# Patient Record
Sex: MALE | Race: WHITE | NOT HISPANIC OR LATINO | Employment: OTHER | URBAN - METROPOLITAN AREA
[De-identification: names, ages, dates, MRNs, and addresses within clinical notes are randomized per-mention and may not be internally consistent; named-entity substitution may affect disease eponyms.]

---

## 2021-07-27 ENCOUNTER — OFFICE VISIT (OUTPATIENT)
Dept: CARDIOLOGY | Facility: CLINIC | Age: 86
End: 2021-07-27

## 2021-07-27 VITALS
HEIGHT: 69 IN | DIASTOLIC BLOOD PRESSURE: 68 MMHG | SYSTOLIC BLOOD PRESSURE: 124 MMHG | RESPIRATION RATE: 18 BRPM | HEART RATE: 64 BPM | WEIGHT: 185.5 LBS | BODY MASS INDEX: 27.47 KG/M2

## 2021-07-27 DIAGNOSIS — R94.31 ABNORMAL EKG: Primary | ICD-10-CM

## 2021-07-27 PROCEDURE — 99204 OFFICE O/P NEW MOD 45 MIN: CPT | Performed by: INTERNAL MEDICINE

## 2021-07-27 RX ORDER — LOSARTAN POTASSIUM AND HYDROCHLOROTHIAZIDE 25; 100 MG/1; MG/1
1 TABLET ORAL DAILY
COMMUNITY
End: 2021-08-31 | Stop reason: DRUGHIGH

## 2021-07-28 NOTE — PROGRESS NOTES
Cardiology clinic note  Aashish Thapa MD, PhD  Subjective:     Encounter Date:07/27/2021      Patient ID: Art Mullins is a 86 y.o. male.    Chief Complaint:  Chief Complaint   Patient presents with   • Abnormal ECG       HPI:  History of Present Illness  At the pleasure to see this very pleasant 86-year-old gentleman is a new patient today for abnormal EKG demonstrating sinus rhythm with frequent PACs, he is a treated for essential hypertension but has no cardiovascular comorbidities such as hyperlipidemia, history of stroke, history of heart attack, he has no stents no prior bypass surgery no history of any heart failure and he is asymptomatic NYHA class I CCS class I and plays golf avidly twice a week.  His heart rate 64 his blood pressure is 124/68 on antihypertensive therapy including metoprolol 25 twice daily, Crestor 40 daily with lipids at target by last check, losartan HCTZ combination managed by primary care.  We are being asked for interpretation of abnormal EKG and cardiovascular impact.    Review of systems otherwise negative x14 point review of systems except as mentioned above    Historical data copied forward from previous encounters in EMR is unchanged next  The following portions of the patient's history were reviewed and updated as appropriate: allergies, current medications, past family history, past medical history, past social history, past surgical history and problem list.    Problem List:  There is no problem list on file for this patient.      Past Medical History:  Past Medical History:   Diagnosis Date   • Hyperlipidemia    • Hypertension        Past Surgical History:  No past surgical history on file.    Social History:  Social History     Socioeconomic History   • Marital status:      Spouse name: Not on file   • Number of children: Not on file   • Years of education: Not on file   • Highest education level: Not on file   Tobacco Use   • Smoking status: Never Smoker  "  Substance and Sexual Activity   • Alcohol use: No   • Drug use: No   • Sexual activity: Defer       Allergies:  Allergies   Allergen Reactions   • Sulfa Antibiotics        Immunizations:  Immunization History   Administered Date(s) Administered   • Tdap 09/01/2016       ROS:  ROS       Objective:         /68 (BP Location: Left arm, Patient Position: Sitting)   Pulse 64   Resp 18   Ht 175.3 cm (69\")   Wt 84.1 kg (185 lb 8 oz)   BMI 27.39 kg/m²     Physical Exam  Regular rate and rhythm no rubs murmurs gallops  No heave no lift  Normal CV exam  No clubbing cyanosis or edema  Clear to auscultation    In-Office Procedure(s):  Procedures    ASCVD RIsk Score::  The ASCVD Risk score (Delavanleesa MCNULTY Jr., et al., 2013) failed to calculate for the following reasons:    The 2013 ASCVD risk score is only valid for ages 40 to 79    Recent Radiology:  Imaging Results (Most Recent)     None          Lab Review:   No visits with results within 6 Month(s) from this visit.   Latest known visit with results is:   No results found for any previous visit.                Assessment:          Diagnosis Plan   1. Abnormal EKG  Holter Monitor - 72 Hour Up To 15 Days    Adult Transthoracic Echo Complete W/ Cont if Necessary Per Protocol          Plan:      Abnormal EKG  2D echo for structural and functional evaluation  Zio patch for ambulatory ECG monitoring    Essential hypertension controlled continue present medications  Hyperlipidemia controlled  Asymptomatic    Telehealth in 30 days to review results, sooner if needed    Aashish Thapa MD, PhD      Level of Care:                 Aashish Thapa MD  07/28/21  .  "

## 2021-08-24 ENCOUNTER — HOSPITAL ENCOUNTER (OUTPATIENT)
Dept: CARDIOLOGY | Facility: HOSPITAL | Age: 86
Discharge: HOME | End: 2021-08-24
Admitting: INTERNAL MEDICINE

## 2021-08-24 ENCOUNTER — TELEPHONE (OUTPATIENT)
Dept: CARDIOLOGY | Facility: CLINIC | Age: 86
End: 2021-08-24

## 2021-08-24 VITALS
HEIGHT: 69 IN | WEIGHT: 185 LBS | DIASTOLIC BLOOD PRESSURE: 52 MMHG | SYSTOLIC BLOOD PRESSURE: 121 MMHG | BODY MASS INDEX: 27.4 KG/M2

## 2021-08-24 DIAGNOSIS — R94.31 ABNORMAL EKG: ICD-10-CM

## 2021-08-24 LAB
AORTIC ARCH: 3.2 CM
AORTIC DIMENSIONLESS INDEX: 0.5 (DI)
ASCENDING AORTA: 3.4 CM
BH CV ECHO MEAS - ACS: 2 CM
BH CV ECHO MEAS - AI DEC SLOPE: 218 CM/SEC^2
BH CV ECHO MEAS - AI MAX PG: 80.3 MMHG
BH CV ECHO MEAS - AI MAX VEL: 448 CM/SEC
BH CV ECHO MEAS - AI P1/2T: 601.9 MSEC
BH CV ECHO MEAS - AO MAX PG: 8 MMHG
BH CV ECHO MEAS - AO MEAN PG (FULL): 3 MMHG
BH CV ECHO MEAS - AO MEAN PG: 4 MMHG
BH CV ECHO MEAS - AO ROOT AREA (BSA CORRECTED): 1.5
BH CV ECHO MEAS - AO ROOT AREA: 6.6 CM^2
BH CV ECHO MEAS - AO ROOT DIAM: 2.9 CM
BH CV ECHO MEAS - AO V2 MAX: 143 CM/SEC
BH CV ECHO MEAS - AO V2 MEAN: 94.8 CM/SEC
BH CV ECHO MEAS - AO V2 VTI: 33.1 CM
BH CV ECHO MEAS - ASC AORTA: 3.4 CM
BH CV ECHO MEAS - AVA(I,A): 2.2 CM^2
BH CV ECHO MEAS - AVA(I,D): 2.2 CM^2
BH CV ECHO MEAS - BSA(HAYCOCK): 2 M^2
BH CV ECHO MEAS - BSA: 2 M^2
BH CV ECHO MEAS - BZI_BMI: 27.3 KILOGRAMS/M^2
BH CV ECHO MEAS - BZI_METRIC_HEIGHT: 175.3 CM
BH CV ECHO MEAS - BZI_METRIC_WEIGHT: 83.9 KG
BH CV ECHO MEAS - EDV(CUBED): 140.6 ML
BH CV ECHO MEAS - EDV(MOD-SP2): 127 ML
BH CV ECHO MEAS - EDV(MOD-SP4): 156 ML
BH CV ECHO MEAS - EDV(TEICH): 129.5 ML
BH CV ECHO MEAS - EF(CUBED): 64 %
BH CV ECHO MEAS - EF(MOD-BP): 56 %
BH CV ECHO MEAS - EF(MOD-SP2): 56.7 %
BH CV ECHO MEAS - EF(MOD-SP4): 54.5 %
BH CV ECHO MEAS - EF(TEICH): 55.1 %
BH CV ECHO MEAS - ESV(CUBED): 50.7 ML
BH CV ECHO MEAS - ESV(MOD-SP2): 55 ML
BH CV ECHO MEAS - ESV(MOD-SP4): 71 ML
BH CV ECHO MEAS - ESV(TEICH): 58.1 ML
BH CV ECHO MEAS - FS: 28.8 %
BH CV ECHO MEAS - IVS/LVPW: 1
BH CV ECHO MEAS - IVSD: 1 CM
BH CV ECHO MEAS - LAT PEAK E' VEL: 6 CM/SEC
BH CV ECHO MEAS - LV DIASTOLIC VOL/BSA (35-75): 78.1 ML/M^2
BH CV ECHO MEAS - LV MASS(C)D: 194.2 GRAMS
BH CV ECHO MEAS - LV MASS(C)DI: 97.2 GRAMS/M^2
BH CV ECHO MEAS - LV MAX PG: 2 MMHG
BH CV ECHO MEAS - LV MEAN PG: 1 MMHG
BH CV ECHO MEAS - LV SYSTOLIC VOL/BSA (12-30): 35.5 ML/M^2
BH CV ECHO MEAS - LV V1 MAX: 74 CM/SEC
BH CV ECHO MEAS - LV V1 MEAN: 49 CM/SEC
BH CV ECHO MEAS - LV V1 VTI: 17.7 CM
BH CV ECHO MEAS - LVIDD: 5.2 CM
BH CV ECHO MEAS - LVIDS: 3.7 CM
BH CV ECHO MEAS - LVLD AP2: 8 CM
BH CV ECHO MEAS - LVLD AP4: 8.4 CM
BH CV ECHO MEAS - LVLS AP2: 6.9 CM
BH CV ECHO MEAS - LVLS AP4: 6.9 CM
BH CV ECHO MEAS - LVOT AREA (M): 4.2 CM^2
BH CV ECHO MEAS - LVOT AREA: 4.2 CM^2
BH CV ECHO MEAS - LVOT DIAM: 2.3 CM
BH CV ECHO MEAS - LVPWD: 1 CM
BH CV ECHO MEAS - MED PEAK E' VEL: 7 CM/SEC
BH CV ECHO MEAS - MV A DUR: 0.15 SEC
BH CV ECHO MEAS - MV A MAX VEL: 55.3 CM/SEC
BH CV ECHO MEAS - MV DEC SLOPE: 486 CM/SEC^2
BH CV ECHO MEAS - MV DEC TIME: 248 SEC
BH CV ECHO MEAS - MV E MAX VEL: 53.3 CM/SEC
BH CV ECHO MEAS - MV E/A: 0.96
BH CV ECHO MEAS - MV MEAN PG: 1 MMHG
BH CV ECHO MEAS - MV P1/2T MAX VEL: 94.7 CM/SEC
BH CV ECHO MEAS - MV P1/2T: 57.1 MSEC
BH CV ECHO MEAS - MV V2 MEAN: 36.9 CM/SEC
BH CV ECHO MEAS - MV V2 VTI: 24.4 CM
BH CV ECHO MEAS - MVA P1/2T LCG: 2.3 CM^2
BH CV ECHO MEAS - MVA(P1/2T): 3.9 CM^2
BH CV ECHO MEAS - MVA(VTI): 3 CM^2
BH CV ECHO MEAS - PA ACC SLOPE: 1456 CM/SEC^2
BH CV ECHO MEAS - PA ACC TIME: 0.07 SEC
BH CV ECHO MEAS - PA MAX PG (FULL): 3.4 MMHG
BH CV ECHO MEAS - PA MAX PG: 4.4 MMHG
BH CV ECHO MEAS - PA PR(ACCEL): 45.7 MMHG
BH CV ECHO MEAS - PA V2 MAX: 105 CM/SEC
BH CV ECHO MEAS - PI END-D VEL: 90.2 CM/SEC
BH CV ECHO MEAS - PULM A REVS DUR: 0.11 SEC
BH CV ECHO MEAS - PULM A REVS VEL: 27.3 CM/SEC
BH CV ECHO MEAS - PULM DIAS VEL: 46.8 CM/SEC
BH CV ECHO MEAS - PULM S/D: 1.3
BH CV ECHO MEAS - PULM SYS VEL: 61.5 CM/SEC
BH CV ECHO MEAS - PVA(V,A): 1.6 CM^2
BH CV ECHO MEAS - PVA(V,D): 1.6 CM^2
BH CV ECHO MEAS - QP/QS: 0.37
BH CV ECHO MEAS - RAP SYSTOLE: 3 MMHG
BH CV ECHO MEAS - RV MAX PG: 0.98 MMHG
BH CV ECHO MEAS - RV MEAN PG: 0 MMHG
BH CV ECHO MEAS - RV V1 MAX: 49.6 CM/SEC
BH CV ECHO MEAS - RV V1 MEAN: 28.2 CM/SEC
BH CV ECHO MEAS - RV V1 VTI: 7.9 CM
BH CV ECHO MEAS - RVOT AREA: 3.5 CM^2
BH CV ECHO MEAS - RVOT DIAM: 2.1 CM
BH CV ECHO MEAS - RVSP: 28.2 MMHG
BH CV ECHO MEAS - SI(AO): 109.4 ML/M^2
BH CV ECHO MEAS - SI(CUBED): 45 ML/M^2
BH CV ECHO MEAS - SI(LVOT): 36.8 ML/M^2
BH CV ECHO MEAS - SI(MOD-SP2): 36 ML/M^2
BH CV ECHO MEAS - SI(MOD-SP4): 42.5 ML/M^2
BH CV ECHO MEAS - SI(TEICH): 35.7 ML/M^2
BH CV ECHO MEAS - SV(AO): 218.6 ML
BH CV ECHO MEAS - SV(CUBED): 90 ML
BH CV ECHO MEAS - SV(LVOT): 73.5 ML
BH CV ECHO MEAS - SV(MOD-SP2): 72 ML
BH CV ECHO MEAS - SV(MOD-SP4): 85 ML
BH CV ECHO MEAS - SV(RVOT): 27.2 ML
BH CV ECHO MEAS - SV(TEICH): 71.4 ML
BH CV ECHO MEAS - TAPSE (>1.6): 2.2 CM
BH CV ECHO MEAS - TR MAX VEL: 251 CM/SEC
BH CV ECHO MEASUREMENTS AVERAGE E/E' RATIO: 8.2
BH CV VAS BP RIGHT ARM: NORMAL MMHG
BH CV XLRA - RV BASE: 3.8 CM
BH CV XLRA - RV LENGTH: 7.5 CM
BH CV XLRA - RV MID: 2.2 CM
BH CV XLRA - TDI S': 13 CM/SEC
LEFT ATRIUM VOLUME INDEX: 59 ML/M2
MAXIMAL PREDICTED HEART RATE: 135 BPM
SINUS: 3.5 CM
STJ: 3.1 CM
STRESS TARGET HR: 115 BPM

## 2021-08-24 PROCEDURE — 93306 TTE W/DOPPLER COMPLETE: CPT | Performed by: INTERNAL MEDICINE

## 2021-08-24 PROCEDURE — 93306 TTE W/DOPPLER COMPLETE: CPT

## 2021-08-24 RX ORDER — METOPROLOL SUCCINATE 50 MG/1
50 TABLET, EXTENDED RELEASE ORAL 2 TIMES DAILY
Qty: 60 TABLET | Refills: 5 | Status: SHIPPED | OUTPATIENT
Start: 2021-08-24

## 2021-08-31 ENCOUNTER — OFFICE VISIT (OUTPATIENT)
Dept: CARDIOLOGY | Facility: CLINIC | Age: 86
End: 2021-08-31

## 2021-08-31 VITALS
HEART RATE: 60 BPM | SYSTOLIC BLOOD PRESSURE: 103 MMHG | WEIGHT: 183 LBS | HEIGHT: 69 IN | DIASTOLIC BLOOD PRESSURE: 57 MMHG | BODY MASS INDEX: 27.11 KG/M2

## 2021-08-31 DIAGNOSIS — R00.2 PALPITATIONS: ICD-10-CM

## 2021-08-31 DIAGNOSIS — I10 ESSENTIAL HYPERTENSION: ICD-10-CM

## 2021-08-31 DIAGNOSIS — I49.3 PVC'S (PREMATURE VENTRICULAR CONTRACTIONS): ICD-10-CM

## 2021-08-31 DIAGNOSIS — R94.31 ABNORMAL EKG: Primary | ICD-10-CM

## 2021-08-31 PROCEDURE — 99214 OFFICE O/P EST MOD 30 MIN: CPT | Performed by: INTERNAL MEDICINE

## 2021-08-31 RX ORDER — LOSARTAN POTASSIUM AND HYDROCHLOROTHIAZIDE 12.5; 5 MG/1; MG/1
1 TABLET ORAL DAILY
Qty: 30 TABLET | Refills: 5 | Status: SHIPPED | OUTPATIENT
Start: 2021-08-31 | End: 2022-11-22 | Stop reason: DRUGHIGH

## 2021-08-31 NOTE — PROGRESS NOTES
Cardiology clinic note  Aashish Thapa MD, PhD  Subjective:     Encounter Date:08/31/2021      Patient ID: Art Mullins is a 86 y.o. male.    Chief Complaint:  Chief Complaint   Patient presents with   • Follow-up   Follow-up paroxysmal atrial fibrillation  Follow-up essential hypertension hyperlipidemia    Verbal consent obtained for telehealth visit today  Unable to complete visit using a video connection to the patient. A phone visit was used to complete this visits. Total time of discussion was 16 minutes with additional 14 minutes and charting in coordination of care.    HPI:  History of Present Illness  Previously I had the pleasure to see this very pleasant 86-year-old gentleman is a new patient today for abnormal EKG demonstrating sinus rhythm with frequent PACs, he is a treated for essential hypertension,  hyperlipidemia, without history of stroke, history of heart attack, he has no stents no prior bypass surgery no history of any heart failure and he is asymptomatic NYHA class I CCS class I and plays golf avidly twice a week.  His heart rate 64 his blood pressure is 124/68 on antihypertensive therapy including metoprolol 25 twice daily, Crestor 40 daily with lipids at target by last check, losartan HCTZ combination managed by primary care.  We are being asked for interpretation of abnormal EKG and cardiovascular impact.    Today I had the privilege to visit with him via telehealth.  Says his blood pressure still remaining slightly low but he otherwise feels okay and is playing golf again twice a week.  His heart rate is down at 60 but his blood pressure is 103/57 likely pretty low in an 86-year-old with otherwise no significant comorbidities that require blood pressure to be that we will tightly controlled.  He denies any orthostasis, his Zio patch demonstrated frequent runs of nonsustained paroxysmal atrial tachycardia as well as very frequent PVCs at 8.7% total burden of QRS complexes with one run of  nonsustained VT at 5 beats only.  He has known preserved LV systolic function with left atrial enlargement, mild to moderate valvular regurgitation that does not need intervention, no valvular stenoses, normal pulmonary pressures by my review and interpretation of his most recent 2D echo.     Review of systems otherwise negative x14 point review of systems except as mentioned above     Historical data copied forward from previous encounters in EMR is unchanged nextThe following    No exam today  Blood pressure 103/57 heart rate 60 his weight is 183 pounds    Assessment plan per my encounter today  Essential hypertension, blood pressures are low on added metoprolol  Decrease losartan HCTZ to 50/12.5 from 100/25  Metoprolol 50 twice daily will continue with paroxysmal tachycardias and significant burden of PVCs    Palpitations  PVCs, bigeminy, 8.7% total burden of complexes  Proximal atrial tachycardia  Nonsustained VT  Metoprolol 50 twice daily heart rates are 60 presently  Decreased antihypertensives with losartan HCTZ as above to avoid hypotension    hyperlipidemia continue on Crestor    No evidence of syncope or heart failure signs or symptoms or anginal chest pain    Continue prevention goals    Back to clinic in 3-6 months for follow-up    Aashish Thapa MD, PhD    portions of the patient's history were reviewed and updated as appropriate: allergies, current medications, past family history, past medical history, past social history, past surgical history and problem list.    Zio patch is reviewed interpreted by me results above    2D echo reviewed interpreted by me results as above    Cardiac medicines reviewed with risk and benefits and necessity of each discussed    Problem List:  There is no problem list on file for this patient.      Past Medical History:  Past Medical History:   Diagnosis Date   • Hyperlipidemia    • Hypertension        Past Surgical History:  History reviewed. No pertinent surgical  "history.    Social History:  Social History     Socioeconomic History   • Marital status:      Spouse name: Not on file   • Number of children: Not on file   • Years of education: Not on file   • Highest education level: Not on file   Tobacco Use   • Smoking status: Never Smoker   Substance and Sexual Activity   • Alcohol use: No   • Drug use: No   • Sexual activity: Defer       Allergies:  Allergies   Allergen Reactions   • Sulfa Antibiotics        Immunizations:  Immunization History   Administered Date(s) Administered   • Tdap 09/01/2016       ROS:  ROS       Objective:         /57   Pulse 60   Ht 175.3 cm (69\")   Wt 83 kg (183 lb)   BMI 27.02 kg/m²     Physical Exam    In-Office Procedure(s):  Procedures    ASCVD RIsk Score::  The ASCVD Risk score (Lizbet MCNULTY Jr., et al., 2013) failed to calculate for the following reasons:    The 2013 ASCVD risk score is only valid for ages 40 to 79    Recent Radiology:  Imaging Results (Most Recent)     None          Lab Review:   Hospital Outpatient Visit on 08/24/2021   Component Date Value   • BSA 08/24/2021 2.0    • IVSd 08/24/2021 1.0    • LVIDd 08/24/2021 5.2    • LVIDs 08/24/2021 3.7    • LVPWd 08/24/2021 1.0    • IVS/LVPW 08/24/2021 1.0    • FS 08/24/2021 28.8    • EDV(Teich) 08/24/2021 129.5    • ESV(Teich) 08/24/2021 58.1    • EF(Teich) 08/24/2021 55.1    • EDV(cubed) 08/24/2021 140.6    • ESV(cubed) 08/24/2021 50.7    • EF(cubed) 08/24/2021 64.0    • LV mass(C)d 08/24/2021 194.2    • LV mass(C)dI 08/24/2021 97.2    • SV(Teich) 08/24/2021 71.4    • SI(Teich) 08/24/2021 35.7    • SV(cubed) 08/24/2021 90.0    • SI(cubed) 08/24/2021 45.0    • Ao root diam 08/24/2021 2.9    • Ao root area 08/24/2021 6.6    • ACS 08/24/2021 2.0    • asc Aorta Diam 08/24/2021 3.4    • LVOT diam 08/24/2021 2.3    • LVOT area 08/24/2021 4.2    • LVOT area(traced) 08/24/2021 4.2    • RVOT diam 08/24/2021 2.1    • RVOT area 08/24/2021 3.5    • LVLd ap4 08/24/2021 8.4    • " EDV(MOD-sp4) 08/24/2021 156.0    • LVLs ap4 08/24/2021 6.9    • ESV(MOD-sp4) 08/24/2021 71.0    • EF(MOD-sp4) 08/24/2021 54.5    • LVLd ap2 08/24/2021 8.0    • EDV(MOD-sp2) 08/24/2021 127.0    • LVLs ap2 08/24/2021 6.9    • ESV(MOD-sp2) 08/24/2021 55.0    • EF(MOD-sp2) 08/24/2021 56.7    • SV(MOD-sp4) 08/24/2021 85.0    • SI(MOD-sp4) 08/24/2021 42.5    • SV(MOD-sp2) 08/24/2021 72.0    • SI(MOD-sp2) 08/24/2021 36.0    • Ao root area (BSA correc* 08/24/2021 1.5    • LV Majano Vol (BSA correct* 08/24/2021 78.1    • LV Sys Vol (BSA correcte* 08/24/2021 35.5    • EF(MOD-bp) 08/24/2021 56.0    • MV A dur 08/24/2021 0.15    • MV E max iveth 08/24/2021 53.3    • MV A max iveth 08/24/2021 55.3    • MV E/A 08/24/2021 0.96    • MV V2 mean 08/24/2021 36.9    • MV mean PG 08/24/2021 1.0    • MV V2 VTI 08/24/2021 24.4    • MVA(VTI) 08/24/2021 3.0    • MV P1/2t max iveth 08/24/2021 94.7    • MV P1/2t 08/24/2021 57.1    • MVA(P1/2t) 08/24/2021 3.9    • MV dec slope 08/24/2021 486.0    • MV dec time 08/24/2021 248    • Ao V2 mean 08/24/2021 94.8    • Ao mean PG 08/24/2021 4.0    • Ao mean PG (full) 08/24/2021 3.0    • Ao V2 VTI 08/24/2021 33.1    • CHET(I,A) 08/24/2021 2.2    • CHET(I,D) 08/24/2021 2.2    • AI max iveth 08/24/2021 448.0    • AI max PG 08/24/2021 80.3    • AI dec slope 08/24/2021 218.0    • AI P1/2t 08/24/2021 601.9    • LV V1 mean PG 08/24/2021 1.0    • LV V1 mean 08/24/2021 49.0    • LV V1 VTI 08/24/2021 17.7    • SV(Ao) 08/24/2021 218.6    • SI(Ao) 08/24/2021 109.4    • SV(LVOT) 08/24/2021 73.5    • SV(RVOT) 08/24/2021 27.2    • SI(LVOT) 08/24/2021 36.8    • PA V2 max 08/24/2021 105.0    • PA max PG 08/24/2021 4.4    • PA max PG (full) 08/24/2021 3.4    • BH CV ECHO GERTRUDE - PVA(V,* 08/24/2021 1.6    • BH CV ECHO GERTRUDE - PVA(V,* 08/24/2021 1.6    • PA acc slope 08/24/2021 1,456    • PA acc time 08/24/2021 0.07    • PI end-d iveth 08/24/2021 90.2    • RV V1 max PG 08/24/2021 0.98    • RV V1 mean PG 08/24/2021 0    • RV V1 max  08/24/2021 49.6    • RV V1 mean 08/24/2021 28.2    • RV V1 VTI 08/24/2021 7.9    • TR max moris 08/24/2021 251.0    • RVSP(TR) 08/24/2021 28.2    • RAP systole 08/24/2021 3.0    • PA pr(Accel) 08/24/2021 45.7    • Pulm Sys Moris 08/24/2021 61.5    • Pulm Majano Moris 08/24/2021 46.8    • Pulm S/D 08/24/2021 1.3    • Qp/Qs 08/24/2021 0.37    • Pulm A Revs Dur 08/24/2021 0.11    • Pulm A Revs Moris 08/24/2021 27.3    • MVA P1/2T LCG 08/24/2021 2.3    • BH CV ECHO GERTRUDE - BZI_BMI 08/24/2021 27.3    • BH CV ECHO GERTRUDE - BSA(HA* 08/24/2021 2.0    • BH CV ECHO GERTRUDE - BZI_ME* 08/24/2021 83.9    • BH CV ECHO GERTRUDE - BZI_ME* 08/24/2021 175.3    • Target HR (85%) 08/24/2021 115    • Max. Pred. HR (100%) 08/24/2021 135    • BH CV VAS BP RIGHT ARM 08/24/2021 121/52    • RV S' 08/24/2021 13.00    • RV Base 08/24/2021 3.80    • RV Length 08/24/2021 7.50    • RV Mid 08/24/2021 2.20    • Sinus 08/24/2021 3.5    • STJ 08/24/2021 3.1    • Ascending aorta 08/24/2021 3.4    • Aortic arch 08/24/2021 3.2    • Dimensionless Index 08/24/2021 0.50    • LA Volume Index 08/24/2021 59.0    • Avg E/e' ratio 08/24/2021 8.20    • Ao pk moris 08/24/2021 143.0    • Lat Peak E' Moris 08/24/2021 6.0    • LV V1 max PG 08/24/2021 2.0    • LV V1 max 08/24/2021 74.0    • Med Peak E' Moris 08/24/2021 7.00    • Ao max PG 08/24/2021 8    • TAPSE (>1.6) 08/24/2021 2.20                 Assessment:         No diagnosis found.       Plan:            Level of Care:                 Aashish Thapa MD  08/31/21  .

## 2021-11-18 ENCOUNTER — OFFICE VISIT (OUTPATIENT)
Dept: CARDIOLOGY | Facility: CLINIC | Age: 86
End: 2021-11-18

## 2021-11-18 VITALS
DIASTOLIC BLOOD PRESSURE: 68 MMHG | WEIGHT: 187 LBS | BODY MASS INDEX: 27.7 KG/M2 | SYSTOLIC BLOOD PRESSURE: 130 MMHG | HEART RATE: 67 BPM | HEIGHT: 69 IN | RESPIRATION RATE: 18 BRPM

## 2021-11-18 DIAGNOSIS — R00.2 PALPITATIONS: ICD-10-CM

## 2021-11-18 DIAGNOSIS — I49.3 PVC'S (PREMATURE VENTRICULAR CONTRACTIONS): ICD-10-CM

## 2021-11-18 DIAGNOSIS — I10 ESSENTIAL HYPERTENSION: ICD-10-CM

## 2021-11-18 DIAGNOSIS — R94.31 ABNORMAL EKG: Primary | ICD-10-CM

## 2021-11-18 PROCEDURE — 99213 OFFICE O/P EST LOW 20 MIN: CPT | Performed by: INTERNAL MEDICINE

## 2021-11-18 RX ORDER — ASPIRIN 81 MG/1
81 TABLET ORAL DAILY
COMMUNITY

## 2021-11-23 NOTE — PROGRESS NOTES
Cardiology clinic note  Aashish Thapa MD, PhD  Subjective:     Encounter Date:11/18/2021      Patient ID: Art Mullins is a 86 y.o. male.    Chief Complaint:  Chief Complaint   Patient presents with   • Follow-up       HPI:  History of Present Illness  Previously I had the pleasure to see this very pleasant 86-year-old gentleman is a new patient today for abnormal EKG demonstrating sinus rhythm with frequent PACs, he is a treated for essential hypertension,  hyperlipidemia, without history of stroke, history of heart attack, he has no stents no prior bypass surgery no history of any heart failure and he is asymptomatic NYHA class I CCS class I and plays golf avidly twice a week.  His heart rate 64 his blood pressure is 124/68 on antihypertensive therapy including metoprolol 25 twice daily, Crestor 40 daily with lipids at target by last check, losartan HCTZ combination managed by primary care.  We are being asked for interpretation of abnormal EKG and cardiovascular impact.     Today I had the privilege to visit with him via telehealth.  Says his blood pressure still remaining slightly low but he otherwise feels okay and is playing golf again twice a week.  His heart rate is down at 60 but his blood pressure is 103/57 likely pretty low in an 86-year-old with otherwise no significant comorbidities that require blood pressure to be that we will tightly controlled.  He denies any orthostasis, his Zio patch demonstrated frequent runs of nonsustained paroxysmal atrial tachycardia as well as very frequent PVCs at 8.7% total burden of QRS complexes with one run of nonsustained VT at 5 beats only.  He has known preserved LV systolic function with left atrial enlargement, mild to moderate valvular regurgitation that does not need intervention, no valvular stenoses, normal pulmonary pressures by my review and interpretation of his most recent 2D echo.    Today in clinic he continues to be well every 5 years old.  No chest  pain shortness of breath or heart failure signs or symptoms.  He continues on losartan HCTZ, metoprolol rosuvastatin and aspirin for his cardiovascular medicines which were reviewed and risk benefits discussed with relative to his medicine with necessity of each.  We are again reviewed his previous Zio patch results which had significant PVCs but he remained with normal EF.  Clinically completely asymptomatic from this respect no medication changes, no need for ablation or other antiarrhythmics     Review of systems otherwise negative x14 point review of systems except as mentioned above     Historical data copied forward from previous encounters in EMR is unchanged nextThe following     No exam today  Blood pressure 103/57 heart rate 60 his weight is 183 pounds     Assessment plan per my encounter today  Essential hypertension, blood pressures are low on added metoprolol  Decrease losartan HCTZ to 50/12.5 from 100/25  Metoprolol 50 twice daily will continue with paroxysmal tachycardias and significant burden of PVCs     Palpitations, benign, normal EF, asymptomatic  PVCs, bigeminy, 8.7% total burden of complexes  Proximal atrial tachycardia  Nonsustained VT  Metoprolol 50 twice daily heart rates are 60 presently  Decreased antihypertensives with losartan HCTZ as above to avoid hypotension     hyperlipidemia continue on Crestor     No evidence of syncope or heart failure signs or symptoms or anginal chest pain     Continue prevention goals     Back to clinic in 3-6 months for follow-up     Aashish Thapa MD, PhD     portions of the patient's history were reviewed and updated as appropriate: allergies, current medications, past family history, past medical history, past social history, past surgical history and problem list.     Zio patch is reviewed interpreted by me results above     2D echo reviewed interpreted by me results as above     Cardiac medicines reviewed with risk and benefits and necessity of each  "discussed  The following portions of the patient's history were reviewed and updated as appropriate: allergies, current medications, past family history, past medical history, past social history, past surgical history and problem list.    Problem List:  Patient Active Problem List   Diagnosis   • Palpitations   • Essential hypertension   • Abnormal EKG       Past Medical History:  Past Medical History:   Diagnosis Date   • Hyperlipidemia    • Hypertension        Past Surgical History:  No past surgical history on file.    Social History:  Social History     Socioeconomic History   • Marital status:    Tobacco Use   • Smoking status: Never Smoker   Substance and Sexual Activity   • Alcohol use: No   • Drug use: No   • Sexual activity: Defer       Allergies:  Allergies   Allergen Reactions   • Sulfa Antibiotics        Immunizations:  Immunization History   Administered Date(s) Administered   • COVID-19 (PFIZER) 02/02/2021, 02/23/2021, 10/12/2021   • Tdap 09/01/2016       ROS:  ROS       Objective:         /68 (BP Location: Left arm, Patient Position: Sitting)   Pulse 67   Resp 18   Ht 175.3 cm (69.02\")   Wt 84.8 kg (187 lb)   BMI 27.60 kg/m²     Physical Exam    In-Office Procedure(s):  Procedures    ASCVD RIsk Score::  The ASCVD Risk score (Hart PRICILA Jr., et al., 2013) failed to calculate for the following reasons:    The 2013 ASCVD risk score is only valid for ages 40 to 79    Recent Radiology:  Imaging Results (Most Recent)     None          Lab Review:   Hospital Outpatient Visit on 08/24/2021   Component Date Value   • BSA 08/24/2021 2.0    • IVSd 08/24/2021 1.0    • LVIDd 08/24/2021 5.2    • LVIDs 08/24/2021 3.7    • LVPWd 08/24/2021 1.0    • IVS/LVPW 08/24/2021 1.0    • FS 08/24/2021 28.8    • EDV(Teich) 08/24/2021 129.5    • ESV(Teich) 08/24/2021 58.1    • EF(Teich) 08/24/2021 55.1    • EDV(cubed) 08/24/2021 140.6    • ESV(cubed) 08/24/2021 50.7    • EF(cubed) 08/24/2021 64.0    • LV mass(C)d " 08/24/2021 194.2    • LV mass(C)dI 08/24/2021 97.2    • SV(Teich) 08/24/2021 71.4    • SI(Teich) 08/24/2021 35.7    • SV(cubed) 08/24/2021 90.0    • SI(cubed) 08/24/2021 45.0    • Ao root diam 08/24/2021 2.9    • Ao root area 08/24/2021 6.6    • ACS 08/24/2021 2.0    • asc Aorta Diam 08/24/2021 3.4    • LVOT diam 08/24/2021 2.3    • LVOT area 08/24/2021 4.2    • LVOT area(traced) 08/24/2021 4.2    • RVOT diam 08/24/2021 2.1    • RVOT area 08/24/2021 3.5    • LVLd ap4 08/24/2021 8.4    • EDV(MOD-sp4) 08/24/2021 156.0    • LVLs ap4 08/24/2021 6.9    • ESV(MOD-sp4) 08/24/2021 71.0    • EF(MOD-sp4) 08/24/2021 54.5    • LVLd ap2 08/24/2021 8.0    • EDV(MOD-sp2) 08/24/2021 127.0    • LVLs ap2 08/24/2021 6.9    • ESV(MOD-sp2) 08/24/2021 55.0    • EF(MOD-sp2) 08/24/2021 56.7    • SV(MOD-sp4) 08/24/2021 85.0    • SI(MOD-sp4) 08/24/2021 42.5    • SV(MOD-sp2) 08/24/2021 72.0    • SI(MOD-sp2) 08/24/2021 36.0    • Ao root area (BSA correc* 08/24/2021 1.5    • LV Majano Vol (BSA correct* 08/24/2021 78.1    • LV Sys Vol (BSA correcte* 08/24/2021 35.5    • EF(MOD-bp) 08/24/2021 56.0    • MV A dur 08/24/2021 0.15    • MV E max iveth 08/24/2021 53.3    • MV A max iveth 08/24/2021 55.3    • MV E/A 08/24/2021 0.96    • MV V2 mean 08/24/2021 36.9    • MV mean PG 08/24/2021 1.0    • MV V2 VTI 08/24/2021 24.4    • MVA(VTI) 08/24/2021 3.0    • MV P1/2t max iveth 08/24/2021 94.7    • MV P1/2t 08/24/2021 57.1    • MVA(P1/2t) 08/24/2021 3.9    • MV dec slope 08/24/2021 486.0    • MV dec time 08/24/2021 248    • Ao V2 mean 08/24/2021 94.8    • Ao mean PG 08/24/2021 4.0    • Ao mean PG (full) 08/24/2021 3.0    • Ao V2 VTI 08/24/2021 33.1    • CHET(I,A) 08/24/2021 2.2    • CHET(I,D) 08/24/2021 2.2    • AI max iveth 08/24/2021 448.0    • AI max PG 08/24/2021 80.3    • AI dec slope 08/24/2021 218.0    • AI P1/2t 08/24/2021 601.9    • LV V1 mean PG 08/24/2021 1.0    • LV V1 mean 08/24/2021 49.0    • LV V1 VTI 08/24/2021 17.7    • SV(Ao) 08/24/2021 218.6    •  SI(Ao) 08/24/2021 109.4    • SV(LVOT) 08/24/2021 73.5    • SV(RVOT) 08/24/2021 27.2    • SI(LVOT) 08/24/2021 36.8    • PA V2 max 08/24/2021 105.0    • PA max PG 08/24/2021 4.4    • PA max PG (full) 08/24/2021 3.4    •  CV ECHO GERTRUDE - PVA(V,* 08/24/2021 1.6    •  CV ECHO GERTRUDE - PVA(V,* 08/24/2021 1.6    • PA acc slope 08/24/2021 1,456    • PA acc time 08/24/2021 0.07    • PI end-d moris 08/24/2021 90.2    • RV V1 max PG 08/24/2021 0.98    • RV V1 mean PG 08/24/2021 0    • RV V1 max 08/24/2021 49.6    • RV V1 mean 08/24/2021 28.2    • RV V1 VTI 08/24/2021 7.9    • TR max moris 08/24/2021 251.0    • RVSP(TR) 08/24/2021 28.2    • RAP systole 08/24/2021 3.0    • PA pr(Accel) 08/24/2021 45.7    • Pulm Sys Moris 08/24/2021 61.5    • Pulm Majano Moris 08/24/2021 46.8    • Pulm S/D 08/24/2021 1.3    • Qp/Qs 08/24/2021 0.37    • Pulm A Revs Dur 08/24/2021 0.11    • Pulm A Revs Moris 08/24/2021 27.3    • MVA P1/2T LCG 08/24/2021 2.3    •  CV ECHO GERTRUDE - BZI_BMI 08/24/2021 27.3    •  CV ECHO GERTRUDE - BSA(HA* 08/24/2021 2.0    •  CV ECHO GERTRUDE - BZI_ME* 08/24/2021 83.9    •  CV ECHO GERTRUDE - BZI_ME* 08/24/2021 175.3    • Target HR (85%) 08/24/2021 115    • Max. Pred. HR (100%) 08/24/2021 135    • BH CV VAS BP RIGHT ARM 08/24/2021 121/52    • RV S' 08/24/2021 13.00    • RV Base 08/24/2021 3.80    • RV Length 08/24/2021 7.50    • RV Mid 08/24/2021 2.20    • Sinus 08/24/2021 3.5    • STJ 08/24/2021 3.1    • Ascending aorta 08/24/2021 3.4    • Aortic arch 08/24/2021 3.2    • Dimensionless Index 08/24/2021 0.50    • LA Volume Index 08/24/2021 59.0    • Avg E/e' ratio 08/24/2021 8.20    • Ao pk moris 08/24/2021 143.0    • Lat Peak E' Moris 08/24/2021 6.0    • LV V1 max PG 08/24/2021 2.0    • LV V1 max 08/24/2021 74.0    • Med Peak E' Moris 08/24/2021 7.00    • Ao max PG 08/24/2021 8    • TAPSE (>1.6) 08/24/2021 2.20                    Aashish Thapa MD  11/23/21  .

## 2022-11-22 ENCOUNTER — OFFICE VISIT (OUTPATIENT)
Dept: CARDIOLOGY | Facility: CLINIC | Age: 87
End: 2022-11-22

## 2022-11-22 VITALS
HEIGHT: 69 IN | HEART RATE: 70 BPM | WEIGHT: 180 LBS | BODY MASS INDEX: 26.66 KG/M2 | SYSTOLIC BLOOD PRESSURE: 103 MMHG | RESPIRATION RATE: 18 BRPM | DIASTOLIC BLOOD PRESSURE: 61 MMHG

## 2022-11-22 DIAGNOSIS — I49.3 PVC'S (PREMATURE VENTRICULAR CONTRACTIONS): Primary | ICD-10-CM

## 2022-11-22 PROCEDURE — 99214 OFFICE O/P EST MOD 30 MIN: CPT | Performed by: INTERNAL MEDICINE

## 2022-11-22 PROCEDURE — 93000 ELECTROCARDIOGRAM COMPLETE: CPT | Performed by: INTERNAL MEDICINE

## 2022-11-22 RX ORDER — LOSARTAN POTASSIUM AND HYDROCHLOROTHIAZIDE 25; 100 MG/1; MG/1
1 TABLET ORAL DAILY
COMMUNITY

## 2022-11-22 NOTE — PROGRESS NOTES
Cardiology Clinic Note  Aashish Thapa MD, PhD    Subjective:     Encounter Date:11/22/2022      Patient ID: Art Mullins is a 87 y.o. male.    Chief Complaint:  Chief Complaint   Patient presents with   • Follow-up       HPI:    History of Present Illness  Previously I had the pleasure to see this very pleasant 87-year-old gentleman with cardiac history of essential hypertension,  hyperlipidemia, with no history of CVA or MI, he has no stents no prior bypass surgery no history of any heart failure and he is asymptomatic NYHA class I CCS class I and plays golf avidly twice a week.  Previous work-up for palpitations with his Zio patch demonstrated frequent runs of nonsustained paroxysmal atrial tachycardia as well as frequent PVCs at 8.7% total burden of QRS complexes with one run of nonsustained VT at 5 beats only.  He has known preserved LV systolic function with left atrial enlargement, mild to moderate valvular regurgitation that does not need intervention, no valvular stenoses, normal pulmonary pressures by my review and interpretation of his most recent 2D echo.  He remains NYHA class I CCS class I at his age otherwise high functioning and doing well      Review of systems otherwise negative x14 point review of systems except as mentioned above     Historical data copied forward from previous encounters in EMR is unchanged nextThe following          Assessment plan per my encounter today  Essential hypertension, blood pressures controlled presently on present medications, logs demonstrate 10 5-1 27 systolic over the last 1 week  Continue losartan HCTZ and metoprolol will be continued    PVCs, palpitations, continue beta-blocker, overall unchanged   Palpitations, benign, normal EF, asymptomatic  PVCs, bigeminy, 8.7% total burden of complexes  Proximal atrial tachycardia  Nonsustained VT  Metoprolol 50 twice daily, decrease to 50 daily if becomes bradycardic or has dizziness or presyncope    Patient follows  with primary care for titration of hypertensives.  Goal blood pressures of less than 135 systolic, would avoid blood pressures routinely less than 110 and 87-year-old gentleman also on HCTZ and alpha-blocker as well.  Avoid volume depletion       hyperlipidemia continue on Crestor     No evidence of syncope or heart failure signs or symptoms or anginal chest pain     Continue primary prevention goals     Back to clinic in 6 months to 1 year     Aashish Thapa MD, PhD    Historical data copied forward from previous encounters in EMR including the history, exam, and assessment/plan has been reviewed and is unchanged unless noted otherwise.    Cardiac medicines reviewed with risk, benefits, and necessity of each discussed.    Risk and benefit of cardiac testing reviewed including death heart attack stroke pain bleeding infection need for vascular /cardiovascular surgery were discussed and the patient           The pleasure to be involved in this patient's cardiovascular care.  Please call with any questions or concerns  Aashish Thapa MD, PhD    Most recent EKG as reviewed and interpreted by me:    ECG 12 Lead    Date/Time: 11/22/2022 11:40 AM  Performed by: Aashish Thapa MD  Authorized by: Aashish Thapa MD   Rhythm: sinus rhythm  Ectopy: unifocal PVCs  Rate: normal  QRS axis: normal  Other findings: non-specific ST-T wave changes    Clinical impression: abnormal EKG             Most recent echo as reviewed and interpreted by me:  Results for orders placed during the hospital encounter of 08/24/21    Adult Transthoracic Echo Complete W/ Cont if Necessary Per Protocol    Interpretation Summary  · Calculated left ventricular EF = 56% Estimated left ventricular EF was in agreement with the calculated left ventricular EF.  · Left ventricular diastolic function is consistent with (grade I) impaired relaxation.  · Left atrial volume is severely increased.  · Moderate tricuspid valve regurgitation is  present.  · Estimated right ventricular systolic pressure from tricuspid regurgitation is normal (<35 mmHg).  · There is mild calcification of the aortic valve mainly affecting the non-coronary, left coronary and right coronary cusp(s).  · Mild aortic valve regurgitation is present. No hemodynamically significant aortic valve stenosis is present.  · Mild mitral valve regurgitation is present.      Most recent stress test as reviewed and interpreted by me:      Most recent cardiac catheterization as reviewed interpreted by me:  No results found for this or any previous visit.    The following portions of the patient's history were reviewed and updated as appropriate: allergies, current medications, past family history, past medical history, past social history, past surgical history and problem list.      ROS:  14 point review of systems negative except as mentioned above    Current Outpatient Medications:   •  aspirin 81 MG EC tablet, Take 81 mg by mouth Daily., Disp: , Rfl:   •  cholecalciferol (VITAMIN D3) 1000 UNITS tablet, Take 1,000 Units by mouth daily., Disp: , Rfl:   •  clonazePAM (KlonoPIN) 1 MG tablet, Take 1 mg by mouth Every Night., Disp: , Rfl:   •  Cyanocobalamin (VITAMIN B 12 PO), Take 2,500 mcg by mouth Daily., Disp: , Rfl:   •  doxazosin (CARDURA) 4 MG tablet, Take 4 mg by mouth Every Night., Disp: , Rfl:   •  losartan-hydrochlorothiazide (Hyzaar) 50-12.5 MG per tablet, Take 1 tablet by mouth Daily., Disp: 30 tablet, Rfl: 5  •  metoprolol succinate XL (TOPROL-XL) 50 MG 24 hr tablet, Take 1 tablet by mouth 2 (two) times a day., Disp: 60 tablet, Rfl: 5  •  rOPINIRole (REQUIP) 2 MG tablet, Take  by mouth Every Night., Disp: , Rfl:   •  rosuvastatin (CRESTOR) 40 MG tablet, Take 40 mg by mouth Daily., Disp: , Rfl:     Problem List:  Patient Active Problem List   Diagnosis   • Palpitations   • Essential hypertension   • Abnormal EKG     Past Medical History:  Past Medical History:   Diagnosis Date   •  Hyperlipidemia    • Hypertension      Past Surgical History:  No past surgical history on file.  Social History:  Social History     Socioeconomic History   • Marital status:    Tobacco Use   • Smoking status: Never   Substance and Sexual Activity   • Alcohol use: No   • Drug use: No   • Sexual activity: Defer     Allergies:  Allergies   Allergen Reactions   • Sulfa Antibiotics      Immunizations:  Immunization History   Administered Date(s) Administered   • COVID-19 (PFIZER) PURPLE CAP 02/02/2021, 02/23/2021, 10/12/2021   • Covid-19 (Pfizer) Gray Cap 06/21/2022   • Tdap 09/01/2016            In-Office Procedure(s):  No orders to display        ASCVD RIsk Score::  The ASCVD Risk score (Kimberly DK, et al., 2019) failed to calculate for the following reasons:    The 2019 ASCVD risk score is only valid for ages 40 to 79    Imaging:    Results for orders placed during the hospital encounter of 09/01/16    STAT XR elbow 2 vw right    Narrative  RIGHT ELBOW    HISTORY: Injury, laceration.    FINDINGS: 2 views of the right elbow showed no evidence of dislocation  or of a sail sign. There is no evidence of a radiopaque foreign body.  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 Calcific  foci adjacent to the radial head are noted which are likely degenerative  in nature, less likely related to remote trauma. No convincing acute  fracture is identified. If the patient's symptoms persists a follow-up  study could be obtained in 7-10 days.    This report was finalized on 9/6/2016 4:17 PM by Dr. Wei Macdonald MD.               Lab Review:   No visits with results within 6 Month(s) from this visit.   Latest known visit with results is:   Hospital Outpatient Visit on 08/24/2021   Component Date Value   • BSA 08/24/2021 2.0    • IVSd 08/24/2021 1.0    • LVIDd 08/24/2021 5.2    • LVIDs 08/24/2021 3.7    • LVPWd 08/24/2021 1.0    • IVS/LVPW 08/24/2021 1.0    • FS 08/24/2021 28.8    • EDV(Teich) 08/24/2021 129.5     • ESV(Teich) 08/24/2021 58.1    • EF(Teich) 08/24/2021 55.1    • EDV(cubed) 08/24/2021 140.6    • ESV(cubed) 08/24/2021 50.7    • EF(cubed) 08/24/2021 64.0    • LV mass(C)d 08/24/2021 194.2    • LV mass(C)dI 08/24/2021 97.2    • SV(Teich) 08/24/2021 71.4    • SI(Teich) 08/24/2021 35.7    • SV(cubed) 08/24/2021 90.0    • SI(cubed) 08/24/2021 45.0    • Ao root diam 08/24/2021 2.9    • Ao root area 08/24/2021 6.6    • ACS 08/24/2021 2.0    • asc Aorta Diam 08/24/2021 3.4    • LVOT diam 08/24/2021 2.3    • LVOT area 08/24/2021 4.2    • LVOT area(traced) 08/24/2021 4.2    • RVOT diam 08/24/2021 2.1    • RVOT area 08/24/2021 3.5    • LVLd ap4 08/24/2021 8.4    • EDV(MOD-sp4) 08/24/2021 156.0    • LVLs ap4 08/24/2021 6.9    • ESV(MOD-sp4) 08/24/2021 71.0    • EF(MOD-sp4) 08/24/2021 54.5    • LVLd ap2 08/24/2021 8.0    • EDV(MOD-sp2) 08/24/2021 127.0    • LVLs ap2 08/24/2021 6.9    • ESV(MOD-sp2) 08/24/2021 55.0    • EF(MOD-sp2) 08/24/2021 56.7    • SV(MOD-sp4) 08/24/2021 85.0    • SI(MOD-sp4) 08/24/2021 42.5    • SV(MOD-sp2) 08/24/2021 72.0    • SI(MOD-sp2) 08/24/2021 36.0    • Ao root area (BSA correc* 08/24/2021 1.5    • LV Majano Vol (BSA correct* 08/24/2021 78.1    • LV Sys Vol (BSA correcte* 08/24/2021 35.5    • EF(MOD-bp) 08/24/2021 56.0    • MV A dur 08/24/2021 0.15    • MV E max iveth 08/24/2021 53.3    • MV A max iveth 08/24/2021 55.3    • MV E/A 08/24/2021 0.96    • MV V2 mean 08/24/2021 36.9    • MV mean PG 08/24/2021 1.0    • MV V2 VTI 08/24/2021 24.4    • MVA(VTI) 08/24/2021 3.0    • MV P1/2t max iveth 08/24/2021 94.7    • MV P1/2t 08/24/2021 57.1    • MVA(P1/2t) 08/24/2021 3.9    • MV dec slope 08/24/2021 486.0    • MV dec time 08/24/2021 248    • Ao V2 mean 08/24/2021 94.8    • Ao mean PG 08/24/2021 4.0    • Ao mean PG (full) 08/24/2021 3.0    • Ao V2 VTI 08/24/2021 33.1    • CHET(I,A) 08/24/2021 2.2    • CHET(I,D) 08/24/2021 2.2    • AI max iveth 08/24/2021 448.0    • AI max PG 08/24/2021 80.3    • AI dec slope  08/24/2021 218.0    • AI P1/2t 08/24/2021 601.9    • LV V1 mean PG 08/24/2021 1.0    • LV V1 mean 08/24/2021 49.0    • LV V1 VTI 08/24/2021 17.7    • SV(Ao) 08/24/2021 218.6    • SI(Ao) 08/24/2021 109.4    • SV(LVOT) 08/24/2021 73.5    • SV(RVOT) 08/24/2021 27.2    • SI(LVOT) 08/24/2021 36.8    • PA V2 max 08/24/2021 105.0    • PA max PG 08/24/2021 4.4    • PA max PG (full) 08/24/2021 3.4    • BH CV ECHO GERTRUDE - PVA(V,* 08/24/2021 1.6    • BH CV ECHO GERTRUDE - PVA(V,* 08/24/2021 1.6    • PA acc slope 08/24/2021 1,456    • PA acc time 08/24/2021 0.07    • PI end-d moris 08/24/2021 90.2    • RV V1 max PG 08/24/2021 0.98    • RV V1 mean PG 08/24/2021 0    • RV V1 max 08/24/2021 49.6    • RV V1 mean 08/24/2021 28.2    • RV V1 VTI 08/24/2021 7.9    • TR max moris 08/24/2021 251.0    • RVSP(TR) 08/24/2021 28.2    • RAP systole 08/24/2021 3.0    • PA pr(Accel) 08/24/2021 45.7    • Pulm Sys Moris 08/24/2021 61.5    • Pulm Majano Moris 08/24/2021 46.8    • Pulm S/D 08/24/2021 1.3    • Qp/Qs 08/24/2021 0.37    • Pulm A Revs Dur 08/24/2021 0.11    • Pulm A Revs Moris 08/24/2021 27.3    • MVA P1/2T LCG 08/24/2021 2.3    • BH CV ECHO GERTRUDE - BZI_BMI 08/24/2021 27.3    •  CV ECHO GERTRUDE - BSA(HA* 08/24/2021 2.0    •  CV ECHO GERTRUDE - BZI_ME* 08/24/2021 83.9    •  CV ECHO GERTRUDE - BZI_ME* 08/24/2021 175.3    • Target HR (85%) 08/24/2021 115    • Max. Pred. HR (100%) 08/24/2021 135    •  CV VAS BP RIGHT ARM 08/24/2021 121/52    • RV S' 08/24/2021 13.00    • RV Base 08/24/2021 3.80    • RV Length 08/24/2021 7.50    • RV Mid 08/24/2021 2.20    • Sinus 08/24/2021 3.5    • STJ 08/24/2021 3.1    • Ascending aorta 08/24/2021 3.4    • Aortic arch 08/24/2021 3.2    • Dimensionless Index 08/24/2021 0.50    • LA ESV Index (BP) 08/24/2021 59.0    • Avg E/e' ratio 08/24/2021 8.20    • Ao pk moris 08/24/2021 143.0    • Lat Peak E' Moris 08/24/2021 6.0    • LV V1 max PG 08/24/2021 2.0    • LV V1 max 08/24/2021 74.0    • Med Peak E' Moris 08/24/2021 7.00    • Ao max  PG 08/24/2021 8    • TAPSE (>1.6) 08/24/2021 2.20      Recent labs reviewed and interpreted for clinical significance and application            Level of Care:           Aashish Thapa MD  11/22/22  .

## 2023-01-01 ENCOUNTER — APPOINTMENT (OUTPATIENT)
Dept: CT IMAGING | Facility: HOSPITAL | Age: 88
DRG: 283 | End: 2023-01-01
Payer: MEDICARE

## 2023-01-01 ENCOUNTER — HOSPITAL ENCOUNTER (INPATIENT)
Facility: HOSPITAL | Age: 88
LOS: 3 days | DRG: 283 | End: 2023-07-21
Attending: EMERGENCY MEDICINE | Admitting: INTERNAL MEDICINE
Payer: MEDICARE

## 2023-01-01 ENCOUNTER — APPOINTMENT (OUTPATIENT)
Dept: GENERAL RADIOLOGY | Facility: HOSPITAL | Age: 88
DRG: 283 | End: 2023-01-01
Payer: MEDICARE

## 2023-01-01 VITALS
WEIGHT: 161 LBS | TEMPERATURE: 96.3 F | HEIGHT: 69 IN | OXYGEN SATURATION: 93 % | BODY MASS INDEX: 23.85 KG/M2 | DIASTOLIC BLOOD PRESSURE: 54 MMHG | SYSTOLIC BLOOD PRESSURE: 91 MMHG

## 2023-01-01 DIAGNOSIS — J90 PLEURAL EFFUSION ON LEFT: Primary | ICD-10-CM

## 2023-01-01 DIAGNOSIS — I50.9 CONGESTIVE HEART FAILURE, UNSPECIFIED HF CHRONICITY, UNSPECIFIED HEART FAILURE TYPE: ICD-10-CM

## 2023-01-01 DIAGNOSIS — S00.03XA CONTUSION OF SCALP, INITIAL ENCOUNTER: ICD-10-CM

## 2023-01-01 LAB
ALBUMIN SERPL-MCNC: 2.6 G/DL (ref 3.5–5.2)
ALBUMIN SERPL-MCNC: 2.8 G/DL (ref 3.5–5.2)
ALBUMIN/GLOB SERPL: 0.9 G/DL
ALBUMIN/GLOB SERPL: 1 G/DL
ALP SERPL-CCNC: 177 U/L (ref 39–117)
ALP SERPL-CCNC: 181 U/L (ref 39–117)
ALT SERPL W P-5'-P-CCNC: 14 U/L (ref 1–41)
ALT SERPL W P-5'-P-CCNC: 17 U/L (ref 1–41)
ANION GAP SERPL CALCULATED.3IONS-SCNC: 12 MMOL/L (ref 5–15)
ANION GAP SERPL CALCULATED.3IONS-SCNC: 12.4 MMOL/L (ref 5–15)
ANION GAP SERPL CALCULATED.3IONS-SCNC: 15.8 MMOL/L (ref 5–15)
AST SERPL-CCNC: 51 U/L (ref 1–40)
AST SERPL-CCNC: 78 U/L (ref 1–40)
BASOPHILS # BLD AUTO: 0.06 10*3/MM3 (ref 0–0.2)
BASOPHILS NFR BLD AUTO: 0.4 % (ref 0–1.5)
BILIRUB SERPL-MCNC: 0.6 MG/DL (ref 0–1.2)
BILIRUB SERPL-MCNC: 1 MG/DL (ref 0–1.2)
BUN SERPL-MCNC: 15 MG/DL (ref 8–23)
BUN SERPL-MCNC: 17 MG/DL (ref 8–23)
BUN SERPL-MCNC: 19 MG/DL (ref 8–23)
BUN/CREAT SERPL: 11.1 (ref 7–25)
BUN/CREAT SERPL: 11.1 (ref 7–25)
BUN/CREAT SERPL: 11.7 (ref 7–25)
CALCIUM SPEC-SCNC: 8.7 MG/DL (ref 8.6–10.5)
CALCIUM SPEC-SCNC: 8.7 MG/DL (ref 8.6–10.5)
CALCIUM SPEC-SCNC: 8.8 MG/DL (ref 8.6–10.5)
CHLORIDE SERPL-SCNC: 103 MMOL/L (ref 98–107)
CHLORIDE SERPL-SCNC: 106 MMOL/L (ref 98–107)
CHLORIDE SERPL-SCNC: 107 MMOL/L (ref 98–107)
CO2 SERPL-SCNC: 22.6 MMOL/L (ref 22–29)
CO2 SERPL-SCNC: 24 MMOL/L (ref 22–29)
CO2 SERPL-SCNC: 27.2 MMOL/L (ref 22–29)
CREAT SERPL-MCNC: 1.35 MG/DL (ref 0.76–1.27)
CREAT SERPL-MCNC: 1.53 MG/DL (ref 0.76–1.27)
CREAT SERPL-MCNC: 1.63 MG/DL (ref 0.76–1.27)
DEPRECATED RDW RBC AUTO: 50.9 FL (ref 37–54)
DEPRECATED RDW RBC AUTO: 52.4 FL (ref 37–54)
DEPRECATED RDW RBC AUTO: 52.8 FL (ref 37–54)
EGFRCR SERPLBLD CKD-EPI 2021: 40.5 ML/MIN/1.73
EGFRCR SERPLBLD CKD-EPI 2021: 43.7 ML/MIN/1.73
EGFRCR SERPLBLD CKD-EPI 2021: 50.8 ML/MIN/1.73
EOSINOPHIL # BLD AUTO: 0.12 10*3/MM3 (ref 0–0.4)
EOSINOPHIL NFR BLD AUTO: 0.9 % (ref 0.3–6.2)
ERYTHROCYTE [DISTWIDTH] IN BLOOD BY AUTOMATED COUNT: 16 % (ref 12.3–15.4)
ERYTHROCYTE [DISTWIDTH] IN BLOOD BY AUTOMATED COUNT: 16.3 % (ref 12.3–15.4)
ERYTHROCYTE [DISTWIDTH] IN BLOOD BY AUTOMATED COUNT: 16.4 % (ref 12.3–15.4)
GEN 5 2HR TROPONIN T REFLEX: 1646 NG/L
GLOBULIN UR ELPH-MCNC: 2.9 GM/DL
GLOBULIN UR ELPH-MCNC: 3 GM/DL
GLUCOSE SERPL-MCNC: 101 MG/DL (ref 65–99)
GLUCOSE SERPL-MCNC: 107 MG/DL (ref 65–99)
GLUCOSE SERPL-MCNC: 90 MG/DL (ref 65–99)
HCT VFR BLD AUTO: 29.4 % (ref 37.5–51)
HCT VFR BLD AUTO: 29.5 % (ref 37.5–51)
HCT VFR BLD AUTO: 31.7 % (ref 37.5–51)
HGB BLD-MCNC: 10.1 G/DL (ref 13–17.7)
HGB BLD-MCNC: 9.5 G/DL (ref 13–17.7)
HGB BLD-MCNC: 9.6 G/DL (ref 13–17.7)
IMM GRANULOCYTES # BLD AUTO: 0.1 10*3/MM3 (ref 0–0.05)
IMM GRANULOCYTES NFR BLD AUTO: 0.7 % (ref 0–0.5)
INR PPP: 1.61 (ref 0.9–1.1)
LYMPHOCYTES # BLD AUTO: 1.74 10*3/MM3 (ref 0.7–3.1)
LYMPHOCYTES NFR BLD AUTO: 12.5 % (ref 19.6–45.3)
MCH RBC QN AUTO: 28.1 PG (ref 26.6–33)
MCH RBC QN AUTO: 28.7 PG (ref 26.6–33)
MCH RBC QN AUTO: 28.7 PG (ref 26.6–33)
MCHC RBC AUTO-ENTMCNC: 31.9 G/DL (ref 31.5–35.7)
MCHC RBC AUTO-ENTMCNC: 32.3 G/DL (ref 31.5–35.7)
MCHC RBC AUTO-ENTMCNC: 32.5 G/DL (ref 31.5–35.7)
MCV RBC AUTO: 88.1 FL (ref 79–97)
MCV RBC AUTO: 88.1 FL (ref 79–97)
MCV RBC AUTO: 88.8 FL (ref 79–97)
MONOCYTES # BLD AUTO: 1.15 10*3/MM3 (ref 0.1–0.9)
MONOCYTES NFR BLD AUTO: 8.2 % (ref 5–12)
NEUTROPHILS NFR BLD AUTO: 10.8 10*3/MM3 (ref 1.7–7)
NEUTROPHILS NFR BLD AUTO: 77.3 % (ref 42.7–76)
NRBC BLD AUTO-RTO: 0 /100 WBC (ref 0–0.2)
NT-PROBNP SERPL-MCNC: ABNORMAL PG/ML (ref 0–1800)
PLATELET # BLD AUTO: 49 10*3/MM3 (ref 140–450)
PLATELET # BLD AUTO: 51 10*3/MM3 (ref 140–450)
PLATELET # BLD AUTO: 55 10*3/MM3 (ref 140–450)
PMV BLD AUTO: 12.9 FL (ref 6–12)
PMV BLD AUTO: ABNORMAL FL
POTASSIUM SERPL-SCNC: 3.2 MMOL/L (ref 3.5–5.2)
POTASSIUM SERPL-SCNC: 3.5 MMOL/L (ref 3.5–5.2)
POTASSIUM SERPL-SCNC: 4.8 MMOL/L (ref 3.5–5.2)
PROCALCITONIN SERPL-MCNC: 0.25 NG/ML (ref 0–0.25)
PROCALCITONIN SERPL-MCNC: 0.26 NG/ML (ref 0–0.25)
PROT SERPL-MCNC: 5.6 G/DL (ref 6–8.5)
PROT SERPL-MCNC: 5.7 G/DL (ref 6–8.5)
PROTHROMBIN TIME: 19.4 SECONDS (ref 11.7–14.2)
QT INTERVAL: 423 MS
RBC # BLD AUTO: 3.31 10*6/MM3 (ref 4.14–5.8)
RBC # BLD AUTO: 3.35 10*6/MM3 (ref 4.14–5.8)
RBC # BLD AUTO: 3.6 10*6/MM3 (ref 4.14–5.8)
SODIUM SERPL-SCNC: 141 MMOL/L (ref 136–145)
SODIUM SERPL-SCNC: 143 MMOL/L (ref 136–145)
SODIUM SERPL-SCNC: 146 MMOL/L (ref 136–145)
TROPONIN T DELTA: 193 NG/L
TROPONIN T SERPL HS-MCNC: 1453 NG/L
WBC NRBC COR # BLD: 11.89 10*3/MM3 (ref 3.4–10.8)
WBC NRBC COR # BLD: 12.21 10*3/MM3 (ref 3.4–10.8)
WBC NRBC COR # BLD: 13.97 10*3/MM3 (ref 3.4–10.8)

## 2023-01-01 PROCEDURE — 99221 1ST HOSP IP/OBS SF/LOW 40: CPT | Performed by: PSYCHIATRY & NEUROLOGY

## 2023-01-01 PROCEDURE — 85027 COMPLETE CBC AUTOMATED: CPT | Performed by: NURSE PRACTITIONER

## 2023-01-01 PROCEDURE — G0378 HOSPITAL OBSERVATION PER HR: HCPCS

## 2023-01-01 PROCEDURE — 25010000002 FUROSEMIDE PER 20 MG: Performed by: INTERNAL MEDICINE

## 2023-01-01 PROCEDURE — 84145 PROCALCITONIN (PCT): CPT | Performed by: NURSE PRACTITIONER

## 2023-01-01 PROCEDURE — 99285 EMERGENCY DEPT VISIT HI MDM: CPT

## 2023-01-01 PROCEDURE — 25010000002 PIPERACILLIN SOD-TAZOBACTAM PER 1 G: Performed by: NURSE PRACTITIONER

## 2023-01-01 PROCEDURE — 25010000002 FUROSEMIDE PER 20 MG: Performed by: NURSE PRACTITIONER

## 2023-01-01 PROCEDURE — 80053 COMPREHEN METABOLIC PANEL: CPT | Performed by: NURSE PRACTITIONER

## 2023-01-01 PROCEDURE — 83880 ASSAY OF NATRIURETIC PEPTIDE: CPT | Performed by: EMERGENCY MEDICINE

## 2023-01-01 PROCEDURE — 85025 COMPLETE CBC W/AUTO DIFF WBC: CPT | Performed by: EMERGENCY MEDICINE

## 2023-01-01 PROCEDURE — 99222 1ST HOSP IP/OBS MODERATE 55: CPT | Performed by: INTERNAL MEDICINE

## 2023-01-01 PROCEDURE — 87040 BLOOD CULTURE FOR BACTERIA: CPT | Performed by: NURSE PRACTITIONER

## 2023-01-01 PROCEDURE — 85610 PROTHROMBIN TIME: CPT | Performed by: NURSE PRACTITIONER

## 2023-01-01 PROCEDURE — 25010000002 LORAZEPAM PER 2 MG: Performed by: NURSE PRACTITIONER

## 2023-01-01 PROCEDURE — 93010 ELECTROCARDIOGRAM REPORT: CPT | Performed by: INTERNAL MEDICINE

## 2023-01-01 PROCEDURE — 85027 COMPLETE CBC AUTOMATED: CPT | Performed by: INTERNAL MEDICINE

## 2023-01-01 PROCEDURE — 92610 EVALUATE SWALLOWING FUNCTION: CPT

## 2023-01-01 PROCEDURE — 71250 CT THORAX DX C-: CPT

## 2023-01-01 PROCEDURE — 70450 CT HEAD/BRAIN W/O DYE: CPT

## 2023-01-01 PROCEDURE — 84484 ASSAY OF TROPONIN QUANT: CPT | Performed by: EMERGENCY MEDICINE

## 2023-01-01 PROCEDURE — 93005 ELECTROCARDIOGRAM TRACING: CPT | Performed by: EMERGENCY MEDICINE

## 2023-01-01 PROCEDURE — 25010000002 MORPHINE PER 10 MG: Performed by: NURSE PRACTITIONER

## 2023-01-01 PROCEDURE — 71045 X-RAY EXAM CHEST 1 VIEW: CPT

## 2023-01-01 PROCEDURE — 80053 COMPREHEN METABOLIC PANEL: CPT | Performed by: EMERGENCY MEDICINE

## 2023-01-01 PROCEDURE — 80048 BASIC METABOLIC PNL TOTAL CA: CPT | Performed by: INTERNAL MEDICINE

## 2023-01-01 PROCEDURE — 72125 CT NECK SPINE W/O DYE: CPT

## 2023-01-01 RX ORDER — DIPHENOXYLATE HYDROCHLORIDE AND ATROPINE SULFATE 2.5; .025 MG/1; MG/1
1 TABLET ORAL
Status: DISCONTINUED | OUTPATIENT
Start: 2023-01-01 | End: 2023-01-01 | Stop reason: HOSPADM

## 2023-01-01 RX ORDER — KETOROLAC TROMETHAMINE 15 MG/ML
15 INJECTION, SOLUTION INTRAMUSCULAR; INTRAVENOUS EVERY 6 HOURS PRN
Status: DISCONTINUED | OUTPATIENT
Start: 2023-01-01 | End: 2023-01-01 | Stop reason: HOSPADM

## 2023-01-01 RX ORDER — FUROSEMIDE 10 MG/ML
40 INJECTION INTRAMUSCULAR; INTRAVENOUS EVERY 12 HOURS
Status: DISCONTINUED | OUTPATIENT
Start: 2023-01-01 | End: 2023-01-01

## 2023-01-01 RX ORDER — LORAZEPAM 2 MG/ML
0.5 CONCENTRATE ORAL
Status: DISCONTINUED | OUTPATIENT
Start: 2023-01-01 | End: 2023-01-01 | Stop reason: HOSPADM

## 2023-01-01 RX ORDER — MIRTAZAPINE 7.5 MG/1
7.5 TABLET, FILM COATED ORAL NIGHTLY
COMMUNITY

## 2023-01-01 RX ORDER — MORPHINE SULFATE 2 MG/ML
4 INJECTION, SOLUTION INTRAMUSCULAR; INTRAVENOUS
Status: DISCONTINUED | OUTPATIENT
Start: 2023-01-01 | End: 2023-01-01 | Stop reason: HOSPADM

## 2023-01-01 RX ORDER — GLYCOPYRROLATE 0.2 MG/ML
0.4 INJECTION INTRAMUSCULAR; INTRAVENOUS
Status: DISCONTINUED | OUTPATIENT
Start: 2023-01-01 | End: 2023-01-01 | Stop reason: HOSPADM

## 2023-01-01 RX ORDER — METOPROLOL SUCCINATE 50 MG/1
50 TABLET, EXTENDED RELEASE ORAL EVERY 12 HOURS SCHEDULED
Status: DISCONTINUED | OUTPATIENT
Start: 2023-01-01 | End: 2023-01-01

## 2023-01-01 RX ORDER — LORAZEPAM 2 MG/ML
1 INJECTION INTRAMUSCULAR
Status: DISCONTINUED | OUTPATIENT
Start: 2023-01-01 | End: 2023-01-01 | Stop reason: HOSPADM

## 2023-01-01 RX ORDER — ECHINACEA PURPUREA EXTRACT 125 MG
1 TABLET ORAL 2 TIMES DAILY
COMMUNITY

## 2023-01-01 RX ORDER — BISACODYL 10 MG
10 SUPPOSITORY, RECTAL RECTAL DAILY PRN
COMMUNITY

## 2023-01-01 RX ORDER — OXYMETAZOLINE HYDROCHLORIDE 0.05 G/100ML
2 SPRAY NASAL
COMMUNITY

## 2023-01-01 RX ORDER — MORPHINE SULFATE 2 MG/ML
2 INJECTION, SOLUTION INTRAMUSCULAR; INTRAVENOUS
Status: DISCONTINUED | OUTPATIENT
Start: 2023-01-01 | End: 2023-01-01 | Stop reason: HOSPADM

## 2023-01-01 RX ORDER — ROSUVASTATIN CALCIUM 5 MG/1
5 TABLET, COATED ORAL NIGHTLY
Status: DISCONTINUED | OUTPATIENT
Start: 2023-01-01 | End: 2023-01-01

## 2023-01-01 RX ORDER — AMOXICILLIN 250 MG
2 CAPSULE ORAL 2 TIMES DAILY
Status: DISCONTINUED | OUTPATIENT
Start: 2023-01-01 | End: 2023-01-01

## 2023-01-01 RX ORDER — ONDANSETRON 2 MG/ML
4 INJECTION INTRAMUSCULAR; INTRAVENOUS EVERY 6 HOURS PRN
Status: DISCONTINUED | OUTPATIENT
Start: 2023-01-01 | End: 2023-01-01 | Stop reason: HOSPADM

## 2023-01-01 RX ORDER — PANTOPRAZOLE SODIUM 40 MG/1
40 TABLET, DELAYED RELEASE ORAL DAILY
Status: DISCONTINUED | OUTPATIENT
Start: 2023-01-01 | End: 2023-01-01

## 2023-01-01 RX ORDER — POLYETHYLENE GLYCOL 3350 17 G/17G
17 POWDER, FOR SOLUTION ORAL DAILY PRN
Status: DISCONTINUED | OUTPATIENT
Start: 2023-01-01 | End: 2023-01-01

## 2023-01-01 RX ORDER — LORAZEPAM 2 MG/ML
2 INJECTION INTRAMUSCULAR
Status: DISCONTINUED | OUTPATIENT
Start: 2023-01-01 | End: 2023-01-01 | Stop reason: HOSPADM

## 2023-01-01 RX ORDER — UREA 10 %
3 LOTION (ML) TOPICAL NIGHTLY PRN
Status: DISCONTINUED | OUTPATIENT
Start: 2023-01-01 | End: 2023-01-01 | Stop reason: HOSPADM

## 2023-01-01 RX ORDER — LORAZEPAM 2 MG/ML
1 CONCENTRATE ORAL
Status: DISCONTINUED | OUTPATIENT
Start: 2023-01-01 | End: 2023-01-01 | Stop reason: HOSPADM

## 2023-01-01 RX ORDER — LORAZEPAM 2 MG/ML
0.5 INJECTION INTRAMUSCULAR
Status: DISCONTINUED | OUTPATIENT
Start: 2023-01-01 | End: 2023-01-01 | Stop reason: HOSPADM

## 2023-01-01 RX ORDER — LORAZEPAM 2 MG/ML
2 CONCENTRATE ORAL
Status: DISCONTINUED | OUTPATIENT
Start: 2023-01-01 | End: 2023-01-01 | Stop reason: HOSPADM

## 2023-01-01 RX ORDER — CLONAZEPAM 0.5 MG/1
0.5 TABLET ORAL NIGHTLY
COMMUNITY

## 2023-01-01 RX ORDER — MORPHINE SULFATE 20 MG/ML
5 SOLUTION ORAL
Status: DISCONTINUED | OUTPATIENT
Start: 2023-01-01 | End: 2023-01-01 | Stop reason: HOSPADM

## 2023-01-01 RX ORDER — BISACODYL 10 MG
10 SUPPOSITORY, RECTAL RECTAL DAILY PRN
Status: DISCONTINUED | OUTPATIENT
Start: 2023-01-01 | End: 2023-01-01

## 2023-01-01 RX ORDER — MORPHINE SULFATE 20 MG/ML
10 SOLUTION ORAL
Status: DISCONTINUED | OUTPATIENT
Start: 2023-01-01 | End: 2023-01-01 | Stop reason: HOSPADM

## 2023-01-01 RX ORDER — MORPHINE SULFATE 2 MG/ML
6 INJECTION, SOLUTION INTRAMUSCULAR; INTRAVENOUS
Status: DISCONTINUED | OUTPATIENT
Start: 2023-01-01 | End: 2023-01-01 | Stop reason: HOSPADM

## 2023-01-01 RX ORDER — TERAZOSIN 5 MG/1
5 CAPSULE ORAL NIGHTLY
Status: DISCONTINUED | OUTPATIENT
Start: 2023-01-01 | End: 2023-01-01

## 2023-01-01 RX ORDER — GLYCOPYRROLATE 0.2 MG/ML
0.2 INJECTION INTRAMUSCULAR; INTRAVENOUS
Status: DISCONTINUED | OUTPATIENT
Start: 2023-01-01 | End: 2023-01-01 | Stop reason: HOSPADM

## 2023-01-01 RX ORDER — AMOXICILLIN 250 MG
1 CAPSULE ORAL EVERY EVENING
Status: DISCONTINUED | OUTPATIENT
Start: 2023-01-01 | End: 2023-01-01

## 2023-01-01 RX ORDER — FUROSEMIDE 10 MG/ML
80 INJECTION INTRAMUSCULAR; INTRAVENOUS ONCE
Status: COMPLETED | OUTPATIENT
Start: 2023-01-01 | End: 2023-01-01

## 2023-01-01 RX ORDER — ACETAMINOPHEN 325 MG/1
650 TABLET ORAL EVERY 4 HOURS PRN
Status: DISCONTINUED | OUTPATIENT
Start: 2023-01-01 | End: 2023-01-01 | Stop reason: HOSPADM

## 2023-01-01 RX ORDER — TRAMADOL HYDROCHLORIDE 50 MG/1
25 TABLET ORAL EVERY 4 HOURS PRN
COMMUNITY

## 2023-01-01 RX ORDER — ONDANSETRON 4 MG/1
4 TABLET, FILM COATED ORAL EVERY 6 HOURS PRN
Status: DISCONTINUED | OUTPATIENT
Start: 2023-01-01 | End: 2023-01-01 | Stop reason: HOSPADM

## 2023-01-01 RX ORDER — AMOXICILLIN 250 MG
1 CAPSULE ORAL EVERY EVENING
COMMUNITY

## 2023-01-01 RX ORDER — CLONAZEPAM 0.5 MG/1
0.5 TABLET ORAL NIGHTLY
Status: DISCONTINUED | OUTPATIENT
Start: 2023-01-01 | End: 2023-01-01

## 2023-01-01 RX ORDER — ENEMA 19; 7 G/133ML; G/133ML
1 ENEMA RECTAL ONCE AS NEEDED
COMMUNITY

## 2023-01-01 RX ORDER — MELATONIN
2000 DAILY
Status: DISCONTINUED | OUTPATIENT
Start: 2023-01-01 | End: 2023-01-01

## 2023-01-01 RX ORDER — MIRTAZAPINE 15 MG/1
7.5 TABLET, FILM COATED ORAL NIGHTLY
Status: DISCONTINUED | OUTPATIENT
Start: 2023-01-01 | End: 2023-01-01

## 2023-01-01 RX ORDER — HYDROMORPHONE HYDROCHLORIDE 1 MG/ML
0.5 INJECTION, SOLUTION INTRAMUSCULAR; INTRAVENOUS; SUBCUTANEOUS
Status: DISCONTINUED | OUTPATIENT
Start: 2023-01-01 | End: 2023-01-01 | Stop reason: HOSPADM

## 2023-01-01 RX ORDER — MORPHINE SULFATE 20 MG/ML
20 SOLUTION ORAL
Status: DISCONTINUED | OUTPATIENT
Start: 2023-01-01 | End: 2023-01-01 | Stop reason: HOSPADM

## 2023-01-01 RX ORDER — BISACODYL 5 MG/1
5 TABLET, DELAYED RELEASE ORAL DAILY PRN
Status: DISCONTINUED | OUTPATIENT
Start: 2023-01-01 | End: 2023-01-01

## 2023-01-01 RX ADMIN — LORAZEPAM 1 MG: 2 INJECTION INTRAMUSCULAR; INTRAVENOUS at 10:32

## 2023-01-01 RX ADMIN — LORAZEPAM 1 MG: 2 INJECTION INTRAMUSCULAR; INTRAVENOUS at 07:43

## 2023-01-01 RX ADMIN — METOPROLOL SUCCINATE 50 MG: 50 TABLET, FILM COATED, EXTENDED RELEASE ORAL at 02:32

## 2023-01-01 RX ADMIN — MIRTAZAPINE 7.5 MG: 15 TABLET, FILM COATED ORAL at 02:47

## 2023-01-01 RX ADMIN — MORPHINE SULFATE 4 MG: 2 INJECTION, SOLUTION INTRAMUSCULAR; INTRAVENOUS at 04:20

## 2023-01-01 RX ADMIN — LORAZEPAM 1 MG: 2 INJECTION INTRAMUSCULAR; INTRAVENOUS at 15:16

## 2023-01-01 RX ADMIN — ROSUVASTATIN CALCIUM 5 MG: 5 TABLET, FILM COATED ORAL at 02:32

## 2023-01-01 RX ADMIN — FUROSEMIDE 40 MG: 10 INJECTION, SOLUTION INTRAMUSCULAR; INTRAVENOUS at 20:09

## 2023-01-01 RX ADMIN — SENNOSIDES AND DOCUSATE SODIUM 2 TABLET: 50; 8.6 TABLET ORAL at 09:53

## 2023-01-01 RX ADMIN — MUPIROCIN 1 APPLICATION: 20 OINTMENT TOPICAL at 20:08

## 2023-01-01 RX ADMIN — MORPHINE SULFATE 2 MG: 2 INJECTION, SOLUTION INTRAMUSCULAR; INTRAVENOUS at 08:07

## 2023-01-01 RX ADMIN — ROSUVASTATIN CALCIUM 5 MG: 5 TABLET, FILM COATED ORAL at 20:07

## 2023-01-01 RX ADMIN — ANORECTAL OINTMENT 1 APPLICATION: 15.7; .44; 24; 20.6 OINTMENT TOPICAL at 09:01

## 2023-01-01 RX ADMIN — MORPHINE SULFATE 4 MG: 2 INJECTION, SOLUTION INTRAMUSCULAR; INTRAVENOUS at 00:14

## 2023-01-01 RX ADMIN — MUPIROCIN 1 APPLICATION: 20 OINTMENT TOPICAL at 09:01

## 2023-01-01 RX ADMIN — METOPROLOL SUCCINATE 50 MG: 50 TABLET, FILM COATED, EXTENDED RELEASE ORAL at 20:07

## 2023-01-01 RX ADMIN — LORAZEPAM 1 MG: 2 INJECTION INTRAMUSCULAR; INTRAVENOUS at 20:20

## 2023-01-01 RX ADMIN — LORAZEPAM 0.5 MG: 2 INJECTION INTRAMUSCULAR; INTRAVENOUS at 01:47

## 2023-01-01 RX ADMIN — TAZOBACTAM SODIUM AND PIPERACILLIN SODIUM 3.38 G: 375; 3 INJECTION, SOLUTION INTRAVENOUS at 20:05

## 2023-01-01 RX ADMIN — LORAZEPAM 1 MG: 2 INJECTION INTRAMUSCULAR; INTRAVENOUS at 00:14

## 2023-01-01 RX ADMIN — LORAZEPAM 0.5 MG: 2 INJECTION INTRAMUSCULAR; INTRAVENOUS at 22:16

## 2023-01-01 RX ADMIN — PIPERACILLIN SODIUM AND TAZOBACTAM SODIUM 3.38 G: 3; .375 INJECTION, SOLUTION INTRAVENOUS at 01:42

## 2023-01-01 RX ADMIN — SENNOSIDES AND DOCUSATE SODIUM 2 TABLET: 50; 8.6 TABLET ORAL at 20:08

## 2023-01-01 RX ADMIN — FUROSEMIDE 80 MG: 40 INJECTION, SOLUTION INTRAMUSCULAR; INTRAVENOUS at 22:13

## 2023-01-01 RX ADMIN — FUROSEMIDE 40 MG: 10 INJECTION, SOLUTION INTRAMUSCULAR; INTRAVENOUS at 08:59

## 2023-01-01 RX ADMIN — PANTOPRAZOLE SODIUM 40 MG: 40 TABLET, DELAYED RELEASE ORAL at 09:52

## 2023-01-01 RX ADMIN — MORPHINE SULFATE 4 MG: 2 INJECTION, SOLUTION INTRAMUSCULAR; INTRAVENOUS at 20:21

## 2023-01-01 RX ADMIN — APIXABAN 2.5 MG: 2.5 TABLET, FILM COATED ORAL at 02:32

## 2023-01-01 RX ADMIN — Medication 3 MG: at 22:13

## 2023-01-01 RX ADMIN — MORPHINE SULFATE 4 MG: 2 INJECTION, SOLUTION INTRAMUSCULAR; INTRAVENOUS at 15:16

## 2023-01-01 RX ADMIN — FUROSEMIDE 40 MG: 10 INJECTION, SOLUTION INTRAMUSCULAR; INTRAVENOUS at 09:34

## 2023-01-01 RX ADMIN — ANORECTAL OINTMENT 1 APPLICATION: 15.7; .44; 24; 20.6 OINTMENT TOPICAL at 20:08

## 2023-01-01 RX ADMIN — LORAZEPAM 1 MG: 2 INJECTION INTRAMUSCULAR; INTRAVENOUS at 08:11

## 2023-01-01 RX ADMIN — LORAZEPAM 0.5 MG: 2 INJECTION INTRAMUSCULAR; INTRAVENOUS at 06:30

## 2023-01-01 RX ADMIN — Medication 2000 UNITS: at 09:52

## 2023-01-01 RX ADMIN — TERAZOSIN HYDROCHLORIDE 5 MG: 5 CAPSULE ORAL at 20:07

## 2023-01-01 RX ADMIN — MORPHINE SULFATE 4 MG: 2 INJECTION, SOLUTION INTRAMUSCULAR; INTRAVENOUS at 08:11

## 2023-01-01 RX ADMIN — LORAZEPAM 1 MG: 2 INJECTION INTRAMUSCULAR; INTRAVENOUS at 04:20

## 2023-01-01 RX ADMIN — MORPHINE SULFATE 2 MG: 2 INJECTION, SOLUTION INTRAMUSCULAR; INTRAVENOUS at 10:34

## 2023-01-01 RX ADMIN — PIPERACILLIN SODIUM AND TAZOBACTAM SODIUM 3.38 G: 3; .375 INJECTION, SOLUTION INTRAVENOUS at 10:36

## 2023-01-01 RX ADMIN — METOPROLOL SUCCINATE 50 MG: 50 TABLET, FILM COATED, EXTENDED RELEASE ORAL at 09:52

## 2023-01-01 RX ADMIN — TERAZOSIN HYDROCHLORIDE 5 MG: 5 CAPSULE ORAL at 02:35

## 2023-04-30 ENCOUNTER — APPOINTMENT (OUTPATIENT)
Dept: MRI IMAGING | Facility: HOSPITAL | Age: 88
End: 2023-04-30
Payer: MEDICARE

## 2023-04-30 ENCOUNTER — APPOINTMENT (OUTPATIENT)
Dept: CT IMAGING | Facility: HOSPITAL | Age: 88
End: 2023-04-30
Payer: MEDICARE

## 2023-04-30 ENCOUNTER — APPOINTMENT (OUTPATIENT)
Dept: GENERAL RADIOLOGY | Facility: HOSPITAL | Age: 88
End: 2023-04-30
Payer: MEDICARE

## 2023-04-30 ENCOUNTER — HOSPITAL ENCOUNTER (INPATIENT)
Facility: HOSPITAL | Age: 88
LOS: 2 days | Discharge: HOME | End: 2023-05-02
Attending: EMERGENCY MEDICINE | Admitting: INTERNAL MEDICINE
Payer: MEDICARE

## 2023-04-30 DIAGNOSIS — I63.9 ACUTE CVA (CEREBROVASCULAR ACCIDENT): Primary | ICD-10-CM

## 2023-04-30 DIAGNOSIS — R77.8 TROPONIN LEVEL ELEVATED: ICD-10-CM

## 2023-04-30 DIAGNOSIS — N17.9 ACUTE KIDNEY INJURY: ICD-10-CM

## 2023-04-30 LAB
ABO GROUP BLD: NORMAL
ALBUMIN SERPL-MCNC: 3.9 G/DL (ref 3.5–5.2)
ALBUMIN/GLOB SERPL: 1.4 G/DL
ALP SERPL-CCNC: 160 U/L (ref 39–117)
ALT SERPL W P-5'-P-CCNC: 18 U/L (ref 1–41)
ANION GAP SERPL CALCULATED.3IONS-SCNC: 9 MMOL/L (ref 5–15)
APTT PPP: 28.2 SECONDS (ref 22.7–35.4)
AST SERPL-CCNC: 33 U/L (ref 1–40)
BASOPHILS # BLD AUTO: 0.04 10*3/MM3 (ref 0–0.2)
BASOPHILS NFR BLD AUTO: 0.4 % (ref 0–1.5)
BILIRUB SERPL-MCNC: 0.6 MG/DL (ref 0–1.2)
BLD GP AB SCN SERPL QL: NEGATIVE
BUN SERPL-MCNC: 27 MG/DL (ref 8–23)
BUN/CREAT SERPL: 15.5 (ref 7–25)
CALCIUM SPEC-SCNC: 8.9 MG/DL (ref 8.6–10.5)
CHLORIDE SERPL-SCNC: 104 MMOL/L (ref 98–107)
CO2 SERPL-SCNC: 28 MMOL/L (ref 22–29)
CREAT SERPL-MCNC: 1.74 MG/DL (ref 0.76–1.27)
DEPRECATED RDW RBC AUTO: 43.3 FL (ref 37–54)
EGFRCR SERPLBLD CKD-EPI 2021: 37.7 ML/MIN/1.73
EOSINOPHIL # BLD AUTO: 0.06 10*3/MM3 (ref 0–0.4)
EOSINOPHIL NFR BLD AUTO: 0.6 % (ref 0.3–6.2)
ERYTHROCYTE [DISTWIDTH] IN BLOOD BY AUTOMATED COUNT: 12.5 % (ref 12.3–15.4)
ETHANOL BLD-MCNC: <10 MG/DL (ref 0–10)
ETHANOL UR QL: <0.01 %
GEN 5 2HR TROPONIN T REFLEX: 110 NG/L
GLOBULIN UR ELPH-MCNC: 2.7 GM/DL
GLUCOSE BLDC GLUCOMTR-MCNC: 100 MG/DL (ref 70–130)
GLUCOSE BLDC GLUCOMTR-MCNC: 88 MG/DL (ref 70–130)
GLUCOSE SERPL-MCNC: 92 MG/DL (ref 65–99)
HCT VFR BLD AUTO: 41.4 % (ref 37.5–51)
HGB BLD-MCNC: 13.9 G/DL (ref 13–17.7)
HOLD SPECIMEN: NORMAL
HOLD SPECIMEN: NORMAL
IMM GRANULOCYTES # BLD AUTO: 0.05 10*3/MM3 (ref 0–0.05)
IMM GRANULOCYTES NFR BLD AUTO: 0.5 % (ref 0–0.5)
INR PPP: 1.14 (ref 0.9–1.1)
LYMPHOCYTES # BLD AUTO: 1.56 10*3/MM3 (ref 0.7–3.1)
LYMPHOCYTES NFR BLD AUTO: 14.7 % (ref 19.6–45.3)
MCH RBC QN AUTO: 31.6 PG (ref 26.6–33)
MCHC RBC AUTO-ENTMCNC: 33.6 G/DL (ref 31.5–35.7)
MCV RBC AUTO: 94.1 FL (ref 79–97)
MONOCYTES # BLD AUTO: 0.9 10*3/MM3 (ref 0.1–0.9)
MONOCYTES NFR BLD AUTO: 8.5 % (ref 5–12)
NEUTROPHILS NFR BLD AUTO: 7.98 10*3/MM3 (ref 1.7–7)
NEUTROPHILS NFR BLD AUTO: 75.3 % (ref 42.7–76)
NRBC BLD AUTO-RTO: 0 /100 WBC (ref 0–0.2)
PLATELET # BLD AUTO: 155 10*3/MM3 (ref 140–450)
PMV BLD AUTO: 9 FL (ref 6–12)
POTASSIUM SERPL-SCNC: 3.9 MMOL/L (ref 3.5–5.2)
PROT SERPL-MCNC: 6.6 G/DL (ref 6–8.5)
PROTHROMBIN TIME: 14.7 SECONDS (ref 11.7–14.2)
QT INTERVAL: 416 MS
RBC # BLD AUTO: 4.4 10*6/MM3 (ref 4.14–5.8)
RH BLD: POSITIVE
SODIUM SERPL-SCNC: 141 MMOL/L (ref 136–145)
T&S EXPIRATION DATE: NORMAL
TROPONIN T DELTA: -4 NG/L
TROPONIN T SERPL HS-MCNC: 114 NG/L
WBC NRBC COR # BLD: 10.59 10*3/MM3 (ref 3.4–10.8)
WHOLE BLOOD HOLD COAG: NORMAL
WHOLE BLOOD HOLD SPECIMEN: NORMAL

## 2023-04-30 PROCEDURE — 93005 ELECTROCARDIOGRAM TRACING: CPT | Performed by: EMERGENCY MEDICINE

## 2023-04-30 PROCEDURE — 82948 REAGENT STRIP/BLOOD GLUCOSE: CPT

## 2023-04-30 PROCEDURE — 85730 THROMBOPLASTIN TIME PARTIAL: CPT | Performed by: EMERGENCY MEDICINE

## 2023-04-30 PROCEDURE — 82565 ASSAY OF CREATININE: CPT

## 2023-04-30 PROCEDURE — 86850 RBC ANTIBODY SCREEN: CPT | Performed by: EMERGENCY MEDICINE

## 2023-04-30 PROCEDURE — 86900 BLOOD TYPING SEROLOGIC ABO: CPT | Performed by: EMERGENCY MEDICINE

## 2023-04-30 PROCEDURE — 4A03X5D MEASUREMENT OF ARTERIAL FLOW, INTRACRANIAL, EXTERNAL APPROACH: ICD-10-PCS | Performed by: RADIOLOGY

## 2023-04-30 PROCEDURE — 36415 COLL VENOUS BLD VENIPUNCTURE: CPT

## 2023-04-30 PROCEDURE — 85025 COMPLETE CBC W/AUTO DIFF WBC: CPT | Performed by: EMERGENCY MEDICINE

## 2023-04-30 PROCEDURE — 84484 ASSAY OF TROPONIN QUANT: CPT | Performed by: EMERGENCY MEDICINE

## 2023-04-30 PROCEDURE — 0042T HC CT CEREBRAL PERFUSION W/WO CONTRAST: CPT

## 2023-04-30 PROCEDURE — 70551 MRI BRAIN STEM W/O DYE: CPT

## 2023-04-30 PROCEDURE — 70496 CT ANGIOGRAPHY HEAD: CPT

## 2023-04-30 PROCEDURE — 93010 ELECTROCARDIOGRAM REPORT: CPT | Performed by: INTERNAL MEDICINE

## 2023-04-30 PROCEDURE — 85610 PROTHROMBIN TIME: CPT | Performed by: EMERGENCY MEDICINE

## 2023-04-30 PROCEDURE — 25510000001 IOPAMIDOL PER 1 ML: Performed by: EMERGENCY MEDICINE

## 2023-04-30 PROCEDURE — 99285 EMERGENCY DEPT VISIT HI MDM: CPT

## 2023-04-30 PROCEDURE — 71045 X-RAY EXAM CHEST 1 VIEW: CPT

## 2023-04-30 PROCEDURE — 82077 ASSAY SPEC XCP UR&BREATH IA: CPT | Performed by: EMERGENCY MEDICINE

## 2023-04-30 PROCEDURE — 80053 COMPREHEN METABOLIC PANEL: CPT | Performed by: EMERGENCY MEDICINE

## 2023-04-30 PROCEDURE — 70498 CT ANGIOGRAPHY NECK: CPT

## 2023-04-30 PROCEDURE — 86901 BLOOD TYPING SEROLOGIC RH(D): CPT | Performed by: EMERGENCY MEDICINE

## 2023-04-30 RX ORDER — ASPIRIN 81 MG/1
324 TABLET, CHEWABLE ORAL ONCE
Status: COMPLETED | OUTPATIENT
Start: 2023-04-30 | End: 2023-04-30

## 2023-04-30 RX ORDER — SODIUM CHLORIDE 0.9 % (FLUSH) 0.9 %
10 SYRINGE (ML) INJECTION EVERY 12 HOURS SCHEDULED
Status: DISCONTINUED | OUTPATIENT
Start: 2023-04-30 | End: 2023-05-02 | Stop reason: HOSPADM

## 2023-04-30 RX ORDER — ATORVASTATIN CALCIUM 80 MG/1
80 TABLET, FILM COATED ORAL NIGHTLY
Status: DISCONTINUED | OUTPATIENT
Start: 2023-04-30 | End: 2023-05-02 | Stop reason: HOSPADM

## 2023-04-30 RX ORDER — CLOPIDOGREL BISULFATE 75 MG/1
75 TABLET ORAL DAILY
Status: DISCONTINUED | OUTPATIENT
Start: 2023-04-30 | End: 2023-05-02

## 2023-04-30 RX ORDER — CLOPIDOGREL BISULFATE 75 MG/1
300 TABLET ORAL ONCE
Status: COMPLETED | OUTPATIENT
Start: 2023-04-30 | End: 2023-04-30

## 2023-04-30 RX ORDER — ONDANSETRON 2 MG/ML
4 INJECTION INTRAMUSCULAR; INTRAVENOUS EVERY 6 HOURS PRN
Status: DISCONTINUED | OUTPATIENT
Start: 2023-04-30 | End: 2023-05-02 | Stop reason: HOSPADM

## 2023-04-30 RX ORDER — ASPIRIN 81 MG/1
81 TABLET, CHEWABLE ORAL DAILY
Status: DISCONTINUED | OUTPATIENT
Start: 2023-04-30 | End: 2023-05-02

## 2023-04-30 RX ORDER — SODIUM CHLORIDE 9 MG/ML
40 INJECTION, SOLUTION INTRAVENOUS AS NEEDED
Status: DISCONTINUED | OUTPATIENT
Start: 2023-04-30 | End: 2023-05-02 | Stop reason: HOSPADM

## 2023-04-30 RX ORDER — SODIUM CHLORIDE 0.9 % (FLUSH) 0.9 %
10 SYRINGE (ML) INJECTION AS NEEDED
Status: DISCONTINUED | OUTPATIENT
Start: 2023-04-30 | End: 2023-05-02 | Stop reason: HOSPADM

## 2023-04-30 RX ORDER — SODIUM CHLORIDE 9 MG/ML
125 INJECTION, SOLUTION INTRAVENOUS CONTINUOUS
Status: DISCONTINUED | OUTPATIENT
Start: 2023-04-30 | End: 2023-05-01

## 2023-04-30 RX ORDER — ASPIRIN 300 MG/1
300 SUPPOSITORY RECTAL DAILY
Status: DISCONTINUED | OUTPATIENT
Start: 2023-04-30 | End: 2023-05-02

## 2023-04-30 RX ORDER — SODIUM CHLORIDE 9 MG/ML
75 INJECTION, SOLUTION INTRAVENOUS CONTINUOUS
Status: DISCONTINUED | OUTPATIENT
Start: 2023-04-30 | End: 2023-05-02 | Stop reason: HOSPADM

## 2023-04-30 RX ADMIN — SODIUM CHLORIDE 125 ML/HR: 9 INJECTION, SOLUTION INTRAVENOUS at 14:27

## 2023-04-30 RX ADMIN — SODIUM CHLORIDE 500 ML: 9 INJECTION, SOLUTION INTRAVENOUS at 14:09

## 2023-04-30 RX ADMIN — IOPAMIDOL 150 ML: 755 INJECTION, SOLUTION INTRAVENOUS at 13:57

## 2023-04-30 RX ADMIN — CLOPIDOGREL BISULFATE 300 MG: 75 TABLET, FILM COATED ORAL at 14:25

## 2023-04-30 RX ADMIN — ASPIRIN 324 MG: 81 TABLET, CHEWABLE ORAL at 14:24

## 2023-04-30 RX ADMIN — Medication 10 ML: at 20:03

## 2023-04-30 RX ADMIN — ATORVASTATIN CALCIUM 80 MG: 80 TABLET, FILM COATED ORAL at 20:02

## 2023-04-30 NOTE — ED PROVIDER NOTES
EMERGENCY DEPARTMENT ENCOUNTER    Room Number:  S521/1  Date of encounter:  4/30/2023  PCP: Santos Simmons MD  Historian: Patient  Relevant information and history provided by sources other than the patient will be included below and in the ED Course.  Review of pertinent past medical records may also be included in record below and ED Course.    HPI:  Chief Complaint: Problems with left arm and vision problem  A complete HPI/ROS/PMH/PSH/SH/FH are unobtainable due to: Not applicable  Context: Art Mullins is a 87 y.o. male who presents to the ED c/o patient states that about 2 days ago had problems using his left arm.  He felt like he did not have as good of control and may be a little weaker than normal.  No abnormal sensation.  Then today when he was in the [facility] checking out he noticed some visual impairment.  He initially said it felt like a double vision.  But then when he clarified it seemed like his vision in both eyes is off.  Both of the symptoms for the vision impairment in the left arm not working as well as normal and that weakness in the left arm have been constant.  Again the left arm symptoms started 2 days ago.  He has been able to stand and walk.  No speech change.  No pain anywhere.  No headache, neck pain, chest pain, abdominal pain.  He did have a history of a TIA when he did have some symptoms on his left side and poor control in his left side and this was in October or September of last year.  He states that he was seen and evaluated by his physician.        Previous Episodes: Yes somewhat similar to his TIA in October or September of last year  Current Symptoms: See above    MEDICAL HISTORY REVIEWED    Patient has a history of hypertension, elevated cholesterol, anxiety    PAST MEDICAL HISTORY  Active Ambulatory Problems     Diagnosis Date Noted   • Palpitations 08/31/2021   • Essential hypertension 08/31/2021   • Abnormal EKG 08/31/2021     Resolved Ambulatory  Problems     Diagnosis Date Noted   • No Resolved Ambulatory Problems     Past Medical History:   Diagnosis Date   • Hyperlipidemia    • Hypertension          PAST SURGICAL HISTORY  History reviewed. No pertinent surgical history.      FAMILY HISTORY  Family History   Problem Relation Age of Onset   • No Known Problems Mother    • No Known Problems Father    • No Known Problems Sister    • No Known Problems Brother          SOCIAL HISTORY  Social History     Socioeconomic History   • Marital status:    Tobacco Use   • Smoking status: Never   Vaping Use   • Vaping Use: Never used   Substance and Sexual Activity   • Alcohol use: No   • Drug use: No   • Sexual activity: Defer         ALLERGIES  Sulfa antibiotics        REVIEW OF SYSTEMS  Review of Systems     All systems reviewed and negative except for those discussed in HPI.       PHYSICAL EXAM    I have reviewed the triage vital signs and nursing notes.    ED Triage Vitals [04/30/23 1259]   Temp Heart Rate Resp BP SpO2   98.5 °F (36.9 °C) 65 17 125/73 96 %      Temp src Heart Rate Source Patient Position BP Location FiO2 (%)   -- -- -- -- --       GENERAL: Elderly male.  Pleasant no acute distress.Vital signs on my initial evaluation unremarkable  HENT: nares patent  Head/neck/ face are symmetric without gross deformity, signs of trauma, or swelling  EYES: no scleral icterus, no conjunctival pallor.  Pupils are equal round reactive to light.  Extract muscles intact.  No visual field deficit.  Feels as if both eyes are little off and cannot see as clearly.  There is no obvious diplopia on exam and patient does not report of any diplopia  NECK: Supple, no meningismus  CV: regular rhythm, regular rate with intact distal pulses.  RESPIRATORY: normal effort and no respiratory distress.  Clear to auscultation bilateral  ABDOMEN: soft and nontender.  MUSCULOSKELETAL: no deformity.  Intact distal pulses to upper and lower extremities that are equal strong and  symmetric  NEURO: alert and appropriate, moves all extremities, follows commands.  This patient has NIH stroke scale of 2.  Patient is able to stand and ambulate at baseline.  SKIN: warm, dry    Vital signs and nursing notes reviewed.        LAB RESULTS  Recent Results (from the past 24 hour(s))   Comprehensive Metabolic Panel    Collection Time: 04/30/23  1:29 PM    Specimen: Blood   Result Value Ref Range    Glucose 92 65 - 99 mg/dL    BUN 27 (H) 8 - 23 mg/dL    Creatinine 1.74 (H) 0.76 - 1.27 mg/dL    Sodium 141 136 - 145 mmol/L    Potassium 3.9 3.5 - 5.2 mmol/L    Chloride 104 98 - 107 mmol/L    CO2 28.0 22.0 - 29.0 mmol/L    Calcium 8.9 8.6 - 10.5 mg/dL    Total Protein 6.6 6.0 - 8.5 g/dL    Albumin 3.9 3.5 - 5.2 g/dL    ALT (SGPT) 18 1 - 41 U/L    AST (SGOT) 33 1 - 40 U/L    Alkaline Phosphatase 160 (H) 39 - 117 U/L    Total Bilirubin 0.6 0.0 - 1.2 mg/dL    Globulin 2.7 gm/dL    A/G Ratio 1.4 g/dL    BUN/Creatinine Ratio 15.5 7.0 - 25.0    Anion Gap 9.0 5.0 - 15.0 mmol/L    eGFR 37.7 (L) >60.0 mL/min/1.73   Protime-INR    Collection Time: 04/30/23  1:29 PM    Specimen: Blood   Result Value Ref Range    Protime 14.7 (H) 11.7 - 14.2 Seconds    INR 1.14 (H) 0.90 - 1.10   aPTT    Collection Time: 04/30/23  1:29 PM    Specimen: Blood   Result Value Ref Range    PTT 28.2 22.7 - 35.4 seconds   Single High Sensitivity Troponin T    Collection Time: 04/30/23  1:29 PM    Specimen: Blood   Result Value Ref Range    HS Troponin T 114 (C) <15 ng/L   Type & Screen    Collection Time: 04/30/23  1:29 PM    Specimen: Blood   Result Value Ref Range    ABO Type O     RH type Positive     Antibody Screen Negative     T&S Expiration Date 5/3/2023 11:59:59 PM    Green Top (Gel)    Collection Time: 04/30/23  1:29 PM   Result Value Ref Range    Extra Tube Hold for add-ons.    Lavender Top    Collection Time: 04/30/23  1:29 PM   Result Value Ref Range    Extra Tube hold for add-on    Gold Top - SST    Collection Time: 04/30/23  1:29  PM   Result Value Ref Range    Extra Tube Hold for add-ons.    Light Blue Top    Collection Time: 04/30/23  1:29 PM   Result Value Ref Range    Extra Tube Hold for add-ons.    CBC Auto Differential    Collection Time: 04/30/23  1:29 PM    Specimen: Blood   Result Value Ref Range    WBC 10.59 3.40 - 10.80 10*3/mm3    RBC 4.40 4.14 - 5.80 10*6/mm3    Hemoglobin 13.9 13.0 - 17.7 g/dL    Hematocrit 41.4 37.5 - 51.0 %    MCV 94.1 79.0 - 97.0 fL    MCH 31.6 26.6 - 33.0 pg    MCHC 33.6 31.5 - 35.7 g/dL    RDW 12.5 12.3 - 15.4 %    RDW-SD 43.3 37.0 - 54.0 fl    MPV 9.0 6.0 - 12.0 fL    Platelets 155 140 - 450 10*3/mm3    Neutrophil % 75.3 42.7 - 76.0 %    Lymphocyte % 14.7 (L) 19.6 - 45.3 %    Monocyte % 8.5 5.0 - 12.0 %    Eosinophil % 0.6 0.3 - 6.2 %    Basophil % 0.4 0.0 - 1.5 %    Immature Grans % 0.5 0.0 - 0.5 %    Neutrophils, Absolute 7.98 (H) 1.70 - 7.00 10*3/mm3    Lymphocytes, Absolute 1.56 0.70 - 3.10 10*3/mm3    Monocytes, Absolute 0.90 0.10 - 0.90 10*3/mm3    Eosinophils, Absolute 0.06 0.00 - 0.40 10*3/mm3    Basophils, Absolute 0.04 0.00 - 0.20 10*3/mm3    Immature Grans, Absolute 0.05 0.00 - 0.05 10*3/mm3    nRBC 0.0 0.0 - 0.2 /100 WBC   Ethanol    Collection Time: 04/30/23  1:29 PM    Specimen: Blood   Result Value Ref Range    Ethanol <10 0 - 10 mg/dL    Ethanol % <0.010 %   POC Glucose Once    Collection Time: 04/30/23  1:33 PM    Specimen: Blood   Result Value Ref Range    Glucose 88 70 - 130 mg/dL   ECG 12 Lead Stroke Evaluation    Collection Time: 04/30/23  2:21 PM   Result Value Ref Range    QT Interval 416 ms   High Sensitivity Troponin T 2Hr    Collection Time: 04/30/23  4:34 PM    Specimen: Blood   Result Value Ref Range    HS Troponin T 110 (C) <15 ng/L    Troponin T Delta -4 (L) >=-4 - <+4 ng/L       Ordered the above labs and independently reviewed the results.        RADIOLOGY  XR Chest 1 View    Result Date: 4/30/2023  ONE VIEW PORTABLE CHEST  HISTORY: Acute stroke. Hypertension.  FINDINGS: The  lungs are well-expanded and clear. There is mild cardiomegaly. No acute abnormality is seen.  This report was finalized on 4/30/2023 2:18 PM by Dr. Logan Morrison M.D.      CT Angiogram Head w AI Analysis of LVO, CT Angiogram Neck, CT CEREBRAL PERFUSION WITH & WITHOUT CONTRAST    Result Date: 4/30/2023  CT ANGIOGRAPHY OF THE HEAD AND NECK WITHOUT AND WITH INTRAVENOUS CONTRAST AND 3-D RECONSTRUCTIONS AND COLOR CT PERFUSION IMAGING OF THE BRAIN WITH INTRAVENOUS CONTRAST  HISTORY: Left-sided weakness. Team D evaluation.  The CT scan was performed as an emergency procedure with CT angiography protocol through the head and neck without and with intravenous contrast and 3-D reconstructions and color CT perfusion imaging of the brain with intravenous contrast. The following findings are present: 1. An initial noncontrast CT scan of the brain was performed. There is moderate diffuse atrophy and chronic small vessel ischemic change. There is no evidence of acute intracranial hemorrhage or mass effect. The findings of the noncontrast CT scan were communicated to the team D physician when imaging made available at 1:45 PM. The postcontrast findings were communicated when imaging made available at 2:00 PM. 2. Evaluation for stenosis is based on NASCET criteria. There is normal filling of the left vertebral artery which supplies the basilar artery. There is a threadlike right vertebral artery which becomes occluded intracranially. The remainder of the posterior circulation is unremarkable. 3. The cervical carotid arteries show no NASCET significant stenosis or irregularity. 4. The intracranial CT angiography shows no major branch stenosis or evidence of intra-arterial thrombus. There is no evidence of aneurysm. 5. The color CT perfusion imaging appears normal. 6. There is nodular enlargement of both thyroid lobes, right greater than left most consistent with multinodular goiter. The nodules measure up to 1.9 cm and further  evaluation with thyroid ultrasound may be appropriate as an outpatient.      AI analysis of LVO was utilized.  Radiation dose reduction techniques were utilized, including automated exposure control and exposure modulation based on body size.  This report was finalized on 4/30/2023 4:37 PM by Dr. Logan Morrison M.D.        I ordered the above noted radiological studies. Reviewed by me and discussed with radiologist.  See dictation for official radiology interpretation.      PROCEDURES    Critical Care  Performed by: Juanjose Hardin MD  Authorized by: Juanjose Hardin MD     Critical care provider statement:     Critical care time (minutes): 30.    Critical care time was exclusive of:  Separately billable procedures and treating other patients    Critical care was necessary to treat or prevent imminent or life-threatening deterioration of the following conditions:  CNS failure or compromise    Critical care was time spent personally by me on the following activities:  Blood draw for specimens, development of treatment plan with patient or surrogate, discussions with consultants, evaluation of patient's response to treatment, examination of patient, obtaining history from patient or surrogate, review of old charts, re-evaluation of patient's condition, pulse oximetry, ordering and review of radiographic studies, ordering and review of laboratory studies and ordering and performing treatments and interventions    I assumed direction of critical care for this patient from another provider in my specialty: no      Care discussed with: admitting provider            MEDICATIONS GIVEN IN ER    Medications   sodium chloride 0.9 % flush 10 mL (has no administration in time range)   sodium chloride 0.9 % infusion (125 mL/hr Intravenous New Bag 4/30/23 1427)   sodium chloride 0.9 % flush 10 mL (10 mL Intravenous Given 4/30/23 2003)   sodium chloride 0.9 % flush 10 mL (has no administration in time range)   sodium chloride 0.9 %  infusion 40 mL (has no administration in time range)   sodium chloride 0.9 % infusion (75 mL/hr Intravenous Currently Infusing 4/30/23 1821)   atorvastatin (LIPITOR) tablet 80 mg (80 mg Oral Given 4/30/23 2002)   ondansetron (ZOFRAN) injection 4 mg (has no administration in time range)   clopidogrel (PLAVIX) tablet 75 mg ( Oral Canceled Entry 4/30/23 1810)   aspirin chewable tablet 81 mg ( Oral Canceled Entry 4/30/23 1810)     Or   aspirin suppository 300 mg ( Rectal Not Given:  See Alt 4/30/23 1810)   sodium chloride 0.9 % bolus 500 mL (0 mL Intravenous Stopped 4/30/23 1631)   iopamidol (ISOVUE-370) 76 % injection 150 mL (150 mL Intravenous Given 4/30/23 1357)   aspirin chewable tablet 324 mg (324 mg Oral Given 4/30/23 1424)   clopidogrel (PLAVIX) tablet 300 mg (300 mg Oral Given 4/30/23 1425)         All labs have been independently reviewed by me.  All radiology studies have been reviewed by me and I discussed with radiologist dictating the report when indicated below.  All EKG's independently viewed and interpreted by me.  Discussion below represents my analysis of pertinent findings related to patient's condition, differential diagnosis, treatment plan and final disposition.        PROGRESS, DATA ANALYSIS, CONSULTS, AND MEDICAL DECISION MAKING    This is a gentleman that very likely is having a neurologic deficit from stroke.  Symptoms of onset started 2 days ago.  He is not a thrombolytic candidate.  I have activated a team D in order a CT angiogram as well as CT perfusion and discussed the case with the stroke neurologist.  I informed the patient of my concerns.  He will need to be admitted for further evaluation.  All questions answered at this time.      ED Course as of 04/30/23 2139   Sun Apr 30, 2023   1323 VNA stroke scale was completed within 2 to 3 minutes of me assigning myself to this patient.  He has a stroke scale of 2.  He has some visual impairment but it does not fit an NIH stroke scale.  He has  no visual field deficit.  He can identify fingers. [MM]   1324 I did discuss the case with Dr. Cronin who is the stroke neurologist on.  This gentleman has a stroke scale of 2.  His symptoms started 2 days ago when he had problems with his left arm.  He is not a thrombolytic candidate.  We do a CT angiogram of his head and neck and perfusion.  We started him on IV fluids.  We will give him an aspirin if the CT scan shows no hemorrhage.  All questions answered [MM]   1411 I did talk with the stroke neurologist again, Dr. Cronin there is no hemorrhage.  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  This is not amenable to clot retrieval.  Uncertain if this is acute or chronic.  It is concerning that this might be contributing to his symptoms.  We will treat with aspirin and Plavix.  The neurologist agrees.  We will admit the patient to the hospital.  Likely diagnosis is acute CVA.  Again this patient is not a thrombolytic candidate or an interventional candidate and discussion with the stroke neurologist and my assessment. [MM]   1427 Creatinine(!): 1.74 [MM]   1428 Creatinine(!): 1.74 [MM]   1443 My own independent interpretation of the EKG that was done at 221 shows a rate of 80 it is sinus rhythm there is some intraventricular conduction delay with PVC  Normal axis I do not appreciate any definitive acute injury pattern there are some nonspecific ST and T wave changes signs of LVH as well.  I have no old EKG to compare with. [MM]   1444 Creatinine(!): 1.74  I do not see any old lab work to see what his baseline creatinine is. [MM]   1444 HS Troponin T(!!): 114  This gentleman does not have any chest pain or shortness of breath here or any recent chest pain or shortness of breath.  I think we will continue to trend this troponin.  He has no acute injury pattern seen on his EKG.  We are treating him with aspirin and Plavix because of his acute CVA. [MM]   1445 I discussed the case with Dr. Alvarenga who is on for  [facility].  Informed of the results of his lab work as well as his CT scans and the patient's clinical presentation and my conversation with the stroke neurologist.  Increased to accept the patient admission.  All questions answered [MM]   8476 On my reevaluation of the patient he again clarified that he has not had any chest pain or any pain currently or over the last several days.  He states that his vision has improved.  Feels that his weakness and poor control of his left arm is about the same.  I informed him and the spouse of the results of the CTs scans as well as lab work.  All questions answered. [MM]   1457 When asked if he has a history of kidney problem he states that [facility] does [facility].  Uncertain if he really has any history of chronic kidney disease. [MM]      ED Course User Index  [MM] Juanjose Hardin MD       AS OF 21:39 EDT VITALS:    BP - 100/62  HR - 77  TEMP - 98.4 °F (36.9 °C) (Oral)  02 SATS - 95%    SOCIAL DETERMINANTS OF HEALTH THAT IMPACT OR LIMIT CARE (For example..Homelessness,safe discharge, inability to obtain care, follow up, or prescriptions):      DIAGNOSIS  Final diagnoses:   Acute CVA (cerebrovascular accident)   Acute kidney injury   Troponin level elevated         DISPOSITION  I have reviewed the test results with my patient and explained the current treatment plan.  I answered all of the patient's questions.  The patient will be admitted to monitor bed at this time.  The patient is not hypotensive and is tolerating their current disease condition well enough for a monitored bed at this time.  The patient's current condition does not require intensive care treatment at this time.            DICTATED UTILIZING DRAGON DICTATION    Note Disclaimer: At [facility], we believe that sharing information builds trust and better relationships. You are receiving this note because you recently visited [facility]. It is possible you will see [facility]  information before a provider has talked with you about it. This kind of information can be easy to misunderstand. To help you fully understand what it means for your health, we urge you to discuss this note with your provider.     Juanjose Hardin MD  04/30/23 0283

## 2023-04-30 NOTE — H&P
Internal medicine history and physical  INTERNAL MEDICINE   [facility]       Patient Identification:  Name: Art Mullins  Age: 87 y.o.  Sex: male  :  1935  MRN: [id number]                   Primary Care Physician: Santos Simmons MD                               Date of admission:2023    Chief Complaint: Came to the emergency room by EMS from [employer] for double vision ongoing for 1 hour in the background of left arm weakness off-and-on for the last couple days.    History of Present Illness:   Patient is a fairly active 87-year-old male who needs to look at the face and read lips in order to be able to communicate and answer questions has past medical history remarkable for TIA in September or October of last year with similar symptoms of left hand and left arm weakness, history of hypertension and palpitations, anxiety and restless leg syndromeWas in his usual state of his health until couple days ago when he noticed that he has lost control of his left arm and hand and was having hard time using it properly.  Since this had happened before and recovered from it did not pay much attention to it and in that background today he was at [facility] and lying to get some [employer] coffee from the store and developed double vision got him concerned.  Patient cannot think talk to understand and express himself and walk without any problem.  EMS was called and patient was brought to the emergency room for further evaluation where initial work-up is essentially unremarkable except for chronic bilateral carotid artery obstructions.  Patient's case was discussed with neurology on-call and recommendation was made to start him on aspirin and Plavix and arrange for further work-up including MRI echocardiogram for further risk modification.  Patient claims to be compliant with his medications.      Past Medical History:  Past Medical History:   Diagnosis Date   • Hyperlipidemia    •  Hypertension      Past Surgical History:  No past surgical history on file.   Home Meds:  (Not in a hospital admission)    Current Meds:     Current Facility-Administered Medications:   •  sodium chloride 0.9 % flush 10 mL, 10 mL, Intravenous, PRN, Juanjose aHrdin MD  •  sodium chloride 0.9 % infusion, 125 mL/hr, Intravenous, Continuous, Juanjose Hardin MD, Last Rate: 125 mL/hr at 04/30/23 1427, 125 mL/hr at 04/30/23 1427    Current Outpatient Medications:   •  aspirin 81 MG EC tablet, Take 81 mg by mouth Daily., Disp: , Rfl:   •  cholecalciferol (VITAMIN D3) 1000 UNITS tablet, Take 1,000 Units by mouth daily., Disp: , Rfl:   •  clonazePAM (KlonoPIN) 1 MG tablet, Take 1 mg by mouth Every Night., Disp: , Rfl:   •  Cyanocobalamin (VITAMIN B 12 PO), Take 2,500 mcg by mouth Daily., Disp: , Rfl:   •  doxazosin (CARDURA) 8 MG tablet, Take 8 mg by mouth Every Night., Disp: , Rfl:   •  losartan-hydrochlorothiazide (HYZAAR) 100-25 MG per tablet, Take 1 tablet by mouth Daily., Disp: , Rfl:   •  metoprolol succinate XL (TOPROL-XL) 50 MG 24 hr tablet, Take 1 tablet by mouth 2 (two) times a day., Disp: 60 tablet, Rfl: 5  •  rOPINIRole (REQUIP) 2 MG tablet, Take  by mouth Every Night., Disp: , Rfl:   •  rosuvastatin (CRESTOR) 40 MG tablet, Take 40 mg by mouth Daily., Disp: , Rfl:   Allergies:  Allergies   Allergen Reactions   • Sulfa Antibiotics      Social History:   Social History     Tobacco Use   • Smoking status: Never   • Smokeless tobacco: Not on file   Substance Use Topics   • Alcohol use: No      Family History:  Family History   Problem Relation Age of Onset   • No Known Problems Mother    • No Known Problems Father    • No Known Problems Sister    • No Known Problems Brother           Review of Systems  See history of present illness and past medical history.   Constitutional: Remarkable for no fever or chills  Cardiovascular: Remarkable for no chest pain or shortness of breath  GI: Remarkable for no nausea vomiting or  "diarrhea  : Remarkable for no burning in urination frequency or urgency  Musculoskeletal: Remarkable for no new joint aches and pain  Neurological: Remarkable for what has been described in the history of presenting illness.      Vitals:   /70 (BP Location: Left arm, Patient Position: Sitting)   Pulse 72   Temp 98.5 °F (36.9 °C)   Resp 20   Ht 172.7 cm (68\")   Wt 86 kg (189 lb 8 oz)   SpO2 93%   BMI 28.81 kg/m²   I/O: No intake or output data in the 24 hours ending 04/30/23 1505  Exam:  Patient is examined using the personal protective equipment as per guidelines from infection control for this particular patient as enacted.  Hand washing was performed before and after patient interaction.  Examination from ER physician reviewed as follows:  General Appearance:   Alert cooperative male who requires to read lips to communicate well and does not appear to be in any acute distress.   Head:    Normocephalic, without obvious abnormality, atraumatic   Eyes:    PERRL, conjunctiva/corneas clear, EOM's intact, both eyes   Ears:    Normal external ear canals, both ears   Nose:   Nares normal, septum midline, mucosa normal, no drainage    or sinus tenderness   Throat:   Lips, tongue, gums normal; oral mucosa pink and moist   Neck:   Supple, symmetrical, trachea midline, no adenopathy;     thyroid:  no enlargement/tenderness/nodules; no carotid    bruit or JVD   Back:     Symmetric, no curvature, ROM normal, no CVA tenderness   Lungs:     Clear to auscultation bilaterally, respirations unlabored   Chest Wall:    No tenderness or deformity    Heart:    Regular rate and rhythm, S1 and S2 normal, no murmur, rub   or gallop   Abdomen:     Soft, non-tender, bowel sounds active all four quadrants,     no masses, no hepatomegaly, no splenomegaly   Extremities:   Extremities normal, atraumatic, no cyanosis or edema   Pulses:   Pulses palpable in all extremities; symmetric all extremities   Skin:   Skin color normal, Skin " is warm and dry,  no rashes or palpable lesions   Neurologic:  Grossly nonfocal still has some vision issues and initial NIH stroke score was recorded as 2.       Data Review:      I reviewed the patient's new clinical results.  Results from last 7 days   Lab Units 04/30/23  1329   WBC 10*3/mm3 10.59   HEMOGLOBIN g/dL 13.9   PLATELETS 10*3/mm3 155     Results from last 7 days   Lab Units 04/30/23  1329   SODIUM mmol/L 141   POTASSIUM mmol/L 3.9   CHLORIDE mmol/L 104   CO2 mmol/L 28.0   BUN mg/dL 27*   CREATININE mg/dL 1.74*   CALCIUM mg/dL 8.9   GLUCOSE mg/dL 92     MRI Brain Without Contrast    Result Date: 4/30/2023  1. Bilateral areas of restricted diffusion noted within both cerebral and cerebellar hemispheres.  Multifocal distribution would suggest an embolic source.  This report was finalized on 4/30/2023 11:36 PM by Dr. Francesca Green M.D.      Microbiology Results (last 10 days)     ** No results found for the last 240 hours. **        ECG 12 Lead Stroke Evaluation   Final Result   HEART RATE= 80  bpm   RR Interval= 750  ms   OH Interval= 166  ms   P Horizontal Axis= 25  deg   P Front Axis= 32  deg   QRSD Interval= 109  ms   QT Interval= 416  ms   QRS Axis= -34  deg   T Wave Axis= 4  deg   - ABNORMAL ECG -   Sinus rhythm   Multiform ventricular premature complexes   Abnormal R-wave progression, late transition   Left ventricular hypertrophy   Borderline prolonged QT interval   No Prior Tracing for Comparison   Electronically Signed By: Harris LarsonKVNG) ([facility]) 30-Apr-2023 20:58:35   Date and Time of Study: 2023-04-30 14:21:41            Assessment:  Active Hospital Problems    Diagnosis  POA   • **Acute CVA (cerebrovascular accident) [I63.9]  Yes   • CKD (chronic kidney disease) [N18.9]  Unknown   • BELLA (acute kidney injury) [N17.9]  Unknown   • Elevated troponin [R77.8]  Unknown   • Vision changes [H53.9]  Unknown   • Palpitations [R00.2]  Yes   • Essential hypertension [I10]  Yes       Medical  decision making/care plan: See admitting orders  · Persistent left arm weakness and incoordination with vision changes starting about an hour prior to presentation in the background of risk factors such as hypertension and dyslipidemia-this presentation is concerning for acute CVA with TIA-plan is to admit the patient, continue with aspirin Plavix and statin, get MRI of the brain, 2D echo with Doppler OT PT evaluation and neurology consultation.  · Hypertension-continue antihypertensive regimen and allow for permissive hypertension and drop in blood pressure with eventual target blood pressure of 130/80.  · Elevated creatinine unclear whether it is acute or chronic or acute on chronic process-avoid nephrotoxic agent and hypotensive episodes and monitor his renal function.  · Elevated troponin with no associated chest pain-plan is to monitor serial troponin level and consult cardiology service if troponin continues to trend up.  Significance of elevated troponin in the setting of renal insufficiency is unclear.        Jeremy Alvarenga MD   4/30/2023  15:05 EDT    Parts of this note may be an electronic transcription/translation of spoken language to printed text using the Dragon dictation system.

## 2023-04-30 NOTE — ED NOTES
Pt arrives to ED via EMS coming from [facility]. Pt reports he was shopping and reports he had blurry vision  that started around 1200 per pt; and L sided weakness that has been occurring for the last 2 days per pt. Upon assessment symptoms has gotten better per pt at this time. Pt speech is clear and not slurred; pt reports having no speech issues.      Per assessment Is still having trouble with vision

## 2023-04-30 NOTE — ED NOTES
Pt to er via ems picked up at [facility] with left arm weaknessx2 days and double vision 1 hour prior to arrival that has resolved upon arrival to ER.

## 2023-04-30 NOTE — ED NOTES
Pt returned from CT; 2nd NIH was performed by this RN. Pt scored a 0 with no deficits with symptoms resolving upon assessment

## 2023-04-30 NOTE — PLAN OF CARE
Goal Outcome Evaluation:  Plan of Care Reviewed With: patient        Progress: improving  Outcome Evaluation: Pt is a 87 year old male presenting with stroke-like symptoms. Pt VSS. A&Ox4. No deficits witnessed by RN. NIH of 0. Passed bedside swallow eval. Awaiting admission orders from admitting. New critical troponin result is trending down from previous critical troponin draw. Will notify attending. IV fluids infusing. CT head and neck completed in ER. Chest x-ray also completed in ER.

## 2023-04-30 NOTE — ED NOTES
"Nursing report ED to floor  Art Mullins  87 y.o.  male    HPI :   Chief Complaint   Patient presents with    Extremity Weakness    Double Vision       Admitting doctor:   Jeremy Alvarenga MD    Admitting diagnosis:   The primary encounter diagnosis was Acute CVA (cerebrovascular accident). Diagnoses of Acute kidney injury and Troponin level elevated were also pertinent to this visit.    Code status:   Current Code Status       Date Active Code Status Order ID Comments User Context       Not on file            Allergies:   Sulfa antibiotics    Isolation:   No active isolations    Intake and Output  No intake or output data in the 24 hours ending 04/30/23 1507    Weight:       04/30/23  1402   Weight: 86 kg (189 lb 8 oz)       Most recent vitals:   Vitals:    04/30/23 1259 04/30/23 1331 04/30/23 1402   BP: 125/73 136/70    BP Location:  Left arm    Patient Position:  Sitting    Pulse: 65 72    Resp: 17 20    Temp: 98.5 °F (36.9 °C)     SpO2: 96% 93%    Weight:   86 kg (189 lb 8 oz)   Height:   172.7 cm (68\")       Active LDAs/IV Access:   Lines, Drains & Airways       Active LDAs       Name Placement date Placement time Site Days    Peripheral IV 04/30/23 1327 Right Antecubital 04/30/23  1327  Antecubital  less than 1                    Labs (abnormal labs have a star):   Labs Reviewed   COMPREHENSIVE METABOLIC PANEL - Abnormal; Notable for the following components:       Result Value    BUN 27 (*)     Creatinine 1.74 (*)     Alkaline Phosphatase 160 (*)     eGFR 37.7 (*)     All other components within normal limits    Narrative:     GFR Normal >60  Chronic Kidney Disease <60  Kidney Failure <15    The GFR formula is only valid for adults with stable renal function between ages 18 and 70.   PROTIME-INR - Abnormal; Notable for the following components:    Protime 14.7 (*)     INR 1.14 (*)     All other components within normal limits   SINGLE HSTROPONIN T - Abnormal; Notable for the following components:    HS " Troponin T 114 (*)     All other components within normal limits    Narrative:     High Sensitive Troponin T Reference Range:  <10.0 ng/L- Negative Female for AMI  <15.0 ng/L- Negative Male for AMI  >=10 - Abnormal Female indicating possible myocardial injury.  >=15 - Abnormal Male indicating possible myocardial injury.   Clinicians would have to utilize clinical acumen, EKG, Troponin, and serial changes to determine if it is an Acute Myocardial Infarction or myocardial injury due to an underlying chronic condition.        CBC WITH AUTO DIFFERENTIAL - Abnormal; Notable for the following components:    Lymphocyte % 14.7 (*)     Neutrophils, Absolute 7.98 (*)     All other components within normal limits   APTT - Normal   POCT GLUCOSE FINGERSTICK - Normal   RAINBOW DRAW    Narrative:     The following orders were created for panel order [place] Draw.  Procedure                               Abnormality         Status                     ---------                               -----------         ------                     Green Top (Gel)[[id number]]                                  Final result               Lavender Top[[id number]]                                     Final result               Gold Top - SST[[id number]]                                   Final result               Light Blue Top[[id number]]                                   Final result                 Please view results for these tests on the individual orders.   ETHANOL   HIGH SENSITIVITIY TROPONIN T 2HR   POCT GLUCOSE FINGERSTICK   TYPE AND SCREEN   CBC AND DIFFERENTIAL    Narrative:     The following orders were created for panel order CBC & Differential.  Procedure                               Abnormality         Status                     ---------                               -----------         ------                     CBC Auto Differential[[id number]]        Abnormal            Final result                 Please view results for these tests  on the individual orders.   GREEN TOP   LAVENDER TOP   GOLD TOP - SST   LIGHT BLUE TOP       EKG:   ECG 12 Lead Stroke Evaluation   Preliminary Result   HEART RATE= 80  bpm   RR Interval= 750  ms   VT Interval= 166  ms   P Horizontal Axis= 25  deg   P Front Axis= 32  deg   QRSD Interval= 109  ms   QT Interval= 416  ms   QRS Axis= -34  deg   T Wave Axis= 4  deg   - ABNORMAL ECG -   Sinus rhythm   Multiform ventricular premature complexes   Abnormal R-wave progression, late transition   Left ventricular hypertrophy   Borderline prolonged QT interval   Electronically Signed By:    Date and Time of Study: 2023-04-30 14:21:41          Meds given in ED:   Medications   sodium chloride 0.9 % flush 10 mL (has no administration in time range)   sodium chloride 0.9 % infusion (125 mL/hr Intravenous New Bag 4/30/23 1427)   sodium chloride 0.9 % bolus 500 mL (500 mL Intravenous New Bag 4/30/23 1409)   iopamidol (ISOVUE-370) 76 % injection 150 mL (150 mL Intravenous Given 4/30/23 1357)   aspirin chewable tablet 324 mg (324 mg Oral Given 4/30/23 1424)   clopidogrel (PLAVIX) tablet 300 mg (300 mg Oral Given 4/30/23 1425)       Imaging results:  No radiology results for the last day    Ambulatory status:   - Assistx1     Social issues:   Social History     Socioeconomic History    Marital status:    Tobacco Use    Smoking status: Never   Substance and Sexual Activity    Alcohol use: No    Drug use: No    Sexual activity: Defer       NIH Stroke Scale:  Interval: baseline      Karina Lorenz RN  04/30/23 15:07 EDT

## 2023-05-01 ENCOUNTER — APPOINTMENT (OUTPATIENT)
Dept: CARDIOLOGY | Facility: HOSPITAL | Age: 88
End: 2023-05-01
Payer: MEDICARE

## 2023-05-01 LAB
ALBUMIN SERPL-MCNC: 3.1 G/DL (ref 3.5–5.2)
ALBUMIN/GLOB SERPL: 1 G/DL
ALP SERPL-CCNC: 143 U/L (ref 39–117)
ALT SERPL W P-5'-P-CCNC: 14 U/L (ref 1–41)
ANION GAP SERPL CALCULATED.3IONS-SCNC: 8.6 MMOL/L (ref 5–15)
AORTIC DIMENSIONLESS INDEX: 0.7 (DI)
AST SERPL-CCNC: 28 U/L (ref 1–40)
BH CV ECHO MEAS - AO MAX PG: 8.4 MMHG
BH CV ECHO MEAS - AO MEAN PG: 4 MMHG
BH CV ECHO MEAS - AO ROOT DIAM: 3.5 CM
BH CV ECHO MEAS - AO V2 MAX: 145 CM/SEC
BH CV ECHO MEAS - AO V2 VTI: 22.5 CM
BH CV ECHO MEAS - AVA(I,D): 2.2 CM2
BH CV ECHO MEAS - EDV(CUBED): 132.7 ML
BH CV ECHO MEAS - EDV(MOD-SP2): 125 ML
BH CV ECHO MEAS - EDV(MOD-SP4): 125 ML
BH CV ECHO MEAS - EF(MOD-BP): 44.6 %
BH CV ECHO MEAS - EF(MOD-SP2): 47.2 %
BH CV ECHO MEAS - EF(MOD-SP4): 35.2 %
BH CV ECHO MEAS - ESV(CUBED): 40.5 ML
BH CV ECHO MEAS - ESV(MOD-SP2): 66 ML
BH CV ECHO MEAS - ESV(MOD-SP4): 81 ML
BH CV ECHO MEAS - FS: 32.7 %
BH CV ECHO MEAS - IVS/LVPW: 1 CM
BH CV ECHO MEAS - IVSD: 1 CM
BH CV ECHO MEAS - LAT PEAK E' VEL: 8.8 CM/SEC
BH CV ECHO MEAS - LV DIASTOLIC VOL/BSA (35-75): 62.6 CM2
BH CV ECHO MEAS - LV MASS(C)D: 188 GRAMS
BH CV ECHO MEAS - LV MAX PG: 2.6 MMHG
BH CV ECHO MEAS - LV MEAN PG: 1 MMHG
BH CV ECHO MEAS - LV SYSTOLIC VOL/BSA (12-30): 40.6 CM2
BH CV ECHO MEAS - LV V1 MAX: 80.6 CM/SEC
BH CV ECHO MEAS - LV V1 VTI: 15.9 CM
BH CV ECHO MEAS - LVIDD: 5.1 CM
BH CV ECHO MEAS - LVIDS: 3.4 CM
BH CV ECHO MEAS - LVOT AREA: 3.1 CM2
BH CV ECHO MEAS - LVOT DIAM: 1.99 CM
BH CV ECHO MEAS - LVPWD: 1 CM
BH CV ECHO MEAS - MED PEAK E' VEL: 5.2 CM/SEC
BH CV ECHO MEAS - MV A MAX VEL: 75.4 CM/SEC
BH CV ECHO MEAS - MV DEC SLOPE: 321.3 CM/SEC2
BH CV ECHO MEAS - MV DEC TIME: 0.16 MSEC
BH CV ECHO MEAS - MV E MAX VEL: 54.8 CM/SEC
BH CV ECHO MEAS - MV E/A: 0.73
BH CV ECHO MEAS - MV MAX PG: 2.48 MMHG
BH CV ECHO MEAS - MV MEAN PG: 1.28 MMHG
BH CV ECHO MEAS - MV P1/2T: 66.8 MSEC
BH CV ECHO MEAS - MV V2 VTI: 17.1 CM
BH CV ECHO MEAS - MVA(P1/2T): 3.3 CM2
BH CV ECHO MEAS - MVA(VTI): 2.9 CM2
BH CV ECHO MEAS - PA V2 MAX: 84.2 CM/SEC
BH CV ECHO MEAS - RAP SYSTOLE: 3 MMHG
BH CV ECHO MEAS - RVOT DIAM: 2.13 CM
BH CV ECHO MEAS - RVSP: 27.4 MMHG
BH CV ECHO MEAS - SI(MOD-SP2): 29.6 ML/M2
BH CV ECHO MEAS - SI(MOD-SP4): 22.1 ML/M2
BH CV ECHO MEAS - SV(LVOT): 49.5 ML
BH CV ECHO MEAS - SV(MOD-SP2): 59 ML
BH CV ECHO MEAS - SV(MOD-SP4): 44 ML
BH CV ECHO MEAS - TR MAX PG: 24.4 MMHG
BH CV ECHO MEAS - TR MAX VEL: 247 CM/SEC
BH CV ECHO MEASUREMENTS AVERAGE E/E' RATIO: 7.83
BH CV ECHO SHUNT ASSESSMENT PERFORMED (HIDDEN SCRIPTING): 1
BH CV XLRA - TDI S': 15 CM/SEC
BILIRUB SERPL-MCNC: 0.6 MG/DL (ref 0–1.2)
BUN SERPL-MCNC: 28 MG/DL (ref 8–23)
BUN/CREAT SERPL: 20.4 (ref 7–25)
CALCIUM SPEC-SCNC: 9 MG/DL (ref 8.6–10.5)
CHLORIDE SERPL-SCNC: 106 MMOL/L (ref 98–107)
CHOLEST SERPL-MCNC: 120 MG/DL (ref 0–200)
CO2 SERPL-SCNC: 25.4 MMOL/L (ref 22–29)
CREAT SERPL-MCNC: 1.37 MG/DL (ref 0.76–1.27)
DEPRECATED RDW RBC AUTO: 43.3 FL (ref 37–54)
EGFRCR SERPLBLD CKD-EPI 2021: 50.2 ML/MIN/1.73
ERYTHROCYTE [DISTWIDTH] IN BLOOD BY AUTOMATED COUNT: 12.6 % (ref 12.3–15.4)
GLOBULIN UR ELPH-MCNC: 3 GM/DL
GLUCOSE BLDC GLUCOMTR-MCNC: 92 MG/DL (ref 70–130)
GLUCOSE SERPL-MCNC: 91 MG/DL (ref 65–99)
HBA1C MFR BLD: 5.3 % (ref 4.8–5.6)
HCT VFR BLD AUTO: 37.6 % (ref 37.5–51)
HDLC SERPL-MCNC: 27 MG/DL (ref 40–60)
HGB BLD-MCNC: 12.7 G/DL (ref 13–17.7)
LDLC SERPL CALC-MCNC: 67 MG/DL (ref 0–100)
LDLC/HDLC SERPL: 2.34 {RATIO}
LEFT ATRIUM VOLUME INDEX: 39.8 ML/M2
MAXIMAL PREDICTED HEART RATE: 134 BPM
MCH RBC QN AUTO: 31.7 PG (ref 26.6–33)
MCHC RBC AUTO-ENTMCNC: 33.8 G/DL (ref 31.5–35.7)
MCV RBC AUTO: 93.8 FL (ref 79–97)
PLATELET # BLD AUTO: 142 10*3/MM3 (ref 140–450)
PMV BLD AUTO: 9.4 FL (ref 6–12)
POTASSIUM SERPL-SCNC: 3.7 MMOL/L (ref 3.5–5.2)
PROT SERPL-MCNC: 6.1 G/DL (ref 6–8.5)
RBC # BLD AUTO: 4.01 10*6/MM3 (ref 4.14–5.8)
SINUS: 3.2 CM
SODIUM SERPL-SCNC: 140 MMOL/L (ref 136–145)
STJ: 2.44 CM
STRESS TARGET HR: 114 BPM
TRIGL SERPL-MCNC: 149 MG/DL (ref 0–150)
TROPONIN T SERPL HS-MCNC: 96 NG/L
VLDLC SERPL-MCNC: 26 MG/DL (ref 5–40)
WBC NRBC COR # BLD: 9.53 10*3/MM3 (ref 3.4–10.8)

## 2023-05-01 PROCEDURE — 97165 OT EVAL LOW COMPLEX 30 MIN: CPT

## 2023-05-01 PROCEDURE — 93306 TTE W/DOPPLER COMPLETE: CPT

## 2023-05-01 PROCEDURE — 80053 COMPREHEN METABOLIC PANEL: CPT | Performed by: INTERNAL MEDICINE

## 2023-05-01 PROCEDURE — 82948 REAGENT STRIP/BLOOD GLUCOSE: CPT

## 2023-05-01 PROCEDURE — 83036 HEMOGLOBIN GLYCOSYLATED A1C: CPT | Performed by: INTERNAL MEDICINE

## 2023-05-01 PROCEDURE — 97161 PT EVAL LOW COMPLEX 20 MIN: CPT

## 2023-05-01 PROCEDURE — 97110 THERAPEUTIC EXERCISES: CPT

## 2023-05-01 PROCEDURE — 85027 COMPLETE CBC AUTOMATED: CPT | Performed by: INTERNAL MEDICINE

## 2023-05-01 PROCEDURE — 84484 ASSAY OF TROPONIN QUANT: CPT | Performed by: STUDENT IN AN ORGANIZED HEALTH CARE EDUCATION/TRAINING PROGRAM

## 2023-05-01 PROCEDURE — 25510000001 PERFLUTREN (DEFINITY) 8.476 MG IN SODIUM CHLORIDE (PF) 0.9 % 10 ML INJECTION: Performed by: INTERNAL MEDICINE

## 2023-05-01 PROCEDURE — 99223 1ST HOSP IP/OBS HIGH 75: CPT | Performed by: PSYCHIATRY & NEUROLOGY

## 2023-05-01 PROCEDURE — 80061 LIPID PANEL: CPT | Performed by: INTERNAL MEDICINE

## 2023-05-01 PROCEDURE — 97530 THERAPEUTIC ACTIVITIES: CPT

## 2023-05-01 PROCEDURE — 99222 1ST HOSP IP/OBS MODERATE 55: CPT | Performed by: INTERNAL MEDICINE

## 2023-05-01 PROCEDURE — 93306 TTE W/DOPPLER COMPLETE: CPT | Performed by: INTERNAL MEDICINE

## 2023-05-01 RX ORDER — ROPINIROLE 2 MG/1
2 TABLET, FILM COATED ORAL NIGHTLY
Status: DISCONTINUED | OUTPATIENT
Start: 2023-05-01 | End: 2023-05-02 | Stop reason: HOSPADM

## 2023-05-01 RX ADMIN — SODIUM CHLORIDE 75 ML/HR: 9 INJECTION, SOLUTION INTRAVENOUS at 06:20

## 2023-05-01 RX ADMIN — CLOPIDOGREL BISULFATE 75 MG: 75 TABLET, FILM COATED ORAL at 11:20

## 2023-05-01 RX ADMIN — ROPINIROLE 2 MG: 2 TABLET, FILM COATED ORAL at 22:18

## 2023-05-01 RX ADMIN — PERFLUTREN 2 ML: 6.52 INJECTION, SUSPENSION INTRAVENOUS at 16:53

## 2023-05-01 RX ADMIN — ATORVASTATIN CALCIUM 80 MG: 80 TABLET, FILM COATED ORAL at 21:01

## 2023-05-01 RX ADMIN — SODIUM CHLORIDE 75 ML/HR: 9 INJECTION, SOLUTION INTRAVENOUS at 21:06

## 2023-05-01 RX ADMIN — ASPIRIN 81 MG: 81 TABLET, CHEWABLE ORAL at 11:20

## 2023-05-01 RX ADMIN — Medication 10 ML: at 21:01

## 2023-05-01 RX ADMIN — ROPINIROLE 2 MG: 2 TABLET, FILM COATED ORAL at 04:06

## 2023-05-01 RX ADMIN — Medication 10 ML: at 11:21

## 2023-05-01 NOTE — PLAN OF CARE
Goal Outcome Evaluation:  Plan of Care Reviewed With: other (see comments) (RN)           Outcome Evaluation: Orders received. Per RN patient is tolerating regular/thin diet and clinical swallow evaluation is not indicated. Speech to s/o. Please re-consult as indicated.

## 2023-05-01 NOTE — PLAN OF CARE
Goal Outcome Evaluation:  Patient alert and oriented, VSS, on RA. Patient is a standby assist to the bathroom. Voiding with the urinal. NIH 0. Restarted home medication. Call light within reach.

## 2023-05-01 NOTE — PROGRESS NOTES
Name: Art Mullins ADMIT: 2023   : 1935  PCP: Santos Simmons MD    MRN: [id number] LOS: 1 days   AGE/SEX: 87 y.o. male  ROOM: [facility]     Subjective   Subjective   Resting in bed, accompanied by family. Awaiting echo, no chest pain.        Objective   Objective   Vital Signs  Temp:  [98.3 °F (36.8 °C)-98.7 °F (37.1 °C)] 98.7 °F (37.1 °C)  Heart Rate:  [74-87] 74  Resp:  [17-18] 17  BP: (100-146)/(62-89) 114/70  SpO2:  [94 %-95 %] 95 %  on   ;   Device (Oxygen Therapy): room air  Body mass index is 28.74 kg/m².  Physical Exam  Constitutional:       General: He is not in acute distress.     Appearance: Normal appearance. He is not toxic-appearing.      Comments: Elderly, frail   Cardiovascular:      Rate and Rhythm: Normal rate and regular rhythm.   Pulmonary:      Effort: Pulmonary effort is normal. No respiratory distress.      Breath sounds: Normal breath sounds. No wheezing.   Musculoskeletal:         General: No tenderness.      Right lower leg: No edema.      Left lower leg: No edema.   Skin:     General: Skin is warm and dry.   Neurological:      General: No focal deficit present.      Mental Status: He is alert and oriented to person, place, and time.      Motor: Weakness present.   Psychiatric:         Mood and Affect: Mood normal.         Behavior: Behavior normal.         Thought Content: Thought content normal.         Judgment: Judgment normal.         Results Review     I reviewed the patient's new clinical results.  Results from last 7 days   Lab Units 23  0516 23  1329   WBC 10*3/mm3 9.53 10.59   HEMOGLOBIN g/dL 12.7* 13.9   PLATELETS 10*3/mm3 142 155     Results from last 7 days   Lab Units 23  0516 23  1329   SODIUM mmol/L 140 141   POTASSIUM mmol/L 3.7 3.9   CHLORIDE mmol/L 106 104   CO2 mmol/L 25.4 28.0   BUN mg/dL 28* 27*   CREATININE mg/dL 1.37* 1.74*   GLUCOSE mg/dL 91 92   Estimated Creatinine Clearance: 41.2 mL/min (A) (by C-G formula based on  SCr of 1.37 mg/dL (H)).  Results from last 7 days   Lab Units 05/01/23  0516 04/30/23  1329   ALBUMIN g/dL 3.1* 3.9   BILIRUBIN mg/dL 0.6 0.6   ALK PHOS U/L 143* 160*   AST (SGOT) U/L 28 33   ALT (SGPT) U/L 14 18     Results from last 7 days   Lab Units 05/01/23  0516 04/30/23  1329   CALCIUM mg/dL 9.0 8.9   ALBUMIN g/dL 3.1* 3.9     No results found for: COVID19  Hemoglobin A1C   Date/Time Value Ref Range Status   05/01/2023 0516 5.30 4.80 - 5.60 % Final     Glucose   Date/Time Value Ref Range Status   05/01/2023 0613 92 70 - 130 mg/dL Final     Comment:     Meter: [id number] : [id number] [facility]   04/30/2023 2217 100 70 - 130 mg/dL Final     Comment:     Meter: [id number] : [id number] [facility]   04/30/2023 1333 88 70 - 130 mg/dL Final     Comment:     Meter: [id number] : [id number] Kike GARCÍA       MRI Brain Without Contrast  Narrative: BRAIN MRI WITHOUT CONTRAST     HISTORY: cva; I63.9-Cerebral infarction, unspecified; N17.9-Acute kidney  failure, unspecified; R77.8-Other specified abnormalities of plasma  proteins     COMPARISON: 04/30/2023.     FINDINGS:  Multiplanar images of the head were obtained without  gadolinium. The patient is noted to have multifocal areas of restricted  diffusion within both cerebellar hemispheres, as well as bases  downstairs. Lesion within the right cerebellar hemisphere measures 9 mm.  Lesion within the left cerebellar hemisphere measures 4 mm. Largest area  of infarction is noted within the right frontal centrum semiovale, and  measures up to 1.4 cm. There is diffuse atrophy. There is  periventricular and deep white matter microangiopathic change. There is  no midline shift or mass effect. There may be an additional old right  cerebellar infarct. There is an old lacunar infarction within the left  basal ganglia. Patient appears to have bilateral basal ganglia  calcifications. Otherwise, no significant abnormalities are seen on  gradient  echo imaging. Mucosal thickening is noted within the ethmoid  and maxillary sinuses.     Impression: 1. Bilateral areas of restricted diffusion noted within both cerebral  and cerebellar hemispheres.  Multifocal distribution would suggest an  embolic source.     This report was finalized on 4/30/2023 11:36 PM by Dr. Francesca Green M.D.     CT Angiogram Head w AI Analysis of LVO, CT Angiogram Neck, CT CEREBRAL PERFUSION WITH & WITHOUT CONTRAST  CT ANGIOGRAPHY OF THE HEAD AND NECK WITHOUT AND WITH INTRAVENOUS  CONTRAST AND 3-D RECONSTRUCTIONS AND COLOR CT PERFUSION IMAGING OF THE  BRAIN WITH INTRAVENOUS CONTRAST     HISTORY: Left-sided weakness. Team D evaluation.     The CT scan was performed as an emergency procedure with CT angiography  protocol through the head and neck without and with intravenous contrast  and 3-D reconstructions and color CT perfusion imaging of the brain with  intravenous contrast. The following findings are present:  1. An initial noncontrast CT scan of the brain was performed. There is  moderate diffuse atrophy and chronic small vessel ischemic change. There  is no evidence of acute intracranial hemorrhage or mass effect. The  findings of the noncontrast CT scan were communicated to the team D  physician when imaging made available at 1:45 PM. The postcontrast  findings were communicated when imaging made available at 2:00 PM.  2. Evaluation for stenosis is based on NASCET criteria. There is normal  filling of the left vertebral artery which supplies the basilar artery.  There is a threadlike right vertebral artery which becomes occluded  intracranially. The remainder of the posterior circulation is  unremarkable.  3. The cervical carotid arteries show no NASCET significant stenosis or  irregularity.  4. The intracranial CT angiography shows no major branch stenosis or  evidence of intra-arterial thrombus. There is no evidence of aneurysm.  5. The color CT perfusion imaging appears  normal.  6. There is nodular enlargement of both thyroid lobes, right greater  than left most consistent with multinodular goiter. The nodules measure  up to 1.9 cm and further evaluation with thyroid ultrasound may be  appropriate as an outpatient.                 AI analysis of LVO was utilized.     Radiation dose reduction techniques were utilized, including automated  exposure control and exposure modulation based on body size.     This report was finalized on 4/30/2023 4:37 PM by Dr. Logan Morrison M.D.     XR Chest 1 View  ONE VIEW PORTABLE CHEST     HISTORY: Acute stroke. Hypertension.     FINDINGS: The lungs are well-expanded and clear. There is mild  cardiomegaly. No acute abnormality is seen.     This report was finalized on 4/30/2023 2:18 PM by Dr. Logan Morrison M.D.       Scheduled Medications  aspirin, 81 mg, Oral, Daily   Or  aspirin, 300 mg, Rectal, Daily  atorvastatin, 80 mg, Oral, Nightly  clopidogrel, 75 mg, Oral, Daily  rOPINIRole, 2 mg, Oral, Nightly  sodium chloride, 10 mL, Intravenous, Q12H    Infusions  sodium chloride, 75 mL/hr, Last Rate: 75 mL/hr (05/01/23 0620)    Diet  Diet: Cardiac Diets; Healthy Heart (2-3 Na+); Texture: Regular Texture (IDDSI 7); Fluid Consistency: Thin (IDDSI 0)         I have personally reviewed:  [x]  Laboratory   []  Microbiology   [x]  Radiology   [x]  EKG/Telemetry   [x]  Cardiology/Vascular   []  Pathology   [x]  Records    Assessment/Plan     Active Hospital Problems    Diagnosis  POA   • **Acute CVA (cerebrovascular accident) [I63.9]  Yes   • CKD (chronic kidney disease) [N18.9]  Unknown   • BELLA (acute kidney injury) [N17.9]  Unknown   • Elevated troponin [R77.8]  Unknown   • Vision changes [H53.9]  Unknown   • Essential hypertension [I10]  Yes      Resolved Hospital Problems   No resolved problems to display.       87 y.o. male admitted with Acute CVA (cerebrovascular accident).    Acute bilateral embolic CVAs  - neuro consulted, f/u TTE  - P2Y12 ordered  per HO  - continue DAPT/statin    Elevated troponin  IVCD  - cards consulted, echo pending  - possibly will need AC pending outcome    Benign essential hypertension  - normotensive off of meds    BELLA versus CKD  - Cr elevated on admission, downtrending    · SCDs for DVT prophylaxis.  · Full code.  · Discussed with patient, family, nursing staff, CCP and care team on multidisciplinary rounds.  · Anticipate discharge home with family in 1-2 days.      Lottie Hernández MD  [facility]  05/01/23  16:55 EDT    I wore protective equipment throughout this patient encounter including gloves and protective eyewear.  Hand hygiene was performed before donning protective equipment and after removal when leaving the room.

## 2023-05-01 NOTE — CONSULTS
NEUROLOGY CONSULT - STROKE TEAM    DOS: 2023  NAME: Art Mullins   : 1935  PCP: Santos Simmons MD  CC: Stroke  Referring MD: Jeremy Alvarenga MD  Patient being seen at request of Dr. Hernández for advice and opinion on stroke.    Neurological Problem and Interval History:  87 y.o. male presents with chief complaint of: left hand weakness. Patient reports that he has been under a great deal of stress with his youngest son and not feeling a hundred percent. Then he noticed after [employer] and at [employer] yesterday that he had weakness of his left arm. He's otherwise been in excellent health. No smoking, alcohol or drug use history. No chest pains or shortness of breath.       Past Medical/Surgical Hx:  Past Medical History:   Diagnosis Date   • Hyperlipidemia    • Hypertension      History reviewed. No pertinent surgical history.    Review of Systems:        No fever, sweats or chills,  No neck pain, back pain or joint pain  No loss of hearing or tinnitus  No blurry or double vision  No rashes or edema  No chest pain or palpitations  No shortness of breath or wheezing  No diarrhea or constipation  No urinary incontinence or dysuria  No seizures or syncope  No depression or anxiety    Medications On Admission  Medications Prior to Admission   Medication Sig Dispense Refill Last Dose   • aspirin 81 MG EC tablet Take 1 tablet by mouth Daily.   2023   • cholecalciferol (VITAMIN D3) 1000 UNITS tablet Take 1 tablet by mouth Daily.   2023   • clonazePAM (KlonoPIN) 1 MG tablet Take 1 tablet by mouth Every Night.   2023   • Cyanocobalamin (VITAMIN B 12 PO) Take 2,500 mcg by mouth Daily.      • doxazosin (CARDURA) 8 MG tablet Take 1 tablet by mouth Every Night.   2023   • losartan-hydrochlorothiazide (HYZAAR) 100-25 MG per tablet Take 1 tablet by mouth Daily.   2023   • metoprolol succinate XL (TOPROL-XL) 50 MG 24 hr tablet Take 1 tablet by mouth 2 (two) times a day. 60 tablet 5 2023  "  • rOPINIRole (REQUIP) 2 MG tablet Take  by mouth Every Night.   4/30/2023   • rosuvastatin (CRESTOR) 40 MG tablet Take 1 tablet by mouth Daily.   4/30/2023       Allergies:  Allergies   Allergen Reactions   • Sulfa Antibiotics        Social Hx:  Social History     Socioeconomic History   • Marital status:    Tobacco Use   • Smoking status: Never   Vaping Use   • Vaping Use: Never used   Substance and Sexual Activity   • Alcohol use: No   • Drug use: No   • Sexual activity: Defer       Family Hx:  Family History   Problem Relation Age of Onset   • No Known Problems Mother    • No Known Problems Father    • No Known Problems Sister    • No Known Problems Brother        Review of Imaging (Interpretation of images not reports):      Laboratory Results:   Lab Results   Component Value Date    GLUCOSE 91 05/01/2023    CALCIUM 9.0 05/01/2023     05/01/2023    K 3.7 05/01/2023    CO2 25.4 05/01/2023     05/01/2023    BUN 28 (H) 05/01/2023    CREATININE 1.37 (H) 05/01/2023    BCR 20.4 05/01/2023    ANIONGAP 8.6 05/01/2023     Lab Results   Component Value Date    WBC 9.53 05/01/2023    HGB 12.7 (L) 05/01/2023    HCT 37.6 05/01/2023    MCV 93.8 05/01/2023     05/01/2023     Lab Results   Component Value Date    CHOL 120 05/01/2023     Lab Results   Component Value Date    HDL 27 (L) 05/01/2023     Lab Results   Component Value Date    LDL 67 05/01/2023     Lab Results   Component Value Date    TRIG 149 05/01/2023     Lab Results   Component Value Date    HGBA1C 5.30 05/01/2023     Lab Results   Component Value Date    INR 1.14 (H) 04/30/2023    PROTIME 14.7 (H) 04/30/2023         Physical Examination:   /89 (BP Location: Right arm, Patient Position: Lying)   Pulse 75   Temp 98.3 °F (36.8 °C) (Oral)   Resp 18   Ht 172.7 cm (68\")   Wt 86 kg (189 lb 8 oz)   SpO2 95%   BMI 28.81 kg/m²   General Appearance:   Well developed, well nourished, well groomed, alert, and " cooperative.  HEENT: Normocephalic. Atraumatic. No JVD, no tracheal deviation.  Neck and Spine: Normal range of motion.  Normal alignment. No mass or tenderness. No bruits.  Cardiac: Regular rate and rhythm. No murmurs.  Pulmonary: No shortness of breath, clear anteriorly to auscultation  Abdomen:  Soft, nontender, nondistended   Peripheral Vasculature: Radial pulses are equal and symmetric. No signs of distal embolization.  Extremities:    No edema or deformities.   Skin:    No rashes or discoloration    Neurological examination:  Higher Integrative  Function: Oriented to time, place and person. Normal registration, recall, attention span and concentration. Normal language including comprehension, spontaneous speech, repetition, naming and vocabulary. No neglect. Normal fund of knowledge and higher integrative function.  CN II: Pupils are equal, round, and reactive to light. Blinks to visual threat and counts fingers in all fields  CN III IV VI: Extraocular movements are full without nystagmus.   CN V: Normal facial sensation   CN VII: Left facial droop  CN VIII:   Hard of hearing  CN IX & X:   No palatal or pharnygeal dysarthria.  CN XI: Turns head without abnormality.   CN XII:   The tongue is midline.  No lingual dysarthria.   Motor: Left pronator drift, slower finger tap, orbits left arm  Reflexes: Areflexic in the upper and lower extremities. Plantar responses are flexor.  Sensation: Normal to light touch, temperature, and proprioception in arms and legs.   Station and Gait: Deferred for bed rest    Coordination: Finger-to-nose test shows no dysmetria.  Rapid alternating movements are normal.  Heel-to-shin normal.    NIHSS:     Diagnoses / Discussion:  87 y.o. who presents with bilateral numerous small strokes. Patient with a right vert occlusion as well but possibly incidental. Patient with elevated troponin and abnormal R-wave progression. Worrisome of a mahad-myocardial infarction related stroke. Would  recommend a cardiology consult and further evaluation such as a TTE. Patient current only plavix plus aspirin.    Plan:  Check P2Y12 tomorrow for sensitivity to plavix.   Await TTE results  Cardiology consult for possible MI.      I have discussed the above with the patient  Time spent with patient: 40min    MDM  Reviewed: previous chart, nursing note and vitals  Reviewed previous: labs, MRI and CT scan  Interpretation: CT scan, MRI and labs  Total time providing critical care: 30-74 minutes. This excludes time spent performing separately reportable procedures and services.         Martir Rahman MD  Neurology

## 2023-05-01 NOTE — THERAPY EVALUATION
Patient Name: Art Mullins  : 1935    MRN: [id number]                              Today's Date: 2023       Admit Date: 2023    Visit Dx:     ICD-10-CM ICD-9-CM   1. Acute CVA (cerebrovascular accident)  I63.9 434.91   2. Acute kidney injury  N17.9 584.9   3. Troponin level elevated  R77.8 790.6     Patient Active Problem List   Diagnosis   • Palpitations   • Essential hypertension   • Abnormal EKG   • Acute CVA (cerebrovascular accident)   • CKD (chronic kidney disease)   • BELLA (acute kidney injury)   • Elevated troponin   • Vision changes     Past Medical History:   Diagnosis Date   • Hyperlipidemia    • Hypertension      History reviewed. No pertinent surgical history.   General Information     Row Name 23 1303          OT Time and Intention    Document Type discharge evaluation/summary  -MW     Mode of Treatment occupational therapy  -MW     Row Name 23 1303          General Information    Patient Profile Reviewed yes  -MW     Prior Level of Function independent:  -MW     Existing Precautions/Restrictions no known precautions/restrictions  -MW     Barriers to Rehab none identified  -MW     Row Name 23 1303          Living Environment    People in Home other (see comments);spouse  -MW     Row Name 23 1303          Home Main Entrance    Number of Stairs, Main Entrance none  -MW     Row Name 23 1303          Cognition    Orientation Status (Cognition) oriented x 4  -MW           User Key  (r) = Recorded By, (t) = Taken By, (c) = Cosigned By    Initials Name Provider Type    MW Allie Bennett OT Occupational Therapist                 Mobility/ADL's     Row Name 23 1304          Bed Mobility    Bed Mobility sit-supine  -MW     Sit-Supine [place] (Bed Mobility) independent  -MW     Comment, (Bed Mobility) pt standing in room on arrival with no bed alarm on  -MW     Row Name 23 1304          Transfers    Transfers sit-stand transfer;stand-sit  transfer  -MW     Row Name 05/01/23 1304          Sit-Stand Transfer    Sit-Stand [place] (Transfers) independent  -MW     Row Name 05/01/23 1304          Stand-Sit Transfer    Stand-Sit [place] (Transfers) independent  -MW     Row Name 05/01/23 1304          Functional Mobility    Functional Mobility- Ind. Level independent  -     Functional Mobility- Comment usama guillen mob in room and hallway with (I), no overt LOBs  -MW     Row Name 05/01/23 1304          Activities of Daily Living    BADL Assessment/Intervention lower body dressing;toileting  -MW     Row Name 05/01/23 1304          Lower Body Dressing Assessment/Training    [place] Level (Lower Body Dressing) don;doff;shoes/slippers;socks;set up  -     Position (Lower Body Dressing) supported sitting  -MW     Row Name 05/01/23 1304          Toileting Assessment/Training    Comment, (Toileting) (I) with urinal use  -           User Key  (r) = Recorded By, (t) = Taken By, (c) = Cosigned By    Initials Name Provider Type     Allie Bennett OT Occupational Therapist               Obj/Interventions     Row Name 05/01/23 1305          Sensory Assessment (Somatosensory)    Sensory Assessment (Somatosensory) UE sensation intact  -MW     Row Name 05/01/23 1305          Vision Assessment/Intervention    Visual Impairment/Limitations WFL  -MW     Row Name 05/01/23 1305          Range of Motion Comprehensive    General Range of Motion no range of motion deficits identified  -MW     Row Name 05/01/23 1305          Strength Comprehensive (MMT)    Comment, General Manual Muscle Testing (MMT) Assessment LUE 4-5, RUE 4+/5, mild strength deficits noted in LUE  -MW     Row Name 05/01/23 1305          Motor Skills    Motor Skills coordination  -     Coordination finger to nose;upper extremity;left;minimal impairment  -MW     Row Name 05/01/23 1305          Balance    Balance Assessment sitting static balance;sitting dynamic balance;standing static  balance;sit to stand dynamic balance;standing dynamic balance  -MW     Static Sitting Balance independent  -MW     Dynamic Sitting Balance independent  -MW     Position, Sitting Balance sitting edge of bed  -MW     Sit to Stand Dynamic Balance independent  -MW     Static Standing Balance independent  -MW     Dynamic Standing Balance independent  -MW     Position/Device Used, Standing Balance unsupported  -MW           User Key  (r) = Recorded By, (t) = Taken By, (c) = Cosigned By    Initials Name Provider Type    Allie Edmond OT Occupational Therapist               Goals/Plan     Row Name 05/01/23 1308          Transfer Goal 1 (OT)    Activity/Assistive Device (Transfer Goal 1, OT) sit-to-stand/stand-to-sit  -MW     [place] Level/Cues Needed (Transfer Goal 1, OT) independent  -MW     Time Frame (Transfer Goal 1, OT) short term goal (STG);1 day  -MW     Progress/Outcome (Transfer Goal 1, OT) goal met  -MW           User Key  (r) = Recorded By, (t) = Taken By, (c) = Cosigned By    Initials Name Provider Type    Allie Edmond OT Occupational Therapist               Clinical Impression     Row Name 05/01/23 1305          Pain Assessment    Pretreatment Pain Rating 0/10 - no pain  -MW     Posttreatment Pain Rating 0/10 - no pain  -MW     Row Name 05/01/23 1305          Plan of Care Review    Plan of Care Reviewed With patient  -MW     Progress improving  -MW     Outcome Evaluation Pt is a 87 y.o. male admitted to [facility] for L arm weakness and double vision - workup still ongoing though per Neuro notes, bilateral numerous small strokes with possible mahad-myocardial infarction related stroke. Pt seen for OT eval this date, A&Ox4, pt standing (I) in room on arrival, pt reports (I) at baseline, lives with spouse and no use of AD. Pt don/doffed shoes socks with s/up in supported sitting. STS and func mob (I) with no overt LOBs, mild strength deficits noted in LUE with minimal coordination deficit however  not impacting overall functional use of LUE with ADL tasks. Pt reports no concerns with d/c home with spouse, no further skilled OT indicated at this time. will sign off.  -     Row Name 05/01/23 1305          Therapy Assessment/Plan (OT)    Criteria for Skilled Therapeutic Interventions Met (OT) no problems identified which require skilled intervention  -MW     Row Name 05/01/23 1305          Therapy Plan Review/Discharge Plan (OT)    Anticipated Discharge Disposition (OT) home  -     Row Name 05/01/23 1305          Vital Signs    O2 Delivery Pre Treatment room air  -MW     Pre Patient Position Standing  -MW     Intra Patient Position Standing  -MW     Post Patient Position Supine  -MW     Row Name 05/01/23 1305          Positioning and Restraints    Pre-Treatment Position standing in room  -MW     Post Treatment Position bed  -MW     In Bed notified nsg;fowlers;call light within reach;encouraged to call for assist;exit alarm on;side rails up x2  -MW           User Key  (r) = Recorded By, (t) = Taken By, (c) = Cosigned By    Initials Name Provider Type    Allie Edmond, OT Occupational Therapist               Outcome Measures     Row Name 05/01/23 1308          How much help from another is currently needed...    Putting on and taking off regular lower body clothing? 4  -MW     Bathing (including washing, rinsing, and drying) 4  -MW     Toileting (which includes using toilet bed pan or urinal) 4  -MW     Putting on and taking off regular upper body clothing 4  -MW     Taking care of personal grooming (such as brushing teeth) 4  -MW     Eating meals 4  -MW     AM-PAC 6 Clicks Score (OT) 24  -MW     Row Name 05/01/23 1206          How much help from another person do you currently need...    Turning from your back to your side while in flat bed without using bedrails? 4  -MS     Moving from lying on back to sitting on the side of a flat bed without bedrails? 4  -MS     Moving to and from a bed to a chair  (including a wheelchair)? 4  -MS     Standing up from a chair using your arms (e.g., wheelchair, bedside chair)? 4  -MS     Climbing 3-5 steps with a railing? 3  -MS     To walk in hospital room? 3  -MS     AM-PAC 6 Clicks Score (PT) 22  -MS     Highest level of mobility 7 --> Walked 25 feet or more  -MS     Row Name 05/01/23 1308 05/01/23 1206       Modified [place] Scale    Modified [place] Scale 1 - No significant disability despite symptoms.  Able to carry out all usual duties and activities.  -MW 1 - No significant disability despite symptoms.  Able to carry out all usual duties and activities.  -MS    Row Name 05/01/23 1308 05/01/23 1206       Functional Assessment    Outcome Measure Options AM-PAC 6 Clicks Daily Activity (OT);Modified [place]  -MW AM-PAC 6 Clicks Basic Mobility (PT);Modified [place]  -MS          User Key  (r) = Recorded By, (t) = Taken By, (c) = Cosigned By    Initials Name Provider Type    MS AlvaradoKristin, PT Physical Therapist    Allie Edmond OT Occupational Therapist                Occupational Therapy Education     Title: PT OT SLP Therapies (Done)     Topic: Occupational Therapy (Done)     Point: ADL training (Done)     Description:   Instruct learner(s) on proper safety adaptation and remediation techniques during self care or transfers.   Instruct in proper use of assistive devices.              Learning Progress Summary           Patient Acceptance, E, VU by ARIADNA at 5/1/2023 1308    Comment: role of OT, d/c rec                   Point: Home exercise program (Done)     Description:   Instruct learner(s) on appropriate technique for monitoring, assisting and/or progressing therapeutic exercises/activities.              Learning Progress Summary           Patient Acceptance, E, VU by  at 5/1/2023 1308    Comment: role of OT, d/c rec                   Point: Precautions (Done)     Description:   Instruct learner(s) on prescribed precautions during self-care and functional  transfers.              Learning Progress Summary           Patient Acceptance, E, VU by  at 5/1/2023 1308    Comment: role of OT, d/c rec                   Point: Body mechanics (Done)     Description:   Instruct learner(s) on proper positioning and spine alignment during self-care, functional mobility activities and/or exercises.              Learning Progress Summary           Patient Acceptance, E, VU by  at 5/1/2023 1308    Comment: role of OT, d/c rec                               User Key     Initials Effective Dates Name Provider Type Discipline     08/20/21 -  Allie Bennett OT Occupational Therapist OT              OT Recommendation and Plan     Plan of Care Review  Plan of Care Reviewed With: patient  Progress: improving  Outcome Evaluation: Pt is a 87 y.o. male admitted to [facility] for L arm weakness and double vision - workup still ongoing though per Neuro notes, bilateral numerous small strokes with possible mahad-myocardial infarction related stroke. Pt seen for OT eval this date, A&Ox4, pt standing (I) in room on arrival, pt reports (I) at baseline, lives with spouse and no use of AD. Pt don/doffed shoes socks with s/up in supported sitting. STS and func mob (I) with no overt LOBs, mild strength deficits noted in LUE with minimal coordination deficit however not impacting overall functional use of LUE with ADL tasks. Pt reports no concerns with d/c home with spouse, no further skilled OT indicated at this time. will sign off.     Time Calculation:    Time Calculation- OT     Row Name 05/01/23 1309             Time Calculation- OT    OT Start Time 1023  -MW      OT Stop Time 1040  -MW      OT Time Calculation (min) 17 min  -MW      Total Timed Code Minutes- OT 10 minute(s)  -MW      OT Received On 05/01/23  -MW         Timed Charges    28238 - OT Therapeutic Activity Minutes 10  -MW         Untimed Charges    OT Eval/Re-eval Minutes 7  -MW         Total Minutes    Timed Charges Total Minutes 10   -MW      Untimed Charges Total Minutes 7  -MW       Total Minutes 17  -MW            User Key  (r) = Recorded By, (t) = Taken By, (c) = Cosigned By    Initials Name Provider Type    Allie Edmond OT Occupational Therapist              Therapy Charges for Today     Code Description Service Date Service Provider Modifiers Qty    [id number]  OT THERAPEUTIC ACT EA 15 MIN 5/1/2023 Allie Bennett OT GO 1    [id number]  OT EVAL LOW COMPLEXITY 2 5/1/2023 Allie Bennett OT GO 1               Allie Bennett OT  5/1/2023

## 2023-05-01 NOTE — PLAN OF CARE
Goal Outcome Evaluation:    Patient is a 87 y.o. male admitted to [facility] for L arm weakness and double vision on 4/30/2023- workup still ongoing though per Neuro notes, bilateral numerous small strokes with possible mahad-myocardial infarction related stroke. Patient standing independently in room upon PT arrival with no exit alarm. Today, patient was ind for bed mobility, ind for STS transfers, and SV for ambulation with no AD for 120 ft. Noted very mild strength deficits in L LE when compared to R LE. No overt LOB or safety concerns demo'd during PT evaluation. Patient denies any questions or concerns for PT at this time. No further acute skilled PT needs.

## 2023-05-01 NOTE — CONSULTS
"Date of Hospital Visit: 23  Encounter Provider: Reynold Schulz MD  Place of Service: [facility] CARDIOLOGY  Patient Name: Art Mullins  :1935  Referral Provider: Jeremy Alvarenga MD    Chief complaint: Problems with Left Arm, Vision Problems    History of Present Illness    Mr. Mullins is an 87 year old man who follows with Dr Thapa at [facility].    He reports a history of \"skipped beats\" for at least 25 years and he carries a diagnosis of monomorphic PVCs.  He had an echocardiogram in  that revealed normal left ventricular size and systolic function, normal diastolic function for age, severe left atrial dilation, and aortic valve calcification without stenosis.    He has not had any cardiac limitations and denies any chest pain or shortness of breath recently.  Just last week, he was playing golf.  He has been under a lot of stress as his 52-year-old son has been having some social issues and he has been helping to take care of him.    Several days ago, he noticed that his left arm was not as strong as the other.  Yesterday while at [facility] he developed a visual disturbance and his wife called 911.  An MRI shows bilateral cerebellar and cerebral infarcts.    We were consulted because his troponin is elevated and his EKG is abnormal.  Review his EKG shows sinus rhythm with PVCs, poor R wave progression, and nonspecific IVCD.  These are all unchanged from previous EKGs in .  His troponin is 114-110-96.  His creatinine is 1.7 which is above his baseline.    ECHO 21  • Calculated left ventricular EF = 56% Estimated left ventricular EF was in agreement with the calculated left ventricular EF.  • Left ventricular diastolic function is consistent with (grade I) impaired relaxation.  • Left atrial volume is severely increased.  • Moderate tricuspid valve regurgitation is present.  • Estimated right ventricular systolic pressure from tricuspid regurgitation is normal " (<35 mmHg).  • There is mild calcification of the aortic valve mainly affecting the non-coronary, left coronary and right coronary cusp(s).  • Mild aortic valve regurgitation is present. No hemodynamically significant aortic valve stenosis is present.  • Mild mitral valve regurgitation is present.    Holter Monitor 7/28/21  • An abnormal monitor study.  • Minimum heart rate 38 Max 179 with an average of 79 Predominantly sinus rhythm 4 episodes of nonsustained VT with fastest lasting 5 beats at a rate of 179, longest lasting 4 beats rate of 134 1 run of SVT occurring lasting 12 beats at a rate of 139 Isolated PACs 1.6, couplets and triplets less than 1% Isolated PVCs were frequent at 8.7% and associated with patient triggered events, minimal couplets and triplets, bigeminy trigeminy were present No atrial fibrillation SVT most consistent with paroxysmal atrial tachycardia No significant pauses No sustained VT Abnormal study      Past Medical History:   Diagnosis Date   • Hyperlipidemia    • Hypertension    • PVCs (premature ventricular contractions)        History reviewed. No pertinent surgical history.    Prior to Admission medications    Medication Sig Start Date End Date Taking? Authorizing Provider   aspirin 81 MG EC tablet Take 1 tablet by mouth Daily.   Yes Maribeth Ngo MD   cholecalciferol (VITAMIN D3) 1000 UNITS tablet Take 1 tablet by mouth Daily.   Yes Maribeth Ngo MD   clonazePAM (KlonoPIN) 1 MG tablet Take 1 tablet by mouth Every Night.   Yes Maribeth Ngo MD   Cyanocobalamin (VITAMIN B 12 PO) Take 2,500 mcg by mouth Daily.   Yes Maribeth Ngo MD   doxazosin (CARDURA) 8 MG tablet Take 1 tablet by mouth Every Night.   Yes Maribeth Ngo MD   losartan-hydrochlorothiazide (HYZAAR) 100-25 MG per tablet Take 1 tablet by mouth Daily.   Yes Maribeth Ngo MD   metoprolol succinate XL (TOPROL-XL) 50 MG 24 hr tablet Take 1 tablet by mouth 2 (two) times a day.  "8/24/21  Yes Aashish Thapa MD   rOPINIRole (REQUIP) 2 MG tablet Take  by mouth Every Night.   Yes ProviderMaribeth MD   rosuvastatin (CRESTOR) 40 MG tablet Take 1 tablet by mouth Daily.   Yes Provider, MD Maribeth       Social History     Socioeconomic History   • Marital status:    Tobacco Use   • Smoking status: Never   Vaping Use   • Vaping Use: Never used   Substance and Sexual Activity   • Alcohol use: No   • Drug use: No   • Sexual activity: Defer       Family History   Problem Relation Age of Onset   • No Known Problems Mother    • No Known Problems Father    • No Known Problems Sister    • No Known Problems Brother        Review of Systems   Eyes: Positive for visual disturbance.   Neurological: Positive for weakness.   Psychiatric/Behavioral: Positive for dysphoric mood. The patient is nervous/anxious.    All other systems reviewed and are negative.       Objective:     Vitals:    04/30/23 2309 05/01/23 0336 05/01/23 0735 05/01/23 1235   BP: 118/73 146/86 122/89 114/70   BP Location: Left arm  Right arm Right arm   Patient Position: Lying  Lying Lying   Pulse: 81 87 75 74   Resp: 18  18 17   Temp: 98.7 °F (37.1 °C)  98.3 °F (36.8 °C) 98.7 °F (37.1 °C)   TempSrc: Oral  Oral Oral   SpO2: 95% 94% 95% 95%   Weight:       Height:         Body mass index is 28.81 kg/m².    Last Weight and Admission Weight        04/30/23  1402   Weight: 86 kg (189 lb 8 oz)     Flowsheet Rows    Flowsheet Row First Filed Value   Admission Height 172.7 cm (68\") Documented at 04/30/2023 1402   Admission Weight 86 kg (189 lb 8 oz) Documented at 04/30/2023 1402            Intake/Output Summary (Last 24 hours) at 5/1/2023 1457  Last data filed at 5/1/2023 1436  Gross per 24 hour   Intake 1300 ml   Output 1200 ml   Net 100 ml         Physical Exam  Vitals reviewed.   Constitutional:       Appearance: He is well-developed.   HENT:      Head: Normocephalic.      Nose: Nose normal.   Eyes:      Conjunctiva/sclera: " Conjunctivae normal.   Neck:      Vascular: No JVD.   Cardiovascular:      Rate and Rhythm: Normal rate and regular rhythm.      Pulses: Normal pulses.      Heart sounds: Murmur heard.    Systolic murmur is present with a grade of 1/6.  Pulmonary:      Effort: Pulmonary effort is normal.      Breath sounds: Normal breath sounds.   Abdominal:      Palpations: Abdomen is soft.      Tenderness: There is no abdominal tenderness.   Musculoskeletal:         General: No swelling. Normal range of motion.      Cervical back: Normal range of motion.   Skin:     General: Skin is warm and dry.      Findings: No erythema.   Neurological:      Mental Status: He is alert and oriented to person, place, and time.   Psychiatric:         Mood and Affect: Mood normal.                 Lab Review:                Results from last 7 days   Lab Units 05/01/23  0516   SODIUM mmol/L 140   POTASSIUM mmol/L 3.7   CHLORIDE mmol/L 106   CO2 mmol/L 25.4   BUN mg/dL 28*   CREATININE mg/dL 1.37*   GLUCOSE mg/dL 91   CALCIUM mg/dL 9.0     Results from last 7 days   Lab Units 05/01/23  0516 04/30/23  1634 04/30/23  1329   HSTROP T ng/L 96* 110* 114*     Results from last 7 days   Lab Units 05/01/23  0516   WBC 10*3/mm3 9.53   HEMOGLOBIN g/dL 12.7*   HEMATOCRIT % 37.6   PLATELETS 10*3/mm3 142     Results from last 7 days   Lab Units 04/30/23  1329   INR  1.14*   APTT seconds 28.2     Results from last 7 days   Lab Units 05/01/23  0516   CHOLESTEROL mg/dL 120         Results from last 7 days   Lab Units 05/01/23  0516   CHOLESTEROL mg/dL 120   TRIGLYCERIDES mg/dL 149   HDL CHOL mg/dL 27*   LDL CHOL mg/dL 67                     I personally viewed and interpreted the patient's EKG/Telemetry data    Assessment/Plan:     1. Bilateral acute strokes  2. PVCs  3. PRWP/IVCD  4. Elevated Tn  5. BELLA on CKD  6. HTN    His EKG is unchanged from baseline and is not suggestive of an acute MI.  His troponin is mildly elevated but downtrending, although the curve is,  in general, quite flat.  We can see mild troponin elevations in the setting of decreased GFR and acute strokes.    Given his severe left atrial dilation and presence of bilateral infarcts, my concern would be that he could be having atrial fibrillation and that these are cardioembolic.  Also, given his recent psychosocial stress, he could potentially have something like Takotsubo cardiomyopathy, although I do not see the T wave inversions that would be expected on EKG with this condition.  He has not had any cardiac limitations or worrisome cardiac symptoms like chest pain or shortness of breath.    We will await the results of his echocardiogram.  We will discuss with neurology once it is completed to see if there would be a reason to empirically anticoagulate him going forward while we perform prolonged rhythm monitoring as an outpatient.

## 2023-05-01 NOTE — PROGRESS NOTES
[facility] Acute Inpt Rehab Note     Referral received via stroke order set.  Please note this is a screening only, rehab admissions will not actively be evaluating this patient.  If felt patient is appropriate for our services once therapies begin, please call our office at [phone], to initiate a full referral.    Thank you,   Kate Abraham RN   Rehab Admission Nurse

## 2023-05-01 NOTE — PLAN OF CARE
Goal Outcome Evaluation:  Plan of Care Reviewed With: patient        Progress: improving  Outcome Evaluation: Pt is a 87 y.o. male admitted to [facility] for L arm weakness and double vision - workup still ongoing though per Neuro notes, bilateral numerous small strokes with possible mahad-myocardial infarction related stroke. Pt seen for OT eval this date, A&Ox4, pt standing (I) in room on arrival, pt reports (I) at baseline, lives with spouse and no use of AD. Pt don/doffed shoes socks with s/up in supported sitting. STS and func mob (I) with no overt LOBs, mild strength deficits noted in LUE with minimal coordination deficit however not impacting overall functional use of LUE with ADL tasks. Pt reports no concerns with d/c home with spouse, no further skilled OT indicated at this time. will sign off.

## 2023-05-02 ENCOUNTER — APPOINTMENT (OUTPATIENT)
Dept: CARDIOLOGY | Facility: HOSPITAL | Age: 88
End: 2023-05-02
Payer: MEDICARE

## 2023-05-02 ENCOUNTER — READMISSION MANAGEMENT (OUTPATIENT)
Dept: CALL CENTER | Facility: HOSPITAL | Age: 88
End: 2023-05-02
Payer: MEDICARE

## 2023-05-02 VITALS
RESPIRATION RATE: 16 BRPM | DIASTOLIC BLOOD PRESSURE: 72 MMHG | OXYGEN SATURATION: 94 % | HEART RATE: 89 BPM | WEIGHT: 189 LBS | TEMPERATURE: 98.2 F | HEIGHT: 68 IN | BODY MASS INDEX: 28.64 KG/M2 | SYSTOLIC BLOOD PRESSURE: 130 MMHG

## 2023-05-02 LAB
ANION GAP SERPL CALCULATED.3IONS-SCNC: 9.4 MMOL/L (ref 5–15)
BASOPHILS # BLD AUTO: 0.04 10*3/MM3 (ref 0–0.2)
BASOPHILS NFR BLD AUTO: 0.4 % (ref 0–1.5)
BUN SERPL-MCNC: 31 MG/DL (ref 8–23)
BUN/CREAT SERPL: 23.7 (ref 7–25)
CALCIUM SPEC-SCNC: 9 MG/DL (ref 8.6–10.5)
CHLORIDE SERPL-SCNC: 106 MMOL/L (ref 98–107)
CO2 SERPL-SCNC: 24.6 MMOL/L (ref 22–29)
CREAT SERPL-MCNC: 1.31 MG/DL (ref 0.76–1.27)
DEPRECATED RDW RBC AUTO: 43.6 FL (ref 37–54)
EGFRCR SERPLBLD CKD-EPI 2021: 53 ML/MIN/1.73
EOSINOPHIL # BLD AUTO: 0.09 10*3/MM3 (ref 0–0.4)
EOSINOPHIL NFR BLD AUTO: 0.8 % (ref 0.3–6.2)
ERYTHROCYTE [DISTWIDTH] IN BLOOD BY AUTOMATED COUNT: 12.7 % (ref 12.3–15.4)
GLUCOSE SERPL-MCNC: 105 MG/DL (ref 65–99)
HCT VFR BLD AUTO: 34.7 % (ref 37.5–51)
HGB BLD-MCNC: 11.9 G/DL (ref 13–17.7)
IMM GRANULOCYTES # BLD AUTO: 0.03 10*3/MM3 (ref 0–0.05)
IMM GRANULOCYTES NFR BLD AUTO: 0.3 % (ref 0–0.5)
LYMPHOCYTES # BLD AUTO: 1.1 10*3/MM3 (ref 0.7–3.1)
LYMPHOCYTES NFR BLD AUTO: 10.3 % (ref 19.6–45.3)
MAGNESIUM SERPL-MCNC: 2.2 MG/DL (ref 1.6–2.4)
MAXIMAL PREDICTED HEART RATE: 134 BPM
MCH RBC QN AUTO: 32.2 PG (ref 26.6–33)
MCHC RBC AUTO-ENTMCNC: 34.3 G/DL (ref 31.5–35.7)
MCV RBC AUTO: 94 FL (ref 79–97)
MONOCYTES # BLD AUTO: 0.97 10*3/MM3 (ref 0.1–0.9)
MONOCYTES NFR BLD AUTO: 9.1 % (ref 5–12)
NEUTROPHILS NFR BLD AUTO: 79.1 % (ref 42.7–76)
NEUTROPHILS NFR BLD AUTO: 8.41 10*3/MM3 (ref 1.7–7)
NRBC BLD AUTO-RTO: 0 /100 WBC (ref 0–0.2)
PA ADP PRP-ACNC: 307 PRU (ref 194–418)
PHOSPHATE SERPL-MCNC: 2.3 MG/DL (ref 2.5–4.5)
PLATELET # BLD AUTO: 141 10*3/MM3 (ref 140–450)
PMV BLD AUTO: 9.4 FL (ref 6–12)
POTASSIUM SERPL-SCNC: 3.7 MMOL/L (ref 3.5–5.2)
RBC # BLD AUTO: 3.69 10*6/MM3 (ref 4.14–5.8)
SODIUM SERPL-SCNC: 140 MMOL/L (ref 136–145)
STRESS TARGET HR: 114 BPM
TSH SERPL DL<=0.05 MIU/L-ACNC: 1.17 UIU/ML (ref 0.27–4.2)
WBC NRBC COR # BLD: 10.64 10*3/MM3 (ref 3.4–10.8)

## 2023-05-02 PROCEDURE — 84100 ASSAY OF PHOSPHORUS: CPT | Performed by: STUDENT IN AN ORGANIZED HEALTH CARE EDUCATION/TRAINING PROGRAM

## 2023-05-02 PROCEDURE — 84443 ASSAY THYROID STIM HORMONE: CPT | Performed by: NURSE PRACTITIONER

## 2023-05-02 PROCEDURE — 80048 BASIC METABOLIC PNL TOTAL CA: CPT | Performed by: STUDENT IN AN ORGANIZED HEALTH CARE EDUCATION/TRAINING PROGRAM

## 2023-05-02 PROCEDURE — 99232 SBSQ HOSP IP/OBS MODERATE 35: CPT | Performed by: NURSE PRACTITIONER

## 2023-05-02 PROCEDURE — 93246 EXT ECG>7D<15D RECORDING: CPT

## 2023-05-02 PROCEDURE — 83735 ASSAY OF MAGNESIUM: CPT | Performed by: STUDENT IN AN ORGANIZED HEALTH CARE EDUCATION/TRAINING PROGRAM

## 2023-05-02 PROCEDURE — 85576 BLOOD PLATELET AGGREGATION: CPT | Performed by: PSYCHIATRY & NEUROLOGY

## 2023-05-02 PROCEDURE — 85025 COMPLETE CBC W/AUTO DIFF WBC: CPT | Performed by: STUDENT IN AN ORGANIZED HEALTH CARE EDUCATION/TRAINING PROGRAM

## 2023-05-02 RX ORDER — ATORVASTATIN CALCIUM 80 MG/1
80 TABLET, FILM COATED ORAL NIGHTLY
Qty: 30 TABLET | Refills: 0 | Status: SHIPPED | OUTPATIENT
Start: 2023-05-02 | End: 2023-05-02 | Stop reason: HOSPADM

## 2023-05-02 RX ORDER — LOSARTAN POTASSIUM 25 MG/1
25 TABLET ORAL DAILY
Qty: 30 TABLET | Refills: 0 | Status: SHIPPED | OUTPATIENT
Start: 2023-05-02 | End: 2023-05-18

## 2023-05-02 RX ADMIN — Medication 10 ML: at 09:57

## 2023-05-02 RX ADMIN — ASPIRIN 81 MG: 81 TABLET, CHEWABLE ORAL at 09:56

## 2023-05-02 RX ADMIN — CLOPIDOGREL BISULFATE 75 MG: 75 TABLET, FILM COATED ORAL at 09:56

## 2023-05-02 NOTE — CASE MANAGEMENT/SOCIAL WORK
Discharge Planning Assessment  [facility]     Patient Name: Art Mullins  MRN: [id number]  Today's Date: 5/2/2023    Admit Date: 4/30/2023    Plan: Home with family support   Discharge Needs Assessment     Row Name 05/02/23 1319       Living Environment    People in Home spouse    Name(s) of People in Home Charito Mullins wife [phone]    Current Living Arrangements home    Potentially Unsafe Housing Conditions none    Primary Care Provided by self    Provides Primary Care For no one    Family Caregiver if Needed spouse    Family Caregiver Names Charito Mullins wife [phone]    Quality of Family Relationships supportive    Able to Return to Prior Arrangements yes       Resource/Environmental Concerns    Resource/Environmental Concerns none    Transportation Concerns none       Transition Planning    Patient/Family Anticipates Transition to home with family    Patient/Family Anticipated Services at Transition none    Transportation Anticipated family or friend will provide       Discharge Needs Assessment    Readmission Within the Last 30 Days no previous admission in last 30 days    Equipment Currently Used at Home none    Concerns to be Addressed no discharge needs identified;denies needs/concerns at this time    Anticipated Changes Related to Illness none    Provided Post Acute Provider List? N/A    N/A Provider List Comment No needs identified    Provided Post Acute Provider Quality & Resource List? N/A               Discharge Plan     Row Name 05/02/23 1325       Plan    Plan Home with family support    Plan Comments IMM 5/2/2024. Met with patient at bedside. Face sheet verified. Prior to admission patient was independent with all aspects of his ADL’s, patient drives and plays golf.  Patient does not use any adaptive equipment. Patient uses the [facility] on [address] denies any issues affording or remembering to take my medications. Patient agreeable to Meds to Beds. Patient has a living will and Charito  Susanna wife [phone] is his health care surrogate. Discharge plans discussed; plan is to return home with wife. Family to transport. Will continue to monitor for new or changing discharge needs. Sharda WALKER RN CCP              Continued Care and Services - Admitted Since 4/30/2023    Coordination has not been started for this encounter.       Expected Discharge Date and Time     Expected Discharge Date Expected Discharge Time    May 2, 2023          Demographic Summary     Row Name 05/02/23 1318       General Information    Admission Type inpatient    Arrived From emergency department    Required Notices Provided Important Message from Medicare    Referral Source admission list    Reason for Consult discharge planning    Preferred Language English       Contact Information    Permission Granted to Share Info With family/designee  Charito Mullins wife [phone]               Functional Status     Row Name 05/02/23 1318       Functional Status    Usual Activity Tolerance good    Current Activity Tolerance good       Functional Status, IADL    Medications independent    Meal Preparation independent    Housekeeping independent    Laundry independent    Shopping independent       Mental Status    General Appearance WDL WDL       Mental Status Summary    Recent Changes in Mental Status/Cognitive Functioning hearing       Employment/    Employment Status retired               Psychosocial    No documentation.                Abuse/Neglect    No documentation.                Legal    No documentation.                Substance Abuse    No documentation.                Patient Forms    No documentation.                   Sharda Roque RN

## 2023-05-02 NOTE — CASE MANAGEMENT/SOCIAL WORK
Case Management Discharge Note      Final Note: Discharge to home with family support- RHIANNON RODRIGUEZ CCP    Provided Post Acute Provider List?: N/A  N/A Provider List Comment: No needs identified  Provided Post Acute Provider Quality & Resource List?: N/A    Selected Continued Care - Admitted Since 4/30/2023     Destination    No services have been selected for the patient.              Durable Medical Equipment    No services have been selected for the patient.              Dialysis/Infusion    No services have been selected for the patient.              Home Medical Care    No services have been selected for the patient.              Therapy    No services have been selected for the patient.              Community Resources    No services have been selected for the patient.              Community & DME    No services have been selected for the patient.                       Final Discharge Disposition Code: 01 - home or self-care

## 2023-05-02 NOTE — DISCHARGE SUMMARY
Patient Name: Art Mullins  : 1935  MRN: [id number]    Date of Admission: 2023  Date of Discharge:  2023  Primary Care Physician: Santos Simmons MD      Chief Complaint:   Extremity Weakness and Double Vision      Discharge Diagnoses     Active Hospital Problems    Diagnosis  POA   • **Acute CVA (cerebrovascular accident) [I63.9]  Yes   • CKD (chronic kidney disease) [N18.9]  Unknown   • BELLA (acute kidney injury) [N17.9]  Unknown   • Elevated troponin [R77.8]  Unknown   • Vision changes [H53.9]  Unknown   • Essential hypertension [I10]  Yes      Resolved Hospital Problems   No resolved problems to display.        Hospital Course     Mr. Mullins is a 87 y.o. male with a history of hypertension, hyperlipidemia, TIA, palpitations, anxiety  who presented to [facility] initially complaining of double vision and intermittent left arm weakness for 2 days.  Please see the admitting history and physical for further details.  He was found to have bilateral hemispheric infarcts and was admitted to the hospital for additional management.  Neurology consulted on admission for additional management recommendations.  The patient underwent MRI imaging which revealed bilateral hemispheric infarcts within different vascular territories highly suggestive of a cardioembolic source.  A CTA head/neck also revealed a right vertebral occlusion but otherwise had no significant high-grade stenosis or hemorrhage.  A 2D echo revealed a severely dilated left atrium.  The patient was also noted to have an elevated troponin for which cardiology was consulted, EKG was negative for ischemia/not suggestive of MI and ACS was not suspected.  Given the patient's bilateral strokes age and severe left atrial enlargement, atrial fibrillation is the most likely cause of stroke although not diagnosed.  Cardiology and neurology are recommending empiric anticoagulation with apixaban and 2-week Zio patch at  discharge.  He should follow-up with his cardiologist Dr. Thapa in 4 weeks to review ZIO results.  He should also follow-up with neurology in 3 months.  There was an incidental finding of nodular enlargement of both thyroid lobes, recommend outpatient ultrasound for further evaluation if not already completed (patient reports he had an US of his neck with his PCP but thinks it may have been for his carotids).   DISCHARGE Follow Up Recommendations for labs and diagnostics:   Follow-up with cardiology, Dr. Thapa, in 4 weeks.  Follow-up with PCP in 1 week for posthospital follow-up visit, recommend thyroid ultrasound for incidental finding of thyroid nodularity if not already completed.  Recommend follow-up in 3 months with neurology for stroke follow-up.  Day of Discharge     Subjective:  Patient sitting up in bed, eager to get home. Asks many appropriate questions.    Physical Exam:  Temp:  [97.6 °F (36.4 °C)-98.2 °F (36.8 °C)] 98.2 °F (36.8 °C)  Heart Rate:  [89-98] 89  Resp:  [16-18] 16  BP: (107-138)/(70-78) 130/72  Body mass index is 28.74 kg/m².  Physical Exam  Constitutional:       General: He is not in acute distress.     Appearance: Normal appearance. He is not toxic-appearing.      Comments: Elderly, frail   Cardiovascular:      Rate and Rhythm: Normal rate and regular rhythm.   Pulmonary:      Effort: Pulmonary effort is normal. No respiratory distress.      Breath sounds: Normal breath sounds. No wheezing.   Musculoskeletal:         General: No tenderness.      Right lower leg: No edema.      Left lower leg: No edema.   Skin:     General: Skin is warm and dry.   Neurological:      General: No focal deficit present.      Mental Status: He is alert and oriented to person, place, and time.      Motor: Weakness present.   Psychiatric:         Mood and Affect: Mood normal.         Behavior: Behavior normal.         Thought Content: Thought content normal.         Judgment: Judgment normal.         Consultants      Consult Orders (all) (From admission, onward)     Start     Ordered    05/01/23 1106  Inpatient Cardiology Consult  Once        Specialty:  Cardiology  Provider:  Arturo Crowe MD    05/01/23 1106    04/30/23 1754  Notify Stroke Coordinator  Once        Provider:  (Not yet assigned)    04/30/23 1753    04/30/23 1754  Inpatient Rehab Admission Consult  Once        Provider:  (Not yet assigned)    04/30/23 1753    04/30/23 1754  Consult to Case Management   Once        Provider:  (Not yet assigned)    04/30/23 1753    04/30/23 1754  Consult to Diabetes Educator  Once,   Status:  Canceled        Provider:  (Not yet assigned)    04/30/23 1753    04/30/23 1754  Inpatient Neurology Consult Stroke  Once        Specialty:  Neurology  Provider:  Stacy Pleitez MD    04/30/23 1753    04/30/23 1414  [facility] (on-call MD unless specified) Details  Once        Specialty:  Hospitalist  Provider:  (Not yet assigned)    04/30/23 1413    04/30/23 1321  Inpatient Neurology Consult Stroke  Once        Specialty:  Neurology  Provider:  (Not yet assigned)    04/30/23 1320    04/30/23 1321  Inpatient Neurology Consult Stroke  Once        Specialty:  Neurology  Provider:  (Not yet assigned)    04/30/23 1320              Procedures     Imaging Results (All)     Procedure Component Value Units Date/Time    MRI Brain Without Contrast [[id number]] Collected: 04/30/23 2328     Updated: 04/30/23 2339    Narrative:      BRAIN MRI WITHOUT CONTRAST     HISTORY: cva; I63.9-Cerebral infarction, unspecified; N17.9-Acute kidney  failure, unspecified; R77.8-Other specified abnormalities of plasma  proteins     COMPARISON: 04/30/2023.     FINDINGS:  Multiplanar images of the head were obtained without  gadolinium. The patient is noted to have multifocal areas of restricted  diffusion within both cerebellar hemispheres, as well as bases  downstairs. Lesion within the right cerebellar hemisphere measures 9 mm.  Lesion within the  left cerebellar hemisphere measures 4 mm. Largest area  of infarction is noted within the right frontal centrum semiovale, and  measures up to 1.4 cm. There is diffuse atrophy. There is  periventricular and deep white matter microangiopathic change. There is  no midline shift or mass effect. There may be an additional old right  cerebellar infarct. There is an old lacunar infarction within the left  basal ganglia. Patient appears to have bilateral basal ganglia  calcifications. Otherwise, no significant abnormalities are seen on  gradient echo imaging. Mucosal thickening is noted within the ethmoid  and maxillary sinuses.       Impression:      1. Bilateral areas of restricted diffusion noted within both cerebral  and cerebellar hemispheres.  Multifocal distribution would suggest an  embolic source.     This report was finalized on 4/30/2023 11:36 PM by Dr. Francesca Green M.D.       CT Angiogram Head w AI Analysis of LVO [[id number]] Collected: 04/30/23 1439     Updated: 04/30/23 1641    Narrative:      CT ANGIOGRAPHY OF THE HEAD AND NECK WITHOUT AND WITH INTRAVENOUS  CONTRAST AND 3-D RECONSTRUCTIONS AND COLOR CT PERFUSION IMAGING OF THE  BRAIN WITH INTRAVENOUS CONTRAST     HISTORY: Left-sided weakness. Team D evaluation.     The CT scan was performed as an emergency procedure with CT angiography  protocol through the head and neck without and with intravenous contrast  and 3-D reconstructions and color CT perfusion imaging of the brain with  intravenous contrast. The following findings are present:  1. An initial noncontrast CT scan of the brain was performed. There is  moderate diffuse atrophy and chronic small vessel ischemic change. There  is no evidence of acute intracranial hemorrhage or mass effect. The  findings of the noncontrast CT scan were communicated to the team D  physician when imaging made available at 1:45 PM. The postcontrast  findings were communicated when imaging made available at 2:00  PM.  2. Evaluation for stenosis is based on NASCET criteria. There is normal  filling of the left vertebral artery which supplies the basilar artery.  There is a threadlike right vertebral artery which becomes occluded  intracranially. The remainder of the posterior circulation is  unremarkable.  3. The cervical carotid arteries show no NASCET significant stenosis or  irregularity.  4. The intracranial CT angiography shows no major branch stenosis or  evidence of intra-arterial thrombus. There is no evidence of aneurysm.  5. The color CT perfusion imaging appears normal.  6. There is nodular enlargement of both thyroid lobes, right greater  than left most consistent with multinodular goiter. The nodules measure  up to 1.9 cm and further evaluation with thyroid ultrasound may be  appropriate as an outpatient.                 AI analysis of LVO was utilized.     Radiation dose reduction techniques were utilized, including automated  exposure control and exposure modulation based on body size.     This report was finalized on 4/30/2023 4:37 PM by Dr. Logan Morrison M.D.       CT Angiogram Neck [[id number]] Collected: 04/30/23 1439     Updated: 04/30/23 1640    Narrative:      CT ANGIOGRAPHY OF THE HEAD AND NECK WITHOUT AND WITH INTRAVENOUS  CONTRAST AND 3-D RECONSTRUCTIONS AND COLOR CT PERFUSION IMAGING OF THE  BRAIN WITH INTRAVENOUS CONTRAST     HISTORY: Left-sided weakness. Team D evaluation.     The CT scan was performed as an emergency procedure with CT angiography  protocol through the head and neck without and with intravenous contrast  and 3-D reconstructions and color CT perfusion imaging of the brain with  intravenous contrast. The following findings are present:  1. An initial noncontrast CT scan of the brain was performed. There is  moderate diffuse atrophy and chronic small vessel ischemic change. There  is no evidence of acute intracranial hemorrhage or mass effect. The  findings of the noncontrast CT scan  were communicated to the team D  physician when imaging made available at 1:45 PM. The postcontrast  findings were communicated when imaging made available at 2:00 PM.  2. Evaluation for stenosis is based on NASCET criteria. There is normal  filling of the left vertebral artery which supplies the basilar artery.  There is a threadlike right vertebral artery which becomes occluded  intracranially. The remainder of the posterior circulation is  unremarkable.  3. The cervical carotid arteries show no NASCET significant stenosis or  irregularity.  4. The intracranial CT angiography shows no major branch stenosis or  evidence of intra-arterial thrombus. There is no evidence of aneurysm.  5. The color CT perfusion imaging appears normal.  6. There is nodular enlargement of both thyroid lobes, right greater  than left most consistent with multinodular goiter. The nodules measure  up to 1.9 cm and further evaluation with thyroid ultrasound may be  appropriate as an outpatient.                 AI analysis of LVO was utilized.     Radiation dose reduction techniques were utilized, including automated  exposure control and exposure modulation based on body size.     This report was finalized on 4/30/2023 4:37 PM by Dr. Logan Morrison M.D.       CT CEREBRAL PERFUSION WITH & WITHOUT CONTRAST [[id number]] Collected: 04/30/23 1439     Updated: 04/30/23 1640    Narrative:      CT ANGIOGRAPHY OF THE HEAD AND NECK WITHOUT AND WITH INTRAVENOUS  CONTRAST AND 3-D RECONSTRUCTIONS AND COLOR CT PERFUSION IMAGING OF THE  BRAIN WITH INTRAVENOUS CONTRAST     HISTORY: Left-sided weakness. Team D evaluation.     The CT scan was performed as an emergency procedure with CT angiography  protocol through the head and neck without and with intravenous contrast  and 3-D reconstructions and color CT perfusion imaging of the brain with  intravenous contrast. The following findings are present:  1. An initial noncontrast CT scan of the brain was  performed. There is  moderate diffuse atrophy and chronic small vessel ischemic change. There  is no evidence of acute intracranial hemorrhage or mass effect. The  findings of the noncontrast CT scan were communicated to the team D  physician when imaging made available at 1:45 PM. The postcontrast  findings were communicated when imaging made available at 2:00 PM.  2. Evaluation for stenosis is based on NASCET criteria. There is normal  filling of the left vertebral artery which supplies the basilar artery.  There is a threadlike right vertebral artery which becomes occluded  intracranially. The remainder of the posterior circulation is  unremarkable.  3. The cervical carotid arteries show no NASCET significant stenosis or  irregularity.  4. The intracranial CT angiography shows no major branch stenosis or  evidence of intra-arterial thrombus. There is no evidence of aneurysm.  5. The color CT perfusion imaging appears normal.  6. There is nodular enlargement of both thyroid lobes, right greater  than left most consistent with multinodular goiter. The nodules measure  up to 1.9 cm and further evaluation with thyroid ultrasound may be  appropriate as an outpatient.                 AI analysis of LVO was utilized.     Radiation dose reduction techniques were utilized, including automated  exposure control and exposure modulation based on body size.     This report was finalized on 4/30/2023 4:37 PM by Dr. Logan Morrison M.D.       XR Chest 1 View [[id number]] Collected: 04/30/23 1417     Updated: 04/30/23 1421    Narrative:      ONE VIEW PORTABLE CHEST     HISTORY: Acute stroke. Hypertension.     FINDINGS: The lungs are well-expanded and clear. There is mild  cardiomegaly. No acute abnormality is seen.     This report was finalized on 4/30/2023 2:18 PM by Dr. Logan Morrison M.D.             Pertinent Labs     Results from last 7 days   Lab Units 05/02/23  0551 05/01/23  0516 04/30/23  1329   WBC 10*3/mm3 10.64 9.53  10.59   HEMOGLOBIN g/dL 11.9* 12.7* 13.9   PLATELETS 10*3/mm3 141 142 155     Results from last 7 days   Lab Units 05/02/23  0551 05/01/23  0516 04/30/23  1329   SODIUM mmol/L 140 140 141   POTASSIUM mmol/L 3.7 3.7 3.9   CHLORIDE mmol/L 106 106 104   CO2 mmol/L 24.6 25.4 28.0   BUN mg/dL 31* 28* 27*   CREATININE mg/dL 1.31* 1.37* 1.74*   GLUCOSE mg/dL 105* 91 92   Estimated Creatinine Clearance: 43.1 mL/min (A) (by C-G formula based on SCr of 1.31 mg/dL (H)).  Results from last 7 days   Lab Units 05/01/23  0516 04/30/23  1329   ALBUMIN g/dL 3.1* 3.9   BILIRUBIN mg/dL 0.6 0.6   ALK PHOS U/L 143* 160*   AST (SGOT) U/L 28 33   ALT (SGPT) U/L 14 18     Results from last 7 days   Lab Units 05/02/23  0551 05/01/23  0516 04/30/23  1329   CALCIUM mg/dL 9.0 9.0 8.9   ALBUMIN g/dL  --  3.1* 3.9   MAGNESIUM mg/dL 2.2  --   --    PHOSPHORUS mg/dL 2.3*  --   --        Results from last 7 days   Lab Units 05/01/23  0516 04/30/23  1634 04/30/23  1329   HSTROP T ng/L 96* 110* 114*       Results from last 7 days   Lab Units 05/01/23  0516   CHOLESTEROL mg/dL 120   TRIGLYCERIDES mg/dL 149   HDL CHOL mg/dL 27*   LDL CHOL mg/dL 67           Test Results Pending at Discharge       Discharge Details        Discharge Medications      New Medications      Instructions Start Date   apixaban 5 MG tablet tablet  Commonly known as: ELIQUIS   5 mg, Oral, Every 12 Hours Scheduled      losartan 25 MG tablet  Commonly known as: Cozaar   25 mg, Oral, Daily         Continue These Medications      Instructions Start Date   cholecalciferol 25 MCG (1000 UT) tablet  Commonly known as: VITAMIN D3   1,000 Units, Oral, Daily      doxazosin 8 MG tablet  Commonly known as: CARDURA   8 mg, Oral, Nightly      metoprolol succinate XL 50 MG 24 hr tablet  Commonly known as: TOPROL-XL   50 mg, Oral, 2 times daily      rOPINIRole 2 MG tablet  Commonly known as: REQUIP   Oral, Nightly      rosuvastatin 40 MG tablet  Commonly known as: CRESTOR   40 mg, Oral, Daily       VITAMIN B 12 PO   2,500 mcg, Oral, Daily         Stop These Medications    aspirin 81 MG EC tablet     clonazePAM 1 MG tablet  Commonly known as: KlonoPIN     losartan-hydrochlorothiazide 100-25 MG per tablet  Commonly known as: HYZAAR            Allergies   Allergen Reactions   • Sulfa Antibiotics          Discharge Disposition:  Home or Self Care    Discharge Diet:  Diet Order   Procedures   • Diet: Cardiac Diets; Healthy Heart (2-3 Na+); Texture: Regular Texture (IDDSI 7); Fluid Consistency: Thin (IDDSI 0)       Discharge Activity:   Activity Instructions     Activity as Tolerated      Gradually Increase Activity Until at Pre-Hospitalization Level            CODE STATUS:    Code Status and Medical Interventions:   Ordered at: 04/30/23 1507     Code Status (Patient has no pulse and is not breathing):    CPR (Attempt to Resuscitate)     Medical Interventions (Patient has pulse or is breathing):    Full Support     Release to patient:    Routine Release       Future Appointments   Date Time Provider Department Center   11/21/2023 10:30 AM Aashish Thapa MD [facility]     Additional Instructions for the Follow-ups that You Need to Schedule     Discharge Follow-up with PCP   As directed       Currently Documented PCP:    Santos Simmons MD    PCP Phone Number:    [phone]     Follow Up Details: 1 week post hospital follow up         Discharge Follow-up with Specified Provider: Cardiology 1 months   As directed      To: Cardiology 1 months         Discharge Follow-up with Specified Provider: Neurology 3 months   As directed      To: Neurology 3 months         Discharge Follow-up with Specified Provider: Ophthalmology ~1 week to evaluate vision prior to driving   As directed      To: Ophthalmology ~1 week to evaluate vision prior to driving            Follow-up Information     Aashish Thapa MD. Schedule an appointment as soon as possible for a visit in 1 month(s).     Specialties: Cardiology, Hospitalist  Contact information:  [address] [place] [address]  [phone]             Santos Simmons MD .    Specialty: Family Medicine  Why: 1 week post hospital follow up  Contact information:  [address] [facility] [address]  [phone]                         Additional Instructions for the Follow-ups that You Need to Schedule     Discharge Follow-up with PCP   As directed       Currently Documented PCP:    Santos Simmons MD    PCP Phone Number:    [phone]     Follow Up Details: 1 week post hospital follow up         Discharge Follow-up with Specified Provider: Cardiology 1 months   As directed      To: Cardiology 1 months         Discharge Follow-up with Specified Provider: Neurology 3 months   As directed      To: Neurology 3 months         Discharge Follow-up with Specified Provider: Ophthalmology ~1 week to evaluate vision prior to driving   As directed      To: Ophthalmology ~1 week to evaluate vision prior to driving           Time Spent on Discharge:  I spent greater than 30 minutes on this discharge activity which included: face-to-face encounter with the patient, reviewing the data in the system, coordination of the care with the nursing staff as well as consultants, documentation, and entering orders.       Lottie Hernández MD  [facility]  05/02/23  14:07 EDT

## 2023-05-02 NOTE — PROGRESS NOTES
"    Patient Name: Art Mullins  :1935  87 y.o.      Patient Care Team:  Santos Simmons MD as PCP - General (Family Medicine)  Aashish Thapa MD as Consulting Physician (Cardiology)    Chief Complaint: follow up bilateral acute strokes    Interval History: resting in bed. Echo reviewed. Questions answered.        Objective   Vital Signs  Temp:  [97.6 °F (36.4 °C)-98.7 °F (37.1 °C)] 98.2 °F (36.8 °C)  Heart Rate:  [74-98] 89  Resp:  [16-18] 16  BP: (107-138)/(70-78) 130/72    Intake/Output Summary (Last 24 hours) at 2023 1057  Last data filed at 2023 2218  Gross per 24 hour   Intake 480 ml   Output 900 ml   Net -420 ml     Flowsheet Rows    Flowsheet Row First Filed Value   Admission Height 172.7 cm (68\") Documented at 2023 1402   Admission Weight 86 kg (189 lb 8 oz) Documented at 2023 1402          Physical Exam:   General Appearance:    Alert, cooperative, in no acute distress   Lungs:     Clear to auscultation.  Normal respiratory effort and rate.      Heart:    Regular rhythm and normal rate, normal S1 and S2, no murmurs, gallops or rubs.     Chest Wall:    No abnormalities observed   Abdomen:     Soft, nontender, positive bowel sounds.     Extremities:   no cyanosis, clubbing or edema.  No marked joint deformities.  Adequate musculoskeletal strength.       Results Review:    Results from last 7 days   Lab Units 23  0551   SODIUM mmol/L 140   POTASSIUM mmol/L 3.7   CHLORIDE mmol/L 106   CO2 mmol/L 24.6   BUN mg/dL 31*   CREATININE mg/dL 1.31*   GLUCOSE mg/dL 105*   CALCIUM mg/dL 9.0     Results from last 7 days   Lab Units 23  0516 23  1634 23  1329   HSTROP T ng/L 96* 110* 114*     Results from last 7 days   Lab Units 23  0551   WBC 10*3/mm3 10.64   HEMOGLOBIN g/dL 11.9*   HEMATOCRIT % 34.7*   PLATELETS 10*3/mm3 141     Results from last 7 days   Lab Units 23  1329   INR  1.14*   APTT seconds 28.2     Results from last 7 days "   Lab Units 05/02/23  0551   MAGNESIUM mg/dL 2.2     Results from last 7 days   Lab Units 05/01/23  0516   CHOLESTEROL mg/dL 120   TRIGLYCERIDES mg/dL 149   HDL CHOL mg/dL 27*   LDL CHOL mg/dL 67               Medication Review:   aspirin, 81 mg, Oral, Daily   Or  aspirin, 300 mg, Rectal, Daily  atorvastatin, 80 mg, Oral, Nightly  clopidogrel, 75 mg, Oral, Daily  rOPINIRole, 2 mg, Oral, Nightly  sodium chloride, 10 mL, Intravenous, Q12H         sodium chloride, 75 mL/hr, Last Rate: 75 mL/hr (05/01/23 2106)        Assessment & Plan   1. Bilateral acute strokes  2. Severe LAE on transthoracic echo 2021  3. PVCs  4. IVCD  5. BELLA on chronic kidney disease  6. HTN - should be able to resume home medications without adjustments. His BP has been pretty well controlled.  7. Elevated high sensitivity troponin - EKG not suggestive of MI. Relatively flat curve. Acute coronary syndrome not suspected.     Transthoracic echocardiogram was technically difficult. Valves and left atrium not well visualized. Preserved LVEF without evidence of thrombus.     Given bilateral strokes , age, severe LAE - atrial fibrillation/cardioembolic most likely cause, though not yet diagnosed.     Discussed with neurology and Dr. Schulz. Stop DAPT and empirically anticoagulate with apixaban when ok with neurology. Place 2 week monitor at discharge.     He should follow up with Dr. Thapa in 4 weeks to review monitor results.     Will see as needed.    CRISTÓBAL Dunne  [facility]  05/02/23  10:57 EDT

## 2023-05-02 NOTE — PLAN OF CARE
Goal Outcome Evaluation:    Patient alert and oriented, VSS, on room air. Patient voiding via urinal. Denies pain. Call light within reach.

## 2023-05-02 NOTE — PROGRESS NOTES
DOS: 2023  NAME: Art Mullins   : 1935  PCP: Santos Simmons MD    Chief Complaint   Patient presents with   • Extremity Weakness   • Double Vision        Stroke    Subjective: Pt seen in follow up, however the problem is new to me.  Sitting up in bed resting comfortably.  Denies any new weakness, numbness, speech or visual disturbances, or headaches.  States his vision is still at baseline.  States he is still having some subtle left arm weakness that he has not noticed has been present since Friday.  No family at bedside.     Objective:  Vital signs:      Vitals:    235 23 2327 23 0511 23 0725   BP: 133/78 138/77 107/70 130/72   BP Location: Left arm Left arm Right arm Right arm   Patient Position: Lying Lying Lying Lying   Pulse: 98 90  89   Resp: 18 16 16 16   Temp: 98.1 °F (36.7 °C) 97.6 °F (36.4 °C) 97.8 °F (36.6 °C) 98.2 °F (36.8 °C)   TempSrc: Oral Oral Oral Oral   SpO2:   94%    Weight:       Height:           Current Facility-Administered Medications:   •  aspirin chewable tablet 81 mg, 81 mg, Oral, Daily, 81 mg at 23 0956 **OR** aspirin suppository 300 mg, 300 mg, Rectal, Daily, Jeremy Alvarenga MD  •  atorvastatin (LIPITOR) tablet 80 mg, 80 mg, Oral, Nightly, Jeremy Alvarenga MD, 80 mg at 23  •  clopidogrel (PLAVIX) tablet 75 mg, 75 mg, Oral, Daily, Jeremy Alvarenga MD, 75 mg at 23 0956  •  ondansetron (ZOFRAN) injection 4 mg, 4 mg, Intravenous, Q6H PRN, Jeremy Alvarenga MD  •  rOPINIRole (REQUIP) tablet 2 mg, 2 mg, Oral, Nightly, Abby Peoples, APRN, 2 mg at 23 2218  •  sodium chloride 0.9 % flush 10 mL, 10 mL, Intravenous, PRN, Jeremy Alvarenga MD  •  sodium chloride 0.9 % flush 10 mL, 10 mL, Intravenous, Q12H, Jeremy Alvarenga MD, 10 mL at 23 0957  •  sodium chloride 0.9 % flush 10 mL, 10 mL, Intravenous, PRN, Jeremy Alvarenga MD  •  sodium chloride 0.9 % infusion 40 mL, 40 mL, Intravenous, PRN, Jeremy Alvarenga MD  •  sodium chloride  0.9 % infusion, 75 mL/hr, Intravenous, Continuous, Jeremy Alvarenga MD, Last Rate: 75 mL/hr at 05/01/23 2106, 75 mL/hr at 05/01/23 2106    PRN meds  •  ondansetron  •  sodium chloride  •  sodium chloride  •  sodium chloride    No current facility-administered medications on file prior to encounter.     Current Outpatient Medications on File Prior to Encounter   Medication Sig   • aspirin 81 MG EC tablet Take 1 tablet by mouth Daily.   • cholecalciferol (VITAMIN D3) 1000 UNITS tablet Take 1 tablet by mouth Daily.   • clonazePAM (KlonoPIN) 1 MG tablet Take 1 tablet by mouth Every Night.   • Cyanocobalamin (VITAMIN B 12 PO) Take 2,500 mcg by mouth Daily.   • doxazosin (CARDURA) 8 MG tablet Take 1 tablet by mouth Every Night.   • losartan-hydrochlorothiazide (HYZAAR) 100-25 MG per tablet Take 1 tablet by mouth Daily.   • metoprolol succinate XL (TOPROL-XL) 50 MG 24 hr tablet Take 1 tablet by mouth 2 (two) times a day.   • rOPINIRole (REQUIP) 2 MG tablet Take  by mouth Every Night.   • rosuvastatin (CRESTOR) 40 MG tablet Take 1 tablet by mouth Daily.       General appearance: Elderly male, NAD, alert and cooperative  HEENT: Normocephalic, atraumatic  Resp: Even and unlabored  Extremities: No obvious edema  Skin: warm, dry    Neurological:   MS: oriented x3, able to correctly identify wall clock time, normal attention/concentration, language intact, no neglect, normal fund of knowledge  CN: visual fields full, EOMI, facial sensation equal, no facial droop, tongue midline  Motor: 5/5 in all 4 ext.  Subtle LUE weakness with pulling against resistance  Sensory: light touch and cold sensation intact in all 4 ext.  Coordination: Normal finger to nose test  Gait and station: Deferred  Rapid alternating movements: normal finger to thumb tap    Laboratory results:  No results found for: TSH  Lab Results   Component Value Date    HGBA1C 5.30 05/01/2023     No results found for: [id number]  Lab Results   Component Value Date    CHOL  120 05/01/2023     Lab Results   Component Value Date    TRIG 149 05/01/2023     Lab Results   Component Value Date    HDL 27 (L) 05/01/2023     Lab Results   Component Value Date    LDL 67 05/01/2023     Lab Results   Component Value Date    WBC 10.64 05/02/2023    HGB 11.9 (L) 05/02/2023    HCT 34.7 (L) 05/02/2023    MCV 94.0 05/02/2023     05/02/2023     Lab Results   Component Value Date    GLUCOSE 105 (H) 05/02/2023    BUN 31 (H) 05/02/2023    CREATININE 1.31 (H) 05/02/2023    BCR 23.7 05/02/2023    K 3.7 05/02/2023    CO2 24.6 05/02/2023    CALCIUM 9.0 05/02/2023    ALBUMIN 3.1 (L) 05/01/2023    AST 28 05/01/2023    ALT 14 05/01/2023     Lab Results   Component Value Date    PTT 28.2 04/30/2023     Lab Results   Component Value Date    INR 1.14 (H) 04/30/2023    PROTIME 14.7 (H) 04/30/2023     Brief Urine Lab Results     None          Review and interpretation of imaging:  MRI Brain Without Contrast    Result Date: 4/30/2023  1. Bilateral areas of restricted diffusion noted within both cerebral and cerebellar hemispheres.  Multifocal distribution would suggest an embolic source.  This report was finalized on 4/30/2023 11:36 PM by Dr. Francesca Green M.D.      Results for orders placed during the hospital encounter of 04/30/23    Adult transthoracic echo complete    Interpretation Summary  •  The study is technically difficult for diagnosis.  •  Left ventricular systolic function is normal. Left ventricular ejection fraction appears to be 56 - 60%. Normal left ventricular cavity size and wall thickness noted. All left ventricular wall segments contract normally. Left ventricular diastolic function is consistent with (grade I) impaired relaxation.  •  Left atrium not well visualized. The left atrial cavity is dilated. Saline test results are negative.  •  None of the valves are well visualized.        Impression/Assessment:  This is a 87-year-old male with past medical history of hyperlipidemia,  hypertension who presented to the hospital on 4/30/2023 with complaints of left arm weakness since Friday and blurred vision.  He states he was at the store when the blurred vision happened and he was unable to read the watch on his wrist.  The symptoms lasted around 1 hour and then resolved.  He mainly noted trouble with his left hand that started on Friday where he felt like he did not have good control of it.    BP on arrival 125/73, HR 65.  EKG with NSR, multiple VPCs, LVH.  Temp 98.5.  BS 92.  He had an MRI brain that revealed acute small bilateral hemispheric infarcts with evidence of old additional infarcts as well.    Diagnosis:  Multiple acute bilateral hemispheric infarcts within different vascular territory highly suggestive of cardioembolic source  Right vertebral occlusion  Essential hypertension  Dyslipidemia  Thyroid nodularity    Additional work up to date:  CTA/CTP: Right vertebral occlusion noted, otherwise no significant high-grade stenosis or hemorrhage.  Nodular enlargement of both thyroid lobes noted.  2D Echo: LA cavity dilated, saline test negative, EF 56 to 60%, all LV wall segments contract normally, no AV stenosis, trace MVR.  Labs: Ethanol <10    Plan:   -Patient remained stable at baseline, has some subtle LUE weakness with resistance testing.  -Discussed with Dr. Schulz, given the multi vascular territory involvement of his strokes including evidence of chronic infarcts and with echo showing a severely dilated LA we do agree with empiric anticoagulation and cardiac monitoring at discharge. Will start Eliquis 5mg BID today.  -I discussed this with the patient and he is also agreeable with this.  No need for additional aspirin or Plavix from our standpoint.  He was on ASA PTA, other than the right vertebral occlusion his intracranial vessels look okay.  Not sure if this was started from a cardiac standpoint.  He can discuss this with his cardiologist in the outpatient setting if he needs to  continue antiplatelet therapy along with his anticoagulant.  For now we will D/C it.  -He can resume his home dose Crestor 40 mg.  -Although he subjectively states his vision is back at baseline and no obvious deficits on exam, I asked that he follow-up with his ophthalmologist in the next week for for visual testing prior to resuming driving.  He will need driving clearance per them.  -Recommend resuming normal activities over the next week if he continues to feel okay.  Neurochecks per stroke protocol  Normalize BP  Stroke Education  JANI/SCDs  PT/OT/ST  -Defer to primary for incidental thyroid nodule work-up.  Radiology recommending a thyroid ultrasound.  I will arrange follow-up in our office in 3 months for stroke follow-up.  Therapies as written. CCP for discharge planning. Call RRT for any acute neurological changes. We will sign off, will see again per request    Case discussed with patient, Dr. Schulz, and Dr. Rahman, and he agrees with plan above.     CRISTÓBAL Reyes

## 2023-05-03 NOTE — OUTREACH NOTE
Prep Survey    Flowsheet Row Responses   [facility] patient discharged from? [facility]   Is LACE score < 7 ? No   Eligibility Readm Mgmt   Discharge diagnosis **Acute CVA    Does the patient have one of the following disease processes/diagnoses(primary or secondary)? Stroke   Does the patient have Home health ordered? No   Is there a DME ordered? No   Medication alerts for this patient Eliquis    Prep survey completed? Yes          Rosalba MATOS - Registered Nurse

## 2023-05-10 ENCOUNTER — READMISSION MANAGEMENT (OUTPATIENT)
Dept: CALL CENTER | Facility: HOSPITAL | Age: 88
End: 2023-05-10
Payer: MEDICARE

## 2023-05-10 NOTE — OUTREACH NOTE
Stroke Week 2 Survey    Flowsheet Row Responses   [facility] facility patient discharged from? [facility]   Does the patient have one of the following disease processes/diagnoses(primary or secondary)? Stroke   Week 2 attempt successful? No   Unsuccessful attempts Attempt 1          Miriam MATOS - Registered Nurse

## 2023-05-12 ENCOUNTER — TRANSCRIBE ORDERS (OUTPATIENT)
Dept: ADMINISTRATIVE | Facility: HOSPITAL | Age: 88
End: 2023-05-12
Payer: MEDICARE

## 2023-05-12 DIAGNOSIS — I63.9 STROKE DETERMINED BY CLINICAL ASSESSMENT: Primary | ICD-10-CM

## 2023-05-16 ENCOUNTER — READMISSION MANAGEMENT (OUTPATIENT)
Dept: CALL CENTER | Facility: HOSPITAL | Age: 88
End: 2023-05-16
Payer: MEDICARE

## 2023-05-16 NOTE — OUTREACH NOTE
Stroke Week 2 Survey    Flowsheet Row Responses   [facility] patient discharged from? [facility]   Does the patient have one of the following disease processes/diagnoses(primary or secondary)? Stroke   Week 2 attempt successful? Yes   Call start time 1558   Call end time 1605   Discharge diagnosis Acute CVA   Meds reviewed with patient/caregiver? Yes   Is the patient taking all medications as directed (includes completed medication regime)? Yes   Comments regarding appointments MRI apt on 5/31/23   Does the patient have a primary care provider?  Yes   Has the patient kept scheduled appointments due by today? Yes  [PCP apt since discharge]   Has home health visited the patient within 72 hours of discharge? N/A   Home health comments PT visits   Does the patient require any assistance with activities of daily living such as eating, bathing, dressing, walking, etc.? Yes   ADL comments uses a walker when ambulating   Does the patient have any residual symptoms from stroke/TIA? Yes   Residual symptoms comments left side weaker than right   Does the patient understand the diet ordered at discharge? Yes   Did the patient receive a copy of their discharge instructions? Yes   Nursing interventions Reviewed instructions with patient   What is the patient's perception of their health status since discharge? Improving   Nursing interventions Nurse provided patient education   Is the patient able to teach back FAST for Stroke? B alance: Watch for sudden loss of balance, E yes: Check for vision loss, F ace: Look for an uneven smile, A rm: Check if one arm is weak, S peech: Listen for slurred speech   Is the patient/caregiver able to teach back the risk factors for a stroke? High blood pressure-goal below 120/80   Is the patient/caregiver able to teach back signs and symptoms related to disease process for when to call PCP? Yes   Is the patient/caregiver able to teach back signs and symptoms related to disease process for  when to call 911? Yes   If the patient is a current smoker, are they able to teach back resources for cessation? Not a smoker   Is the patient/caregiver able to teach back the hierarchy of who to call/visit for symptoms/problems? PCP, Specialist, Home health nurse, Urgent Care, ED, 911 Yes   Week 2 call completed? Yes   Revoked No further contact(revokes)-requires comment   Graduated/Revoked comments no issues during call          Mai H - Registered Nurse

## 2023-05-17 LAB — CREAT BLDA-MCNC: 1.9 MG/DL (ref 0.6–1.3)

## 2023-05-18 ENCOUNTER — APPOINTMENT (OUTPATIENT)
Dept: GENERAL RADIOLOGY | Facility: HOSPITAL | Age: 88
End: 2023-05-18
Payer: MEDICARE

## 2023-05-18 ENCOUNTER — APPOINTMENT (OUTPATIENT)
Dept: CARDIOLOGY | Facility: HOSPITAL | Age: 88
End: 2023-05-18
Payer: MEDICARE

## 2023-05-18 ENCOUNTER — HOSPITAL ENCOUNTER (INPATIENT)
Facility: HOSPITAL | Age: 88
LOS: 4 days | Discharge: NURSING, REHAB OR LONG-TERM CARE | End: 2023-05-23
Attending: EMERGENCY MEDICINE | Admitting: INTERNAL MEDICINE
Payer: MEDICARE

## 2023-05-18 ENCOUNTER — APPOINTMENT (OUTPATIENT)
Dept: CT IMAGING | Facility: HOSPITAL | Age: 88
End: 2023-05-18
Payer: MEDICARE

## 2023-05-18 DIAGNOSIS — I50.9 ACUTE CONGESTIVE HEART FAILURE, UNSPECIFIED HEART FAILURE TYPE: ICD-10-CM

## 2023-05-18 DIAGNOSIS — R41.82 ALTERED MENTAL STATUS, UNSPECIFIED ALTERED MENTAL STATUS TYPE: Primary | ICD-10-CM

## 2023-05-18 DIAGNOSIS — I21.4 NSTEMI (NON-ST ELEVATED MYOCARDIAL INFARCTION): ICD-10-CM

## 2023-05-18 DIAGNOSIS — F41.1 GAD (GENERALIZED ANXIETY DISORDER): ICD-10-CM

## 2023-05-18 LAB
ALBUMIN SERPL-MCNC: 3.5 G/DL (ref 3.5–5.2)
ALBUMIN/GLOB SERPL: 1.3 G/DL
ALP SERPL-CCNC: 181 U/L (ref 39–117)
ALT SERPL W P-5'-P-CCNC: 30 U/L (ref 1–41)
ANION GAP SERPL CALCULATED.3IONS-SCNC: 8.7 MMOL/L (ref 5–15)
APTT PPP: 34.6 SECONDS (ref 22.7–35.4)
AST SERPL-CCNC: 79 U/L (ref 1–40)
BACTERIA UR QL AUTO: ABNORMAL /HPF
BASOPHILS # BLD AUTO: 0.05 10*3/MM3 (ref 0–0.2)
BASOPHILS NFR BLD AUTO: 0.4 % (ref 0–1.5)
BH CV ECHO MEAS - AI P1/2T: 305.8 MSEC
BH CV ECHO MEAS - EDV(CUBED): 148.9 ML
BH CV ECHO MEAS - EDV(MOD-SP2): 96 ML
BH CV ECHO MEAS - EDV(MOD-SP4): 117 ML
BH CV ECHO MEAS - EF(MOD-BP): 62.9 %
BH CV ECHO MEAS - EF(MOD-SP2): 60.4 %
BH CV ECHO MEAS - EF(MOD-SP4): 62.4 %
BH CV ECHO MEAS - ESV(CUBED): 39.3 ML
BH CV ECHO MEAS - ESV(MOD-SP2): 38 ML
BH CV ECHO MEAS - ESV(MOD-SP4): 44 ML
BH CV ECHO MEAS - FS: 35.8 %
BH CV ECHO MEAS - IVS/LVPW: 1 CM
BH CV ECHO MEAS - IVSD: 1.2 CM
BH CV ECHO MEAS - LV DIASTOLIC VOL/BSA (35-75): 59 CM2
BH CV ECHO MEAS - LV MASS(C)D: 256.6 GRAMS
BH CV ECHO MEAS - LV SYSTOLIC VOL/BSA (12-30): 22.2 CM2
BH CV ECHO MEAS - LVIDD: 5.3 CM
BH CV ECHO MEAS - LVIDS: 3.4 CM
BH CV ECHO MEAS - LVPWD: 1.2 CM
BH CV ECHO MEAS - RAP SYSTOLE: 3 MMHG
BH CV ECHO MEAS - RVSP: 70 MMHG
BH CV ECHO MEAS - SI(MOD-SP2): 29.2 ML/M2
BH CV ECHO MEAS - SI(MOD-SP4): 36.8 ML/M2
BH CV ECHO MEAS - SV(MOD-SP2): 58 ML
BH CV ECHO MEAS - SV(MOD-SP4): 73 ML
BH CV ECHO MEAS - TR MAX PG: 67.2 MMHG
BH CV ECHO MEAS - TR MAX VEL: 410 CM/SEC
BILIRUB SERPL-MCNC: 0.6 MG/DL (ref 0–1.2)
BILIRUB UR QL STRIP: NEGATIVE
BUN SERPL-MCNC: 14 MG/DL (ref 8–23)
BUN/CREAT SERPL: 10.7 (ref 7–25)
CALCIUM SPEC-SCNC: 9.1 MG/DL (ref 8.6–10.5)
CHLORIDE SERPL-SCNC: 110 MMOL/L (ref 98–107)
CK SERPL-CCNC: 288 U/L (ref 20–200)
CLARITY UR: CLEAR
CO2 SERPL-SCNC: 26.3 MMOL/L (ref 22–29)
COLOR UR: YELLOW
CREAT SERPL-MCNC: 1.31 MG/DL (ref 0.76–1.27)
DEPRECATED RDW RBC AUTO: 43.6 FL (ref 37–54)
EGFRCR SERPLBLD CKD-EPI 2021: 53 ML/MIN/1.73
EOSINOPHIL # BLD AUTO: 0.06 10*3/MM3 (ref 0–0.4)
EOSINOPHIL NFR BLD AUTO: 0.5 % (ref 0.3–6.2)
ERYTHROCYTE [DISTWIDTH] IN BLOOD BY AUTOMATED COUNT: 12.7 % (ref 12.3–15.4)
GEN 5 2HR TROPONIN T REFLEX: 1180 NG/L
GLOBULIN UR ELPH-MCNC: 2.6 GM/DL
GLUCOSE SERPL-MCNC: 105 MG/DL (ref 65–99)
GLUCOSE UR STRIP-MCNC: NEGATIVE MG/DL
HCT VFR BLD AUTO: 33.5 % (ref 37.5–51)
HGB BLD-MCNC: 11.3 G/DL (ref 13–17.7)
HGB UR QL STRIP.AUTO: ABNORMAL
HOLD SPECIMEN: NORMAL
HOLD SPECIMEN: NORMAL
HYALINE CASTS UR QL AUTO: ABNORMAL /LPF
IMM GRANULOCYTES # BLD AUTO: 0.05 10*3/MM3 (ref 0–0.05)
IMM GRANULOCYTES NFR BLD AUTO: 0.4 % (ref 0–0.5)
INR PPP: 1.38 (ref 0.9–1.1)
KETONES UR QL STRIP: ABNORMAL
LEUKOCYTE ESTERASE UR QL STRIP.AUTO: NEGATIVE
LYMPHOCYTES # BLD AUTO: 1.29 10*3/MM3 (ref 0.7–3.1)
LYMPHOCYTES NFR BLD AUTO: 10.2 % (ref 19.6–45.3)
MAXIMAL PREDICTED HEART RATE: 134 BPM
MCH RBC QN AUTO: 31.7 PG (ref 26.6–33)
MCHC RBC AUTO-ENTMCNC: 33.7 G/DL (ref 31.5–35.7)
MCV RBC AUTO: 94.1 FL (ref 79–97)
MONOCYTES # BLD AUTO: 1.16 10*3/MM3 (ref 0.1–0.9)
MONOCYTES NFR BLD AUTO: 9.1 % (ref 5–12)
NEUTROPHILS NFR BLD AUTO: 10.09 10*3/MM3 (ref 1.7–7)
NEUTROPHILS NFR BLD AUTO: 79.4 % (ref 42.7–76)
NITRITE UR QL STRIP: NEGATIVE
NRBC BLD AUTO-RTO: 0 /100 WBC (ref 0–0.2)
NT-PROBNP SERPL-MCNC: 7716 PG/ML (ref 0–1800)
PH UR STRIP.AUTO: 6 [PH] (ref 5–8)
PLATELET # BLD AUTO: 126 10*3/MM3 (ref 140–450)
PMV BLD AUTO: 9.4 FL (ref 6–12)
POTASSIUM SERPL-SCNC: 3.8 MMOL/L (ref 3.5–5.2)
PROT SERPL-MCNC: 6.1 G/DL (ref 6–8.5)
PROT UR QL STRIP: ABNORMAL
PROTHROMBIN TIME: 17.1 SECONDS (ref 11.7–14.2)
QT INTERVAL: 377 MS
RBC # BLD AUTO: 3.56 10*6/MM3 (ref 4.14–5.8)
RBC # UR STRIP: ABNORMAL /HPF
REF LAB TEST METHOD: ABNORMAL
SODIUM SERPL-SCNC: 145 MMOL/L (ref 136–145)
SP GR UR STRIP: 1.02 (ref 1–1.03)
SQUAMOUS #/AREA URNS HPF: ABNORMAL /HPF
STRESS TARGET HR: 114 BPM
TROPONIN T DELTA: 11 NG/L
TROPONIN T SERPL HS-MCNC: 1169 NG/L
UROBILINOGEN UR QL STRIP: ABNORMAL
WBC # UR STRIP: ABNORMAL /HPF
WBC NRBC COR # BLD: 12.7 10*3/MM3 (ref 3.4–10.8)
WHOLE BLOOD HOLD COAG: NORMAL
WHOLE BLOOD HOLD SPECIMEN: NORMAL

## 2023-05-18 PROCEDURE — 80053 COMPREHEN METABOLIC PANEL: CPT | Performed by: EMERGENCY MEDICINE

## 2023-05-18 PROCEDURE — 83880 ASSAY OF NATRIURETIC PEPTIDE: CPT | Performed by: EMERGENCY MEDICINE

## 2023-05-18 PROCEDURE — 99285 EMERGENCY DEPT VISIT HI MDM: CPT

## 2023-05-18 PROCEDURE — G0378 HOSPITAL OBSERVATION PER HR: HCPCS

## 2023-05-18 PROCEDURE — 84484 ASSAY OF TROPONIN QUANT: CPT | Performed by: EMERGENCY MEDICINE

## 2023-05-18 PROCEDURE — 93308 TTE F-UP OR LMTD: CPT | Performed by: INTERNAL MEDICINE

## 2023-05-18 PROCEDURE — 71045 X-RAY EXAM CHEST 1 VIEW: CPT

## 2023-05-18 PROCEDURE — 93325 DOPPLER ECHO COLOR FLOW MAPG: CPT | Performed by: INTERNAL MEDICINE

## 2023-05-18 PROCEDURE — 93325 DOPPLER ECHO COLOR FLOW MAPG: CPT

## 2023-05-18 PROCEDURE — 93010 ELECTROCARDIOGRAM REPORT: CPT | Performed by: INTERNAL MEDICINE

## 2023-05-18 PROCEDURE — 81001 URINALYSIS AUTO W/SCOPE: CPT | Performed by: EMERGENCY MEDICINE

## 2023-05-18 PROCEDURE — 93321 DOPPLER ECHO F-UP/LMTD STD: CPT

## 2023-05-18 PROCEDURE — 85610 PROTHROMBIN TIME: CPT | Performed by: INTERNAL MEDICINE

## 2023-05-18 PROCEDURE — 25010000002 HEPARIN (PORCINE) 25000-0.45 UT/250ML-% SOLUTION: Performed by: INTERNAL MEDICINE

## 2023-05-18 PROCEDURE — 93308 TTE F-UP OR LMTD: CPT

## 2023-05-18 PROCEDURE — 25510000001 PERFLUTREN (DEFINITY) 8.476 MG IN SODIUM CHLORIDE (PF) 0.9 % 10 ML INJECTION: Performed by: INTERNAL MEDICINE

## 2023-05-18 PROCEDURE — 70450 CT HEAD/BRAIN W/O DYE: CPT

## 2023-05-18 PROCEDURE — 25010000002 FUROSEMIDE PER 20 MG: Performed by: EMERGENCY MEDICINE

## 2023-05-18 PROCEDURE — 99222 1ST HOSP IP/OBS MODERATE 55: CPT | Performed by: INTERNAL MEDICINE

## 2023-05-18 PROCEDURE — 36415 COLL VENOUS BLD VENIPUNCTURE: CPT

## 2023-05-18 PROCEDURE — 82550 ASSAY OF CK (CPK): CPT | Performed by: INTERNAL MEDICINE

## 2023-05-18 PROCEDURE — 85025 COMPLETE CBC W/AUTO DIFF WBC: CPT | Performed by: EMERGENCY MEDICINE

## 2023-05-18 PROCEDURE — 93005 ELECTROCARDIOGRAM TRACING: CPT | Performed by: EMERGENCY MEDICINE

## 2023-05-18 PROCEDURE — 93321 DOPPLER ECHO F-UP/LMTD STD: CPT | Performed by: INTERNAL MEDICINE

## 2023-05-18 PROCEDURE — 85730 THROMBOPLASTIN TIME PARTIAL: CPT | Performed by: INTERNAL MEDICINE

## 2023-05-18 RX ORDER — CLONAZEPAM 1 MG/1
1 TABLET ORAL DAILY
Status: DISCONTINUED | OUTPATIENT
Start: 2023-05-18 | End: 2023-05-23 | Stop reason: HOSPADM

## 2023-05-18 RX ORDER — SODIUM CHLORIDE 9 MG/ML
40 INJECTION, SOLUTION INTRAVENOUS AS NEEDED
Status: DISCONTINUED | OUTPATIENT
Start: 2023-05-18 | End: 2023-05-23 | Stop reason: HOSPADM

## 2023-05-18 RX ORDER — LOSARTAN POTASSIUM 25 MG/1
25 TABLET ORAL DAILY
COMMUNITY
End: 2023-05-23 | Stop reason: HOSPADM

## 2023-05-18 RX ORDER — FUROSEMIDE 10 MG/ML
60 INJECTION INTRAMUSCULAR; INTRAVENOUS
Status: DISCONTINUED | OUTPATIENT
Start: 2023-05-19 | End: 2023-05-19

## 2023-05-18 RX ORDER — POLYETHYLENE GLYCOL 3350 17 G/17G
17 POWDER, FOR SOLUTION ORAL DAILY PRN
Status: DISCONTINUED | OUTPATIENT
Start: 2023-05-18 | End: 2023-05-23 | Stop reason: HOSPADM

## 2023-05-18 RX ORDER — FUROSEMIDE 10 MG/ML
80 INJECTION INTRAMUSCULAR; INTRAVENOUS ONCE
Status: COMPLETED | OUTPATIENT
Start: 2023-05-18 | End: 2023-05-18

## 2023-05-18 RX ORDER — ROPINIROLE 2 MG/1
2 TABLET, FILM COATED ORAL NIGHTLY
Status: DISCONTINUED | OUTPATIENT
Start: 2023-05-18 | End: 2023-05-23 | Stop reason: HOSPADM

## 2023-05-18 RX ORDER — LOSARTAN POTASSIUM AND HYDROCHLOROTHIAZIDE 25; 100 MG/1; MG/1
1 TABLET ORAL DAILY
COMMUNITY
End: 2023-05-23 | Stop reason: HOSPADM

## 2023-05-18 RX ORDER — BISACODYL 10 MG
10 SUPPOSITORY, RECTAL RECTAL DAILY PRN
Status: DISCONTINUED | OUTPATIENT
Start: 2023-05-18 | End: 2023-05-23 | Stop reason: HOSPADM

## 2023-05-18 RX ORDER — SODIUM CHLORIDE 0.9 % (FLUSH) 0.9 %
10 SYRINGE (ML) INJECTION EVERY 12 HOURS SCHEDULED
Status: DISCONTINUED | OUTPATIENT
Start: 2023-05-18 | End: 2023-05-23 | Stop reason: HOSPADM

## 2023-05-18 RX ORDER — ROSUVASTATIN CALCIUM 40 MG/1
40 TABLET, COATED ORAL DAILY
Status: DISCONTINUED | OUTPATIENT
Start: 2023-05-18 | End: 2023-05-23 | Stop reason: HOSPADM

## 2023-05-18 RX ORDER — ACETAMINOPHEN 160 MG/5ML
650 SOLUTION ORAL EVERY 4 HOURS PRN
Status: DISCONTINUED | OUTPATIENT
Start: 2023-05-18 | End: 2023-05-23 | Stop reason: HOSPADM

## 2023-05-18 RX ORDER — SODIUM CHLORIDE 0.9 % (FLUSH) 0.9 %
10 SYRINGE (ML) INJECTION AS NEEDED
Status: DISCONTINUED | OUTPATIENT
Start: 2023-05-18 | End: 2023-05-23 | Stop reason: HOSPADM

## 2023-05-18 RX ORDER — PANTOPRAZOLE SODIUM 40 MG/1
40 TABLET, DELAYED RELEASE ORAL DAILY
Status: ON HOLD | COMMUNITY

## 2023-05-18 RX ORDER — TERAZOSIN 5 MG/1
5 CAPSULE ORAL NIGHTLY
Status: DISCONTINUED | OUTPATIENT
Start: 2023-05-18 | End: 2023-05-20

## 2023-05-18 RX ORDER — ASPIRIN 325 MG
325 TABLET ORAL ONCE
Status: COMPLETED | OUTPATIENT
Start: 2023-05-18 | End: 2023-05-18

## 2023-05-18 RX ORDER — ASPIRIN 325 MG
325 TABLET ORAL DAILY
Status: DISCONTINUED | OUTPATIENT
Start: 2023-05-19 | End: 2023-05-20

## 2023-05-18 RX ORDER — ONDANSETRON 2 MG/ML
4 INJECTION INTRAMUSCULAR; INTRAVENOUS EVERY 6 HOURS PRN
Status: DISCONTINUED | OUTPATIENT
Start: 2023-05-18 | End: 2023-05-23 | Stop reason: HOSPADM

## 2023-05-18 RX ORDER — PANTOPRAZOLE SODIUM 40 MG/1
40 TABLET, DELAYED RELEASE ORAL DAILY
Status: DISCONTINUED | OUTPATIENT
Start: 2023-05-18 | End: 2023-05-23 | Stop reason: HOSPADM

## 2023-05-18 RX ORDER — LOSARTAN POTASSIUM 100 MG/1
100 TABLET ORAL
Status: DISCONTINUED | OUTPATIENT
Start: 2023-05-18 | End: 2023-05-20

## 2023-05-18 RX ORDER — CLONAZEPAM 1 MG/1
1 TABLET ORAL DAILY
Status: ON HOLD | COMMUNITY
End: 2023-05-23 | Stop reason: SDUPTHER

## 2023-05-18 RX ORDER — METOPROLOL SUCCINATE 50 MG/1
50 TABLET, EXTENDED RELEASE ORAL EVERY 12 HOURS SCHEDULED
Status: DISCONTINUED | OUTPATIENT
Start: 2023-05-18 | End: 2023-05-23 | Stop reason: HOSPADM

## 2023-05-18 RX ORDER — HEPARIN SODIUM 10000 [USP'U]/100ML
18 INJECTION, SOLUTION INTRAVENOUS
Status: DISCONTINUED | OUTPATIENT
Start: 2023-05-18 | End: 2023-05-20

## 2023-05-18 RX ORDER — HYDROCHLOROTHIAZIDE 25 MG/1
25 TABLET ORAL
Status: DISCONTINUED | OUTPATIENT
Start: 2023-05-18 | End: 2023-05-20

## 2023-05-18 RX ORDER — AMOXICILLIN 250 MG
2 CAPSULE ORAL 2 TIMES DAILY
Status: DISCONTINUED | OUTPATIENT
Start: 2023-05-18 | End: 2023-05-23 | Stop reason: HOSPADM

## 2023-05-18 RX ORDER — BISACODYL 5 MG/1
5 TABLET, DELAYED RELEASE ORAL DAILY PRN
Status: DISCONTINUED | OUTPATIENT
Start: 2023-05-18 | End: 2023-05-23 | Stop reason: HOSPADM

## 2023-05-18 RX ORDER — NITROGLYCERIN 0.4 MG/1
0.4 TABLET SUBLINGUAL
Status: DISCONTINUED | OUTPATIENT
Start: 2023-05-18 | End: 2023-05-23 | Stop reason: HOSPADM

## 2023-05-18 RX ORDER — HEPARIN SODIUM 5000 [USP'U]/ML
40-80 INJECTION, SOLUTION INTRAVENOUS; SUBCUTANEOUS EVERY 6 HOURS PRN
Status: DISCONTINUED | OUTPATIENT
Start: 2023-05-18 | End: 2023-05-20

## 2023-05-18 RX ORDER — ACETAMINOPHEN 650 MG/1
650 SUPPOSITORY RECTAL EVERY 4 HOURS PRN
Status: DISCONTINUED | OUTPATIENT
Start: 2023-05-18 | End: 2023-05-23 | Stop reason: HOSPADM

## 2023-05-18 RX ORDER — MELATONIN
1000 DAILY
Status: DISCONTINUED | OUTPATIENT
Start: 2023-05-18 | End: 2023-05-23 | Stop reason: HOSPADM

## 2023-05-18 RX ORDER — ACETAMINOPHEN 325 MG/1
650 TABLET ORAL EVERY 4 HOURS PRN
Status: DISCONTINUED | OUTPATIENT
Start: 2023-05-18 | End: 2023-05-23 | Stop reason: HOSPADM

## 2023-05-18 RX ADMIN — ROPINIROLE 2 MG: 2 TABLET, FILM COATED ORAL at 20:35

## 2023-05-18 RX ADMIN — LOSARTAN POTASSIUM 100 MG: 100 TABLET, FILM COATED ORAL at 20:35

## 2023-05-18 RX ADMIN — METOPROLOL SUCCINATE 50 MG: 50 TABLET, FILM COATED, EXTENDED RELEASE ORAL at 20:35

## 2023-05-18 RX ADMIN — PERFLUTREN 2 ML: 6.52 INJECTION, SUSPENSION INTRAVENOUS at 17:19

## 2023-05-18 RX ADMIN — ROSUVASTATIN CALCIUM 40 MG: 40 TABLET, FILM COATED ORAL at 20:35

## 2023-05-18 RX ADMIN — FUROSEMIDE 80 MG: 20 INJECTION, SOLUTION INTRAMUSCULAR; INTRAVENOUS at 13:17

## 2023-05-18 RX ADMIN — CLONAZEPAM 1 MG: 1 TABLET ORAL at 18:22

## 2023-05-18 RX ADMIN — TERAZOSIN HYDROCHLORIDE 5 MG: 5 CAPSULE ORAL at 20:35

## 2023-05-18 RX ADMIN — HYDROCHLOROTHIAZIDE 25 MG: 25 TABLET ORAL at 20:35

## 2023-05-18 RX ADMIN — DOCUSATE SODIUM 50MG AND SENNOSIDES 8.6MG 2 TABLET: 8.6; 5 TABLET, FILM COATED ORAL at 20:35

## 2023-05-18 RX ADMIN — PANTOPRAZOLE SODIUM 40 MG: 40 TABLET, DELAYED RELEASE ORAL at 18:22

## 2023-05-18 RX ADMIN — ASPIRIN 325 MG: 325 TABLET ORAL at 14:54

## 2023-05-18 RX ADMIN — Medication 1000 UNITS: at 20:35

## 2023-05-18 RX ADMIN — HEPARIN SODIUM 18 UNITS/KG/HR: 10000 INJECTION, SOLUTION INTRAVENOUS at 18:24

## 2023-05-18 NOTE — ED NOTES
Pt arrived by EMS from home. Call for AMS today wife called [facility] 9pm last night, wife reports he was walking around last night at home in the middle of the night.   X2 weeks ago CVA  Pt reports he is disoriented today.

## 2023-05-18 NOTE — H&P
Internal medicine history and physical  INTERNAL MEDICINE   [facility]       Patient Identification:  Name: Art Mullins  Age: 87 y.o.  Sex: male  :  1935  MRN: [id number]                   Primary Care Physician: Santos Simmons MD                               Date of admission:2023    Chief Complaint: Brought to the emergency room via EMS as per wife's request for worsening confusion since early this morning.    History of Present Illness:   Patient is a 87-year-old male who was recently discharged from the hospital on 2023 after being in the hospital for 48 hours when he was admitted for extremity weakness and double vision and was found to have bilateral hemispheric infarcts of cardioembolic source.  Patient was suspected to have atrial fibrillation as of best explanation for his multi vascular territory embolic infarct and was started on anticoagulation therapy with Zio patch at the time of discharge on 2023.  Patient apparently has done well since his discharge until last night when he went to bed and according to the report he was confused in the middle of the night and woke up multiple times thinking that he was in the hospital.  Patient reportedly does not have any new weakness and the weakness in the left side is unchanged.  Work-up in the emergency room included CT scan of the head without contrast which did not show any acute intracranial process or new hemorrhage.  Patient was noted to have elevated troponin 1169, creatinine of 1.31, BNP of 7000 and mild leukocytosis and elevated CPK.  Chest x-ray shows evidence of increased interstitial marking and mild hydrostatic edema concerning for volume overload.  Patient was noted to have room air oxygen saturation of 92% upon arrival to the emergency room earlier today.  Patient received a dose of Lasix in the emergency room and was started on aspirin and is being admitted for further evaluation of altered mental  status, acute congestive heart failure with pulmonary edema and non-ST segment elevation MI.  Cardiology service has been consulted.      Past Medical History:  Past Medical History:   Diagnosis Date   • Hyperlipidemia    • Hypertension    • PVCs (premature ventricular contractions)      Past Surgical History:  History reviewed. No pertinent surgical history.   Home Meds:  Medications Prior to Admission   Medication Sig Dispense Refill Last Dose   • apixaban (ELIQUIS) 5 MG tablet tablet Take 1 tablet by mouth Every 12 (Twelve) Hours. Indications: Other - full anticoagulation (Patient taking differently: Take 2.5 mg by mouth Every 12 (Twelve) Hours. Indications: Other - full anticoagulation) 60 tablet 0 5/17/2023   • cholecalciferol (VITAMIN D3) 1000 UNITS tablet Take 1 tablet by mouth Daily.   5/17/2023   • Cyanocobalamin (VITAMIN B 12 PO) Take 2,500 mcg by mouth Daily.   5/17/2023   • doxazosin (CARDURA) 8 MG tablet Take 1 tablet by mouth Every Night.   5/17/2023   • losartan (COZAAR) 25 MG tablet Take 1 tablet by mouth Daily.   5/17/2023   • metoprolol succinate XL (TOPROL-XL) 50 MG 24 hr tablet Take 1 tablet by mouth 2 (two) times a day. 60 tablet 5 5/17/2023   • pantoprazole (PROTONIX) 40 MG EC tablet Take 1 tablet by mouth Daily.   5/17/2023   • rOPINIRole (REQUIP) 2 MG tablet Take 1 tablet by mouth Every Night.   5/17/2023   • rosuvastatin (CRESTOR) 40 MG tablet Take 1 tablet by mouth Daily.   5/17/2023   • clonazePAM (KlonoPIN) 1 MG tablet Take 1 tablet by mouth Daily.      • losartan-hydrochlorothiazide (HYZAAR) 100-25 MG per tablet Take 1 tablet by mouth Daily.        Current Meds:     Current Facility-Administered Medications:   •  heparin (porcine) 5000 UNIT/ML injection 3,300-6,600 Units, 40-80 Units/kg, Intravenous, Q6H PRN, Arturo Crowe MD  •  heparin 65519 units/250 mL (100 units/mL) in 0.45 % NaCl infusion, 18 Units/kg/hr, Intravenous, Titrated, Arturo Crowe MD  Allergies:  Allergies  "  Allergen Reactions   • Sulfa Antibiotics      Social History:   Social History     Tobacco Use   • Smoking status: Never   • Smokeless tobacco: Not on file   Substance Use Topics   • Alcohol use: No      Family History:  Family History   Problem Relation Age of Onset   • No Known Problems Mother    • No Known Problems Father    • No Known Problems Sister    • No Known Problems Brother           Review of Systems  See history of present illness and past medical history.  Patient denies headache, dizziness, syncope, falls, trauma, change in vision, change in hearing, change in taste, changes in weight, changes in appetite, focal weakness, numbness, or paresthesia.  Patient denies chest pain, palpitations, dyspnea, orthopnea, PND, cough, sinus pressure, rhinorrhea, epistaxis, hemoptysis, nausea, vomiting,hematemesis, diarrhea, constipation or hematchezia.  Denies cold or heat intolerance, polydipsia, polyuria, polyphagia. Denies hematuria, pyuria, dysuria, hesitancy, frequency or urgency. Denies consumption of raw and under cooked meats foods or change in water source.  Denies fever, chills, sweats, night sweats.  Denies missing any routine medications. Remainder of ROS is negative.      Vitals:   /86   Pulse 67   Temp 98.9 °F (37.2 °C) (Oral)   Resp 17   Ht 175.3 cm (69\")   Wt 82.6 kg (182 lb)   SpO2 97%   BMI 26.88 kg/m²   I/O:     Intake/Output Summary (Last 24 hours) at 5/18/2023 1730  Last data filed at 5/18/2023 1454  Gross per 24 hour   Intake --   Output 1000 ml   Net -1000 ml     Exam:  Patient is examined using the personal protective equipment as per guidelines from infection control for this particular patient as enacted.  Hand washing was performed before and after patient interaction.  General Appearance:   Confused but interactive   Head:    Normocephalic, without obvious abnormality, atraumatic   Eyes:    PERRL, conjunctiva/corneas clear, EOM's intact, both eyes   Ears:    Normal external " ear canals, both ears   Nose:   Nares normal, septum midline, mucosa normal, no drainage    or sinus tenderness   Throat:   Lips, tongue, gums normal; oral mucosa pink and moist   Neck:   Supple, symmetrical, trachea midline, no adenopathy;     thyroid:  no enlargement/tenderness/nodules; no carotid    bruit or JVD   Back:     Symmetric, no curvature, ROM normal, no CVA tenderness   Lungs:     Clear to auscultation bilaterally, respirations unlabored   Chest Wall:    No tenderness or deformity    Heart:   S1-S2 regular   Abdomen:    Soft nontender   Extremities:  Bilateral lower extremity edema   Pulses:   Pulses palpable in all extremities; symmetric all extremities   Skin:  Ecchymotic changes noted   Neurologic:  Residual left-sided weakness still slightly confused but engages in conversation.       Data Review:      I reviewed the patient's new clinical results.  Results from last 7 days   Lab Units 05/18/23  0949   WBC 10*3/mm3 12.70*   HEMOGLOBIN g/dL 11.3*   PLATELETS 10*3/mm3 126*     Results from last 7 days   Lab Units 05/18/23  0949   SODIUM mmol/L 145   POTASSIUM mmol/L 3.8   CHLORIDE mmol/L 110*   CO2 mmol/L 26.3   BUN mg/dL 14   CREATININE mg/dL 1.31*   CALCIUM mg/dL 9.1   GLUCOSE mg/dL 105*     MRI Brain Without Contrast    Result Date: 4/30/2023  1. Bilateral areas of restricted diffusion noted within both cerebral and cerebellar hemispheres.  Multifocal distribution would suggest an embolic source.  This report was finalized on 4/30/2023 11:36 PM by Dr. Francesca Green M.D.      XR Chest 1 View    Result Date: 5/18/2023  Cardiomegaly with vascular engorgement as well as increased interstitial markings suspicious for mild hydrostatic pulmonary edema. No evidence for pleural effusion or definite infiltrate.  This report was finalized on 5/18/2023 11:00 AM by Dr. Epi Cesar M.D.      Microbiology Results (last 10 days)     ** No results found for the last 240 hours. **        ECG 12 Lead Other;  NSTEMI   Preliminary Result   HEART RATE= 70  bpm   RR Interval= 857  ms   IN Interval= 151  ms   P Horizontal Axis= 6  deg   P Front Axis= 9  deg   QRSD Interval= 99  ms   QT Interval= 430  ms   QRS Axis= -46  deg   T Wave Axis= -20  deg   - ABNORMAL ECG -   Sinus rhythm   Atrial premature complexes   Left anterior fascicular block   Abnormal R-wave progression, late transition   Left ventricular hypertrophy   Nonspecific T abnormalities, inferior leads   Electronically Signed By:    Date and Time of Study: 2023-05-19 04:49:23      ECG 12 Lead Altered Mental Status   Final Result   HEART RATE= 84  bpm   RR Interval= 714  ms   IN Interval= 146  ms   P Horizontal Axis= 23  deg   P Front Axis= 41  deg   QRSD Interval= 99  ms   QT Interval= 377  ms   QRS Axis= -39  deg   T Wave Axis= -1  deg   - ABNORMAL ECG -   Sinus rhythm   Ventricular trigeminy   Left ventricular hypertrophy   Borderline T abnormalities, inferior leads   No change from previous tracing   Electronically Signed By: Reynold Schulz ([facility]) 18-May-2023 16:32:08   Date and Time of Study: 2023-05-18 10:14:40      SCANNED - TELEMETRY     Final Result      SCANNED - TELEMETRY     Final Result      SCANNED - TELEMETRY     Final Result      SCANNED - TELEMETRY     Final Result      SCANNED - TELEMETRY     Final Result            Assessment:  Active Hospital Problems    Diagnosis  POA   • **Altered mental status, unspecified altered mental status type [R41.82]  Yes   • NSTEMI (non-ST elevated myocardial infarction) [I21.4]  Unknown   • CHF (congestive heart failure) [I50.9]  Unknown   • Acute CVA (cerebrovascular accident) [I63.9]  Yes   • CKD (chronic kidney disease) [N18.9]  Yes   • Elevated troponin [R77.8]  Yes   • Essential hypertension [I10]  Yes     Medical decision making/care plan: See admitting orders  · Acute congestive heart failure with pulmonary edema and possible associated hypoxia contributing to confusion and mental status changes-continue with IV  diuretics, monitor his hemodynamics and renal function and cardiology evaluation.  · Non-ST segment elevation MI-aspirin has been started patient is on heparin drip cardiology service have been consulted  · Recent acute CVA of cardioembolic type with multi vascular territory involvement-high suspicion for atrial fibrillation continue current regimen.  · Chronic kidney disease with creatinine improved from previous hospitalization-monitor renal function and avoid nephrotoxic agent and hypotensive episode.  · Hypertension-continue antihypertensive regimen and avoid hypotensive episode.  · Confusional state due to stress of congestive heart failure non-ST segment elevation MI and already taxed neurological system from previous CVA-provide him with fall precautions and monitor for sundowning.  May consider neurology evaluation.    Jeremy Alvarenga MD   5/18/2023  17:30 EDT    Parts of this note may be an electronic transcription/translation of spoken language to printed text using the Dragon dictation system.

## 2023-05-18 NOTE — ED NOTES
.Nursing report ED to floor  Art Mullins  87 y.o.  male    HPI :   Chief Complaint   Patient presents with    Altered Mental Status       Admitting doctor:   Jeremy Alvarenga MD    Admitting diagnosis:   The primary encounter diagnosis was Altered mental status, unspecified altered mental status type. Diagnoses of Acute congestive heart failure, unspecified heart failure type and NSTEMI (non-ST elevated myocardial infarction) were also pertinent to this visit.    Code status:   Current Code Status       Date Active Code Status Order ID Comments User Context       Prior            Allergies:   Sulfa antibiotics    Isolation:   No active isolations    Intake and Output  No intake or output data in the 24 hours ending 05/18/23 1445    Weight:       05/18/23  0957   Weight: 82.6 kg (182 lb)       Most recent vitals:   Vitals:    05/18/23 1121 05/18/23 1151 05/18/23 1251 05/18/23 1315   BP: 155/72 150/95 130/63 139/89   BP Location: Right arm Right arm Right arm Right arm   Patient Position: Lying Lying Lying Lying   Pulse: 83 83 78 84   Resp: 18 18 18 18   Temp:       TempSrc:       SpO2: 96% 96% 95% 96%   Weight:       Height:           Active LDAs/IV Access:   Lines, Drains & Airways       Active LDAs       Name Placement date Placement time Site Days    Peripheral IV 05/18/23 0945 Left Antecubital 05/18/23  0945  Antecubital  less than 1                    Labs (abnormal labs have a star):   Labs Reviewed   COMPREHENSIVE METABOLIC PANEL - Abnormal; Notable for the following components:       Result Value    Glucose 105 (*)     Creatinine 1.31 (*)     Chloride 110 (*)     AST (SGOT) 79 (*)     Alkaline Phosphatase 181 (*)     eGFR 53.0 (*)     All other components within normal limits    Narrative:     GFR Normal >60  Chronic Kidney Disease <60  Kidney Failure <15    The GFR formula is only valid for adults with stable renal function between ages 18 and 70.   CBC WITH AUTO DIFFERENTIAL - Abnormal; Notable for the  following components:    WBC 12.70 (*)     RBC 3.56 (*)     Hemoglobin 11.3 (*)     Hematocrit 33.5 (*)     Platelets 126 (*)     Neutrophil % 79.4 (*)     Lymphocyte % 10.2 (*)     Neutrophils, Absolute 10.09 (*)     Monocytes, Absolute 1.16 (*)     All other components within normal limits   BNP (IN-HOUSE) - Abnormal; Notable for the following components:    proBNP 7,716.0 (*)     All other components within normal limits    Narrative:     Among patients with dyspnea, NT-proBNP is highly sensitive for the detection of acute congestive heart failure. In addition NT-proBNP of <300 pg/ml effectively rules out acute congestive heart failure with 99% negative predictive value.    Results may be falsely decreased if patient taking Biotin.     SINGLE HSTROPONIN T - Abnormal; Notable for the following components:    HS Troponin T 1,169 (*)     All other components within normal limits    Narrative:     High Sensitive Troponin T Reference Range:  <10.0 ng/L- Negative Female for AMI  <15.0 ng/L- Negative Male for AMI  >=10 - Abnormal Female indicating possible myocardial injury.  >=15 - Abnormal Male indicating possible myocardial injury.   Clinicians would have to utilize clinical acumen, EKG, Troponin, and serial changes to determine if it is an Acute Myocardial Infarction or myocardial injury due to an underlying chronic condition.        URINALYSIS W/ MICROSCOPIC IF INDICATED (NO CULTURE) - Abnormal; Notable for the following components:    Ketones, UA Trace (*)     Blood, UA Small (1+) (*)     Protein, UA >=300 mg/dL (3+) (*)     All other components within normal limits   HIGH SENSITIVITIY TROPONIN T 2HR - Abnormal; Notable for the following components:    HS Troponin T 1,180 (*)     Troponin T Delta 11 (*)     All other components within normal limits    Narrative:     High Sensitive Troponin T Reference Range:  <10.0 ng/L- Negative Female for AMI  <15.0 ng/L- Negative Male for AMI  >=10 - Abnormal Female indicating  possible myocardial injury.  >=15 - Abnormal Male indicating possible myocardial injury.   Clinicians would have to utilize clinical acumen, EKG, Troponin, and serial changes to determine if it is an Acute Myocardial Infarction or myocardial injury due to an underlying chronic condition.        URINALYSIS, MICROSCOPIC ONLY - Abnormal; Notable for the following components:    RBC, UA 3-5 (*)     WBC, UA 3-5 (*)     All other components within normal limits   RAINBOW DRAW    Narrative:     The following orders were created for panel order [place] Draw.  Procedure                               Abnormality         Status                     ---------                               -----------         ------                     Green Top (Gel)[[id number]]                                  Final result               Lavender Top[[id number]]                                     Final result               Gold Top - SST[[id number]]                                   Final result               Light Blue Top[[id number]]                                   Final result                 Please view results for these tests on the individual orders.   CBC AND DIFFERENTIAL    Narrative:     The following orders were created for panel order CBC & Differential.  Procedure                               Abnormality         Status                     ---------                               -----------         ------                     CBC Auto Differential[[id number]]        Abnormal            Final result                 Please view results for these tests on the individual orders.   GREEN TOP   LAVENDER TOP   GOLD TOP - SST   LIGHT BLUE TOP       EKG:   ECG 12 Lead Altered Mental Status   Preliminary Result   HEART RATE= 84  bpm   RR Interval= 714  ms   RI Interval= 146  ms   P Horizontal Axis= 23  deg   P Front Axis= 41  deg   QRSD Interval= 99  ms   QT Interval= 377  ms   QRS Axis= -39  deg   T Wave Axis= -1  deg   - ABNORMAL ECG -   Sinus  rhythm   Ventricular trigeminy   Left ventricular hypertrophy   Borderline T abnormalities, inferior leads   Electronically Signed By:    Date and Time of Study: 2023-05-18 10:14:40      SCANNED - TELEMETRY     Final Result      SCANNED - TELEMETRY     Final Result      SCANNED - TELEMETRY     Final Result          Meds given in ED:   Medications   aspirin tablet 325 mg (has no administration in time range)   furosemide (LASIX) injection 80 mg (80 mg Intravenous Given 5/18/23 1317)       Imaging results:  XR Chest 1 View    Result Date: 5/18/2023  Cardiomegaly with vascular engorgement as well as increased interstitial markings suspicious for mild hydrostatic pulmonary edema. No evidence for pleural effusion or definite infiltrate.  This report was finalized on 5/18/2023 11:00 AM by Dr. Epi Cesar M.D.       Ambulatory status:   - assist x1    Social issues:   Social History     Socioeconomic History    Marital status:    Tobacco Use    Smoking status: Never   Vaping Use    Vaping Use: Never used   Substance and Sexual Activity    Alcohol use: No    Drug use: No    Sexual activity: Defer       NIH Stroke Scale:         Dorie Talley RN  05/18/23 14:45 EDT

## 2023-05-18 NOTE — PROGRESS NOTES
Clinical Pharmacy Services: Medication History    Art Mullins is a 87 y.o. male presenting to [facility] for   Chief Complaint   Patient presents with   • Altered Mental Status       He  has a past medical history of Hyperlipidemia, Hypertension, and PVCs (premature ventricular contractions).    Allergies as of 05/18/2023 - Reviewed 05/18/2023   Allergen Reaction Noted   • Sulfa antibiotics  09/01/2016       Medication information was obtained from: Pharmacy   Pharmacy and Phone Number: [facility] [phone]    Prior to Admission Medications     Prescriptions Last Dose Informant Patient Reported? Taking?    apixaban (ELIQUIS) 5 MG tablet tablet 5/17/2023 Pharmacy No Yes    Take 1 tablet by mouth Every 12 (Twelve) Hours. Indications: Other - full anticoagulation    Patient taking differently:  Take 2.5 mg by mouth Every 12 (Twelve) Hours. Indications: Other - full anticoagulation    cholecalciferol (VITAMIN D3) 1000 UNITS tablet 5/17/2023 Spouse/Significant Other Yes Yes    Take 1 tablet by mouth Daily.    Cyanocobalamin (VITAMIN B 12 PO) 5/17/2023 Spouse/Significant Other Yes Yes    Take 2,500 mcg by mouth Daily.    doxazosin (CARDURA) 8 MG tablet 5/17/2023 Pharmacy Yes Yes    Take 1 tablet by mouth Every Night.    losartan (COZAAR) 25 MG tablet 5/17/2023 Pharmacy Yes Yes    Take 1 tablet by mouth Daily.    metoprolol succinate XL (TOPROL-XL) 50 MG 24 hr tablet 5/17/2023 Spouse/Significant Other No Yes    Take 1 tablet by mouth 2 (two) times a day.    pantoprazole (PROTONIX) 40 MG EC tablet 5/17/2023 Spouse/Significant Other Yes Yes    Take 1 tablet by mouth Daily.    rOPINIRole (REQUIP) 2 MG tablet 5/17/2023 Pharmacy Yes Yes    Take 1 tablet by mouth Every Night.    rosuvastatin (CRESTOR) 40 MG tablet 5/17/2023 Pharmacy Yes Yes    Take 1 tablet by mouth Daily.    clonazePAM (KlonoPIN) 1 MG tablet  Pharmacy Yes No    Take 1 tablet by mouth Daily.    losartan-hydrochlorothiazide (HYZAAR) 100-25 MG per  tablet  Pharmacy Yes No    Take 1 tablet by mouth Daily.            Medication notes: Per notes pt was recently discharged on 5/2/23 and was told to stop taking losartan-hctz, clonazepam and aspirin and start taking eliquis and losartan 25mg daily. However on 5/4/23 losartan-hctz was filled and picked up from pharmacy pt wife is unsure which one pt is really taking.      This medication list is complete to the best of my knowledge as of 5/18/2023    Please call if questions.    Keri Sim  Medication History Technician   [phone]    5/18/2023 14:33 EDT

## 2023-05-18 NOTE — CONSULTS
Date of Consultation: 23    Referral Provider: Dr. Alvarenga.    Reason for Consultation: Elevated troponin, new congestive heart failure.    Encounter Provider: Arturo Crowe MD    Group of Service: [facility]     Patient Name: Art Mullins    :1935    Chief complaint: Altered mental status.    History of Present Illness:      This is a very pleasant 87-year-old male who was just recently admitted to [facility].  He was admitted on 2023 through 2023 with an acute CVA.  An MRI showed bilateral hemispheric infarcts within different vascular territories, concerning for an embolic event.  His troponin was mildly elevated at that time, although this was felt to be secondary to the stroke.  He was placed on systemic anticoagulation with Eliquis secondary to high suspicion for atrial fibrillation.  He normally follows with Dr. Thapa in cardiology.    The patient was brought to the ER after his wife noticed him confused at home.  He still has baseline left-sided weakness which has not worsened.  He was noted to be hypoxic, and his chest x-ray showed pulmonary edema, likely consistent with congestive heart failure.  His high-sensitivity troponin was also elevated initially at 1169, and again at 1180.  His creatinine was 1.31 with a GFR of 53.  CT scan of the head showed no new issues and no bleeding.  He denied any chest pain or chest pressure.    Past Medical History:   Diagnosis Date   • Hyperlipidemia    • Hypertension    • PVCs (premature ventricular contractions)          History reviewed. No pertinent surgical history.      Allergies   Allergen Reactions   • Sulfa Antibiotics          No current facility-administered medications on file prior to encounter.     Current Outpatient Medications on File Prior to Encounter   Medication Sig Dispense Refill   • apixaban (ELIQUIS) 5 MG tablet tablet Take 1 tablet by mouth Every 12 (Twelve) Hours. Indications: Other - full  anticoagulation (Patient taking differently: Take 2.5 mg by mouth Every 12 (Twelve) Hours. Indications: Other - full anticoagulation) 60 tablet 0   • cholecalciferol (VITAMIN D3) 1000 UNITS tablet Take 1 tablet by mouth Daily.     • Cyanocobalamin (VITAMIN B 12 PO) Take 2,500 mcg by mouth Daily.     • doxazosin (CARDURA) 8 MG tablet Take 1 tablet by mouth Every Night.     • losartan (COZAAR) 25 MG tablet Take 1 tablet by mouth Daily.     • metoprolol succinate XL (TOPROL-XL) 50 MG 24 hr tablet Take 1 tablet by mouth 2 (two) times a day. 60 tablet 5   • pantoprazole (PROTONIX) 40 MG EC tablet Take 1 tablet by mouth Daily.     • rOPINIRole (REQUIP) 2 MG tablet Take 1 tablet by mouth Every Night.     • rosuvastatin (CRESTOR) 40 MG tablet Take 1 tablet by mouth Daily.     • [DISCONTINUED] losartan (Cozaar) 25 MG tablet Take 1 tablet by mouth Daily. 30 tablet 0   • clonazePAM (KlonoPIN) 1 MG tablet Take 1 tablet by mouth Daily.     • losartan-hydrochlorothiazide (HYZAAR) 100-25 MG per tablet Take 1 tablet by mouth Daily.           Social History     Socioeconomic History   • Marital status:    Tobacco Use   • Smoking status: Never   Vaping Use   • Vaping Use: Never used   Substance and Sexual Activity   • Alcohol use: No   • Drug use: No   • Sexual activity: Defer         Family History   Problem Relation Age of Onset   • No Known Problems Mother    • No Known Problems Father    • No Known Problems Sister    • No Known Problems Brother        REVIEW OF SYSTEMS:   Pertinent positives are noted in the HPI above.  Otherwise, all other systems were reviewed, and are negative.     Objective:     Vitals:    05/18/23 1315 05/18/23 1421 05/18/23 1603 05/18/23 1718   BP: 139/89 121/77 140/65 137/86   BP Location: Right arm Right arm Right arm    Patient Position: Lying Lying Lying    Pulse: 84 72 69 67   Resp: 18 18 17    Temp:   98.9 °F (37.2 °C)    TempSrc:   Oral    SpO2: 96% 97% 97%    Weight:    82.6 kg (182 lb)  "  Height:    175.3 cm (69\")     Body mass index is 26.88 kg/m².  Flowsheet Rows    Flowsheet Row First Filed Value   Admission Height 175.3 cm (69\") Documented at 05/18/2023 0957   Admission Weight 82.6 kg (182 lb) Documented at 05/18/2023 0957           General:    No acute distress, alert and oriented x4, pleasant                   Head:    Normocephalic, atraumatic.   Eyes:          Conjunctivae and sclerae normal, no icterus.   Throat:   No oral lesions, no thrush, oral mucosa moist.    Neck:   Supple, trachea midline.   Lungs:     Decreased breath sounds and rales bilaterally.    Heart:   Regular rhythm and normal rate. II/VI SM LLSB.   Abdomen:     Soft, non-tender, non-distended, positive bowel sounds.    Extremities:  Trace edema lower extremities bilaterally.   Pulses:   Pulses palpable and equal bilaterally.    Skin:   No bleeding or rash.   Neuro:   Baseline left-sided weakness.   Psychiatric:   Normal mood and affect.         Lab Review:                Results from last 7 days   Lab Units 05/18/23  0949   SODIUM mmol/L 145   POTASSIUM mmol/L 3.8   CHLORIDE mmol/L 110*   CO2 mmol/L 26.3   BUN mg/dL 14   CREATININE mg/dL 1.31*   GLUCOSE mg/dL 105*   CALCIUM mg/dL 9.1     Results from last 7 days   Lab Units 05/18/23  1624 05/18/23  1206 05/18/23  0949   CK TOTAL U/L 288*  --   --    HSTROP T ng/L  --  1,180* 1,169*     Results from last 7 days   Lab Units 05/18/23  0949   WBC 10*3/mm3 12.70*   HEMOGLOBIN g/dL 11.3*   HEMATOCRIT % 33.5*   PLATELETS 10*3/mm3 126*     Results from last 7 days   Lab Units 05/18/23  1624   INR  1.38*   APTT seconds 34.6                   EKG (reviewed by me personally): Normal sinus rhythm, LVH, PVCs.      Assessment:   1.  Acute diastolic CHF with pulmonary edema and hypoxia  2.  Severe hypokinesis of the basal to mid inferolateral wall with preserved EF of 55 to 60%  3.  Elevated high-sensitivity troponin  4.  Elevated RVSP of 70 mmHg by echo, suggestive of pulmonary " hypertension  5.  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  Stage III chronic kidney disease  7.  Frequent PVCs, asymptomatic  8.  Multifactorial anemia  9.  Thrombocytopenia    Plan:       The patient presented with altered mental status, although was found to be hypoxic.  His chest x-ray is strongly suggestive of pulmonary edema and diastolic CHF.  His troponin is somewhat concerning as it was initially 1160.  This was significantly higher than when he was recently admitted with a stroke.  I am concerned that he may have had a true type I NSTEMI given the inferolateral hypokinesis on the stat echocardiogram from earlier today.  His ejection fraction was still preserved.  He is having no chest discomfort.    I stopped his Eliquis and started a heparin drip for now.  He will be continued on aspirin.  I agree with diuresis with Lasix 60 mg IV twice daily.  He is already on losartan and Toprol-XL, as well as Crestor.  Given his recent stroke, heart catheterization would be higher risk than normal.  I will trend his troponin and see how he does clinically before deciding on heart catheterization.  I will also talk to the patient and his wife to see how aggressive they want to be at his advanced age in terms of invasive procedures.  I did review the stat echocardiogram personally after it was performed earlier today.    Cardiology will continue to follow.  Thank you very much for this consult.    Abe Crowe MD

## 2023-05-18 NOTE — Clinical Note
Hemostasis started on the right radial artery. Radial compression device applied to vessel. Hemostasis achieved successfully. Closure device additional comment: Vasc band w/ 12cc air

## 2023-05-18 NOTE — ED PROVIDER NOTES
EMERGENCY DEPARTMENT ENCOUNTER    Room Number:  09/09  Date seen:  5/18/2023  PCP: Santos Simmons MD  Historian: Patient and EMS      HPI:  Chief Complaint: Altered mental status  A complete HPI/ROS/PMH/PSH/SH/FH are unobtainable due to: None   Context: Art Mullins is a 87 y.o. male who presents to the ED c/o altered mental status.  Patient was recently admitted here for multiple strokes.  Patient was discharged home and was doing well.  Normally alert and oriented went to bed last night was fine..  Apparently wife had to get up with him twice throughout the night because he was confused that he was at the hospital.  Patient was somewhat confused with EMS.  Patient states he feels disoriented.  Is able to answer questions appropriately and follow commands.  Has left-sided weakness that he states is not any worse than it has been.            PAST MEDICAL HISTORY  Active Ambulatory Problems     Diagnosis Date Noted   • Essential hypertension 08/31/2021   • Acute CVA (cerebrovascular accident) 04/30/2023   • CKD (chronic kidney disease)    • BELLA (acute kidney injury)    • Elevated troponin    • Vision changes      Resolved Ambulatory Problems     Diagnosis Date Noted   • No Resolved Ambulatory Problems     Past Medical History:   Diagnosis Date   • Hyperlipidemia    • Hypertension    • PVCs (premature ventricular contractions)          PAST SURGICAL HISTORY  History reviewed. No pertinent surgical history.      FAMILY HISTORY  Family History   Problem Relation Age of Onset   • No Known Problems Mother    • No Known Problems Father    • No Known Problems Sister    • No Known Problems Brother          SOCIAL HISTORY  Social History     Socioeconomic History   • Marital status:    Tobacco Use   • Smoking status: Never   Vaping Use   • Vaping Use: Never used   Substance and Sexual Activity   • Alcohol use: No   • Drug use: No   • Sexual activity: Defer         ALLERGIES  Sulfa antibiotics        REVIEW  OF SYSTEMS  Review of Systems   Altered mental status      PHYSICAL EXAM  ED Triage Vitals   Temp Heart Rate Resp BP SpO2   05/18/23 0959 05/18/23 0944 05/18/23 0944 05/18/23 0944 05/18/23 0944   98 °F (36.7 °C) 83 18 154/70 92 %      Temp src Heart Rate Source Patient Position BP Location FiO2 (%)   05/18/23 0959 05/18/23 0944 05/18/23 0944 05/18/23 0944 --   Tympanic Monitor Lying Right arm        Physical Exam      GENERAL: no acute distress  HENT: nares patent  EYES: no scleral icterus  CV: regular rhythm, normal rate  RESPIRATORY: normal effort  ABDOMEN: soft  MUSCULOSKELETAL: no deformity  NEURO: alert, moves all extremities, follows commands.  Left-sided facial and arm weakness that is not new  PSYCH:  calm, cooperative  SKIN: warm, dry    Vital signs and nursing notes reviewed.          LAB RESULTS  Recent Results (from the past 24 hour(s))   Comprehensive Metabolic Panel    Collection Time: 05/18/23  9:49 AM    Specimen: Blood   Result Value Ref Range    Glucose 105 (H) 65 - 99 mg/dL    BUN 14 8 - 23 mg/dL    Creatinine 1.31 (H) 0.76 - 1.27 mg/dL    Sodium 145 136 - 145 mmol/L    Potassium 3.8 3.5 - 5.2 mmol/L    Chloride 110 (H) 98 - 107 mmol/L    CO2 26.3 22.0 - 29.0 mmol/L    Calcium 9.1 8.6 - 10.5 mg/dL    Total Protein 6.1 6.0 - 8.5 g/dL    Albumin 3.5 3.5 - 5.2 g/dL    ALT (SGPT) 30 1 - 41 U/L    AST (SGOT) 79 (H) 1 - 40 U/L    Alkaline Phosphatase 181 (H) 39 - 117 U/L    Total Bilirubin 0.6 0.0 - 1.2 mg/dL    Globulin 2.6 gm/dL    A/G Ratio 1.3 g/dL    BUN/Creatinine Ratio 10.7 7.0 - 25.0    Anion Gap 8.7 5.0 - 15.0 mmol/L    eGFR 53.0 (L) >60.0 mL/min/1.73   CBC Auto Differential    Collection Time: 05/18/23  9:49 AM    Specimen: Blood   Result Value Ref Range    WBC 12.70 (H) 3.40 - 10.80 10*3/mm3    RBC 3.56 (L) 4.14 - 5.80 10*6/mm3    Hemoglobin 11.3 (L) 13.0 - 17.7 g/dL    Hematocrit 33.5 (L) 37.5 - 51.0 %    MCV 94.1 79.0 - 97.0 fL    MCH 31.7 26.6 - 33.0 pg    MCHC 33.7 31.5 - 35.7 g/dL    RDW  12.7 12.3 - 15.4 %    RDW-SD 43.6 37.0 - 54.0 fl    MPV 9.4 6.0 - 12.0 fL    Platelets 126 (L) 140 - 450 10*3/mm3    Neutrophil % 79.4 (H) 42.7 - 76.0 %    Lymphocyte % 10.2 (L) 19.6 - 45.3 %    Monocyte % 9.1 5.0 - 12.0 %    Eosinophil % 0.5 0.3 - 6.2 %    Basophil % 0.4 0.0 - 1.5 %    Immature Grans % 0.4 0.0 - 0.5 %    Neutrophils, Absolute 10.09 (H) 1.70 - 7.00 10*3/mm3    Lymphocytes, Absolute 1.29 0.70 - 3.10 10*3/mm3    Monocytes, Absolute 1.16 (H) 0.10 - 0.90 10*3/mm3    Eosinophils, Absolute 0.06 0.00 - 0.40 10*3/mm3    Basophils, Absolute 0.05 0.00 - 0.20 10*3/mm3    Immature Grans, Absolute 0.05 0.00 - 0.05 10*3/mm3    nRBC 0.0 0.0 - 0.2 /100 WBC   Green Top (Gel)    Collection Time: 05/18/23  9:49 AM   Result Value Ref Range    Extra Tube Hold for add-ons.    Lavender Top    Collection Time: 05/18/23  9:49 AM   Result Value Ref Range    Extra Tube hold for add-on    Gold Top - SST    Collection Time: 05/18/23  9:49 AM   Result Value Ref Range    Extra Tube Hold for add-ons.    Light Blue Top    Collection Time: 05/18/23  9:49 AM   Result Value Ref Range    Extra Tube Hold for add-ons.    BNP    Collection Time: 05/18/23  9:49 AM    Specimen: Blood   Result Value Ref Range    proBNP 7,716.0 (H) 0.0 - 1,800.0 pg/mL   Single High Sensitivity Troponin T    Collection Time: 05/18/23  9:49 AM    Specimen: Blood   Result Value Ref Range    HS Troponin T 1,169 (C) <15 ng/L   ECG 12 Lead Altered Mental Status    Collection Time: 05/18/23 10:14 AM   Result Value Ref Range    QT Interval 377 ms   High Sensitivity Troponin T 2Hr    Collection Time: 05/18/23 12:06 PM    Specimen: Blood   Result Value Ref Range    HS Troponin T 1,180 (C) <15 ng/L    Troponin T Delta 11 (C) >=-4 - <+4 ng/L   Urinalysis With Microscopic If Indicated (No Culture) - Urine, Clean Catch    Collection Time: 05/18/23 12:12 PM    Specimen: Urine, Clean Catch   Result Value Ref Range    Color, UA Yellow Yellow, Straw    Appearance, UA Clear  Clear    pH, UA 6.0 5.0 - 8.0    Specific Gravity, UA 1.022 1.005 - 1.030    Glucose, UA Negative Negative    Ketones, UA Trace (A) Negative    Bilirubin, UA Negative Negative    Blood, UA Small (1+) (A) Negative    Protein, UA >=300 mg/dL (3+) (A) Negative    Leuk Esterase, UA Negative Negative    Nitrite, UA Negative Negative    Urobilinogen, UA 0.2 E.U./dL 0.2 - 1.0 E.U./dL   Urinalysis, Microscopic Only - Urine, Clean Catch    Collection Time: 05/18/23 12:12 PM    Specimen: Urine, Clean Catch   Result Value Ref Range    RBC, UA 3-5 (A) None Seen, 0-2 /HPF    WBC, UA 3-5 (A) None Seen, 0-2 /HPF    Bacteria, UA None Seen None Seen /HPF    Squamous Epithelial Cells, UA 0-2 None Seen, 0-2 /HPF    Hyaline Casts, UA 3-6 None Seen /LPF    Methodology Automated Microscopy        Ordered the above labs and reviewed the results.        RADIOLOGY  CT Head Without Contrast    Result Date: 5/18/2023  CT HEAD WITHOUT CONTRAST  HISTORY: Altered mental status. Stroke.  COMPARISON: MRI of brain 04/30/2023.  FINDINGS: The brain ventricles are symmetrical. There is no evidence hemorrhage, hydrocephalus or of abnormal extra-axial fluid. No focal area of decreased attenuation to suggest acute infarction is identified. Further evaluation could be performed with MRI examination of brain as indicated.    Radiation dose reduction techniques were utilized, including automated exposure control and exposure modulation based on body size.  This report was finalized on 5/18/2023 12:30 PM by Dr. Wei Macdonald M.D.      XR Chest 1 View    Result Date: 5/18/2023  CHEST SINGLE VIEW  HISTORY: Shortness of air, altered mental status.  COMPARISON: AP chest 04/30/2023.  FINDINGS: Heart size is mildly enlarged. There is pulmonary vascular engorgement and there are increased interstitial markings suspicious for mild hydrostatic pulmonary edema. No focal airspace disease is evident and there is no evidence for pleural effusion or pneumothorax. Cardiac  monitoring leads are present.      Cardiomegaly with vascular engorgement as well as increased interstitial markings suspicious for mild hydrostatic pulmonary edema. No evidence for pleural effusion or definite infiltrate.  This report was finalized on 5/18/2023 11:00 AM by Dr. Epi Cesar M.D.        Ordered the above noted radiological studies.  Chest x-ray independently interpreted by me that does show congestive heart failure          PROCEDURES  Procedures      EKG          EKG time: 1014  Rhythm/Rate: Normal sinus rhythm 84 occasional PVCs  P waves and ID: Normal P waves  QRS, axis: Normal QRS  ST and T waves: Normal ST-T wave    Interpreted Contemporaneously by me, independently viewed  Unchanged compared to prior 4/30/2023      MEDICATIONS GIVEN IN ER  Medications   aspirin tablet 325 mg (has no administration in time range)   furosemide (LASIX) injection 80 mg (80 mg Intravenous Given 5/18/23 1317)                   MEDICAL DECISION MAKING, PROGRESS, and CONSULTS    Discussion below represents my analysis of pertinent findings related to patient's condition, differential diagnosis, treatment plan and final disposition.      Additional sources:  - Discussed/ obtained information from independent historians: None    - External (non-ED) record review: Epic reviewed and patient was admitted recently for acute CVA 4/30/2023    - Chronic or social conditions impacting care: None    - Shared decision making: None      Orders placed during this visit:  Orders Placed This Encounter   Procedures   • XR Chest 1 View   • CT Head Without Contrast   • Comprehensive Metabolic Panel   • [place] Draw   • CBC Auto Differential   • BNP   • Single High Sensitivity Troponin T   • Urinalysis With Microscopic If Indicated (No Culture) - Urine, Clean Catch   • High Sensitivity Troponin T 2Hr   • Urinalysis, Microscopic Only - Urine, Clean Catch   • LHA (on-call MD unless specified) Details   • LCG (on-call MD unless  specified)   • ECG 12 Lead Altered Mental Status   • SCANNED - TELEMETRY     • SCANNED - TELEMETRY     • SCANNED - TELEMETRY     • Initiate Observation Status   • CBC & Differential   • Green Top (Gel)   • Lavender Top   • Gold Top - SST   • Light Blue Top         Additional orders considered but not ordered:  None        Differential diagnosis includes but is not limited to:    Congestive heart failure, pneumonia, CVA      Independent interpretation of labs, radiology studies, and discussions with consultants:  ED Course as of 05/18/23 1416   Thu May 18, 2023   1403 14:03 EDT  Patient presents for evaluation of some confusion.  Head CT negative.  Patient also appears to be short of breath.  Patient's chest x-ray shows congestive heart failure.  BNP and troponins are both elevated.  Patient has been given Lasix.  Discussed with Dr. Alvarenga who will admit. []   1415 14:15 EDT  Discussed with Dr. Crowe who will consult.  Asking for an aspirin [SL]      ED Course User Index  [] Dom Miller MD               DIAGNOSIS  Final diagnoses:   Altered mental status, unspecified altered mental status type   Acute congestive heart failure, unspecified heart failure type   NSTEMI (non-ST elevated myocardial infarction)         DISPOSITION  admit            Latest Documented Vital Signs:  As of 14:16 EDT  BP- 139/89 HR- 84 Temp- 98 °F (36.7 °C) (Tympanic) O2 sat- 96%              --    Please note that portions of this were completed with a voice recognition program.       Note Disclaimer: At [facility], we believe that sharing information builds trust and better relationships. You are receiving this note because you are receiving care at [facility] or recently visited. It is possible you will see health information before a provider has talked with you about it. This kind of information can be easy to misunderstand. To help you fully understand what it means for your health, we urge you to discuss this note  with your provider.           Dom Miller MD  05/18/23 3715

## 2023-05-19 LAB
ALBUMIN SERPL-MCNC: 3.3 G/DL (ref 3.5–5.2)
ALBUMIN/GLOB SERPL: 1.3 G/DL
ALP SERPL-CCNC: 167 U/L (ref 39–117)
ALT SERPL W P-5'-P-CCNC: 24 U/L (ref 1–41)
ANION GAP SERPL CALCULATED.3IONS-SCNC: 8.6 MMOL/L (ref 5–15)
APTT PPP: 143.1 SECONDS (ref 22.7–35.4)
APTT PPP: 36.4 SECONDS (ref 22.7–35.4)
APTT PPP: 93.7 SECONDS (ref 22.7–35.4)
AST SERPL-CCNC: 57 U/L (ref 1–40)
BASOPHILS # BLD AUTO: 0.05 10*3/MM3 (ref 0–0.2)
BASOPHILS NFR BLD AUTO: 0.5 % (ref 0–1.5)
BILIRUB SERPL-MCNC: 0.6 MG/DL (ref 0–1.2)
BUN SERPL-MCNC: 17 MG/DL (ref 8–23)
BUN/CREAT SERPL: 12.6 (ref 7–25)
CALCIUM SPEC-SCNC: 8.8 MG/DL (ref 8.6–10.5)
CHLORIDE SERPL-SCNC: 103 MMOL/L (ref 98–107)
CO2 SERPL-SCNC: 29.4 MMOL/L (ref 22–29)
CREAT SERPL-MCNC: 1.35 MG/DL (ref 0.76–1.27)
DEPRECATED RDW RBC AUTO: 42.5 FL (ref 37–54)
EGFRCR SERPLBLD CKD-EPI 2021: 51.1 ML/MIN/1.73
EOSINOPHIL # BLD AUTO: 0.05 10*3/MM3 (ref 0–0.4)
EOSINOPHIL NFR BLD AUTO: 0.5 % (ref 0.3–6.2)
ERYTHROCYTE [DISTWIDTH] IN BLOOD BY AUTOMATED COUNT: 12.6 % (ref 12.3–15.4)
GLOBULIN UR ELPH-MCNC: 2.5 GM/DL
GLUCOSE SERPL-MCNC: 100 MG/DL (ref 65–99)
HCT VFR BLD AUTO: 33.7 % (ref 37.5–51)
HGB BLD-MCNC: 11.4 G/DL (ref 13–17.7)
LYMPHOCYTES # BLD AUTO: 1 10*3/MM3 (ref 0.7–3.1)
LYMPHOCYTES NFR BLD AUTO: 9.2 % (ref 19.6–45.3)
MCH RBC QN AUTO: 31.1 PG (ref 26.6–33)
MCHC RBC AUTO-ENTMCNC: 33.8 G/DL (ref 31.5–35.7)
MCV RBC AUTO: 92.1 FL (ref 79–97)
MONOCYTES # BLD AUTO: 0.97 10*3/MM3 (ref 0.1–0.9)
MONOCYTES NFR BLD AUTO: 8.9 % (ref 5–12)
NEUTROPHILS NFR BLD AUTO: 8.8 10*3/MM3 (ref 1.7–7)
NEUTROPHILS NFR BLD AUTO: 80.4 % (ref 42.7–76)
PLATELET # BLD AUTO: 150 10*3/MM3 (ref 140–450)
PMV BLD AUTO: 10 FL (ref 6–12)
POTASSIUM SERPL-SCNC: 3.3 MMOL/L (ref 3.5–5.2)
PROT SERPL-MCNC: 5.8 G/DL (ref 6–8.5)
QT INTERVAL: 451 MS
RBC # BLD AUTO: 3.66 10*6/MM3 (ref 4.14–5.8)
SODIUM SERPL-SCNC: 141 MMOL/L (ref 136–145)
TROPONIN T SERPL HS-MCNC: 1127 NG/L
WBC NRBC COR # BLD: 10.92 10*3/MM3 (ref 3.4–10.8)

## 2023-05-19 PROCEDURE — 99232 SBSQ HOSP IP/OBS MODERATE 35: CPT | Performed by: INTERNAL MEDICINE

## 2023-05-19 PROCEDURE — 25010000002 FENTANYL CITRATE (PF) 50 MCG/ML SOLUTION: Performed by: STUDENT IN AN ORGANIZED HEALTH CARE EDUCATION/TRAINING PROGRAM

## 2023-05-19 PROCEDURE — C1769 GUIDE WIRE: HCPCS | Performed by: STUDENT IN AN ORGANIZED HEALTH CARE EDUCATION/TRAINING PROGRAM

## 2023-05-19 PROCEDURE — 93010 ELECTROCARDIOGRAM REPORT: CPT | Performed by: INTERNAL MEDICINE

## 2023-05-19 PROCEDURE — 80053 COMPREHEN METABOLIC PANEL: CPT | Performed by: INTERNAL MEDICINE

## 2023-05-19 PROCEDURE — 25010000002 FUROSEMIDE PER 20 MG: Performed by: INTERNAL MEDICINE

## 2023-05-19 PROCEDURE — 93005 ELECTROCARDIOGRAM TRACING: CPT | Performed by: INTERNAL MEDICINE

## 2023-05-19 PROCEDURE — 84484 ASSAY OF TROPONIN QUANT: CPT | Performed by: INTERNAL MEDICINE

## 2023-05-19 PROCEDURE — 4A023N7 MEASUREMENT OF CARDIAC SAMPLING AND PRESSURE, LEFT HEART, PERCUTANEOUS APPROACH: ICD-10-PCS | Performed by: STUDENT IN AN ORGANIZED HEALTH CARE EDUCATION/TRAINING PROGRAM

## 2023-05-19 PROCEDURE — 85730 THROMBOPLASTIN TIME PARTIAL: CPT | Performed by: STUDENT IN AN ORGANIZED HEALTH CARE EDUCATION/TRAINING PROGRAM

## 2023-05-19 PROCEDURE — 85730 THROMBOPLASTIN TIME PARTIAL: CPT | Performed by: INTERNAL MEDICINE

## 2023-05-19 PROCEDURE — 25010000002 HEPARIN (PORCINE) PER 1000 UNITS: Performed by: STUDENT IN AN ORGANIZED HEALTH CARE EDUCATION/TRAINING PROGRAM

## 2023-05-19 PROCEDURE — 93458 L HRT ARTERY/VENTRICLE ANGIO: CPT | Performed by: STUDENT IN AN ORGANIZED HEALTH CARE EDUCATION/TRAINING PROGRAM

## 2023-05-19 PROCEDURE — 99152 MOD SED SAME PHYS/QHP 5/>YRS: CPT | Performed by: STUDENT IN AN ORGANIZED HEALTH CARE EDUCATION/TRAINING PROGRAM

## 2023-05-19 PROCEDURE — B2111ZZ FLUOROSCOPY OF MULTIPLE CORONARY ARTERIES USING LOW OSMOLAR CONTRAST: ICD-10-PCS | Performed by: STUDENT IN AN ORGANIZED HEALTH CARE EDUCATION/TRAINING PROGRAM

## 2023-05-19 PROCEDURE — 85025 COMPLETE CBC W/AUTO DIFF WBC: CPT | Performed by: INTERNAL MEDICINE

## 2023-05-19 PROCEDURE — 25510000001 IOPAMIDOL PER 1 ML: Performed by: STUDENT IN AN ORGANIZED HEALTH CARE EDUCATION/TRAINING PROGRAM

## 2023-05-19 PROCEDURE — 93005 ELECTROCARDIOGRAM TRACING: CPT | Performed by: STUDENT IN AN ORGANIZED HEALTH CARE EDUCATION/TRAINING PROGRAM

## 2023-05-19 PROCEDURE — C1894 INTRO/SHEATH, NON-LASER: HCPCS | Performed by: STUDENT IN AN ORGANIZED HEALTH CARE EDUCATION/TRAINING PROGRAM

## 2023-05-19 PROCEDURE — 25010000002 HEPARIN (PORCINE) 25000-0.45 UT/250ML-% SOLUTION: Performed by: INTERNAL MEDICINE

## 2023-05-19 RX ORDER — NITROGLYCERIN 0.4 MG/1
0.4 TABLET SUBLINGUAL
Status: DISCONTINUED | OUTPATIENT
Start: 2023-05-19 | End: 2023-05-23 | Stop reason: HOSPADM

## 2023-05-19 RX ORDER — LIDOCAINE HYDROCHLORIDE 20 MG/ML
INJECTION, SOLUTION INFILTRATION; PERINEURAL
Status: DISCONTINUED | OUTPATIENT
Start: 2023-05-19 | End: 2023-05-19 | Stop reason: HOSPADM

## 2023-05-19 RX ORDER — SODIUM CHLORIDE 9 MG/ML
INJECTION, SOLUTION INTRAVENOUS
Status: COMPLETED | OUTPATIENT
Start: 2023-05-19 | End: 2023-05-19

## 2023-05-19 RX ORDER — ACETAMINOPHEN 325 MG/1
650 TABLET ORAL EVERY 4 HOURS PRN
Status: DISCONTINUED | OUTPATIENT
Start: 2023-05-19 | End: 2023-05-23 | Stop reason: HOSPADM

## 2023-05-19 RX ORDER — HEPARIN SODIUM 1000 [USP'U]/ML
INJECTION, SOLUTION INTRAVENOUS; SUBCUTANEOUS
Status: DISCONTINUED | OUTPATIENT
Start: 2023-05-19 | End: 2023-05-19 | Stop reason: HOSPADM

## 2023-05-19 RX ORDER — VERAPAMIL HYDROCHLORIDE 2.5 MG/ML
INJECTION, SOLUTION INTRAVENOUS
Status: DISCONTINUED | OUTPATIENT
Start: 2023-05-19 | End: 2023-05-19 | Stop reason: HOSPADM

## 2023-05-19 RX ORDER — FENTANYL CITRATE 50 UG/ML
INJECTION, SOLUTION INTRAMUSCULAR; INTRAVENOUS
Status: DISCONTINUED | OUTPATIENT
Start: 2023-05-19 | End: 2023-05-19 | Stop reason: HOSPADM

## 2023-05-19 RX ADMIN — Medication 10 ML: at 20:59

## 2023-05-19 RX ADMIN — HYDROCHLOROTHIAZIDE 25 MG: 25 TABLET ORAL at 09:06

## 2023-05-19 RX ADMIN — METOPROLOL SUCCINATE 50 MG: 50 TABLET, FILM COATED, EXTENDED RELEASE ORAL at 20:57

## 2023-05-19 RX ADMIN — HEPARIN SODIUM 15 UNITS/KG/HR: 10000 INJECTION, SOLUTION INTRAVENOUS at 10:03

## 2023-05-19 RX ADMIN — ASPIRIN 325 MG: 325 TABLET ORAL at 09:05

## 2023-05-19 RX ADMIN — Medication 10 ML: at 09:06

## 2023-05-19 RX ADMIN — TERAZOSIN HYDROCHLORIDE 5 MG: 5 CAPSULE ORAL at 20:58

## 2023-05-19 RX ADMIN — Medication 1000 UNITS: at 09:05

## 2023-05-19 RX ADMIN — DOCUSATE SODIUM 50MG AND SENNOSIDES 8.6MG 2 TABLET: 8.6; 5 TABLET, FILM COATED ORAL at 09:06

## 2023-05-19 RX ADMIN — METOPROLOL SUCCINATE 50 MG: 50 TABLET, FILM COATED, EXTENDED RELEASE ORAL at 09:05

## 2023-05-19 RX ADMIN — ROPINIROLE 2 MG: 2 TABLET, FILM COATED ORAL at 20:57

## 2023-05-19 RX ADMIN — FUROSEMIDE 60 MG: 20 INJECTION, SOLUTION INTRAMUSCULAR; INTRAVENOUS at 09:05

## 2023-05-19 RX ADMIN — ROSUVASTATIN CALCIUM 40 MG: 40 TABLET, FILM COATED ORAL at 09:06

## 2023-05-19 RX ADMIN — CLONAZEPAM 1 MG: 1 TABLET ORAL at 09:06

## 2023-05-19 RX ADMIN — PANTOPRAZOLE SODIUM 40 MG: 40 TABLET, DELAYED RELEASE ORAL at 09:05

## 2023-05-19 NOTE — PLAN OF CARE
Goal Outcome Evaluation:  Plan of Care Reviewed With: patient     Problem: Adult Inpatient Plan of Care  Goal: Plan of Care Review  Outcome: Ongoing, Progressing  Flowsheets (Taken 5/19/2023 0113)  Progress: improving  Plan of Care Reviewed With: patient  Goal: Patient-Specific Goal (Individualized)  Outcome: Ongoing, Progressing  Goal: Absence of Hospital-Acquired Illness or Injury  Outcome: Ongoing, Progressing  Intervention: Identify and Manage Fall Risk  Recent Flowsheet Documentation  Taken 5/19/2023 0000 by Yennifer Ventura RN  Safety Promotion/Fall Prevention:   activity supervised   assistive device/personal items within reach   clutter free environment maintained   nonskid shoes/slippers when out of bed   safety round/check completed  Taken 5/18/2023 2200 by Yennifer Ventura RN  Safety Promotion/Fall Prevention:   activity supervised   assistive device/personal items within reach   clutter free environment maintained   safety round/check completed   nonskid shoes/slippers when out of bed  Taken 5/18/2023 2000 by Yennifer Ventura RN  Safety Promotion/Fall Prevention:   activity supervised   assistive device/personal items within reach   clutter free environment maintained   nonskid shoes/slippers when out of bed   safety round/check completed  Intervention: Prevent Skin Injury  Recent Flowsheet Documentation  Taken 5/18/2023 2200 by Yennifer Ventura RN  Body Position: position changed independently  Taken 5/18/2023 2000 by Yennifer Ventura RN  Body Position: position changed independently  Intervention: Prevent and Manage VTE (Venous Thromboembolism) Risk  Recent Flowsheet Documentation  Taken 5/19/2023 0000 by Yennifer Ventura RN  Activity Management: (pt resting in bed) other (see comments)  Taken 5/18/2023 2200 by Yennifer Ventura RN  Activity Management: (pt resting in bed) other (see comments)  Taken 5/18/2023 2000 by Yennifer Ventura RN  Activity Management: (pt resting in bed) other (see comments)  Intervention: Prevent  Infection  Recent Flowsheet Documentation  Taken 5/19/2023 0000 by Yennifer Ventura RN  Infection Prevention:   rest/sleep promoted   single patient room provided  Taken 5/18/2023 2200 by Yennifer Ventura RN  Infection Prevention:   rest/sleep promoted   single patient room provided  Taken 5/18/2023 2000 by Yennifer Ventura RN  Infection Prevention:   rest/sleep promoted   single patient room provided  Goal: Optimal Comfort and Wellbeing  Outcome: Ongoing, Progressing  Intervention: Provide Person-Centered Care  Recent Flowsheet Documentation  Taken 5/18/2023 2000 by Yennifer Ventura RN  Trust Relationship/Rapport:   care explained   reassurance provided  Goal: Readiness for Transition of Care  Outcome: Ongoing, Progressing     Problem: Fall Injury Risk  Goal: Absence of Fall and Fall-Related Injury  Outcome: Ongoing, Progressing  Intervention: Identify and Manage Contributors  Recent Flowsheet Documentation  Taken 5/19/2023 0000 by Yennifer Ventura RN  Medication Review/Management: medications reviewed  Taken 5/18/2023 2200 by Yennifer Ventura RN  Medication Review/Management: medications reviewed  Taken 5/18/2023 2000 by Yennifer Ventura RN  Medication Review/Management: medications reviewed  Intervention: Promote Injury-Free Environment  Recent Flowsheet Documentation  Taken 5/19/2023 0000 by Yennifer Ventura RN  Safety Promotion/Fall Prevention:   activity supervised   assistive device/personal items within reach   clutter free environment maintained   nonskid shoes/slippers when out of bed   safety round/check completed  Taken 5/18/2023 2200 by Yennifer Ventura RN  Safety Promotion/Fall Prevention:   activity supervised   assistive device/personal items within reach   clutter free environment maintained   safety round/check completed   nonskid shoes/slippers when out of bed  Taken 5/18/2023 2000 by Yennifer Ventura RN  Safety Promotion/Fall Prevention:   activity supervised   assistive device/personal items within reach   clutter free environment  maintained   nonskid shoes/slippers when out of bed   safety round/check completed     Problem: Heart Failure Comorbidity  Goal: Maintenance of Heart Failure Symptom Control  Outcome: Ongoing, Progressing  Intervention: Maintain Heart Failure-Management  Recent Flowsheet Documentation  Taken 5/19/2023 0000 by Yennifer Ventura, RN  Medication Review/Management: medications reviewed  Taken 5/18/2023 2200 by Yennifer Ventura, RN  Medication Review/Management: medications reviewed  Taken 5/18/2023 2000 by Yennifer Ventura, RN  Medication Review/Management: medications reviewed        Progress: improving

## 2023-05-19 NOTE — DISCHARGE INSTRUCTIONS
[facility]  [address] [place] [address]    Coronary Angiogram (Radial/Ulnar Approach) After Care    Refer to this sheet in the next few weeks. These instructions provide you with information on caring for yourself after your procedure. Your caregiver may also give you more specific instructions. Your treatment has been planned according to current medical practices, but problems sometimes occur. Call your caregiver if you have any problems or questions after your procedure.    Home Care Instructions:  You may shower the day after the procedure. Remove the bandage (dressing) and gently wash the site with plain soap and water. Gently pat the site dry. You may apply a band aid daily for 2 days if desired.    Do not apply powder or lotion to the site.  Do not submerge the affected site in water for 3 to 5 days or until the site is completely healed.   Do not lift, push or pull anything over 5 pounds for 5 days after your procedure or as directed by your physician.  As a reference, a gallon of milk weighs 8 pounds.   Inspect the site at least twice daily. You may notice some bruising at the site and it may be tender for 1 to 2 weeks.     Increase your fluid intake for the next 2 days.    Keep arm elevated for 24 hours. For the remainder of the day, keep your arm in “Pledge of Allegiance” position when up and about.     You may drive 24 hours after the procedure unless otherwise instructed by your caregiver.  Do not operate machinery or power tools for 24 hours.  A responsible adult should be with you for the first 24 hours after you arrive home. Do not make any important legal decisions or sign legal papers for 24 hours.  Do not drink alcohol for 24 hours.    Metformin or any medications containing Metformin should not be taken for 48 hours after your procedure.      Call Your Doctor if:   You have unusual pain at the radial/ulnar (wrist) site.  You have redness, warmth, swelling, or pain at the  radial/ulnar (wrist) site.  You have drainage (other than a small amount of blood on the dressing).  `You have chills or a fever > 101.  Your arm becomes pale or dark, cool, tingly, or numb.  You develop chest pain, shortness of breath, feel faint or pass out.    You have heavy bleeding from the site, hold pressure on the site for 20 minutes.  If the bleeding stops, apply a fresh bandage and call your cardiologist.  However, if you        continue to have bleeding, call 911 and continue to apply pressure to the site.   You have any symptoms of a stroke.  Remember BE FAST  B-balance. Sudden trouble walking or loss of balance.  E-eyes.  Sudden changes in how you see or a sudden onset of a very bad headache.   F-face. Sudden weakness or loss of feeling of the face or facial droop on one side.   A-arms Sudden weakness or numbness in one arm.  One arm drifts down if they are both held out in front of you. This happens suddenly and usually on one side of the body.   S-speech.  Sudden trouble speaking, slurred speech or trouble understanding what are saying.   T-time  Time to call emergency services.  Write down the symptoms and the time they started.

## 2023-05-19 NOTE — PROGRESS NOTES
LOS: 0 days   Patient Care Team:  Santos Simmons MD as PCP - General (Family Medicine)  Aashish Thapa MD as Consulting Physician (Cardiology)    Chief Complaint: Follow-up NSTEMI, pulmonary edema, acute diastolic CHF.    Interval History: Breathing better today with diuretics.  No chest pain.  Still feels weak in general.    Vital Signs:  Temp:  [98 °F (36.7 °C)-98.9 °F (37.2 °C)] 98.8 °F (37.1 °C)  Heart Rate:  [66-85] 73  Resp:  [17-18] 18  BP: (107-140)/(52-86) 119/54    Intake/Output Summary (Last 24 hours) at 5/19/2023 1522  Last data filed at 5/19/2023 1400  Gross per 24 hour   Intake --   Output 1800 ml   Net -1800 ml       Physical Exam:   General Appearance:    No acute distress, alert and oriented x4   Lungs:     Decreased breath sounds bilaterally.    Heart:    Regular rhythm and normal rate. II/VI SM LLSB.   Abdomen:     Soft, nontender, nondistended.    Extremities:   Trace edema of the lower extremities bilaterally.     Results Review:    Results from last 7 days   Lab Units 05/19/23  0717   SODIUM mmol/L 141   POTASSIUM mmol/L 3.3*   CHLORIDE mmol/L 103   CO2 mmol/L 29.4*   BUN mg/dL 17   CREATININE mg/dL 1.35*   GLUCOSE mg/dL 100*   CALCIUM mg/dL 8.8     Results from last 7 days   Lab Units 05/19/23  0717 05/18/23  1624 05/18/23  1206 05/18/23  0949   CK TOTAL U/L  --  288*  --   --    HSTROP T ng/L 1,127*  --  1,180* 1,169*     Results from last 7 days   Lab Units 05/19/23  0717   WBC 10*3/mm3 10.92*   HEMOGLOBIN g/dL 11.4*   HEMATOCRIT % 33.7*   PLATELETS 10*3/mm3 150     Results from last 7 days   Lab Units 05/19/23  0717 05/19/23  0036 05/18/23  1624   INR   --   --  1.38*   APTT seconds 143.1* 93.7* 34.6                   I reviewed the patient's new clinical results.        Assessment:  1.  Acute diastolic CHF with pulmonary edema and hypoxia  2.  Severe hypokinesis of the basal to mid inferolateral wall with preserved EF of 55 to 60%  3.  NSTEMI  4.  Elevated RVSP of 70 mmHg  by echo, suggestive of pulmonary hypertension  5.  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  Stage III chronic kidney disease  7.  Frequent PVCs, asymptomatic  8.  Multifactorial anemia  9.  Thrombocytopenia    Plan:  -Troponin is still 1127 today.  Again, he has not had chest pain, although he came in and new congestive heart failure.  He also had basal to mid inferolateral wall hypokinesis with a preserved EF on his echo.    -I had a long discussion with the patient and his son today.  We discussed goals of care and recommendations.  I told them that under normal circumstances, I would recommend a left heart catheterization.  I did tell them that there are risks involved, including a 1% risk of stroke.  He also just had a stroke on 4/30/2023.  However, after discussing with the family, they do want to proceed.  I will have this put on for today.  We will not perform a left ventriculogram.    -He got 1 dose of IV Lasix 60 mg this morning.  I will hold his evening dose and reevaluate his renal function tomorrow.  Potentially resume diuretics tomorrow if renal function is stable.    -Continue heparin drip for now.  Continue aspirin.  I did not put him on dual antiplatelet therapy yet because of the need for systemic anticoagulation.  He was on Eliquis as an outpatient.    -Continue Toprol-XL, losartan, and Crestor.    -Further plans after the heart catheterization today.    Arturo Crowe MD  05/19/23  15:22 EDT

## 2023-05-19 NOTE — PROGRESS NOTES
Name: Art Mullins ADMIT: 2023   : 1935  PCP: Santos Simmons MD    MRN: [id number] LOS: 0 days   AGE/SEX: 87 y.o. male  ROOM: [facility]/NONE     Subjective   Subjective   Patient is lying in the bed and does not appear to be any major distress.  Currently on 2 L of oxygen which will continued.  He denies any chest pain, palpitations, dizziness, numbness, tingling, one-sided weakness.     Objective   Objective   Vital Signs  Temp:  [98 °F (36.7 °C)-98.8 °F (37.1 °C)] 98.8 °F (37.1 °C)  Heart Rate:  [66-85] 73  Resp:  [18-24] 21  BP: (107-137)/(52-86) 119/54  SpO2:  [89 %-96 %] 91 %  on  Flow (L/min):  [2-3] 3;   Device (Oxygen Therapy): nasal cannula with ETCO2  Body mass index is 26.88 kg/m².  Physical Exam   HEENT: PERRLA, extraocular movements intact, Scleras no icterus  Neck: Supple, no JVD  Cardiovascular: Regular rate and rhythm with normal S1 and S2  Respiratory: Diminished breath sounds with no wheezes  GI: Soft, nontender, bowel Sulpor  Neurologic: Grossly nonfocal, no facial asymmetry    Results Review     I reviewed the patient's new clinical results.  Results from last 7 days   Lab Units 23  0717 23  0949   WBC 10*3/mm3 10.92* 12.70*   HEMOGLOBIN g/dL 11.4* 11.3*   PLATELETS 10*3/mm3 150 126*     Results from last 7 days   Lab Units 23  0717 23  0949   SODIUM mmol/L 141 145   POTASSIUM mmol/L 3.3* 3.8   CHLORIDE mmol/L 103 110*   CO2 mmol/L 29.4* 26.3   BUN mg/dL 17 14   CREATININE mg/dL 1.35* 1.31*   GLUCOSE mg/dL 100* 105*   EGFR mL/min/1.73 51.1* 53.0*     Results from last 7 days   Lab Units 23  0717 23  0949   ALBUMIN g/dL 3.3* 3.5   BILIRUBIN mg/dL 0.6 0.6   ALK PHOS U/L 167* 181*   AST (SGOT) U/L 57* 79*   ALT (SGPT) U/L 24 30     Results from last 7 days   Lab Units 23  0717 23  0949   CALCIUM mg/dL 8.8 9.1   ALBUMIN g/dL 3.3* 3.5       No results found for: HGBA1C, POCGLU    XR Chest 1 View    Result Date:  5/18/2023  Cardiomegaly with vascular engorgement as well as increased interstitial markings suspicious for mild hydrostatic pulmonary edema. No evidence for pleural effusion or definite infiltrate.  This report was finalized on 5/18/2023 11:00 AM by Dr. Epi Cesar M.D.      I have personally reviewed all medications:  Scheduled Medications  aspirin, 325 mg, Oral, Daily  [MAR Hold] cholecalciferol, 1,000 Units, Oral, Daily  clonazePAM, 1 mg, Oral, Daily  [MAR Hold] losartan, 100 mg, Oral, Q24H   And  [MAR Hold] hydroCHLOROthiazide, 25 mg, Oral, Q24H  metoprolol succinate XL, 50 mg, Oral, Q12H  [MAR Hold] pantoprazole, 40 mg, Oral, Daily  [MAR Hold] rOPINIRole, 2 mg, Oral, Nightly  [MAR Hold] rosuvastatin, 40 mg, Oral, Daily  [MAR Hold] senna-docusate sodium, 2 tablet, Oral, BID  [MAR Hold] sodium chloride, 10 mL, Intravenous, Q12H  terazosin, 5 mg, Oral, Nightly    Infusions  heparin, 18 Units/kg/hr, Last Rate: Stopped (05/19/23 1616)    Diet  NPO Diet NPO Type: Strict NPO    I have personally reviewed:  [x]  Laboratory   [x]  Microbiology   [x]  Radiology   [x]  EKG/Telemetry  [x]  Cardiology/Vascular   [x]  Pathology    [x]  Records       Assessment/Plan     Active Hospital Problems    Diagnosis  POA   • **Altered mental status, unspecified altered mental status type [R41.82]  Yes   • NSTEMI (non-ST elevated myocardial infarction) [I21.4]  Unknown   • CHF (congestive heart failure) [I50.9]  Unknown   • Acute CVA (cerebrovascular accident) [I63.9]  Yes   • CKD (chronic kidney disease) [N18.9]  Yes   • Elevated troponin [R77.8]  Yes   • Essential hypertension [I10]  Yes      Resolved Hospital Problems   No resolved problems to display.       87 y.o. male admitted with Altered mental status, unspecified altered mental status type.    1. Acute diastolic heart failure, defer diuretics to cardiology.  On metoprolol and will be continued.  2.  Acute hypoxic respiratory failure, currently requiring 3 L of oxygen and  this was felt to be secondary to vascular congestion/pulmonary edemasecondary to diastolic heart failure.  No definitive evidence of any pneumonia clinically.  3.  Non-ST ovation MI, on aspirin, heparin and due for heart cath later today.  We will continue with beta-blockers and Crestor.  Lipid panel will be checked and 2D echo revealed normal ejection fraction with inferolateral wall hypokinesis.  4.  Recent CVA, continue with aspirin and statins.  5.  Hypertension, on metoprolol, Cozaar and blood pressure will be monitored closely.  6.  History of BPH, on terazosin.  7.  CKD stage III, creatinine is at baseline.  8.  On heparin for DVT prophylaxis.  9.  CODE STATUS is full code.          David Montoya MD  [facility]  05/19/23  17:05 EDT

## 2023-05-19 NOTE — SIGNIFICANT NOTE
05/19/23 1440   OTHER   Discipline physical therapist   Rehab Time/Intention   Session Not Performed other (see comments)  (pt going for a heart cath, PT will hold today and followup tomorrow)   Recommendation   PT - Next Appointment 05/20/23

## 2023-05-20 LAB
ANION GAP SERPL CALCULATED.3IONS-SCNC: 13.7 MMOL/L (ref 5–15)
APTT PPP: 38.2 SECONDS (ref 22.7–35.4)
APTT PPP: 66.9 SECONDS (ref 22.7–35.4)
BASOPHILS # BLD AUTO: 0.04 10*3/MM3 (ref 0–0.2)
BASOPHILS NFR BLD AUTO: 0.4 % (ref 0–1.5)
BUN SERPL-MCNC: 33 MG/DL (ref 8–23)
BUN/CREAT SERPL: 21 (ref 7–25)
CALCIUM SPEC-SCNC: 8.9 MG/DL (ref 8.6–10.5)
CHLORIDE SERPL-SCNC: 99 MMOL/L (ref 98–107)
CO2 SERPL-SCNC: 30.3 MMOL/L (ref 22–29)
CREAT SERPL-MCNC: 1.57 MG/DL (ref 0.76–1.27)
DEPRECATED RDW RBC AUTO: 40.9 FL (ref 37–54)
EGFRCR SERPLBLD CKD-EPI 2021: 42.7 ML/MIN/1.73
EOSINOPHIL # BLD AUTO: 0.07 10*3/MM3 (ref 0–0.4)
EOSINOPHIL NFR BLD AUTO: 0.7 % (ref 0.3–6.2)
ERYTHROCYTE [DISTWIDTH] IN BLOOD BY AUTOMATED COUNT: 12.5 % (ref 12.3–15.4)
GEN 5 2HR TROPONIN T REFLEX: 1613 NG/L
GLUCOSE SERPL-MCNC: 107 MG/DL (ref 65–99)
HCT VFR BLD AUTO: 35.2 % (ref 37.5–51)
HGB BLD-MCNC: 12 G/DL (ref 13–17.7)
IMM GRANULOCYTES # BLD AUTO: 0.05 10*3/MM3 (ref 0–0.05)
IMM GRANULOCYTES NFR BLD AUTO: 0.5 % (ref 0–0.5)
LYMPHOCYTES # BLD AUTO: 1.02 10*3/MM3 (ref 0.7–3.1)
LYMPHOCYTES NFR BLD AUTO: 9.6 % (ref 19.6–45.3)
MCH RBC QN AUTO: 30.9 PG (ref 26.6–33)
MCHC RBC AUTO-ENTMCNC: 34.1 G/DL (ref 31.5–35.7)
MCV RBC AUTO: 90.7 FL (ref 79–97)
MONOCYTES # BLD AUTO: 1.16 10*3/MM3 (ref 0.1–0.9)
MONOCYTES NFR BLD AUTO: 10.9 % (ref 5–12)
NEUTROPHILS NFR BLD AUTO: 77.9 % (ref 42.7–76)
NEUTROPHILS NFR BLD AUTO: 8.27 10*3/MM3 (ref 1.7–7)
NRBC BLD AUTO-RTO: 0 /100 WBC (ref 0–0.2)
PLATELET # BLD AUTO: 170 10*3/MM3 (ref 140–450)
PMV BLD AUTO: 9.5 FL (ref 6–12)
POTASSIUM SERPL-SCNC: 3 MMOL/L (ref 3.5–5.2)
QT INTERVAL: 430 MS
RBC # BLD AUTO: 3.88 10*6/MM3 (ref 4.14–5.8)
SODIUM SERPL-SCNC: 143 MMOL/L (ref 136–145)
TROPONIN T DELTA: 118 NG/L
TROPONIN T SERPL HS-MCNC: 1495 NG/L
WBC NRBC COR # BLD: 10.61 10*3/MM3 (ref 3.4–10.8)

## 2023-05-20 PROCEDURE — 80048 BASIC METABOLIC PNL TOTAL CA: CPT | Performed by: INTERNAL MEDICINE

## 2023-05-20 PROCEDURE — 85730 THROMBOPLASTIN TIME PARTIAL: CPT | Performed by: STUDENT IN AN ORGANIZED HEALTH CARE EDUCATION/TRAINING PROGRAM

## 2023-05-20 PROCEDURE — 25010000002 HEPARIN (PORCINE) 25000-0.45 UT/250ML-% SOLUTION: Performed by: STUDENT IN AN ORGANIZED HEALTH CARE EDUCATION/TRAINING PROGRAM

## 2023-05-20 PROCEDURE — 97530 THERAPEUTIC ACTIVITIES: CPT | Performed by: PHYSICAL THERAPIST

## 2023-05-20 PROCEDURE — 99232 SBSQ HOSP IP/OBS MODERATE 35: CPT | Performed by: INTERNAL MEDICINE

## 2023-05-20 PROCEDURE — 85025 COMPLETE CBC W/AUTO DIFF WBC: CPT | Performed by: STUDENT IN AN ORGANIZED HEALTH CARE EDUCATION/TRAINING PROGRAM

## 2023-05-20 PROCEDURE — 97162 PT EVAL MOD COMPLEX 30 MIN: CPT | Performed by: PHYSICAL THERAPIST

## 2023-05-20 PROCEDURE — 25010000002 HEPARIN (PORCINE) PER 1000 UNITS: Performed by: STUDENT IN AN ORGANIZED HEALTH CARE EDUCATION/TRAINING PROGRAM

## 2023-05-20 PROCEDURE — 84484 ASSAY OF TROPONIN QUANT: CPT | Performed by: INTERNAL MEDICINE

## 2023-05-20 RX ORDER — POTASSIUM CHLORIDE 750 MG/1
40 TABLET, FILM COATED, EXTENDED RELEASE ORAL EVERY 4 HOURS
Status: COMPLETED | OUTPATIENT
Start: 2023-05-20 | End: 2023-05-20

## 2023-05-20 RX ORDER — TERAZOSIN 1 MG/1
1 CAPSULE ORAL NIGHTLY
Status: DISCONTINUED | OUTPATIENT
Start: 2023-05-20 | End: 2023-05-23 | Stop reason: HOSPADM

## 2023-05-20 RX ORDER — FUROSEMIDE 40 MG/1
40 TABLET ORAL DAILY
Status: DISCONTINUED | OUTPATIENT
Start: 2023-05-21 | End: 2023-05-23 | Stop reason: HOSPADM

## 2023-05-20 RX ADMIN — ASPIRIN 81 MG: 81 TABLET, COATED ORAL at 16:40

## 2023-05-20 RX ADMIN — CLONAZEPAM 1 MG: 1 TABLET ORAL at 10:17

## 2023-05-20 RX ADMIN — ROSUVASTATIN CALCIUM 40 MG: 40 TABLET, FILM COATED ORAL at 10:18

## 2023-05-20 RX ADMIN — ASPIRIN 325 MG: 325 TABLET ORAL at 10:17

## 2023-05-20 RX ADMIN — METOPROLOL SUCCINATE 50 MG: 50 TABLET, FILM COATED, EXTENDED RELEASE ORAL at 10:18

## 2023-05-20 RX ADMIN — PANTOPRAZOLE SODIUM 40 MG: 40 TABLET, DELAYED RELEASE ORAL at 10:17

## 2023-05-20 RX ADMIN — Medication 10 ML: at 21:00

## 2023-05-20 RX ADMIN — TERAZOSIN 1 MG: 1 CAPSULE ORAL at 22:52

## 2023-05-20 RX ADMIN — POTASSIUM CHLORIDE 40 MEQ: 750 TABLET, EXTENDED RELEASE ORAL at 20:59

## 2023-05-20 RX ADMIN — HEPARIN SODIUM 17 UNITS/KG/HR: 10000 INJECTION, SOLUTION INTRAVENOUS at 07:31

## 2023-05-20 RX ADMIN — Medication 10 ML: at 11:12

## 2023-05-20 RX ADMIN — POTASSIUM CHLORIDE 40 MEQ: 750 TABLET, EXTENDED RELEASE ORAL at 11:50

## 2023-05-20 RX ADMIN — ROPINIROLE 2 MG: 2 TABLET, FILM COATED ORAL at 20:58

## 2023-05-20 RX ADMIN — HYDROCHLOROTHIAZIDE 25 MG: 25 TABLET ORAL at 10:19

## 2023-05-20 RX ADMIN — POTASSIUM CHLORIDE 40 MEQ: 750 TABLET, EXTENDED RELEASE ORAL at 16:40

## 2023-05-20 RX ADMIN — HEPARIN SODIUM 3300 UNITS: 5000 INJECTION INTRAVENOUS; SUBCUTANEOUS at 07:29

## 2023-05-20 RX ADMIN — Medication 1000 UNITS: at 10:19

## 2023-05-20 RX ADMIN — METOPROLOL SUCCINATE 50 MG: 50 TABLET, FILM COATED, EXTENDED RELEASE ORAL at 20:58

## 2023-05-20 NOTE — PLAN OF CARE
Goal Outcome Evaluation:  Plan of Care Reviewed With: patient   Pt returned back from cardiac cath at beginning of the shift, right cath site on wrist was dry clean and intact, covered with guaze and tegaderm. Upon arrival back to unit pt was placed on bp checks on monitor. Pt had no c/o pain. Heparin drip was restarted at 0030 per nursing communication order by md at previous rate of 15 units. Per nursing communication md to restart pt's eliquis today. Pt remains confused at times specifically to place and time.    Problem: Adult Inpatient Plan of Care  Goal: Plan of Care Review  Outcome: Ongoing, Progressing  Flowsheets (Taken 5/20/2023 0315)  Progress: improving  Plan of Care Reviewed With: patient  Goal: Patient-Specific Goal (Individualized)  Outcome: Ongoing, Progressing  Goal: Absence of Hospital-Acquired Illness or Injury  Outcome: Ongoing, Progressing  Intervention: Identify and Manage Fall Risk  Recent Flowsheet Documentation  Taken 5/20/2023 0200 by Yennifer Ventura RN  Safety Promotion/Fall Prevention:   activity supervised   assistive device/personal items within reach   clutter free environment maintained   nonskid shoes/slippers when out of bed   safety round/check completed  Taken 5/20/2023 0000 by Yennifer Ventura RN  Safety Promotion/Fall Prevention:   assistive device/personal items within reach   clutter free environment maintained   nonskid shoes/slippers when out of bed   safety round/check completed  Taken 5/19/2023 2200 by Yennifer Ventura RN  Safety Promotion/Fall Prevention:   activity supervised   assistive device/personal items within reach   clutter free environment maintained   nonskid shoes/slippers when out of bed   safety round/check completed  Taken 5/19/2023 2000 by Yennifer Ventura RN  Safety Promotion/Fall Prevention:   activity supervised   assistive device/personal items within reach   clutter free environment maintained   safety round/check completed   nonskid shoes/slippers when out of  bed  Intervention: Prevent Skin Injury  Recent Flowsheet Documentation  Taken 5/19/2023 2200 by Yennifer Ventura RN  Body Position:   turned   left  Taken 5/19/2023 2000 by Yennifer Ventura RN  Body Position: supine  Intervention: Prevent and Manage VTE (Venous Thromboembolism) Risk  Recent Flowsheet Documentation  Taken 5/20/2023 0000 by Yennifer Ventura RN  Activity Management: (pt resting in bed) other (see comments)  Taken 5/19/2023 2200 by Yennifer Ventura RN  Activity Management: (pt resting in bed) other (see comments)  Taken 5/19/2023 2000 by Yennifer Ventura RN  Activity Management: (pt resting in bed) other (see comments)  Range of Motion: active ROM (range of motion) encouraged  Intervention: Prevent Infection  Recent Flowsheet Documentation  Taken 5/20/2023 0200 by Yennifer Ventura RN  Infection Prevention:   rest/sleep promoted   single patient room provided  Taken 5/20/2023 0000 by Yennifer Ventura RN  Infection Prevention:   rest/sleep promoted   single patient room provided  Taken 5/19/2023 2200 by Yennifer Ventura RN  Infection Prevention:   rest/sleep promoted   single patient room provided  Taken 5/19/2023 2000 by Yennifer Ventura RN  Infection Prevention:   rest/sleep promoted   single patient room provided  Goal: Optimal Comfort and Wellbeing  Outcome: Ongoing, Progressing  Intervention: Provide Person-Centered Care  Recent Flowsheet Documentation  Taken 5/19/2023 2000 by Yennifer Ventura RN  Trust Relationship/Rapport:   care explained   reassurance provided  Goal: Readiness for Transition of Care  Outcome: Ongoing, Progressing     Problem: Fall Injury Risk  Goal: Absence of Fall and Fall-Related Injury  Outcome: Ongoing, Progressing  Intervention: Identify and Manage Contributors  Recent Flowsheet Documentation  Taken 5/20/2023 0200 by Yennifer Ventura RN  Medication Review/Management: medications reviewed  Taken 5/20/2023 0000 by Yennifer Ventura RN  Medication Review/Management: medications reviewed  Taken 5/19/2023 2200 by Yennifer Ventura  RN  Medication Review/Management: medications reviewed  Taken 5/19/2023 2000 by Yennifer Ventura RN  Medication Review/Management: medications reviewed  Intervention: Promote Injury-Free Environment  Recent Flowsheet Documentation  Taken 5/20/2023 0200 by Yennifer Ventura RN  Safety Promotion/Fall Prevention:   activity supervised   assistive device/personal items within reach   clutter free environment maintained   nonskid shoes/slippers when out of bed   safety round/check completed  Taken 5/20/2023 0000 by Yennifer Ventura RN  Safety Promotion/Fall Prevention:   assistive device/personal items within reach   clutter free environment maintained   nonskid shoes/slippers when out of bed   safety round/check completed  Taken 5/19/2023 2200 by Yennifer Ventura RN  Safety Promotion/Fall Prevention:   activity supervised   assistive device/personal items within reach   clutter free environment maintained   nonskid shoes/slippers when out of bed   safety round/check completed  Taken 5/19/2023 2000 by Yennifer Ventura RN  Safety Promotion/Fall Prevention:   activity supervised   assistive device/personal items within reach   clutter free environment maintained   safety round/check completed   nonskid shoes/slippers when out of bed     Problem: Heart Failure Comorbidity  Goal: Maintenance of Heart Failure Symptom Control  Outcome: Ongoing, Progressing  Intervention: Maintain Heart Failure-Management  Recent Flowsheet Documentation  Taken 5/20/2023 0200 by Yennifer Ventura RN  Medication Review/Management: medications reviewed  Taken 5/20/2023 0000 by Yennifer Ventura RN  Medication Review/Management: medications reviewed  Taken 5/19/2023 2200 by Yennifer Ventura RN  Medication Review/Management: medications reviewed  Taken 5/19/2023 2000 by Yennifer Ventura RN  Medication Review/Management: medications reviewed     Problem: Skin Injury Risk Increased  Goal: Skin Health and Integrity  Outcome: Ongoing, Progressing  Intervention: Optimize Skin  Protection  Recent Flowsheet Documentation  Taken 5/20/2023 0200 by Yennifer Ventura RN  Head of Bed (HOB) Positioning: HOB elevated  Taken 5/20/2023 0000 by Yennifer Ventura RN  Head of Bed (HOB) Positioning: HOB elevated  Taken 5/19/2023 2200 by Yennifer Ventura RN  Head of Bed (HOB) Positioning: HOB elevated  Taken 5/19/2023 2000 by Yennifer Ventura RN  Head of Bed (HOB) Positioning: HOB elevated        Progress: improving

## 2023-05-20 NOTE — PROGRESS NOTES
LOS: 1 day   Patient Care Team:  Santos Simmons MD as PCP - General (Family Medicine)  Aashish Thapa MD as Consulting Physician (Cardiology)    Chief Complaint: Follow-up NSTEMI, pulmonary edema, acute diastolic CHF.    Interval History: s/p cath yesterday, no obstructive CAD. Resting comfortably.     Vital Signs:  Temp:  [97.9 °F (36.6 °C)-99.8 °F (37.7 °C)] 97.9 °F (36.6 °C)  Heart Rate:  [69-88] 76  Resp:  [16-24] 16  BP: ()/(52-79) 107/52    Intake/Output Summary (Last 24 hours) at 5/20/2023 1448  Last data filed at 5/20/2023 0100  Gross per 24 hour   Intake 265 ml   Output 300 ml   Net -35 ml       Physical Exam:   General Appearance:    No acute distress, alert and oriented x4   Lungs:     Decreased breath sounds bilaterally.    Heart:    Regular rhythm and normal rate. II/VI SM LLSB.   Abdomen:     Soft, nontender, nondistended.    Extremities:   Trace edema of the lower extremities bilaterally.     Results Review:    Results from last 7 days   Lab Units 05/20/23  0632   SODIUM mmol/L 143   POTASSIUM mmol/L 3.0*   CHLORIDE mmol/L 99   CO2 mmol/L 30.3*   BUN mg/dL 33*   CREATININE mg/dL 1.57*   GLUCOSE mg/dL 107*   CALCIUM mg/dL 8.9     Results from last 7 days   Lab Units 05/20/23  0828 05/20/23  0632 05/19/23  0717 05/18/23  1624   CK TOTAL U/L  --   --   --  288*   HSTROP T ng/L 1,613* 1,495* 1,127*  --      Results from last 7 days   Lab Units 05/20/23  0632   WBC 10*3/mm3 10.61   HEMOGLOBIN g/dL 12.0*   HEMATOCRIT % 35.2*   PLATELETS 10*3/mm3 170     Results from last 7 days   Lab Units 05/20/23  0632 05/19/23  2042 05/19/23  0717 05/19/23  0036 05/18/23  1624   INR   --   --   --   --  1.38*   APTT seconds 66.9* 36.4* 143.1*   < > 34.6    < > = values in this interval not displayed.                   I reviewed the patient's new clinical results.        Assessment:  1.  Acute diastolic CHF with pulmonary edema and hypoxia  2.  Severe hypokinesis of the basal to mid inferolateral  wall with preserved EF of 55 to 60%  3.  NSTEMI  4.  Elevated RVSP of 70 mmHg by echo, suggestive of pulmonary hypertension  5.  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  Stage III chronic kidney disease  7.  Frequent PVCs, asymptomatic  8.  Multifactorial anemia  9.  Thrombocytopenia    Plan:  - NSTEMI type II related to demand. Cath without obstructive CAD  - GDMT for cardiomyopathy, continue toprol, losartan. EDP is, 10. Stop IV lasix, transition to PO lasix.   - no objection to discharge from cardiac standpoint though he was confused this morning.     Jacqui Galeana MD  05/20/23  14:48 EDT

## 2023-05-20 NOTE — PLAN OF CARE
Goal Outcome Evaluation:  Plan of Care Reviewed With: patient, spouse           Outcome Evaluation: PT ZAMZAM completed. Pt and spouse report that she helped with bed mobility and all ADLs at home. Pt is somewhat difficult to keep on topic, but answers orientation questions correctly. pt transfers supine to sit with mod x 1. pt transferred sit to stand with mod x 1. Pt attempted 2 x sit to stand with max x 1 and RW. Pt unable to fully stand. Pt transferred sit to supine with mod x 1. Pt requires skilled therapy due to decreased transfers, decreased ambulation, decreased bed mobility. Recommend DC to inpatient rehab or home with HH.    PPE: Patient was placed in face mask in first look. Patient was wearing facemask when I entered the room and throughout our encounter. I wore full protective equipment throughout this patient encounter including a face mask, and gloves. Hand hygiene was performed before donning protective equipment and after removal when leaving the room.

## 2023-05-20 NOTE — THERAPY EVALUATION
Patient Name: Art Mullins  : 1935    MRN: [id number]                              Today's Date: 2023       Admit Date: 2023    Visit Dx:     ICD-10-CM ICD-9-CM   1. Altered mental status, unspecified altered mental status type  R41.82 780.97   2. Acute congestive heart failure, unspecified heart failure type  I50.9 428.0   3. NSTEMI (non-ST elevated myocardial infarction)  I21.4 410.70     Patient Active Problem List   Diagnosis   • Essential hypertension   • Acute CVA (cerebrovascular accident)   • CKD (chronic kidney disease)   • BELLA (acute kidney injury)   • Elevated troponin   • Vision changes   • Altered mental status, unspecified altered mental status type   • NSTEMI (non-ST elevated myocardial infarction)   • CHF (congestive heart failure)     Past Medical History:   Diagnosis Date   • Hyperlipidemia    • Hypertension    • PVCs (premature ventricular contractions)      History reviewed. No pertinent surgical history.   General Information     Row Name 23 1334          Physical Therapy Time and Intention    Document Type evaluation  -     Mode of Treatment individual therapy;physical therapy  -     Row Name 23 1330          General Information    Patient Profile Reviewed yes  -     Prior Level of Function min assist:;all household mobility;gait;transfer;bed mobility;ADL's  -     Existing Precautions/Restrictions fall;oxygen therapy device and L/min  -     Row Name 23 1337          Living Environment    People in Home spouse  -     Row Name 23 1332          Cognition    Orientation Status (Cognition) oriented x 3  -     Row Name 23 1332          Safety Issues, Functional Mobility    Impairments Affecting Function (Mobility) balance;strength;endurance/activity tolerance  -           User Key  (r) = Recorded By, (t) = Taken By, (c) = Cosigned By    Initials Name Provider Type    Humble Pierson, PT DPT Physical Therapist               Mobility      Row Name 05/20/23 1332          Bed Mobility    Bed Mobility bed mobility (all) activities  -     All Activities, Tucker (Bed Mobility) moderate assist (50% patient effort)  -     Assistive Device (Bed Mobility) bed rails;head of bed elevated  -[facility] Name 05/20/23 1334          Sit-Stand Transfer    Sit-Stand Tucker (Transfers) maximum assist (25% patient effort)  -     Assistive Device (Sit-Stand Transfers) walker, front-wheeled  -           User Key  (r) = Recorded By, (t) = Taken By, (c) = Cosigned By    Initials Name Provider Type     Humble Leos, PT DPT Physical Therapist               Obj/Interventions     Row Name 05/20/23 1335          Range of Motion Comprehensive    General Range of Motion no range of motion deficits identified  -[facility] Name 05/20/23 1335          Strength Comprehensive (MMT)    Comment, General Manual Muscle Testing (MMT) Assessment BLE MMT grossly 2+/5  -           User Key  (r) = Recorded By, (t) = Taken By, (c) = Cosigned By    Initials Name Provider Type     Humble Leos, PT DPT Physical Therapist               Goals/Plan     [facility] Name 05/20/23 133          Bed Mobility Goal 1 (PT)    Activity/Assistive Device (Bed Mobility Goal 1, PT) bed mobility activities, all  -     Tucker Level/Cues Needed (Bed Mobility Goal 1, PT) contact guard required  -     Time Frame (Bed Mobility Goal 1, PT) 1 week  -[facility] Name 05/20/23 1333          Transfer Goal 1 (PT)    Activity/Assistive Device (Transfer Goal 1, PT) transfers, all;walker, rolling  -     Tucker Level/Cues Needed (Transfer Goal 1, PT) contact guard required  -     Time Frame (Transfer Goal 1, PT) 1 week  -[facility] Name 05/20/23 1336          Gait Training Goal 1 (PT)    Activity/Assistive Device (Gait Training Goal 1, PT) gait (walking locomotion);walker, rolling  -     Tucker Level (Gait Training Goal 1, PT) contact guard required  -     Distance (Gait  Training Goal 1, PT) 50  -LC     Time Frame (Gait Training Goal 1, PT) 1 week  -           User Key  (r) = Recorded By, (t) = Taken By, (c) = Cosigned By    Initials Name Provider Type    Humble Pierson, PT DPT Physical Therapist               Clinical Impression     Row Name 05/20/23 7293          Pain    Pretreatment Pain Rating 0/10 - no pain  -LC     Posttreatment Pain Rating 0/10 - no pain  -LC     Pain Intervention(s) Medication (See MAR);Repositioned  -     Row Name 05/20/23 5592          Plan of Care Review    Plan of Care Reviewed With patient;spouse  -     Outcome Evaluation PT EVAL completed. Pt and spouse report that she helped with bed mobility and all ADLs at home. Pt is somewhat difficult to keep on topic, but answers orientation questions correctly. pt transfers supine to sit with mod x 1. pt transferred sit to stand with mod x 1. Pt attempted 2 x sit to stand with max x 1 and RW. Pt unable to fully stand. Pt transferred sit to supine with mod x 1. Pt requires skilled therapy due to decreased transfers, decreased ambulation, decreased bed mobility. Recommend DC to inpatient rehab or home with .  -     Row Name 05/20/23 6973          Therapy Assessment/Plan (PT)    Patient/Family Therapy Goals Statement (PT) home with   -     Rehab Potential (PT) fair, will monitor progress closely  -     Criteria for Skilled Interventions Met (PT) yes;skilled treatment is necessary  -     Therapy Frequency (PT) daily  -     Predicted Duration of Therapy Intervention (PT) 1 week  -     Row Name 05/20/23 7499          Positioning and Restraints    Pre-Treatment Position in bed  -     Post Treatment Position bed  -LC     In Bed notified nsg;supine;call light within reach;encouraged to call for assist;exit alarm on;side rails up x2;with family/caregiver  -           User Key  (r) = Recorded By, (t) = Taken By, (c) = Cosigned By    Initials Name Provider Type    Humble Pierson, PT DPT  Physical Therapist               Outcome Measures     [facility] Name 05/20/23 1339          How much help from another person do you currently need...    Turning from your back to your side while in flat bed without using bedrails? 3  -LC     Moving from lying on back to sitting on the side of a flat bed without bedrails? 2  -LC     Moving to and from a bed to a chair (including a wheelchair)? 2  -LC     Standing up from a chair using your arms (e.g., wheelchair, bedside chair)? 2  -LC     Climbing 3-5 steps with a railing? 2  -LC     To walk in hospital room? 2  -LC     AM-PAC 6 Clicks Score (PT) 13  -     Highest level of mobility 4 --> Transferred to chair/commode  -           User Key  (r) = Recorded By, (t) = Taken By, (c) = Cosigned By    Initials Name Provider Type     Humble Leos, PT DPT Physical Therapist                             Physical Therapy Education     Title: PT OT SLP Therapies (In Progress)     Topic: Physical Therapy (Done)     Point: Mobility training (Done)     Learning Progress Summary           Patient Acceptance, E,D, DU,VU by  at 5/20/2023 1339                   Point: Home exercise program (Done)     Learning Progress Summary           Patient Acceptance, E,D, DU,VU by  at 5/20/2023 1339                   Point: Body mechanics (Done)     Learning Progress Summary           Patient Acceptance, E,D, DU,VU by  at 5/20/2023 1339                   Point: Precautions (Done)     Learning Progress Summary           Patient Acceptance, E,D, DU,VU by  at 5/20/2023 1339                               User Key     Initials Effective Dates Name Provider Type [facility] 07/02/20 -  Humble Leos, PT DPT Physical Therapist PT              PT Recommendation and Plan     Plan of Care Reviewed With: patient, spouse  Outcome Evaluation: PT ZAMZAM completed. Pt and spouse report that she helped with bed mobility and all ADLs at home. Pt is somewhat difficult to keep on topic, but answers  orientation questions correctly. pt transfers supine to sit with mod x 1. pt transferred sit to stand with mod x 1. Pt attempted 2 x sit to stand with max x 1 and RW. Pt unable to fully stand. Pt transferred sit to supine with mod x 1. Pt requires skilled therapy due to decreased transfers, decreased ambulation, decreased bed mobility. Recommend DC to inpatient rehab or home with HH.     Time Calculation:    PT Charges     Row Name 05/20/23 1341             Time Calculation    Start Time 1300  -LC      Stop Time 1327  -      Time Calculation (min) 27 min  -LC      PT Received On 05/20/23  -      PT - Next Appointment 05/21/23  -      PT Goal Re-Cert Due Date 05/27/23  -         Time Calculation- PT    Total Timed Code Minutes- PT 27 minute(s)  -         Timed Charges    49353 - PT Therapeutic Activity Minutes 15  -LC         Total Minutes    Timed Charges Total Minutes 15  -LC       Total Minutes 15  -LC            User Key  (r) = Recorded By, (t) = Taken By, (c) = Cosigned By    Initials Name Provider Type     Humble Leos, PT DPT Physical Therapist              Therapy Charges for Today     Code Description Service Date Service Provider Modifiers Qty    77625327918  PT THERAPEUTIC ACT EA 15 MIN 5/20/2023 Humble Leos, PT DPT GP 1    46294098299 HC PT EVAL MOD COMPLEXITY 1 5/20/2023 Humble Leos, PT DPT GP 1          PT G-Codes  AM-PAC 6 Clicks Score (PT): 13  PT Discharge Summary  Anticipated Discharge Disposition (PT): home with home health, inpatient rehabilitation facility, skilled nursing facility    Humble Leos PT DPT  5/20/2023

## 2023-05-20 NOTE — PLAN OF CARE
Goal Outcome Evaluation:  Plan of Care Reviewed With: patient        Progress: improving          Pt slightly confused for me this AM, a/o x4 in afternoon. VSS, 2L NC. Replaced k per protocol. Hep gtt dc'd. Aspirin PO. Pt unable to fully stand w/ PT. Rec SNF vs. HH. Nephrology consulted for CKD III mgmt. [facility]CYING.

## 2023-05-20 NOTE — PROGRESS NOTES
Name: Art Mullins ADMIT: 2023   : 1935  PCP: Santos Simmons MD    MRN: [id number] LOS: 1 days   AGE/SEX: 87 y.o. male  ROOM: [facility]     Subjective   Subjective   Patient is lying in the bed and does not appear to be any major distress.  Currently on 4 L of oxygen which will continued.  He denies any chest pain, palpitations, dizziness, numbness, tingling, one-sided weakness.     Objective   Objective   Vital Signs  Temp:  [97.9 °F (36.6 °C)-99.8 °F (37.7 °C)] 97.9 °F (36.6 °C)  Heart Rate:  [69-88] 76  Resp:  [16-24] 16  BP: ()/(52-79) 107/52  SpO2:  [89 %-98 %] 96 %  on  Flow (L/min):  [3-4] 4;   Device (Oxygen Therapy): nasal cannula  Body mass index is 26.88 kg/m².  Physical Exam   HEENT: PERRLA, extraocular movements intact, Scleras no icterus  Neck: Supple, no JVD  Cardiovascular: Regular rate and rhythm with normal S1 and S2  Respiratory: Diminished breath sounds with no wheezes  GI: Soft, nontender, bowel Sulpor  Neurologic: Grossly nonfocal, no facial asymmetry    Results Review     I reviewed the patient's new clinical results.  Results from last 7 days   Lab Units 23  0632 23  0717 23  0949   WBC 10*3/mm3 10.61 10.92* 12.70*   HEMOGLOBIN g/dL 12.0* 11.4* 11.3*   PLATELETS 10*3/mm3 170 150 126*     Results from last 7 days   Lab Units 23  0632 23  0717 23  0949   SODIUM mmol/L 143 141 145   POTASSIUM mmol/L 3.0* 3.3* 3.8   CHLORIDE mmol/L 99 103 110*   CO2 mmol/L 30.3* 29.4* 26.3   BUN mg/dL 33* 17 14   CREATININE mg/dL 1.57* 1.35* 1.31*   GLUCOSE mg/dL 107* 100* 105*   EGFR mL/min/1.73 42.7* 51.1* 53.0*     Results from last 7 days   Lab Units 23  0717 23  0949   ALBUMIN g/dL 3.3* 3.5   BILIRUBIN mg/dL 0.6 0.6   ALK PHOS U/L 167* 181*   AST (SGOT) U/L 57* 79*   ALT (SGPT) U/L 24 30     Results from last 7 days   Lab Units 23  0632 23  0717 23  0949   CALCIUM mg/dL 8.9 8.8 9.1   ALBUMIN g/dL  --  3.3* 3.5        No results found for: HGBA1C, POCGLU    No radiology results for the last day  I have personally reviewed all medications:  Scheduled Medications  aspirin, 81 mg, Oral, Daily  cholecalciferol, 1,000 Units, Oral, Daily  clonazePAM, 1 mg, Oral, Daily  [START ON 5/21/2023] furosemide, 40 mg, Oral, Daily  losartan, 100 mg, Oral, Q24H   And  hydroCHLOROthiazide, 25 mg, Oral, Q24H  metoprolol succinate XL, 50 mg, Oral, Q12H  pantoprazole, 40 mg, Oral, Daily  potassium chloride, 40 mEq, Oral, Q4H  rOPINIRole, 2 mg, Oral, Nightly  rosuvastatin, 40 mg, Oral, Daily  senna-docusate sodium, 2 tablet, Oral, BID  sodium chloride, 10 mL, Intravenous, Q12H  terazosin, 5 mg, Oral, Nightly    Infusions   Diet  Diet: Cardiac Diets; Healthy Heart (2-3 Na+); Texture: Regular Texture (IDDSI 7); Fluid Consistency: Thin (IDDSI 0)    I have personally reviewed:  [x]  Laboratory   [x]  Microbiology   [x]  Radiology   [x]  EKG/Telemetry  [x]  Cardiology/Vascular   [x]  Pathology    [x]  Records       Assessment/Plan     Active Hospital Problems    Diagnosis  POA   • **Altered mental status, unspecified altered mental status type [R41.82]  Yes   • NSTEMI (non-ST elevated myocardial infarction) [I21.4]  Unknown   • CHF (congestive heart failure) [I50.9]  Unknown   • Acute CVA (cerebrovascular accident) [I63.9]  Yes   • CKD (chronic kidney disease) [N18.9]  Yes   • Elevated troponin [R77.8]  Yes   • Essential hypertension [I10]  Yes      Resolved Hospital Problems   No resolved problems to display.       87 y.o. male admitted with Altered mental status, unspecified altered mental status type.    1. Acute diastolic heart failure, being diuresed by Cardiology and IV diuretics have been transitioned to p.o..  On metoprolol which will be continued.    2.  Acute hypoxic respiratory failure, currently requiring 4 L of oxygen and this was felt to be secondary to vascular congestion/pulmonary edemasecondary to diastolic heart failure.  No definitive  evidence of any pneumonia clinically.    3.    Elevated troponin,  2D echo revealed normal ejection fraction with inferolateral wall hypokinesis. Underwent heart catheterization with no significant disease therefore heparin drip will be discontinued.  Continue with aspirin, metoprolol and statins.    4.  Recent CVA, continue with aspirin and statins.    5.  Hypertension, on metoprolol, Cozaar and blood pressure will be monitored closely.    6.  History of BPH, on terazosin.    7.  CKD stage III, creatinine is at baseline.  Nephrology consult will be obtained regarding the same.    8.  On heparin for DVT prophylaxis.    9.  CODE STATUS is full code.      Copied text on this note has been reviewed by me on 05/20/2023      David Montoya MD  [facility]  05/20/23  15:16 EDT

## 2023-05-21 LAB
ALBUMIN SERPL-MCNC: 3.3 G/DL (ref 3.5–5.2)
ANION GAP SERPL CALCULATED.3IONS-SCNC: 7 MMOL/L (ref 5–15)
BASOPHILS # BLD AUTO: 0.06 10*3/MM3 (ref 0–0.2)
BASOPHILS NFR BLD AUTO: 0.6 % (ref 0–1.5)
BUN SERPL-MCNC: 42 MG/DL (ref 8–23)
BUN/CREAT SERPL: 23.3 (ref 7–25)
CALCIUM SPEC-SCNC: 8.4 MG/DL (ref 8.6–10.5)
CHLORIDE SERPL-SCNC: 100 MMOL/L (ref 98–107)
CO2 SERPL-SCNC: 31 MMOL/L (ref 22–29)
CREAT SERPL-MCNC: 1.8 MG/DL (ref 0.76–1.27)
DEPRECATED RDW RBC AUTO: 42.6 FL (ref 37–54)
EGFRCR SERPLBLD CKD-EPI 2021: 36.2 ML/MIN/1.73
EOSINOPHIL # BLD AUTO: 0.14 10*3/MM3 (ref 0–0.4)
EOSINOPHIL NFR BLD AUTO: 1.3 % (ref 0.3–6.2)
ERYTHROCYTE [DISTWIDTH] IN BLOOD BY AUTOMATED COUNT: 12.6 % (ref 12.3–15.4)
GLUCOSE SERPL-MCNC: 99 MG/DL (ref 65–99)
HCT VFR BLD AUTO: 33.7 % (ref 37.5–51)
HGB BLD-MCNC: 11.5 G/DL (ref 13–17.7)
IMM GRANULOCYTES # BLD AUTO: 0.05 10*3/MM3 (ref 0–0.05)
IMM GRANULOCYTES NFR BLD AUTO: 0.5 % (ref 0–0.5)
LYMPHOCYTES # BLD AUTO: 0.99 10*3/MM3 (ref 0.7–3.1)
LYMPHOCYTES NFR BLD AUTO: 9.3 % (ref 19.6–45.3)
MAGNESIUM SERPL-MCNC: 2.1 MG/DL (ref 1.6–2.4)
MCH RBC QN AUTO: 31.5 PG (ref 26.6–33)
MCHC RBC AUTO-ENTMCNC: 34.1 G/DL (ref 31.5–35.7)
MCV RBC AUTO: 92.3 FL (ref 79–97)
MONOCYTES # BLD AUTO: 1.28 10*3/MM3 (ref 0.1–0.9)
MONOCYTES NFR BLD AUTO: 12.1 % (ref 5–12)
NEUTROPHILS NFR BLD AUTO: 76.2 % (ref 42.7–76)
NEUTROPHILS NFR BLD AUTO: 8.1 10*3/MM3 (ref 1.7–7)
NRBC BLD AUTO-RTO: 0 /100 WBC (ref 0–0.2)
PHOSPHATE SERPL-MCNC: 2.8 MG/DL (ref 2.5–4.5)
PLATELET # BLD AUTO: 169 10*3/MM3 (ref 140–450)
PMV BLD AUTO: 9.8 FL (ref 6–12)
POTASSIUM SERPL-SCNC: 3.9 MMOL/L (ref 3.5–5.2)
RBC # BLD AUTO: 3.65 10*6/MM3 (ref 4.14–5.8)
SODIUM SERPL-SCNC: 138 MMOL/L (ref 136–145)
WBC NRBC COR # BLD: 10.62 10*3/MM3 (ref 3.4–10.8)

## 2023-05-21 PROCEDURE — 83735 ASSAY OF MAGNESIUM: CPT | Performed by: INTERNAL MEDICINE

## 2023-05-21 PROCEDURE — 97165 OT EVAL LOW COMPLEX 30 MIN: CPT

## 2023-05-21 PROCEDURE — 80069 RENAL FUNCTION PANEL: CPT | Performed by: INTERNAL MEDICINE

## 2023-05-21 PROCEDURE — 97530 THERAPEUTIC ACTIVITIES: CPT | Performed by: PHYSICAL THERAPIST

## 2023-05-21 PROCEDURE — 97535 SELF CARE MNGMENT TRAINING: CPT

## 2023-05-21 PROCEDURE — 99232 SBSQ HOSP IP/OBS MODERATE 35: CPT | Performed by: NURSE PRACTITIONER

## 2023-05-21 PROCEDURE — 85025 COMPLETE CBC W/AUTO DIFF WBC: CPT | Performed by: INTERNAL MEDICINE

## 2023-05-21 RX ADMIN — PANTOPRAZOLE SODIUM 40 MG: 40 TABLET, DELAYED RELEASE ORAL at 08:58

## 2023-05-21 RX ADMIN — ROSUVASTATIN CALCIUM 40 MG: 40 TABLET, FILM COATED ORAL at 09:02

## 2023-05-21 RX ADMIN — CLONAZEPAM 1 MG: 1 TABLET ORAL at 08:59

## 2023-05-21 RX ADMIN — DOCUSATE SODIUM 50MG AND SENNOSIDES 8.6MG 2 TABLET: 8.6; 5 TABLET, FILM COATED ORAL at 20:32

## 2023-05-21 RX ADMIN — ROPINIROLE 2 MG: 2 TABLET, FILM COATED ORAL at 20:33

## 2023-05-21 RX ADMIN — FUROSEMIDE 40 MG: 40 TABLET ORAL at 08:57

## 2023-05-21 RX ADMIN — METOPROLOL SUCCINATE 50 MG: 50 TABLET, FILM COATED, EXTENDED RELEASE ORAL at 08:58

## 2023-05-21 RX ADMIN — Medication 10 ML: at 20:33

## 2023-05-21 RX ADMIN — Medication 1000 UNITS: at 08:58

## 2023-05-21 RX ADMIN — ASPIRIN 81 MG: 81 TABLET, COATED ORAL at 08:58

## 2023-05-21 RX ADMIN — TERAZOSIN 1 MG: 1 CAPSULE ORAL at 20:33

## 2023-05-21 RX ADMIN — Medication 10 ML: at 09:03

## 2023-05-21 RX ADMIN — METOPROLOL SUCCINATE 50 MG: 50 TABLET, FILM COATED, EXTENDED RELEASE ORAL at 20:33

## 2023-05-21 NOTE — PROGRESS NOTES
HOSPITAL PROGRESS NOTE    Date of Service: 23  LOS:  LOS: 2 days   Patient Name: Art Mullins  Age/Sex: 87 y.o. male  : 1935  MRN: [id number]  Primary Cardiologist: Dr. Mac Thapa     Subjective:     Chief Complaint/Follow up:   Non-STEMI, acute diastolic heart failure, nonobstructive CAD    Interval History:   Resting in bed.  Wife and daughter at bedside.  On oxygen.  Daughter says he is puffing.  He denies shortness of breath, chest pain, palpitations, or dizziness.      Objective:     Objective:  Temp:  [98.1 °F (36.7 °C)-98.5 °F (36.9 °C)] 98.5 °F (36.9 °C)  Heart Rate:  [70-77] 77  Resp:  [18] 18  BP: ()/(46-54) 120/52  Body mass index is 26.88 kg/m².    Intake/Output Summary (Last 24 hours) at 2023 1329  Last data filed at 2023 0525  Gross per 24 hour   Intake 210 ml   Output 200 ml   Net 10 ml         23  0957 23  1718   Weight: 82.6 kg (182 lb) 82.6 kg (182 lb)     Weight change:     Physical Exam:   General Appearance: Alert.  In no distress.  Lungs: Clear. Normal respiratory effort and rate.  On oxygen.  Heart: Regular rate and rhythm, normal S1 and S2, no murmurs, gallops or rubs.  Extremities: No edema.     Lab Review:   Results from last 7 days   Lab Units 23  0510 23  0632 23  0717 23  0949   SODIUM mmol/L 138 143 141 145   POTASSIUM mmol/L 3.9 3.0* 3.3* 3.8   CHLORIDE mmol/L 100 99 103 110*   CO2 mmol/L 31.0* 30.3* 29.4* 26.3   BUN mg/dL 42* 33* 17 14   CREATININE mg/dL 1.80* 1.57* 1.35* 1.31*   GLUCOSE mg/dL 99 107* 100* 105*   CALCIUM mg/dL 8.4* 8.9 8.8 9.1   AST (SGOT) U/L  --   --  57* 79*   ALT (SGPT) U/L  --   --  24 30     Results from last 7 days   Lab Units 23  0828 23  0632 23  0717 23  1624 23  1206 23  0949   CK TOTAL U/L  --   --   --  288*  --   --    HSTROP T ng/L 1,613* 1,495* 1,127*  --  1,180* 1,169*     Results from last 7 days   Lab Units 23  0510  05/20/23  0632   WBC 10*3/mm3 10.62 10.61   HEMOGLOBIN g/dL 11.5* 12.0*   HEMATOCRIT % 33.7* 35.2*   PLATELETS 10*3/mm3 169 170     Results from last 7 days   Lab Units 05/20/23  1709 05/20/23  0632 05/19/23  0036 05/18/23  1624   INR   --   --   --  1.38*   APTT seconds 38.2* 66.9*   < > 34.6    < > = values in this interval not displayed.     Results from last 7 days   Lab Units 05/21/23  0510   MAGNESIUM mg/dL 2.1         Results from last 7 days   Lab Units 05/18/23  0949   PROBNP pg/mL 7,716.0*           Results for orders placed during the hospital encounter of 05/18/23    Adult Transthoracic Echo Limited W/ Cont if Necessary Per Protocol    Interpretation Summary  •  There is severe hypokinesis of the basal to mid inferolateral wall. The remaining wall segments are hyperdynamic and the ejection fraction is preserved  •  Left ventricular systolic function is normal. Left ventricular ejection fraction appears to be 56 - 60%.  •  Left ventricular wall thickness is consistent with mild concentric hypertrophy.  •  Normal right ventricular cavity size and systolic function noted.  •  Mild to moderate aortic valve regurgitation is present.  •  Mild to moderate mitral valve regurgitation is present.  •  Mild tricuspid valve regurgitation is present  •  Calculated right ventricular systolic pressure from tricuspid regurgitation is 70 mmHg.  •  There is no evidence of pericardial effusion.    I reviewed the patient's new clinical results.  I personally viewed and interpreted the patient's EKG/Telemetry data/Labs/Test Results.     Current Medications:   Scheduled Meds:aspirin, 81 mg, Oral, Daily  cholecalciferol, 1,000 Units, Oral, Daily  clonazePAM, 1 mg, Oral, Daily  furosemide, 40 mg, Oral, Daily  metoprolol succinate XL, 50 mg, Oral, Q12H  pantoprazole, 40 mg, Oral, Daily  rOPINIRole, 2 mg, Oral, Nightly  rosuvastatin, 40 mg, Oral, Daily  senna-docusate sodium, 2 tablet, Oral, BID  sodium chloride, 10 mL,  Intravenous, Q12H  terazosin, 1 mg, Oral, Nightly        Allergies:  Allergies   Allergen Reactions   • Sulfa Antibiotics        Assessment:       Altered mental status, unspecified altered mental status type    Essential hypertension    Acute CVA (cerebrovascular accident)    CKD (chronic kidney disease)    Elevated troponin    NSTEMI (non-ST elevated myocardial infarction)    CHF (congestive heart failure)        Plan:   Assessment & Plan       1.  Acute diastolic heart failure with pulmonary edema and hypoxia.  On oral furosemide.  Appears better today.  2.  Severe hypokinesis of the basal to mid inferior lateral wall with preserved EF.  3.  Non-STEMI type II related to demand.  4.  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  5.  Severe pulmonary hypertension noted on echo.  6.  Stage III chronic kidney disease.  7.  Asymptomatic PVCs.  8.  Multifactorial anemia  9.  Thrombocytopenia.  10.  Dr. Galeana said he could be discharged from a cardiac standpoint when team feels is ready.  He would need to make an appointment to follow-up with his cardiologist Dr. Mac Thapa outpatient.      CRISTÓBAL Chun  [facility]   05/21/23  13:29 EDT

## 2023-05-21 NOTE — THERAPY EVALUATION
Patient Name: Art Mullins  : 1935    MRN: [id number]                              Today's Date: 2023       Admit Date: 2023    Visit Dx:     ICD-10-CM ICD-9-CM   1. Altered mental status, unspecified altered mental status type  R41.82 780.97   2. Acute congestive heart failure, unspecified heart failure type  I50.9 428.0   3. NSTEMI (non-ST elevated myocardial infarction)  I21.4 410.70     Patient Active Problem List   Diagnosis   • Essential hypertension   • Acute CVA (cerebrovascular accident)   • CKD (chronic kidney disease)   • BELLA (acute kidney injury)   • Elevated troponin   • Vision changes   • Altered mental status, unspecified altered mental status type   • NSTEMI (non-ST elevated myocardial infarction)   • CHF (congestive heart failure)     Past Medical History:   Diagnosis Date   • Hyperlipidemia    • Hypertension    • PVCs (premature ventricular contractions)      History reviewed. No pertinent surgical history.   General Information     Row Name 23 1245          OT Time and Intention    Document Type evaluation  -RE     Mode of Treatment individual therapy;occupational therapy  -RE     Row Name 23 1245          General Information    Patient Profile Reviewed yes  -RE     Prior Level of Function independent:  Patient was independent and driving prior to his latest stroke.  -RE     Existing Precautions/Restrictions fall;oxygen therapy device and L/min  -RE     Barriers to Rehab medically complex;hearing deficit  -RE     Row Name 23 1245          Living Environment    People in Home spouse  -RE     Row Name 23 1245          Safety Issues, Functional Mobility    Safety Issues Affecting Function (Mobility) problem-solving  -RE     Impairments Affecting Function (Mobility) grasp;motor control;motor planning;coordination  -RE           User Key  (r) = Recorded By, (t) = Taken By, (c) = Cosigned By    Initials Name Provider Type    RE Beti Dodge OTR Occupational  Therapist                 Mobility/ADL's     Row Name 05/21/23 1247          Bed Mobility    Bed Mobility scooting/bridging  -RE     Scooting/Bridging [place] (Bed Mobility) moderate assist (50% patient effort)  -RE     Row Name 05/21/23 1247          Activities of Daily Living    BADL Assessment/Intervention bathing;upper body dressing;grooming  -RE     Row Name 05/21/23 1247          Bathing Assessment/Intervention    [place] Level (Bathing) upper body;set up;supervision;verbal cues;nonverbal cues (demo/gesture)  -RE     Position (Bathing) sitting up in bed  -RE     Row Name 05/21/23 1247          Upper Body Dressing Assessment/Training    [place] Level (Upper Body Dressing) doff;don;pajama/robe;minimum assist (75% patient effort)  -RE     Position (Upper Body Dressing) sitting up in bed  -RE     Row Name 05/21/23 1247          Grooming Assessment/Training    [place] Level (Grooming) wash face, hands;set up;standby assist;verbal cues  -RE     Position (Grooming) sitting up in bed  -RE           User Key  (r) = Recorded By, (t) = Taken By, (c) = Cosigned By    Initials Name Provider Type    Beti Borden OTR Occupational Therapist               Obj/Interventions     Row Name 05/21/23 1249          Range of Motion Comprehensive    General Range of Motion upper extremity range of motion deficits identified  -RE     Comment, General Range of Motion Right upper extremity active range of motion is within functional limits.  Left upper extremity active range of motion is approximately 75% of normal.  No active shoulder external rotation is noted causing significant shoulder instability.  -     Row Name 05/21/23 1249          Motor Skills    Motor Skills neuro-muscular function  -RE     Neuromuscular Function right;moderate impairment  -RE           User Key  (r) = Recorded By, (t) = Taken By, (c) = Cosigned By    Initials Name Provider Type    Beti Borden OTR Occupational Therapist                Goals/Plan     Row Name 05/21/23 1259          Bathing Goal 1 (OT)    Activity/Device (Bathing Goal 1, OT) bathing skills, all  -RE     [place] Level/Cues Needed (Bathing Goal 1, OT) minimum assist (75% or more patient effort)  -RE     Time Frame (Bathing Goal 1, OT) long term goal (LTG);2 weeks  -RE     Progress/Outcomes (Bathing Goal 1, OT) good progress toward goal  -RE     Row Name 05/21/23 1259          Dressing Goal 1 (OT)    Activity/Device (Dressing Goal 1, OT) dressing skills, all  -RE     [place]/Cues Needed (Dressing Goal 1, OT) minimum assist (75% or more patient effort)  -RE     Time Frame (Dressing Goal 1, OT) long term goal (LTG);2 weeks  -RE     Progress/Outcome (Dressing Goal 1, OT) good progress toward goal  -RE     Row Name 05/21/23 1259          Grooming Goal 1 (OT)    Activity/Device (Grooming Goal 1, OT) grooming skills, all  -RE     [place] (Grooming Goal 1, OT) standby assist;set-up required  -RE     Time Frame (Grooming Goal 1, OT) long term goal (LTG);1 week  -RE     Progress/Outcome (Grooming Goal 1, OT) good progress toward goal  -RE     Row Name 05/21/23 1259          Therapy Assessment/Plan (OT)    Planned Therapy Interventions (OT) BADL retraining;activity tolerance training;adaptive equipment training;neuromuscular control/coordination retraining;transfer/mobility retraining  -RE           User Key  (r) = Recorded By, (t) = Taken By, (c) = Cosigned By    Initials Name Provider Type    RE Beti Dodge OTR Occupational Therapist               Clinical Impression     Row Name 05/21/23 1252          Pain Assessment    Pretreatment Pain Rating 0/10 - no pain  -RE     Posttreatment Pain Rating 0/10 - no pain  -RE     Row Name 05/21/23 1252          Plan of Care Review    Plan of Care Reviewed With patient;son  -RE     Progress improving  -RE     Outcome Evaluation Occupational Therapy evaluation completed.  Patient's left upper extremity is noted to have decreased  active range of motion and coordination.  With verbal cues he will use his left arm as an active assist during ADLs.  With verbal and physical cues patient was able to wash his face and bathe his upper body.  He required minimum assist to doff and don [facility] gown.  Patient's not appropriate for toilet or tub transfers at this time.  I would recommend skilled occupational therapy services to increase independence with ADLs and assess functional transfers as appropriate.  At discharge I would recommend inpatient acute rehab or subacute rehab.  -RE     Row Name 05/21/23 1255          Therapy Assessment/Plan (OT)    Rehab Potential (OT) good, to achieve stated therapy goals  -RE     Criteria for Skilled Therapeutic Interventions Met (OT) yes;meets criteria;skilled treatment is necessary  -RE     Therapy Frequency (OT) 5 times/wk  -RE     Predicted Duration of Therapy Intervention (OT) 1 week  -RE     Row Name 05/21/23 1252          Therapy Plan Review/Discharge Plan (OT)    Anticipated Discharge Disposition (OT) sub acute care setting;inpatient rehabilitation facility  -RE     Row Name 05/21/23 1252          Positioning and Restraints    Pre-Treatment Position in bed  -RE     Post Treatment Position bed  -RE     In Bed exit alarm on;side rails up x3;encouraged to call for assist;call light within reach;with family/caregiver  -RE           User Key  (r) = Recorded By, (t) = Taken By, (c) = Cosigned By    Initials Name Provider Type    RE Beti Dodge OTR Occupational Therapist               Outcome Measures     Row Name 05/21/23 9136          How much help from another is currently needed...    Putting on and taking off regular lower body clothing? 2  -RE     Bathing (including washing, rinsing, and drying) 2  -RE     Toileting (which includes using toilet bed pan or urinal) 1  -RE     Putting on and taking off regular upper body clothing 3  -RE     Taking care of personal grooming (such as brushing teeth) 4  -RE      Eating meals 4  -RE     AM-PAC 6 Clicks Score (OT) 16  -RE     Row Name 05/21/23 1302          Modified [place] Scale    Modified [place] Scale 4 - Moderately severe disability.  Unable to walk without assistance, and unable to attend to own bodily needs without assistance.  -RE     Row Name 05/21/23 1302          Functional Assessment    Outcome Measure Options AM-PAC 6 Clicks Daily Activity (OT);Modified [place]  -RE           User Key  (r) = Recorded By, (t) = Taken By, (c) = Cosigned By    Initials Name Provider Type    RE Beti Dodge OTR Occupational Therapist                Occupational Therapy Education     Title: PT OT SLP Therapies (In Progress)     Topic: Occupational Therapy (In Progress)     Point: ADL training (Done)     Description:   Instruct learner(s) on proper safety adaptation and remediation techniques during self care or transfers.   Instruct in proper use of assistive devices.              Learning Progress Summary           Patient Acceptance, E,TB,D, VU,NR by RE at 5/21/2023 1303   Family Acceptance, E,TB,D, VU,NR by RE at 5/21/2023 1303                   Point: Home exercise program (Not Started)     Description:   Instruct learner(s) on appropriate technique for monitoring, assisting and/or progressing therapeutic exercises/activities.              Learner Progress:  Not documented in this visit.          Point: Precautions (Not Started)     Description:   Instruct learner(s) on prescribed precautions during self-care and functional transfers.              Learner Progress:  Not documented in this visit.          Point: Body mechanics (Not Started)     Description:   Instruct learner(s) on proper positioning and spine alignment during self-care, functional mobility activities and/or exercises.              Learner Progress:  Not documented in this visit.                      User Key     Initials Effective Dates Name Provider Type Discipline     06/16/21 -  Beti Dodge OTR Occupational  Therapist OT              OT Recommendation and Plan  Planned Therapy Interventions (OT): BADL retraining, activity tolerance training, adaptive equipment training, neuromuscular control/coordination retraining, transfer/mobility retraining  Therapy Frequency (OT): 5 times/wk  Plan of Care Review  Plan of Care Reviewed With: patient, son  Progress: improving  Outcome Evaluation: Occupational Therapy evaluation completed.  Patient's left upper extremity is noted to have decreased active range of motion and coordination.  With verbal cues he will use his left arm as an active assist during ADLs.  With verbal and physical cues patient was able to wash his face and bathe his upper body.  He required minimum assist to [facility] and don hospital gown.  Patient's not appropriate for toilet or tub transfers at this time.  I would recommend skilled occupational therapy services to increase independence with ADLs and assess functional transfers as appropriate.  At discharge I would recommend inpatient acute rehab or subacute rehab.     Time Calculation:    Time Calculation- OT     Row Name 05/21/23 1304             Time Calculation- OT    OT Start Time 0945  -RE      OT Stop Time 1023  -RE      OT Time Calculation (min) 38 min  -RE      Total Timed Code Minutes- OT 25 minute(s)  -RE         Timed Charges    96728 - OT Self Care/Mgmt Minutes 25  -RE         Untimed Charges    OT Eval/Re-eval Minutes 13  -RE         Total Minutes    Timed Charges Total Minutes 25  -RE      Untimed Charges Total Minutes 13  -RE       Total Minutes 38  -RE            User Key  (r) = Recorded By, (t) = Taken By, (c) = Cosigned By    Initials Name Provider Type    RE Beti Dodge OTR Occupational Therapist              Therapy Charges for Today     Code Description Service Date Service Provider Modifiers Qty    [id number]  OT SELF CARE/MGMT/TRAIN EA 15 MIN 5/21/2023 Beti Dodge OTR GO 2    [id number]  OT EVAL LOW COMPLEXITY 2 5/21/2023  Beti Dodge, OTR GO 1               Beti Dodge, OTR  5/21/2023

## 2023-05-21 NOTE — PLAN OF CARE
Goal Outcome Evaluation:  Plan of Care Reviewed With: patient        Progress: improving          Pt a/o x3/4, 2L NC, VSS. Cr increased from 1.5 to 1.8. Lasix added. Nephro following. Pt most likely will dc to SNF. WCTM.

## 2023-05-21 NOTE — PROGRESS NOTES
Name: Art Mullins ADMIT: 2023   : 1935  PCP: Santos Simmons MD    MRN: [id number] LOS: 2 days   AGE/SEX: 87 y.o. male  ROOM: [facility]     Subjective   Subjective   Patient is lying in the bed and does not appear to be any major distress.  Currently on 3 L of oxygen which will continued.  He denies any chest pain, palpitations, dizziness, numbness, tingling, one-sided weakness.     Objective   Objective   Vital Signs  Temp:  [97.8 °F (36.6 °C)-98.5 °F (36.9 °C)] 97.8 °F (36.6 °C)  Heart Rate:  [70-77] 77  Resp:  [18] 18  BP: ()/(46-72) 124/72  SpO2:  [94 %-98 %] 98 %  on  Flow (L/min):  [3-4] 3;   Device (Oxygen Therapy): nasal cannula  Body mass index is 26.88 kg/m².  Physical Exam   HEENT: PERRLA, extraocular movements intact, Scleras no icterus  Neck: Supple, no JVD  Cardiovascular: Regular rate and rhythm with normal S1 and S2  Respiratory: Diminished breath sounds with no wheezes  GI: Soft, nontender, bowel [place]  Neurologic: Grossly nonfocal, no facial asymmetry    Results Review     I reviewed the patient's new clinical results.  Results from last 7 days   Lab Units 23  0510 23  0632 23  0717 23  0949   WBC 10*3/mm3 10.62 10.61 10.92* 12.70*   HEMOGLOBIN g/dL 11.5* 12.0* 11.4* 11.3*   PLATELETS 10*3/mm3 169 170 150 126*     Results from last 7 days   Lab Units 23  0510 23  0632 23  0717 23  0949   SODIUM mmol/L 138 143 141 145   POTASSIUM mmol/L 3.9 3.0* 3.3* 3.8   CHLORIDE mmol/L 100 99 103 110*   CO2 mmol/L 31.0* 30.3* 29.4* 26.3   BUN mg/dL 42* 33* 17 14   CREATININE mg/dL 1.80* 1.57* 1.35* 1.31*   GLUCOSE mg/dL 99 107* 100* 105*   EGFR mL/min/1.73 36.2* 42.7* 51.1* 53.0*     Results from last 7 days   Lab Units 23  0510 23  0717 23  0949   ALBUMIN g/dL 3.3* 3.3* 3.5   BILIRUBIN mg/dL  --  0.6 0.6   ALK PHOS U/L  --  167* 181*   AST (SGOT) U/L  --  57* 79*   ALT (SGPT) U/L  --  24 30     Results from last 7  days   Lab Units 05/21/23  0510 05/20/23  0632 05/19/23  0717 05/18/23  0949   CALCIUM mg/dL 8.4* 8.9 8.8 9.1   ALBUMIN g/dL 3.3*  --  3.3* 3.5   MAGNESIUM mg/dL 2.1  --   --   --    PHOSPHORUS mg/dL 2.8  --   --   --        No results found for: HGBA1C, POCGLU    No radiology results for the last day  I have personally reviewed all medications:  Scheduled Medications  aspirin, 81 mg, Oral, Daily  cholecalciferol, 1,000 Units, Oral, Daily  clonazePAM, 1 mg, Oral, Daily  furosemide, 40 mg, Oral, Daily  metoprolol succinate XL, 50 mg, Oral, Q12H  pantoprazole, 40 mg, Oral, Daily  rOPINIRole, 2 mg, Oral, Nightly  rosuvastatin, 40 mg, Oral, Daily  senna-docusate sodium, 2 tablet, Oral, BID  sodium chloride, 10 mL, Intravenous, Q12H  terazosin, 1 mg, Oral, Nightly    Infusions   Diet  Diet: Cardiac Diets; Healthy Heart (2-3 Na+); Texture: Regular Texture (IDDSI 7); Fluid Consistency: Thin (IDDSI 0)    I have personally reviewed:  [x]  Laboratory   [x]  Microbiology   [x]  Radiology   [x]  EKG/Telemetry  [x]  Cardiology/Vascular   [x]  Pathology    [x]  Records       Assessment/Plan     Active Hospital Problems    Diagnosis  POA   • **Altered mental status, unspecified altered mental status type [R41.82]  Yes   • NSTEMI (non-ST elevated myocardial infarction) [I21.4]  Unknown   • CHF (congestive heart failure) [I50.9]  Unknown   • Acute CVA (cerebrovascular accident) [I63.9]  Yes   • CKD (chronic kidney disease) [N18.9]  Yes   • Elevated troponin [R77.8]  Yes   • Essential hypertension [I10]  Yes      Resolved Hospital Problems   No resolved problems to display.       87 y.o. male admitted with Altered mental status, unspecified altered mental status type.    1. Acute diastolic heart failure, being diuresed by Cardiology and IV diuretics have been transitioned to p.o..  On metoprolol which will be continued.    2.  Acute hypoxic respiratory failure, currently requiring 3 L of oxygen and this was felt to be secondary to  vascular congestion/pulmonary edemasecondary to diastolic heart failure.  No definitive evidence of any pneumonia clinically.    3.    Elevated troponin,  2D echo revealed normal ejection fraction with inferolateral wall hypokinesis. Underwent heart catheterization with no significant disease therefore heparin drip was discontinued.  Continue with aspirin, metoprolol and statins.    4.  Recent CVA, continue with aspirin and statins.    5.  Hypertension, on metoprolol, Cozaar and blood pressure will be monitored closely.    6.  History of BPH, on terazosin.    7.  CKD stage III, creatinine is at baseline.  Nephrology is following along.    8.  On heparin for DVT prophylaxis.    9.  CODE STATUS is full code.    10. Estimated discharge date, to rehab in next 1-2 days.      Copied text on this note has been reviewed by me on 05/21/2023      David Montoya MD  [facility]  05/21/23  15:07 EDT

## 2023-05-21 NOTE — THERAPY TREATMENT NOTE
Patient Name: Art Mullins  : 1935    MRN: [id number]                              Today's Date: 2023       Admit Date: 2023    Visit Dx:     ICD-10-CM ICD-9-CM   1. Altered mental status, unspecified altered mental status type  R41.82 780.97   2. Acute congestive heart failure, unspecified heart failure type  I50.9 428.0   3. NSTEMI (non-ST elevated myocardial infarction)  I21.4 410.70     Patient Active Problem List   Diagnosis   • Essential hypertension   • Acute CVA (cerebrovascular accident)   • CKD (chronic kidney disease)   • BELLA (acute kidney injury)   • Elevated troponin   • Vision changes   • Altered mental status, unspecified altered mental status type   • NSTEMI (non-ST elevated myocardial infarction)   • CHF (congestive heart failure)     Past Medical History:   Diagnosis Date   • Hyperlipidemia    • Hypertension    • PVCs (premature ventricular contractions)      History reviewed. No pertinent surgical history.   General Information     Row Name 23 1415          Physical Therapy Time and Intention    Document Type therapy note (daily note)  -     Mode of Treatment individual therapy;physical therapy  -     Row Name 23 1415          General Information    Patient Profile Reviewed yes  -     Existing Precautions/Restrictions fall;oxygen therapy device and L/min  -     Row Name 23 1415          Cognition    Orientation Status (Cognition) oriented x 3  -     Row Name 23 1415          Safety Issues, Functional Mobility    Impairments Affecting Function (Mobility) grasp;motor control;motor planning;coordination  -           User Key  (r) = Recorded By, (t) = Taken By, (c) = Cosigned By    Initials Name Provider Type     Humble Leos PT DPT Physical Therapist               Mobility     Row Name 23 1415          Bed Mobility    Bed Mobility bed mobility (all) activities  -     All Activities, [place] (Bed Mobility) moderate assist (50%  patient effort)  -     Assistive Device (Bed Mobility) bed rails;head of bed elevated  -     Row Name 05/21/23 1415          Sit-Stand Transfer    Sit-Stand [place] (Transfers) moderate assist (50% patient effort)  -     Assistive Device (Sit-Stand Transfers) walker, front-wheeled  -LC     Comment, (Sit-Stand Transfer) 3 x sit to stand  -     Row Name 05/21/23 1415          Gait/Stairs (Locomotion)    [place] Level (Gait) moderate assist (50% patient effort)  -     Assistive Device (Gait) walker, front-wheeled  -LC     Distance in Feet (Gait) 8 ft side stepping bedside  -     Deviations/Abnormal Patterns (Gait) ataxic;stride length decreased;weight shifting decreased;gait speed decreased  -           User Key  (r) = Recorded By, (t) = Taken By, (c) = Cosigned By    Initials Name Provider Type     Humble Leos, PT DPT Physical Therapist               Obj/Interventions    No documentation.                Goals/Plan    No documentation.                Clinical Impression     Row Name 05/21/23 1416          Pain    Pretreatment Pain Rating 0/10 - no pain  -     Posttreatment Pain Rating 0/10 - no pain  -     Pain Intervention(s) Medication (See MAR);Repositioned  -     Row Name 05/21/23 1416          Plan of Care Review    Plan of Care Reviewed With patient;family  -     Progress improving  -     Outcome Evaluation Pt AO x 3. Pt transferred supine to sit with mod x 1. Pt transferred sit to stand with mod x 1 and RW for 3 repetitions. Pt ambulated 8 ft with RW and min to mod x 1 assist at edge of bed. Pt transferred sit to supine with mod x 1. Pt showed improved motor control with interventions today.  -     Row Name 05/21/23 1416          Positioning and Restraints    Pre-Treatment Position in bed  -     Post Treatment Position bed  -LC     In Bed notified nsg;supine;call light within reach;encouraged to call for assist;exit alarm on;side rails up x2;with family/caregiver  -            User Key  (r) = Recorded By, (t) = Taken By, (c) = Cosigned By    Initials Name Provider Type    Humble Pierson, PT DPT Physical Therapist               Outcome Measures     Row Name 05/21/23 1418          How much help from another person do you currently need...    Turning from your back to your side while in flat bed without using bedrails? 3  -LC     Moving from lying on back to sitting on the side of a flat bed without bedrails? 2  -LC     Moving to and from a bed to a chair (including a wheelchair)? 3  -LC     Standing up from a chair using your arms (e.g., wheelchair, bedside chair)? 2  -LC     Climbing 3-5 steps with a railing? 2  -LC     To walk in hospital room? 3  -     AM-PAC 6 Clicks Score (PT) 15  -     Highest level of mobility 4 --> Transferred to chair/commode  -     Row Name 05/21/23 1302          Modified [place] Scale    Modified [place] Scale 4 - Moderately severe disability.  Unable to walk without assistance, and unable to attend to own bodily needs without assistance.  -RE     Row Name 05/21/23 1418 05/21/23 1302       Functional Assessment    Outcome Measure Options AM-PAC 6 Clicks Basic Mobility (PT)  - AM-PAC 6 Clicks Daily Activity (OT);Modified [place]  -RE          User Key  (r) = Recorded By, (t) = Taken By, (c) = Cosigned By    Initials Name Provider Type    Beti Borden OTR Occupational Therapist    Humble Pierson, PT DPT Physical Therapist                             Physical Therapy Education     Title: PT OT SLP Therapies (In Progress)     Topic: Physical Therapy (Done)     Point: Mobility training (Done)     Learning Progress Summary           Patient Acceptance, E,D, DU,VU by  at 5/21/2023 1417    Acceptance, E,D, DU,VU by  at 5/20/2023 1331                   Point: Home exercise program (Done)     Learning Progress Summary           Patient Acceptance, E,D, DU,VU by  at 5/21/2023 1417    Acceptance, E,D, DU,VU by  at 5/20/2023 1332                    Point: Body mechanics (Done)     Learning Progress Summary           Patient Acceptance, E,D, DU,VU by  at 5/21/2023 1417    Acceptance, E,D, DU,VU by  at 5/20/2023 1339                   Point: Precautions (Done)     Learning Progress Summary           Patient Acceptance, E,D, DU,VU by  at 5/21/2023 1417    Acceptance, E,D, DU,VU by  at 5/20/2023 1339                               User Key     Initials Effective Dates Name Provider Type Discipline     07/02/20 -  Humble Leos, PT DPT Physical Therapist PT              PT Recommendation and Plan     Plan of Care Reviewed With: patient, family  Progress: improving  Outcome Evaluation: Pt AO x 3. Pt transferred supine to sit with mod x 1. Pt transferred sit to stand with mod x 1 and RW for 3 repetitions. Pt ambulated 8 ft with RW and min to mod x 1 assist at edge of bed. Pt transferred sit to supine with mod x 1. Pt showed improved motor control with interventions today.     Time Calculation:    PT Charges     Row Name 05/21/23 1418             Time Calculation    Start Time 1402  -LC      Stop Time 1426  -      Time Calculation (min) 24 min  -      PT Received On 05/21/23  -      PT - Next Appointment 05/22/23  -         Time Calculation- PT    Total Timed Code Minutes- PT 24 minute(s)  -LC         Timed Charges    69296 - PT Therapeutic Activity Minutes 24  -LC         Total Minutes    Timed Charges Total Minutes 24  -LC       Total Minutes 24  -LC            User Key  (r) = Recorded By, (t) = Taken By, (c) = Cosigned By    Initials Name Provider Type    LC Humble Leos, PT DPT Physical Therapist              Therapy Charges for Today     Code Description Service Date Service Provider Modifiers Qty    33236141563 HC PT THERAPEUTIC ACT EA 15 MIN 5/20/2023 Humble Leos, PT DPT GP 1    50466973226 HC PT EVAL MOD COMPLEXITY 1 5/20/2023 Humble Leos, PT DPT GP 1    21555677113 HC PT THERAPEUTIC ACT EA 15 MIN 5/21/2023 Humble Leos,  PT DPT GP 2          PT G-Codes  Outcome Measure Options: AM-PAC 6 Clicks Basic Mobility (PT)  AM-PAC 6 Clicks Score (PT): 15  AM-PAC 6 Clicks Score (OT): 16  Modified [place] Scale: 4 - Moderately severe disability.  Unable to walk without assistance, and unable to attend to own bodily needs without assistance.  PT Discharge Summary  Anticipated Discharge Disposition (PT): home with home health, inpatient rehabilitation facility, skilled nursing facility    Humble Leos, PT DPT  5/21/2023

## 2023-05-21 NOTE — CONSULTS
"  [facility] Consult Note      Patient Name: Art Mullins  : 1935  MRN: [id number]  Primary Care Physician:  Santos Simmons MD  Referring Physician: Jeremy Alvarenga MD  Date of admission: 2023    Subjective     Reason for Consult:  BELLA on CKD3a    HPI:   Art Mullins is a 87 y.o. male with likely CKD3a (baseline SCR appears to be 1.3 mg/dL), admitted  for confusion.  Full PMH outlined below; pertinent is recent stroke earlier this month, hypertension, and CAD.  SCR yesterday 1.4 and today 1.6.  Hospitalization notable for cardiac catheterization performed yesterday.  Patient repeatedly asks why he is here \"in the lobby, completely unattended.\"    · Breathing is comfortable on 4 L/min as he lies nearly flat  · Denies chest pain  · States that his appetite is okay; no N/V  · Has no urinary complaints    Review of Systems:   Not reliably obtainable.  Patient is confused and paranoid.    Personal History     Past Medical History:   Diagnosis Date   • Hyperlipidemia    • Hypertension    • PVCs (premature ventricular contractions)        History reviewed. No pertinent surgical history.    Family History: family history includes No Known Problems in his brother, father, mother, and sister.    Social History:  reports that he has never smoked. He does not have any smokeless tobacco history on file. He reports that he does not drink alcohol and does not use drugs.    Home Medications:  Prior to Admission medications    Medication Sig Start Date End Date Taking? Authorizing Provider   apixaban (ELIQUIS) 5 MG tablet tablet Take 1 tablet by mouth Every 12 (Twelve) Hours. Indications: Other - full anticoagulation  Patient taking differently: Take 2.5 mg by mouth Every 12 (Twelve) Hours. Indications: Other - full anticoagulation 23  Yes Lottie Hernández MD   cholecalciferol (VITAMIN D3) 1000 UNITS tablet Take 1 tablet by mouth Daily.   Yes Provider, MD Maribeth "   Cyanocobalamin (VITAMIN B 12 PO) Take 2,500 mcg by mouth Daily.   Yes Maribeth Ngo MD   doxazosin (CARDURA) 8 MG tablet Take 1 tablet by mouth Every Night.   Yes Maribeth Ngo MD   losartan (COZAAR) 25 MG tablet Take 1 tablet by mouth Daily.   Yes Maribeth Ngo MD   metoprolol succinate XL (TOPROL-XL) 50 MG 24 hr tablet Take 1 tablet by mouth 2 (two) times a day. 8/24/21  Yes Aashish Thapa MD   pantoprazole (PROTONIX) 40 MG EC tablet Take 1 tablet by mouth Daily.   Yes Maribeth Ngo MD   rOPINIRole (REQUIP) 2 MG tablet Take 1 tablet by mouth Every Night.   Yes Maribeth Ngo MD   rosuvastatin (CRESTOR) 40 MG tablet Take 1 tablet by mouth Daily.   Yes Maribeth Ngo MD   clonazePAM (KlonoPIN) 1 MG tablet Take 1 tablet by mouth Daily.    ProviderMaribeth MD   losartan-hydrochlorothiazide (HYZAAR) 100-25 MG per tablet Take 1 tablet by mouth Daily.    ProviderMaribeth MD       Allergies:  Allergies   Allergen Reactions   • Sulfa Antibiotics        Objective     Vitals:   Temp:  [97.9 °F (36.6 °C)-98.8 °F (37.1 °C)] 98.4 °F (36.9 °C)  Heart Rate:  [69-80] 72  Resp:  [16-18] 18  BP: ()/(52-67) 109/54  Flow (L/min):  [4] 4    Intake/Output Summary (Last 24 hours) at 5/20/2023 2032  Last data filed at 5/20/2023 0100  Gross per 24 hour   Intake --   Output 300 ml   Net -300 ml       Physical Exam:   Constitutional: Awake, alert, confused, paranoid; frail  HEENT: Sclera anicteric, no conjunctival injection, MMM  Neck: Supple, no carotid bruit, trachea at midline, no JVD  Respiratory: Clear anteriorly, nonlabored on 4 L/min  Cardiovascular: RRR, no rub  Gastrointestinal: BS +, soft, nontender and nondistended  : No palpable bladder; external catheter  Musculoskeletal: No significant edema, no clubbing or cyanosis  Psychiatric: Confused, paranoid; not oriented  Neurologic: moving all extremities, halting speech  Skin: Warm and dry       Scheduled  Meds:     aspirin, 81 mg, Oral, Daily  cholecalciferol, 1,000 Units, Oral, Daily  clonazePAM, 1 mg, Oral, Daily  [START ON 5/21/2023] furosemide, 40 mg, Oral, Daily  losartan, 100 mg, Oral, Q24H   And  hydroCHLOROthiazide, 25 mg, Oral, Q24H  metoprolol succinate XL, 50 mg, Oral, Q12H  pantoprazole, 40 mg, Oral, Daily  potassium chloride, 40 mEq, Oral, Q4H  rOPINIRole, 2 mg, Oral, Nightly  rosuvastatin, 40 mg, Oral, Daily  senna-docusate sodium, 2 tablet, Oral, BID  sodium chloride, 10 mL, Intravenous, Q12H  terazosin, 5 mg, Oral, Nightly      IV Meds:        Results Reviewed:   I have personally reviewed the results from the time of this admission to 5/20/2023 20:32 EDT     Lab Results   Component Value Date    GLUCOSE 107 (H) 05/20/2023    CALCIUM 8.9 05/20/2023     05/20/2023    K 3.0 (L) 05/20/2023    CO2 30.3 (H) 05/20/2023    CL 99 05/20/2023    BUN 33 (H) 05/20/2023    CREATININE 1.57 (H) 05/20/2023    BCR 21.0 05/20/2023    ANIONGAP 13.7 05/20/2023      Lab Results   Component Value Date    MG 2.2 05/02/2023    PHOS 2.3 (L) 05/02/2023    ALBUMIN 3.3 (L) 05/19/2023           Assessment / Plan       Altered mental status, unspecified altered mental status type    Essential hypertension    Acute CVA (cerebrovascular accident)    CKD (chronic kidney disease)    Elevated troponin    NSTEMI (non-ST elevated myocardial infarction)    CHF (congestive heart failure)      ASSESSMENT:  1.  BELLA on CKD3a, nonoliguric, prerenal due to hypotension on ARB and high-dose diuretic with recent contrast exposure.  Volume status looks fine.  Low potassium on high-dose thiazide.  Urinalysis on arrival with greater than 300 mg/dL protein  2.  Confusion  3.  CHF with pulmonary edema and hypoxia  4.  NSTEMI  5.  PVD with recent stroke several weeks ago  6.  Anemia      PLAN:  1.  Discontinue losartan and HCTZ given hypotension  2.  Agree with start of furosemide  3.  Potassium replacement already given  4.  Surveillance  labs    Thank you for involving us in the care of Art Mullins.  Please feel free to call with any questions.    Rayray Su MD  05/20/23  20:32 EDT    [facility]  [phone]      Please note that portions of this note were completed with a voice recognition program.

## 2023-05-21 NOTE — PLAN OF CARE
Goal Outcome Evaluation:  Plan of Care Reviewed With: patient, son        Progress: improving  Outcome Evaluation: Occupational Therapy evaluation completed.  Patient's left upper extremity is noted to have decreased active range of motion and coordination.  With verbal cues he will use his left arm as an active assist during ADLs.  With verbal and physical cues patient was able to wash his face and bathe his upper body.  He required minimum assist to doff and don hospital gown.  Patient's not appropriate for toilet or tub transfers at this time.  I would recommend skilled occupational therapy services to increase independence with ADLs and assess functional transfers as appropriate.  At discharge I would recommend inpatient acute rehab or subacute rehab.

## 2023-05-21 NOTE — PLAN OF CARE
Goal Outcome Evaluation:  Plan of Care Reviewed With: patient, family        Progress: improving  Outcome Evaluation: Pt AO x 3. Pt transferred supine to sit with mod x 1. Pt transferred sit to stand with mod x 1 and RW for 3 repetitions. Pt ambulated 8 ft with RW and min to mod x 1 assist at edge of bed. Pt transferred sit to supine with mod x 1. Pt showed improved motor control with interventions today.       PPE: Patient was placed in face mask in first look. Patient was wearing facemask when I entered the room and throughout our encounter. I wore full protective equipment throughout this patient encounter including a face mask, and gloves. Hand hygiene was performed before donning protective equipment and after removal when leaving the room.

## 2023-05-21 NOTE — PROGRESS NOTES
[facility] Progress Note      Patient Name: Art Mullins  : 1935  MRN: [id number]  Primary Care Physician:  Santos Simmons MD  Date of admission: 2023    Subjective     Interval History:   Much more calm and this morning  Denies shortness of breath, though still requiring 3 L/min  States that he walked earlier and was not dizzy    Review of Systems:   As noted above    Objective     Vitals:   Temp:  [97.8 °F (36.6 °C)-98.5 °F (36.9 °C)] 97.8 °F (36.6 °C)  Heart Rate:  [70-77] 77  Resp:  [18] 18  BP: ()/(46-72) 124/72  Flow (L/min):  [3-4] 3    Intake/Output Summary (Last 24 hours) at 2023 1442  Last data filed at 2023 1340  Gross per 24 hour   Intake 210 ml   Output 800 ml   Net -590 ml       Physical Exam:    Constitutional: Awake, alert, frail, pleasant  HEENT: Sclera anicteric, no conjunctival injection, MMM  Neck: Supple, no carotid bruit, trachea at midline, no JVD  Respiratory: Crackles in bases, nonlabored on 3 L/min  Cardiovascular: RRR, no rub  Gastrointestinal: BS +, soft, nontender and nondistended  : No palpable bladder; external catheter  Musculoskeletal: No significant edema, no clubbing or cyanosis  Psychiatric:  Slightly confused but less so than yesterday, partially oriented  Neurologic: moving all extremities, halting speech  Skin: Warm and dry     Scheduled Meds:     aspirin, 81 mg, Oral, Daily  cholecalciferol, 1,000 Units, Oral, Daily  clonazePAM, 1 mg, Oral, Daily  furosemide, 40 mg, Oral, Daily  metoprolol succinate XL, 50 mg, Oral, Q12H  pantoprazole, 40 mg, Oral, Daily  rOPINIRole, 2 mg, Oral, Nightly  rosuvastatin, 40 mg, Oral, Daily  senna-docusate sodium, 2 tablet, Oral, BID  sodium chloride, 10 mL, Intravenous, Q12H  terazosin, 1 mg, Oral, Nightly      IV Meds:        Results Reviewed:   I have personally reviewed the results from the time of this admission to 2023 14:42 EDT     Results from last 7 days   Lab  Units 05/21/23  0510 05/20/23  0632 05/19/23  0717 05/18/23  0949   SODIUM mmol/L 138 143 141 145   POTASSIUM mmol/L 3.9 3.0* 3.3* 3.8   CHLORIDE mmol/L 100 99 103 110*   CO2 mmol/L 31.0* 30.3* 29.4* 26.3   BUN mg/dL 42* 33* 17 14   CREATININE mg/dL 1.80* 1.57* 1.35* 1.31*   CALCIUM mg/dL 8.4* 8.9 8.8 9.1   BILIRUBIN mg/dL  --   --  0.6 0.6   ALK PHOS U/L  --   --  167* 181*   ALT (SGPT) U/L  --   --  24 30   AST (SGOT) U/L  --   --  57* 79*   GLUCOSE mg/dL 99 107* 100* 105*       Estimated Creatinine Clearance: 34.4 mL/min (A) (by C-G formula based on SCr of 1.8 mg/dL (H)).    Results from last 7 days   Lab Units 05/21/23  0510   MAGNESIUM mg/dL 2.1   PHOSPHORUS mg/dL 2.8             Results from last 7 days   Lab Units 05/21/23  0510 05/20/23  0632 05/19/23  0717 05/18/23  0949   WBC 10*3/mm3 10.62 10.61 10.92* 12.70*   HEMOGLOBIN g/dL 11.5* 12.0* 11.4* 11.3*   PLATELETS 10*3/mm3 169 170 150 126*       Results from last 7 days   Lab Units 05/18/23  1624   INR  1.38*       Assessment / Plan     ASSESSMENT:  1.  BELLA on CKD3a, nonoliguric, with expected rise in SCR due to heart failure, diuretics, hypotension, and recent contrast exposure.  Volume excess, better.  Potassium normal.  Urinalysis on arrival with greater than 300 mg/dL protein  2.  Confusion, waxing and waning  3.  CHF with pulmonary edema and hypoxia  4.  NSTEMI  5.  PVD with recent stroke several weeks ago  6.  Anemia    PLAN:  1.  Continue oral furosemide 40 mg once daily  2.  Discussed with wife and daughter at bedside  3.  Surveillance labs    Thank you for involving us in the care of Art PERCY Mullins.  Please feel free to call with any questions.    Rayray Su MD  05/21/23  14:42 EDT    [facility]  [phone]    Please note that portions of this note were completed with a voice recognition program.

## 2023-05-22 PROBLEM — I50.33 ACUTE ON CHRONIC DIASTOLIC CONGESTIVE HEART FAILURE: Status: ACTIVE | Noted: 2023-01-01

## 2023-05-22 LAB
ANION GAP SERPL CALCULATED.3IONS-SCNC: 11.1 MMOL/L (ref 5–15)
BASOPHILS # BLD AUTO: 0.05 10*3/MM3 (ref 0–0.2)
BASOPHILS NFR BLD AUTO: 0.5 % (ref 0–1.5)
BUN SERPL-MCNC: 44 MG/DL (ref 8–23)
BUN/CREAT SERPL: 29.7 (ref 7–25)
CALCIUM SPEC-SCNC: 9.1 MG/DL (ref 8.6–10.5)
CHLORIDE SERPL-SCNC: 98 MMOL/L (ref 98–107)
CO2 SERPL-SCNC: 27.9 MMOL/L (ref 22–29)
CREAT SERPL-MCNC: 1.48 MG/DL (ref 0.76–1.27)
DEPRECATED RDW RBC AUTO: 41 FL (ref 37–54)
EGFRCR SERPLBLD CKD-EPI 2021: 45.8 ML/MIN/1.73
EOSINOPHIL # BLD AUTO: 0.2 10*3/MM3 (ref 0–0.4)
EOSINOPHIL NFR BLD AUTO: 1.8 % (ref 0.3–6.2)
ERYTHROCYTE [DISTWIDTH] IN BLOOD BY AUTOMATED COUNT: 12.4 % (ref 12.3–15.4)
GLUCOSE SERPL-MCNC: 99 MG/DL (ref 65–99)
HCT VFR BLD AUTO: 36 % (ref 37.5–51)
HGB BLD-MCNC: 12.3 G/DL (ref 13–17.7)
IMM GRANULOCYTES # BLD AUTO: 0.04 10*3/MM3 (ref 0–0.05)
IMM GRANULOCYTES NFR BLD AUTO: 0.4 % (ref 0–0.5)
LYMPHOCYTES # BLD AUTO: 0.94 10*3/MM3 (ref 0.7–3.1)
LYMPHOCYTES NFR BLD AUTO: 8.6 % (ref 19.6–45.3)
MCH RBC QN AUTO: 31.1 PG (ref 26.6–33)
MCHC RBC AUTO-ENTMCNC: 34.2 G/DL (ref 31.5–35.7)
MCV RBC AUTO: 91.1 FL (ref 79–97)
MONOCYTES # BLD AUTO: 1.24 10*3/MM3 (ref 0.1–0.9)
MONOCYTES NFR BLD AUTO: 11.3 % (ref 5–12)
NEUTROPHILS NFR BLD AUTO: 77.4 % (ref 42.7–76)
NEUTROPHILS NFR BLD AUTO: 8.48 10*3/MM3 (ref 1.7–7)
NRBC BLD AUTO-RTO: 0 /100 WBC (ref 0–0.2)
PLATELET # BLD AUTO: 174 10*3/MM3 (ref 140–450)
PMV BLD AUTO: 9.6 FL (ref 6–12)
POTASSIUM SERPL-SCNC: 3.5 MMOL/L (ref 3.5–5.2)
POTASSIUM SERPL-SCNC: 4 MMOL/L (ref 3.5–5.2)
POTASSIUM SERPL-SCNC: 4.2 MMOL/L (ref 3.5–5.2)
RBC # BLD AUTO: 3.95 10*6/MM3 (ref 4.14–5.8)
SODIUM SERPL-SCNC: 137 MMOL/L (ref 136–145)
WBC NRBC COR # BLD: 10.95 10*3/MM3 (ref 3.4–10.8)

## 2023-05-22 PROCEDURE — 85025 COMPLETE CBC W/AUTO DIFF WBC: CPT | Performed by: INTERNAL MEDICINE

## 2023-05-22 PROCEDURE — 84132 ASSAY OF SERUM POTASSIUM: CPT | Performed by: INTERNAL MEDICINE

## 2023-05-22 PROCEDURE — 80048 BASIC METABOLIC PNL TOTAL CA: CPT | Performed by: INTERNAL MEDICINE

## 2023-05-22 PROCEDURE — 97110 THERAPEUTIC EXERCISES: CPT

## 2023-05-22 PROCEDURE — 97530 THERAPEUTIC ACTIVITIES: CPT

## 2023-05-22 PROCEDURE — 99232 SBSQ HOSP IP/OBS MODERATE 35: CPT | Performed by: NURSE PRACTITIONER

## 2023-05-22 RX ORDER — POTASSIUM CHLORIDE 750 MG/1
40 TABLET, FILM COATED, EXTENDED RELEASE ORAL EVERY 4 HOURS
Status: COMPLETED | OUTPATIENT
Start: 2023-05-22 | End: 2023-05-22

## 2023-05-22 RX ADMIN — DOCUSATE SODIUM 50MG AND SENNOSIDES 8.6MG 2 TABLET: 8.6; 5 TABLET, FILM COATED ORAL at 10:37

## 2023-05-22 RX ADMIN — ACETAMINOPHEN 650 MG: 325 TABLET, FILM COATED ORAL at 14:32

## 2023-05-22 RX ADMIN — TERAZOSIN 1 MG: 1 CAPSULE ORAL at 21:45

## 2023-05-22 RX ADMIN — APIXABAN 2.5 MG: 2.5 TABLET, FILM COATED ORAL at 21:45

## 2023-05-22 RX ADMIN — METOPROLOL SUCCINATE 50 MG: 50 TABLET, FILM COATED, EXTENDED RELEASE ORAL at 10:37

## 2023-05-22 RX ADMIN — ROPINIROLE 2 MG: 2 TABLET, FILM COATED ORAL at 21:45

## 2023-05-22 RX ADMIN — PANTOPRAZOLE SODIUM 40 MG: 40 TABLET, DELAYED RELEASE ORAL at 10:37

## 2023-05-22 RX ADMIN — FUROSEMIDE 40 MG: 40 TABLET ORAL at 10:37

## 2023-05-22 RX ADMIN — CLONAZEPAM 1 MG: 1 TABLET ORAL at 10:37

## 2023-05-22 RX ADMIN — METOPROLOL SUCCINATE 50 MG: 50 TABLET, FILM COATED, EXTENDED RELEASE ORAL at 21:45

## 2023-05-22 RX ADMIN — ROSUVASTATIN CALCIUM 40 MG: 40 TABLET, FILM COATED ORAL at 10:36

## 2023-05-22 RX ADMIN — DOCUSATE SODIUM 50MG AND SENNOSIDES 8.6MG 2 TABLET: 8.6; 5 TABLET, FILM COATED ORAL at 21:45

## 2023-05-22 RX ADMIN — Medication 10 ML: at 10:37

## 2023-05-22 RX ADMIN — Medication 10 ML: at 21:46

## 2023-05-22 RX ADMIN — POTASSIUM CHLORIDE 40 MEQ: 750 TABLET, EXTENDED RELEASE ORAL at 17:40

## 2023-05-22 RX ADMIN — ASPIRIN 81 MG: 81 TABLET, COATED ORAL at 10:37

## 2023-05-22 RX ADMIN — POTASSIUM CHLORIDE 40 MEQ: 750 TABLET, EXTENDED RELEASE ORAL at 13:07

## 2023-05-22 RX ADMIN — APIXABAN 2.5 MG: 2.5 TABLET, FILM COATED ORAL at 13:07

## 2023-05-22 RX ADMIN — Medication 1000 UNITS: at 10:36

## 2023-05-22 NOTE — PLAN OF CARE
Goal Outcome Evaluation:  Plan of Care Reviewed With: patient           Outcome Evaluation: Pt seen for OT session this afternoon. Supine in bed upon arrival and transitioned to EOB with mod A. Stood with mod Ax1 and use of rwx. Pt then took steps to chair with min-mod Ax1. Cues for sequence with transfer and for proper positioning of rwx. Dep to don socks while seated EOB. Demo posterior lean initially. Cues for upright posterior with standing. Pt performed grooming tasks while seated in chair and required cues to attend to L side of head and use LUE as much as he could to assist. Pt continues to benefit from skilled OT to address deficits and maximize independence with ADLs.

## 2023-05-22 NOTE — CASE MANAGEMENT/SOCIAL WORK
Discharge Planning Assessment  [facility]     Patient Name: Art Mullins  MRN: [id number]  Today's Date: 5/22/2023    Admit Date: 5/18/2023    Plan: SNF referrals pending   Discharge Needs Assessment     Row Name 05/22/23 1510       Living Environment    People in Home spouse    Current Living Arrangements home    Potentially Unsafe Housing Conditions none    Primary Care Provided by self    Provides Primary Care For no one, unable/limited ability to care for self    Family Caregiver if Needed spouse    Quality of Family Relationships helpful;involved;supportive    Able to Return to Prior Arrangements yes       Resource/Environmental Concerns    Resource/Environmental Concerns none       Transition Planning    Patient/Family Anticipates Transition to inpatient rehabilitation facility    Patient/Family Anticipated Services at Transition skilled nursing;rehabilitation services    Transportation Anticipated family or friend will provide;car, drives self       Discharge Needs Assessment    Readmission Within the Last 30 Days no previous admission in last 30 days    Equipment Currently Used at Home none    Anticipated Changes Related to Illness inability to care for self;inability to care for someone else    Equipment Needed After Discharge other (see comments)    Discharge Facility/Level of Care Needs nursing facility, skilled    Current Discharge Risk chronically ill;cognitively impaired;physical impairment               Discharge Plan     Row Name 05/22/23 1510       Plan    Plan SNF referrals pending    Roadmap to Recovery Yes    Patient/Family in Agreement with Plan yes    Provided Post Acute Provider List? Yes    Post Acute Provider List Nursing Home;Home Health    Provided Post Acute Provider Quality & Resource List? Yes    Post Acute Provider Quality and Resource List Nursing Home    Delivered To Support Person    Method of Delivery In person    Plan Comments CCP spoke with pts wife Charito  (c#[phone]/home# [phone]) at bedside, introduced self and explained CCP role. Pt is intermittently confused. Verified facesheet and confirmed local pharmacy is [facility] [address]. She denies problems with medication management or cost. Pts living will on file, HCS is wife Charito. Prior to admission pt was IADL with mobility, pt lives at home with his wife in a patio home. Pt has no SNF or HH hx. Prior to pts first admission was IADL, played golf and drove. CCP discussed dc planning and wife confirms plan for SNF at [place]. CCP provided SNF list. She would like CCP to check [facility] as it is near there home. Requested additional options and provided medicare.gov ratings as well. Additional ref[facility] to [facility]. Partial Packet in CCP office. Elieser RODRIGUEZ/SHELLEY              Continued Care and Services - Admitted Since 5/18/2023     Destination     Service Provider Request Status Selected Services Address Phone Fax Patient Preferred    [facility] Pending - Request Sent N/A [address] [facility] [address] [phone] --    [facility] Pending - Request Sent N/A [address] [facility] [address] [phone] --    [facility] Pending - Request Sent N/A [address] [facility] [facility] [address] [phone] --                 Demographic Summary     Row Name 05/22/23 1510       General Information    Admission Type inpatient    Referral Source admission list    Reason for Consult discharge planning    Preferred Language English       Contact Information    Permission Granted to Share Info With family/designee;facility                Functional Status     Row Name 05/22/23 1516       Functional Status    Usual Activity Tolerance good    Current Activity Tolerance poor       Functional Status, IADL    Medications independent     Meal Preparation independent    Housekeeping independent    Laundry independent    Shopping independent       Mental Status    General Appearance WDL WDL       Mental Status Summary    Recent Changes in Mental Status/Cognitive Functioning mental status       Employment/    Employment Status retired               Psychosocial    No documentation.                Abuse/Neglect    No documentation.                Legal    No documentation.                Substance Abuse    No documentation.                Patient Forms    No documentation.                   Lorena Bender RN

## 2023-05-22 NOTE — DISCHARGE PLACEMENT REQUEST
"Ron Mullins (87 y.o. Male)     Date of Birth   1935    Social Security Number       Address   [address] [place] [address]    Home Phone   [phone]    MRN   [id number]       Scientologist   Orthodoxy    Marital Status                               Admission Date   5/18/23    Admission Type   Emergency    Admitting Provider   Jeremy Alvarenga MD    Attending Provider   Aashish Hatfield MD    Department, Room/Bed   [address], [address]       Discharge Date       Discharge Disposition       Discharge Destination                               Attending Provider: Aashish Hatfield MD    Allergies: Sulfa Antibiotics    Isolation: None   Infection: None   Code Status: CPR    Ht: 175.3 cm (69\")   Wt: 82.6 kg (182 lb)    Admission Cmt: None   Principal Problem: Altered mental status, unspecified altered mental status type [R41.82]                 Active Insurance as of 5/18/2023     Primary Coverage     Payor Plan Insurance Group Employer/Plan Group    MEDICARE MEDICARE A & B      Payor Plan Address Payor Plan Phone Number Payor Plan Fax Number Effective Dates    [address] [phone]  6/1/2001 - None Entered    [facility] [address]       Subscriber Name Subscriber Birth Date Member ID       RON MULLINS 1935 [id number]           Secondary Coverage     Payor Plan Insurance Group Employer/Plan Group    [facility] SUPP [id number]     Payor Plan Address Payor Plan Phone Number Payor Plan Fax Number Effective Dates    [address]   12/1/2016 - None Entered    [facility] [id number]       Subscriber Name Subscriber Birth Date Member ID       RON MULLINS 1935 [id number]                 Emergency Contacts      (Rel.) Home Phone Work Phone Mobile Phone    Charito Mullins (Spouse) [phone] -- --              "

## 2023-05-22 NOTE — THERAPY TREATMENT NOTE
Patient Name: Art Mullins  : 1935    MRN: [id number]                              Today's Date: 2023       Admit Date: 2023    Visit Dx:     ICD-10-CM ICD-9-CM   1. Altered mental status, unspecified altered mental status type  R41.82 780.97   2. Acute congestive heart failure, unspecified heart failure type  I50.9 428.0   3. NSTEMI (non-ST elevated myocardial infarction)  I21.4 410.70     Patient Active Problem List   Diagnosis   • Essential hypertension   • Acute CVA (cerebrovascular accident)   • CKD (chronic kidney disease)   • BELLA (acute kidney injury)   • Elevated troponin   • Vision changes   • Altered mental status, unspecified altered mental status type   • NSTEMI (non-ST elevated myocardial infarction)   • CHF (congestive heart failure)     Past Medical History:   Diagnosis Date   • Hyperlipidemia    • Hypertension    • PVCs (premature ventricular contractions)      Past Surgical History:   Procedure Laterality Date   • CARDIAC CATHETERIZATION N/A 2023    Procedure: Left Heart Cath;  Surgeon: Ricardo Arrieta MD;  Location: [facility] INVASIVE LOCATION;  Service: Cardiology;  Laterality: N/A;   • CARDIAC CATHETERIZATION N/A 2023    Procedure: Coronary angiography;  Surgeon: Ricardo Arrieta MD;  Location: [facility] INVASIVE LOCATION;  Service: Cardiology;  Laterality: N/A;      General Information     Row Name 23 1344          OT Time and Intention    Document Type therapy note (daily note)  -KA     Mode of Treatment occupational therapy;individual therapy  -KA     Row Name 23 1344          General Information    Patient Profile Reviewed yes  -KA     Existing Precautions/Restrictions fall  -KA     Row Name 23 1344          Cognition    Orientation Status (Cognition) oriented x 3  -KA     Row Name 23 1344          Safety Issues, Functional Mobility    Impairments Affecting Function (Mobility) grasp;motor control;motor planning;coordination;balance;range of  motion (ROM);strength;endurance/activity tolerance  -     Comment, Safety Issues/Impairments (Mobility) nonskid socks and gait belt donned for safety  -           User Key  (r) = Recorded By, (t) = Taken By, (c) = Cosigned By    Initials Name Provider Type    Nani Haywood OT Occupational Therapist                 Mobility/ADL's     Row Name 05/22/23 1345          Bed Mobility    Bed Mobility supine-sit  -     Supine-Sit [place] (Bed Mobility) moderate assist (50% patient effort);1 person assist  -     Assistive Device (Bed Mobility) bed rails;head of bed elevated  -     Row Name 05/22/23 1345          Transfers    Transfers sit-stand transfer  -     Row Name 05/22/23 134          Sit-Stand Transfer    Sit-Stand [place] (Transfers) moderate assist (50% patient effort)  -     Assistive Device (Sit-Stand Transfers) walker, front-wheeled  -     Comment, (Sit-Stand Transfer) Cues for upright posture while standing EOB  -     Row Name 05/22/23 134          Functional Mobility    Functional Mobility- Comment Took steps to bedside chair with min-mod A and use of rwx. Cues for sequencing transfer throughout and proper placement of rwx.  -     Row Name 05/22/23 1345          Activities of Daily Living    BADL Assessment/Intervention lower body dressing  -     Row Name 05/22/23 1345          Grooming Assessment/Training    [place] Level (Grooming) standby assist;verbal cues;grooming skills;hair care, combing/brushing;nonverbal cues (demo/gesture)  -     Comment, (Grooming) Cues to use LUE with task and attend to L side of head  -     Row Name 05/22/23 1345          Lower Body Dressing Assessment/Training    [place] Level (Lower Body Dressing) lower body dressing skills;doff;don;socks;dependent (less than 25% patient effort)  -     Position (Lower Body Dressing) edge of bed sitting  -     Comment, (Lower Body Dressing) Cues for posture. Posterior lean noted initially   -KA           User Key  (r) = Recorded By, (t) = Taken By, (c) = Cosigned By    Initials Name Provider Type    Nani Haywood OT Occupational Therapist               Obj/Interventions     Row Name 05/22/23 1350          Balance    Balance Assessment sitting static balance;sitting dynamic balance;standing static balance;standing dynamic balance  -     Static Sitting Balance contact guard  -     Dynamic Sitting Balance contact guard;minimal assist  -     Static Standing Balance minimal assist  -DELMIS     Dynamic Standing Balance minimal assist;moderate assist  -KA     Position/Device Used, Standing Balance walker, front-wheeled  -KA     Balance Interventions sitting;standing;occupation based/functional task  -           User Key  (r) = Recorded By, (t) = Taken By, (c) = Cosigned By    Initials Name Provider Type    Nani Haywood OT Occupational Therapist               Goals/Plan     Row Name 05/22/23 1354          Transfer Goal 1 (OT)    Activity/Assistive Device (Transfer Goal 1, OT) toilet;commode, bedside without drop arms  -     [place] Level/Cues Needed (Transfer Goal 1, OT) contact guard required  -KA     Time Frame (Transfer Goal 1, OT) short term goal (STG);2 weeks  -     Progress/Outcome (Transfer Goal 1, OT) goal ongoing  -     Row Name 05/22/23 1354          Bathing Goal 1 (OT)    Activity/Device (Bathing Goal 1, OT) bathing skills, all  -KA     [place] Level/Cues Needed (Bathing Goal 1, OT) minimum assist (75% or more patient effort)  -KA     Time Frame (Bathing Goal 1, OT) 2 weeks;short term goal (STG)  -KA     Progress/Outcomes (Bathing Goal 1, OT) good progress toward goal  -     Row Name 05/22/23 1354          Dressing Goal 1 (OT)    Activity/Device (Dressing Goal 1, OT) dressing skills, all  -KA     [place]/Cues Needed (Dressing Goal 1, OT) minimum assist (75% or more patient effort)  -KA     Time Frame (Dressing Goal 1, OT) 2 weeks;short term goal (STG)  -      Progress/Outcome (Dressing Goal 1, OT) good progress toward goal  -KA     Row Name 05/22/23 1354          Grooming Goal 1 (OT)    Activity/Device (Grooming Goal 1, OT) grooming skills, all  -KA     [place] (Grooming Goal 1, OT) standby assist;set-up required  -KA     Time Frame (Grooming Goal 1, OT) 1 week;short term goal (STG)  -KA     Progress/Outcome (Grooming Goal 1, OT) good progress toward goal  -KA           User Key  (r) = Recorded By, (t) = Taken By, (c) = Cosigned By    Initials Name Provider Type    Nani Haywood, OT Occupational Therapist               Clinical Impression     Row Name 05/22/23 1351          Pain Assessment    Pretreatment Pain Rating 0/10 - no pain  -KA     Posttreatment Pain Rating 0/10 - no pain  -KA     Row Name 05/22/23 1351          Plan of Care Review    Plan of Care Reviewed With patient  -     Outcome Evaluation Pt seen for OT session this afternoon. Supine in bed upon arrival and transitioned to EOB with mod A. Stood with mod Ax1 and use of rwx. Pt then took steps to chair with min-mod Ax1. Cues for sequence with transfer and for proper positioning of rwx. Dep to don socks while seated EOB. Demo posterior lean initially. Cues for upright posterior with standing. Pt performed grooming tasks while seated in chair and required cues to attend to L side of head and use LUE as much as he could to assist. Pt continues to benefit from skilled OT to address deficits and maximize independence with ADLs.  -KA     Row Name 05/22/23 1351          Vital Signs    Pre Patient Position Supine  -KA     Intra Patient Position Standing  -KA     Post Patient Position Sitting  -KA     Row Name 05/22/23 1351          Positioning and Restraints    Pre-Treatment Position in bed  -KA     Post Treatment Position chair  -KA     In Bed notified nsg;sitting;with PT  -KA           User Key  (r) = Recorded By, (t) = Taken By, (c) = Cosigned By    Initials Name Provider Type    DELMIS El  TC Cardoza Occupational Therapist               Outcome Measures     Row Name 05/22/23 1358          How much help from another is currently needed...    Putting on and taking off regular lower body clothing? 2  -KA     Bathing (including washing, rinsing, and drying) 2  -KA     Toileting (which includes using toilet bed pan or urinal) 1  -KA     Putting on and taking off regular upper body clothing 3  -KA     Taking care of personal grooming (such as brushing teeth) 4  -KA     Eating meals 4  -KA     AM-PAC 6 Clicks Score (OT) 16  -KA     Row Name 05/22/23 1357          Functional Assessment    Outcome Measure Options AM-PAC 6 Clicks Daily Activity (OT)  -KA           User Key  (r) = Recorded By, (t) = Taken By, (c) = Cosigned By    Initials Name Provider Type    Nani Haywood OT Occupational Therapist                Occupational Therapy Education     Title: PT OT SLP Therapies (In Progress)     Topic: Occupational Therapy (In Progress)     Point: ADL training (Done)     Description:   Instruct learner(s) on proper safety adaptation and remediation techniques during self care or transfers.   Instruct in proper use of assistive devices.              Learning Progress Summary           Patient Acceptance, E,TB,D, VU,NR by RE at 5/21/2023 1303   Family Acceptance, E,TB,D, VU,NR by RE at 5/21/2023 1303                   Point: Home exercise program (Not Started)     Description:   Instruct learner(s) on appropriate technique for monitoring, assisting and/or progressing therapeutic exercises/activities.              Learner Progress:  Not documented in this visit.          Point: Precautions (Not Started)     Description:   Instruct learner(s) on prescribed precautions during self-care and functional transfers.              Learner Progress:  Not documented in this visit.          Point: Body mechanics (Not Started)     Description:   Instruct learner(s) on proper positioning and spine alignment during self-care,  functional mobility activities and/or exercises.              Learner Progress:  Not documented in this visit.                      User Key     Initials Effective Dates Name Provider Type Discipline    RE 06/16/21 -  Beti Dodge OTR Occupational Therapist OT              OT Recommendation and Plan     Plan of Care Review  Plan of Care Reviewed With: patient  Outcome Evaluation: Pt seen for OT session this afternoon. Supine in bed upon arrival and transitioned to EOB with mod A. Stood with mod Ax1 and use of rwx. Pt then took steps to chair with min-mod Ax1. Cues for sequence with transfer and for proper positioning of rwx. Dep to don socks while seated EOB. Demo posterior lean initially. Cues for upright posterior with standing. Pt performed grooming tasks while seated in chair and required cues to attend to L side of head and use LUE as much as he could to assist. Pt continues to benefit from skilled OT to address deficits and maximize independence with ADLs.     Time Calculation:    Time Calculation- OT     Row Name 05/22/23 1355             Time Calculation- OT    OT Start Time 1300  -KA      OT Stop Time 1335  -KA      OT Time Calculation (min) 35 min  -KA      Total Timed Code Minutes- OT 35 minute(s)  -KA      OT Received On 05/22/23  -KA      OT - Next Appointment 05/23/23  -KA      OT Goal Re-Cert Due Date 06/05/23  -KA         Timed Charges    03120 - OT Therapeutic Activity Minutes 35  -KA         Total Minutes    Timed Charges Total Minutes 35  -KA       Total Minutes 35  -KA            User Key  (r) = Recorded By, (t) = Taken By, (c) = Cosigned By    Initials Name Provider Type     Nani El OT Occupational Therapist              Therapy Charges for Today     Code Description Service Date Service Provider Modifiers Qty    [id number]  OT THERAPEUTIC ACT EA 15 MIN 5/22/2023 Nani El OT GO 2               Nani El OT  5/22/2023

## 2023-05-22 NOTE — PROGRESS NOTES
"    Patient Name: Art Mullins  :1935  87 y.o.      Patient Care Team:  Santos Simmons MD as PCP - General (Family Medicine)  Aashish Thapa MD as Consulting Physician (Cardiology)    Chief Complaint: follow up heart failure , non obstructive coronary artery disease    Interval History: He is resting in bed. Has not really been up much. Vitals stable. Good urine output.        Objective   Vital Signs  Temp:  [97.6 °F (36.4 °C)-99 °F (37.2 °C)] 97.6 °F (36.4 °C)  Heart Rate:  [71-83] 71  Resp:  [18] 18  BP: (110-125)/(59-67) 114/59    Intake/Output Summary (Last 24 hours) at 2023 1342  Last data filed at 2023 1300  Gross per 24 hour   Intake 808 ml   Output 1700 ml   Net -892 ml     Flowsheet Rows    Flowsheet Row First Filed Value   Admission Height 175.3 cm (69\") Documented at 2023 0957   Admission Weight 82.6 kg (182 lb) Documented at 2023 0957          Physical Exam:   General Appearance:    Alert, cooperative, in no acute distress   Lungs:     Clear to auscultation.  Normal respiratory effort and rate.      Heart:    Regular rhythm and normal rate, normal S1 and S2, no murmurs, gallops or rubs.     Chest Wall:    No abnormalities observed   Abdomen:     Soft, nontender, positive bowel sounds.     Extremities:   no cyanosis, clubbing or edema.  No marked joint deformities.  Adequate musculoskeletal strength.       Results Review:    Results from last 7 days   Lab Units 23  0553   SODIUM mmol/L 137   POTASSIUM mmol/L 3.5   CHLORIDE mmol/L 98   CO2 mmol/L 27.9   BUN mg/dL 44*   CREATININE mg/dL 1.48*   GLUCOSE mg/dL 99   CALCIUM mg/dL 9.1     Results from last 7 days   Lab Units 23  0828 23  0632 23  0717 23  1624   CK TOTAL U/L  --   --   --  288*   HSTROP T ng/L 1,613* 1,495* 1,127*  --      Results from last 7 days   Lab Units 23  0553   WBC 10*3/mm3 10.95*   HEMOGLOBIN g/dL 12.3*   HEMATOCRIT % 36.0*   PLATELETS 10*3/mm3 174 "     Results from last 7 days   Lab Units 05/20/23  1709 05/20/23  0632 05/19/23  2042 05/19/23  0036 05/18/23  1624   INR   --   --   --   --  1.38*   APTT seconds 38.2* 66.9* 36.4*   < > 34.6    < > = values in this interval not displayed.     Results from last 7 days   Lab Units 05/21/23  0510   MAGNESIUM mg/dL 2.1                   Medication Review:   apixaban, 2.5 mg, Oral, Q12H  aspirin, 81 mg, Oral, Daily  cholecalciferol, 1,000 Units, Oral, Daily  clonazePAM, 1 mg, Oral, Daily  furosemide, 40 mg, Oral, Daily  metoprolol succinate XL, 50 mg, Oral, Q12H  pantoprazole, 40 mg, Oral, Daily  potassium chloride ER, 40 mEq, Oral, Q4H  rOPINIRole, 2 mg, Oral, Nightly  rosuvastatin, 40 mg, Oral, Daily  senna-docusate sodium, 2 tablet, Oral, BID  sodium chloride, 10 mL, Intravenous, Q12H  terazosin, 1 mg, Oral, Nightly              Assessment & Plan   1. Acute on chronic diastolic heart failure, now on oral diuretics.   2. Elevated troponin consistent to type II NSTEMI frm heart failure. Echo showed hypokinesis of the basal to mid inferolateral wall but cath on 5/19 showed just non obstructive disease.   3. Severe pulmonary hypertension  4. Acute on Chronic kidney disease- nephrology is following. Waste products better today.   5. Thrombocytopenia - appears stable  6. Recent stroke, started empirically on OAC/ holter placed but not resulted.     Apixaban has been restarted. Would stop ASA due to >>risk.   Nothing further to add. Will see as needed.   He should follow up with primary cardiologist, Dr. Thapa in 2-4 weeks.     CRISTÓBAL Dunne  [facility]  05/22/23  13:42 EDT

## 2023-05-22 NOTE — PROGRESS NOTES
Name: Art Mullins ADMIT: 2023   : 1935  PCP: Santos Simmons MD    MRN: [id number] LOS: 3 days   AGE/SEX: 87 y.o. male  ROOM: [facility]     Subjective   Subjective   No acute events. Patient denies new complaints. Taking PO. No CP/fdyspnea/f/c/n/v/d.  His wife is at bedside.  Objective   Objective   Vital Signs  Temp:  [97.6 °F (36.4 °C)-99 °F (37.2 °C)] 97.6 °F (36.4 °C)  Heart Rate:  [71-83] 74  Resp:  [18] 18  BP: (110-125)/(59-67) 114/59  SpO2:  [90 %-97 %] 94 %  on  Flow (L/min):  [1-3] 2;   Device (Oxygen Therapy): room air  Body mass index is 26.88 kg/m².  Physical Exam  Vitals and nursing note reviewed.   Constitutional:       General: He is not in acute distress.     Appearance: He is ill-appearing (chronically). He is not toxic-appearing or diaphoretic.   HENT:      Head: Normocephalic and atraumatic.      Nose: Nose normal.      Mouth/Throat:      Mouth: Mucous membranes are moist.      Pharynx: Oropharynx is clear.   Eyes:      Conjunctiva/sclera: Conjunctivae normal.      Pupils: Pupils are equal, round, and reactive to light.   Cardiovascular:      Rate and Rhythm: Normal rate and regular rhythm.      Pulses: Normal pulses.   Pulmonary:      Effort: Pulmonary effort is normal.      Breath sounds: Normal breath sounds. No rales.   Abdominal:      General: Bowel sounds are normal.      Palpations: Abdomen is soft.   Musculoskeletal:         General: Swelling (trace BLE) present. No tenderness.      Cervical back: Normal range of motion and neck supple.   Skin:     General: Skin is warm and dry.      Capillary Refill: Capillary refill takes less than 2 seconds.   Neurological:      General: No focal deficit present.      Mental Status: He is alert.   Psychiatric:         Mood and Affect: Mood normal.         Behavior: Behavior normal.       Results Review     I reviewed the patient's new clinical results.  Results from last 7 days   Lab Units 23  0553 23  0510  05/20/23  0632 05/19/23  0717   WBC 10*3/mm3 10.95* 10.62 10.61 10.92*   HEMOGLOBIN g/dL 12.3* 11.5* 12.0* 11.4*   PLATELETS 10*3/mm3 174 169 170 150     Results from last 7 days   Lab Units 05/22/23  1416 05/22/23  0553 05/21/23  0510 05/20/23  0632 05/19/23  0717   SODIUM mmol/L  --  137 138 143 141   POTASSIUM mmol/L 4.2 3.5 3.9 3.0* 3.3*   CHLORIDE mmol/L  --  98 100 99 103   CO2 mmol/L  --  27.9 31.0* 30.3* 29.4*   BUN mg/dL  --  44* 42* 33* 17   CREATININE mg/dL  --  1.48* 1.80* 1.57* 1.35*   GLUCOSE mg/dL  --  99 99 107* 100*   EGFR mL/min/1.73  --  45.8* 36.2* 42.7* 51.1*     Results from last 7 days   Lab Units 05/21/23  0510 05/19/23  0717 05/18/23  0949   ALBUMIN g/dL 3.3* 3.3* 3.5   BILIRUBIN mg/dL  --  0.6 0.6   ALK PHOS U/L  --  167* 181*   AST (SGOT) U/L  --  57* 79*   ALT (SGPT) U/L  --  24 30     Results from last 7 days   Lab Units 05/22/23  0553 05/21/23  0510 05/20/23  0632 05/19/23  0717 05/18/23  0949   CALCIUM mg/dL 9.1 8.4* 8.9 8.8 9.1   ALBUMIN g/dL  --  3.3*  --  3.3* 3.5   MAGNESIUM mg/dL  --  2.1  --   --   --    PHOSPHORUS mg/dL  --  2.8  --   --   --        No results found for: HGBA1C, POCGLU    No radiology results for the last day  I have personally reviewed all medications:  Scheduled Medications  apixaban, 2.5 mg, Oral, Q12H  aspirin, 81 mg, Oral, Daily  cholecalciferol, 1,000 Units, Oral, Daily  clonazePAM, 1 mg, Oral, Daily  furosemide, 40 mg, Oral, Daily  metoprolol succinate XL, 50 mg, Oral, Q12H  pantoprazole, 40 mg, Oral, Daily  rOPINIRole, 2 mg, Oral, Nightly  rosuvastatin, 40 mg, Oral, Daily  senna-docusate sodium, 2 tablet, Oral, BID  sodium chloride, 10 mL, Intravenous, Q12H  terazosin, 1 mg, Oral, Nightly    Infusions   Diet  Diet: Cardiac Diets; Healthy Heart (2-3 Na+); Texture: Regular Texture (IDDSI 7); Fluid Consistency: Thin (IDDSI 0)    I have personally reviewed:  [x]  Laboratory   []  Microbiology   [x]  Radiology   [x]  EKG/Telemetry  [x]  Cardiology/Vascular    []  Pathology    [x]  Records       Assessment/Plan     Active Hospital Problems    Diagnosis  POA   • **Acute on chronic diastolic congestive heart failure [I50.33]  Yes   • Altered mental status, unspecified altered mental status type [R41.82]  Yes   • Acute CVA (cerebrovascular accident) [I63.9]  Yes   • Stage 3a chronic kidney disease [N18.31]  Yes   • Elevated troponin [R77.8]  Yes   • Essential hypertension [I10]  Yes      Resolved Hospital Problems   No resolved problems to display.   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  Full code.  Discussed with patient, spouse, nursing staff and care team on multidisciplinary rounds.  Anticipate discharge to SNU facility once arrangements have been made..      Aashish Hatfield MD  [facility]  05/22/23  17:28 EDT

## 2023-05-22 NOTE — THERAPY TREATMENT NOTE
Patient Name: Art Mullins  : 1935    MRN: [id number]                              Today's Date: 2023       Admit Date: 2023    Visit Dx:     ICD-10-CM ICD-9-CM   1. Altered mental status, unspecified altered mental status type  R41.82 780.97   2. Acute congestive heart failure, unspecified heart failure type  I50.9 428.0   3. NSTEMI (non-ST elevated myocardial infarction)  I21.4 410.70     Patient Active Problem List   Diagnosis   • Essential hypertension   • Acute CVA (cerebrovascular accident)   • CKD (chronic kidney disease)   • BELLA (acute kidney injury)   • Elevated troponin   • Vision changes   • Altered mental status, unspecified altered mental status type   • NSTEMI (non-ST elevated myocardial infarction)   • CHF (congestive heart failure)     Past Medical History:   Diagnosis Date   • Hyperlipidemia    • Hypertension    • PVCs (premature ventricular contractions)      Past Surgical History:   Procedure Laterality Date   • CARDIAC CATHETERIZATION N/A 2023    Procedure: Left Heart Cath;  Surgeon: Ricardo Arrieta MD;  Location: [facility] CATH INVASIVE LOCATION;  Service: Cardiology;  Laterality: N/A;   • CARDIAC CATHETERIZATION N/A 2023    Procedure: Coronary angiography;  Surgeon: Ricardo Arrieta MD;  Location: [facility] CATH INVASIVE LOCATION;  Service: Cardiology;  Laterality: N/A;      General Information     Row Name 23 1349          Physical Therapy Time and Intention    Document Type therapy note (daily note)  -     Mode of Treatment physical therapy;individual therapy  -     Row Name 23 1348          General Information    Patient Profile Reviewed yes  -     Existing Precautions/Restrictions fall  -     Row Name 23 1349          Living Environment    People in Home spouse  -     Row Name 23 1349          Cognition    Orientation Status (Cognition) oriented x 3  -     Row Name 23 1349          Safety Issues, Functional Mobility    Safety Issues  Affecting Function (Mobility) ability to follow commands;insight into deficits/self-awareness;judgment;positioning of assistive device;problem-solving;safety precaution awareness;sequencing abilities  L side neglect  -     Impairments Affecting Function (Mobility) balance;coordination;endurance/activity tolerance;grasp;strength;visual/perceptual  -           User Key  (r) = Recorded By, (t) = Taken By, (c) = Cosigned By    Initials Name Provider Type    Abby Ng PTA Physical Therapist Assistant               Mobility     Row Name 05/22/23 1351          Bed Mobility    Comment, (Bed Mobility) in chair , finishing w/OT  -     Row Name 05/22/23 1351          Sit-Stand Transfer    Sit-Stand Greene (Transfers) 2 person assist;moderate assist (50% patient effort);verbal cues;nonverbal cues (demo/gesture)  -     Assistive Device (Sit-Stand Transfers) walker, front-wheeled  -     Comment, (Sit-Stand Transfer) knee buckling bilat initially, diff following directions to avoid, had to perf STS x3  -     Row Name 05/22/23 1351          Gait/Stairs (Locomotion)    Greene Level (Gait) 2 person assist;minimum assist (75% patient effort);moderate assist (50% patient effort);verbal cues;nonverbal cues (demo/gesture)  -     Assistive Device (Gait) walker, front-wheeled  -     Distance in Feet (Gait) pt able to amb ~20ft, but constant cues, assist to guide rwx, L neglect, running into objects in room on L if not guided away from, easily off task  -     Deviations/Abnormal Patterns (Gait) left sided deviations;ataxic;base of support, narrow;edmar decreased;festinating/shuffling;stride length decreased;weight shifting decreased  -     Bilateral Gait Deviations forward flexed posture  -     Left Sided Gait Deviations decreased knee extension;leans left  -           User Key  (r) = Recorded By, (t) = Taken By, (c) = Cosigned By    Initials Name Provider Type    Abby Ng PTA  Physical Therapist Assistant               Obj/Interventions     Row Name 05/22/23 1558          Motor Skills    Therapeutic Exercise --  QS, APs, LAQs, seated MIP x10 reps -difficulty counting and keeping on task  -           User Key  (r) = Recorded By, (t) = Taken By, (c) = Cosigned By    Initials Name Provider Type    Abby Ng PTA Physical Therapist Assistant               Goals/Plan    No documentation.                Clinical Impression     Row Name 05/22/23 1559          Pain    Pretreatment Pain Rating 0/10 - no pain  -     Posttreatment Pain Rating 0/10 - no pain  -[facility] Name 05/22/23 1559          Plan of Care Review    Plan of Care Reviewed With patient;spouse  -     Progress improving  -     Outcome Evaluation Pt agreed to PT session, but easily off task, fidgety-wife states that is normal for him but worse here; pt willie STS w/assist of 2, req assist of 2 for amb due to easily off task and L neglect limiting safety, willie 10 reps of exer , but rests and cues required, pt amb ~ 20ft w/assist of 2, will benefit from SNU at [place] , wife will not be able to attend to his current deficits  -[facility] Name 05/22/23 1559          Therapy Assessment/Plan (PT)    Criteria for Skilled Interventions Met (PT) yes  -[facility] Name 05/22/23 1559          Vital Signs    O2 Delivery Pre Treatment room air  -JM     Row Name 05/22/23 1559          Positioning and Restraints    Pre-Treatment Position sitting in chair/recliner  -     Post Treatment Position chair  -     In Chair reclined;call light within reach;encouraged to call for assist;exit alarm on;with family/caregiver;notified nsg  -           User Key  (r) = Recorded By, (t) = Taken By, (c) = Cosigned By    Initials Name Provider Type    Abby Ng PTA Physical Therapist Assistant               Outcome Measures     Row Name 05/22/23 1448          How much help from another person do you currently need...    Turning from your  back to your side while in flat bed without using bedrails? 2  -JM     Moving from lying on back to sitting on the side of a flat bed without bedrails? 2  -JM     Moving to and from a bed to a chair (including a wheelchair)? 2  -JM     Standing up from a chair using your arms (e.g., wheelchair, bedside chair)? 2  -JM     Climbing 3-5 steps with a railing? 1  -JM     To walk in hospital room? 2  -JM     AM-PAC 6 Clicks Score (PT) 11  -JM     Highest level of mobility 4 --> Transferred to chair/commode  -JM     Row Name 05/22/23 1355          Functional Assessment    Outcome Measure Options AM-PAC 6 Clicks Daily Activity (OT)  -DELMIS           User Key  (r) = Recorded By, (t) = Taken By, (c) = Cosigned By    Initials Name Provider Type    Abby Ng, MADELYN Physical Therapist Assistant    Nani Haywood, OT Occupational Therapist                             Physical Therapy Education     Title: PT OT SLP Therapies (In Progress)     Topic: Physical Therapy (In Progress)     Point: Mobility training (In Progress)     Learning Progress Summary           Patient Acceptance, E,D, NR by JEB at 5/22/2023 1605    Acceptance, E,D, DU,VU by DIANA at 5/21/2023 1417    Acceptance, E,D, DU,VU by DIANA at 5/20/2023 1339   Family Acceptance, E,D, NR by JEB at 5/22/2023 1605                   Point: Home exercise program (In Progress)     Learning Progress Summary           Patient Acceptance, E,D, NR by JEB at 5/22/2023 1605    Acceptance, E,D, DU,VU by DIANA at 5/21/2023 1417    Acceptance, E,D, DU,VU by DIANA at 5/20/2023 1339   Family Acceptance, E,D, NR by JEB at 5/22/2023 1605                   Point: Body mechanics (In Progress)     Learning Progress Summary           Patient Acceptance, E,D, NR by JEB at 5/22/2023 1605    Acceptance, E,D, DU,VU by DIANA at 5/21/2023 1417    Acceptance, E,D, DU,VU by DIANA at 5/20/2023 1339   Family Acceptance, E,D, NR by JEB at 5/22/2023 1605                   Point: Precautions (In Progress)     Learning  Progress Summary           Patient Acceptance, E,D, NR by JEB at 5/22/2023 1605    Acceptance, E,D, DU,VU by DIANA at 5/21/2023 1417    Acceptance, E,D, DU,VU by DIANA at 5/20/2023 1339   Family Acceptance, E,D, NR by JEB at 5/22/2023 1605                               User Key     Initials Effective Dates Name Provider Type [facility] 03/07/18 -  Abby Abraham PTA Physical Therapist Assistant PT    DIANA 07/02/20 -  Humble Leos, PT DPT Physical Therapist PT              PT Recommendation and Plan     Plan of Care Reviewed With: patient, spouse  Progress: improving  Outcome Evaluation: Pt agreed to PT session, but easily off task, fidgety-wife states that is normal for him but worse here; pt willie STS w/assist of 2, req assist of 2 for amb due to easily off task and L neglect limiting safety, willie 10 reps of exer , but rests and cues required, pt amb ~ 20ft w/assist of 2, will benefit from SNU at [place] , wife will not be able to attend to his current deficits     Time Calculation:    PT Charges     Row Name 05/22/23 1605             Time Calculation    Start Time 1401  -      Stop Time 1419  -      Time Calculation (min) 18 min  -      PT Received On 05/22/23  -      PT - Next Appointment 05/23/23  -            User Key  (r) = Recorded By, (t) = Taken By, (c) = Cosigned By    Initials Name Provider Type    Abby Ng PTA Physical Therapist Assistant              Therapy Charges for Today     Code Description Service Date Service Provider Modifiers Qty    03916049043 HC PT THER PROC EA 15 MIN 5/22/2023 Abby Abraham PTA GP 1    16783598015 HC PT THER SUPP EA 15 MIN 5/22/2023 Abby Abraham PTA GP 1          PT G-Codes  Outcome Measure Options: AM-PAC 6 Clicks Daily Activity (OT)  AM-PAC 6 Clicks Score (PT): 11  AM-PAC 6 Clicks Score (OT): 16  Modified [place] Scale: 4 - Moderately severe disability.  Unable to walk without assistance, and unable to attend to own bodily needs without  assistance.  PT Discharge Summary  Anticipated Discharge Disposition (PT): skilled nursing facility    Abby Abraham, PTA  5/22/2023

## 2023-05-22 NOTE — PROGRESS NOTES
[facility] Progress Note      Patient Name: Art Mullins  : 1935  MRN: [id number]  Primary Care Physician:  Santos Simmons MD  Date of admission: 2023    Subjective     Interval History:     Seen and examined.  Currently on 2 L.  Feeling much better and denies any shortness of air.    Review of Systems:   As noted above    Objective     Vitals:   Temp:  [97.8 °F (36.6 °C)-99 °F (37.2 °C)] 97.9 °F (36.6 °C)  Heart Rate:  [74-83] 79  Resp:  [18] 18  BP: (119-125)/(64-72) 123/67  Flow (L/min):  [3] 3    Intake/Output Summary (Last 24 hours) at 2023 0823  Last data filed at 2023 0500  Gross per 24 hour   Intake 328 ml   Output 1600 ml   Net -1272 ml       Physical Exam:    Constitutional: Awake, alert, frail, pleasant  HEENT: Sclera anicteric, no conjunctival injection, MMM  Neck: Supple, no carotid bruit, trachea at midline, no JVD  Respiratory: CTA  Cardiovascular: RRR, no rub  Gastrointestinal: BS +, soft, nontender and nondistended  : No palpable bladder; external catheter  Musculoskeletal: No significant edema, no clubbing or cyanosis  Psychiatric:  Slightly confused but less so than yesterday, partially oriented  Neurologic: moving all extremities, halting speech  Skin: Warm and dry     Scheduled Meds:     aspirin, 81 mg, Oral, Daily  cholecalciferol, 1,000 Units, Oral, Daily  clonazePAM, 1 mg, Oral, Daily  furosemide, 40 mg, Oral, Daily  metoprolol succinate XL, 50 mg, Oral, Q12H  pantoprazole, 40 mg, Oral, Daily  potassium chloride ER, 40 mEq, Oral, Q4H  rOPINIRole, 2 mg, Oral, Nightly  rosuvastatin, 40 mg, Oral, Daily  senna-docusate sodium, 2 tablet, Oral, BID  sodium chloride, 10 mL, Intravenous, Q12H  terazosin, 1 mg, Oral, Nightly      IV Meds:        Results Reviewed:   I have personally reviewed the results from the time of this admission to 2023 08:23 EDT     Results from last 7 days   Lab Units 23  0553 23  0510  05/20/23  0632 05/19/23  0717 05/18/23  0949   SODIUM mmol/L 137 138 143 141 145   POTASSIUM mmol/L 3.5 3.9 3.0* 3.3* 3.8   CHLORIDE mmol/L 98 100 99 103 110*   CO2 mmol/L 27.9 31.0* 30.3* 29.4* 26.3   BUN mg/dL 44* 42* 33* 17 14   CREATININE mg/dL 1.48* 1.80* 1.57* 1.35* 1.31*   CALCIUM mg/dL 9.1 8.4* 8.9 8.8 9.1   BILIRUBIN mg/dL  --   --   --  0.6 0.6   ALK PHOS U/L  --   --   --  167* 181*   ALT (SGPT) U/L  --   --   --  24 30   AST (SGOT) U/L  --   --   --  57* 79*   GLUCOSE mg/dL 99 99 107* 100* 105*       Estimated Creatinine Clearance: 41.9 mL/min (A) (by C-G formula based on SCr of 1.48 mg/dL (H)).    Results from last 7 days   Lab Units 05/21/23  0510   MAGNESIUM mg/dL 2.1   PHOSPHORUS mg/dL 2.8             Results from last 7 days   Lab Units 05/22/23  0553 05/21/23  0510 05/20/23  0632 05/19/23  0717 05/18/23  0949   WBC 10*3/mm3 10.95* 10.62 10.61 10.92* 12.70*   HEMOGLOBIN g/dL 12.3* 11.5* 12.0* 11.4* 11.3*   PLATELETS 10*3/mm3 174 169 170 150 126*       Results from last 7 days   Lab Units 05/18/23  1624   INR  1.38*       Assessment / Plan     ASSESSMENT:  1.  BELLA on CKD3a, nonoliguric, with expected rise in SCR due to heart failure, diuretics, hypotension, and recent contrast exposure.  Volume excess, better.  Potassium normal.  Urinalysis on arrival with greater than 300 mg/dL protein.  Kidney function is better with creatinine down to 1.4 mg/dL  2.  Confusion, waxing and waning  3.  CHF with pulmonary edema and hypoxia  4.  NSTEMI  5.  PVD with recent stroke several weeks ago  6.  Anemia    PLAN:    1.  Continue oral furosemide 40 mg once daily  2.  Surveillance labs    Thank you for involving us in the care of Art Mullins.  Please feel free to call with any questions.    Lina Kilpatrick MD  05/22/23  08:23 EDT    [facility]  [phone]    Please note that portions of this note were completed with a voice recognition program.

## 2023-05-22 NOTE — PLAN OF CARE
Goal Outcome Evaluation:  Plan of Care Reviewed With: patient, spouse        Progress: improving  Outcome Evaluation: Pt agreed to PT session, but easily off task, fidgety-wife states that is normal for him but worse here; pt willie STS w/assist of 2, req assist of 2 for amb due to easily off task and L neglect limiting safety, willie 10 reps of exer , but rests and cues required, pt amb ~ 20ft w/assist of 2, will benefit from SNU at [place] , wife will not be able to attend to his current deficits

## 2023-05-23 VITALS
HEIGHT: 69 IN | WEIGHT: 185.19 LBS | OXYGEN SATURATION: 95 % | SYSTOLIC BLOOD PRESSURE: 136 MMHG | TEMPERATURE: 97.9 F | DIASTOLIC BLOOD PRESSURE: 70 MMHG | RESPIRATION RATE: 18 BRPM | BODY MASS INDEX: 27.43 KG/M2 | HEART RATE: 77 BPM

## 2023-05-23 LAB
ANION GAP SERPL CALCULATED.3IONS-SCNC: 9.7 MMOL/L (ref 5–15)
BASOPHILS # BLD AUTO: 0.05 10*3/MM3 (ref 0–0.2)
BASOPHILS NFR BLD AUTO: 0.4 % (ref 0–1.5)
BUN SERPL-MCNC: 44 MG/DL (ref 8–23)
BUN/CREAT SERPL: 30.8 (ref 7–25)
CALCIUM SPEC-SCNC: 9.2 MG/DL (ref 8.6–10.5)
CHLORIDE SERPL-SCNC: 102 MMOL/L (ref 98–107)
CO2 SERPL-SCNC: 27.3 MMOL/L (ref 22–29)
CREAT SERPL-MCNC: 1.43 MG/DL (ref 0.76–1.27)
DEPRECATED RDW RBC AUTO: 42.4 FL (ref 37–54)
EGFRCR SERPLBLD CKD-EPI 2021: 47.7 ML/MIN/1.73
EOSINOPHIL # BLD AUTO: 0.25 10*3/MM3 (ref 0–0.4)
EOSINOPHIL NFR BLD AUTO: 2.2 % (ref 0.3–6.2)
ERYTHROCYTE [DISTWIDTH] IN BLOOD BY AUTOMATED COUNT: 12.7 % (ref 12.3–15.4)
GLUCOSE SERPL-MCNC: 104 MG/DL (ref 65–99)
HCT VFR BLD AUTO: 33.5 % (ref 37.5–51)
HGB BLD-MCNC: 11.3 G/DL (ref 13–17.7)
IMM GRANULOCYTES # BLD AUTO: 0.06 10*3/MM3 (ref 0–0.05)
IMM GRANULOCYTES NFR BLD AUTO: 0.5 % (ref 0–0.5)
LYMPHOCYTES # BLD AUTO: 0.99 10*3/MM3 (ref 0.7–3.1)
LYMPHOCYTES NFR BLD AUTO: 8.5 % (ref 19.6–45.3)
MCH RBC QN AUTO: 31 PG (ref 26.6–33)
MCHC RBC AUTO-ENTMCNC: 33.7 G/DL (ref 31.5–35.7)
MCV RBC AUTO: 91.8 FL (ref 79–97)
MONOCYTES # BLD AUTO: 1.27 10*3/MM3 (ref 0.1–0.9)
MONOCYTES NFR BLD AUTO: 10.9 % (ref 5–12)
NEUTROPHILS NFR BLD AUTO: 77.5 % (ref 42.7–76)
NEUTROPHILS NFR BLD AUTO: 8.98 10*3/MM3 (ref 1.7–7)
NRBC BLD AUTO-RTO: 0 /100 WBC (ref 0–0.2)
PLATELET # BLD AUTO: 194 10*3/MM3 (ref 140–450)
PMV BLD AUTO: 9.7 FL (ref 6–12)
POTASSIUM SERPL-SCNC: 4.5 MMOL/L (ref 3.5–5.2)
RBC # BLD AUTO: 3.65 10*6/MM3 (ref 4.14–5.8)
SODIUM SERPL-SCNC: 139 MMOL/L (ref 136–145)
WBC NRBC COR # BLD: 11.6 10*3/MM3 (ref 3.4–10.8)

## 2023-05-23 PROCEDURE — 97110 THERAPEUTIC EXERCISES: CPT

## 2023-05-23 PROCEDURE — 80048 BASIC METABOLIC PNL TOTAL CA: CPT | Performed by: INTERNAL MEDICINE

## 2023-05-23 PROCEDURE — 97535 SELF CARE MNGMENT TRAINING: CPT | Performed by: OCCUPATIONAL THERAPIST

## 2023-05-23 PROCEDURE — 85025 COMPLETE CBC W/AUTO DIFF WBC: CPT | Performed by: INTERNAL MEDICINE

## 2023-05-23 PROCEDURE — 97112 NEUROMUSCULAR REEDUCATION: CPT | Performed by: OCCUPATIONAL THERAPIST

## 2023-05-23 RX ORDER — CLONAZEPAM 1 MG/1
1 TABLET ORAL DAILY
Qty: 3 TABLET | Refills: 0 | Status: ON HOLD | OUTPATIENT
Start: 2023-05-23

## 2023-05-23 RX ORDER — FUROSEMIDE 40 MG/1
40 TABLET ORAL DAILY
Qty: 30 TABLET | Refills: 0 | Status: ON HOLD | OUTPATIENT
Start: 2023-05-24

## 2023-05-23 RX ADMIN — Medication 10 ML: at 08:52

## 2023-05-23 RX ADMIN — ROSUVASTATIN CALCIUM 40 MG: 40 TABLET, FILM COATED ORAL at 08:51

## 2023-05-23 RX ADMIN — CLONAZEPAM 1 MG: 1 TABLET ORAL at 08:51

## 2023-05-23 RX ADMIN — FUROSEMIDE 40 MG: 40 TABLET ORAL at 08:51

## 2023-05-23 RX ADMIN — METOPROLOL SUCCINATE 50 MG: 50 TABLET, FILM COATED, EXTENDED RELEASE ORAL at 08:51

## 2023-05-23 RX ADMIN — Medication 1000 UNITS: at 08:51

## 2023-05-23 RX ADMIN — PANTOPRAZOLE SODIUM 40 MG: 40 TABLET, DELAYED RELEASE ORAL at 08:51

## 2023-05-23 RX ADMIN — APIXABAN 2.5 MG: 2.5 TABLET, FILM COATED ORAL at 08:51

## 2023-05-23 NOTE — PLAN OF CARE
Goal Outcome Evaluation:  Plan of Care Reviewed With: patient, spouse        Progress: improving  Outcome Evaluation: pt worked w OT/PT in co treat session due to level of A and decr endurance this date. pt completed sup to sit w mod 1-2 and mod x2 for sit to sup. pt completed sitting balance w CGA. pt stood w min/mod x2 and walker. pt walked w wx w VC on L side and to turn L due to visual deficit. pt required min/mod x2 walking and no buckling this date. pt was able to wash his face w SBA and was dep to don his sock. pt completed L hand ex and VC to attend to L side when completing task to decr visual neglect on L side. pt cont to benefit from OT to incr ADL , strength, coordination, balance, and tsf.

## 2023-05-23 NOTE — CASE MANAGEMENT/SOCIAL WORK
Continued Stay Note  [facility]     Patient Name: Art Mullins  MRN: [id number]  Today's Date: 5/23/2023    Admit Date: 5/18/2023    Plan: [facility] at 4pm today   Discharge Plan     Row Name 05/23/23 1227       Plan    Plan [facility] at 4pm today    Patient/Family in Agreement with Plan yes    Plan Comments Spoke with Aure/Telma, Pt accepted at [facility]. Recieved vm from wife, and she requests [facility] and [facility] referral for after Rehab. CCP returned wifes call and discussed dc planning. Wife states caretenders had come once before this admission, and they wish to use [facility] after rehab. Pt will need WC transport, booked via [employer] w[facility] (reservation# [id number]) pickup 4pm. Notified MD and RN, as well as Aure/Telma Ruelas. Packet with RN. Referral to [facility].  Elieser RODRIGUEZ/CCP               Discharge Codes    No documentation.               Expected Discharge Date and Time     Expected Discharge Date Expected Discharge Time    May 23, 2023             Lorena Bender, RN

## 2023-05-23 NOTE — DISCHARGE SUMMARY
Date of Admission: 5/18/2023  Date of Discharge:  5/23/2023  Primary Care Physician: Santos Simmons MD     Discharge Diagnosis:  Active Hospital Problems    Diagnosis  POA   • **Acute on chronic diastolic congestive heart failure [I50.33]  Yes   • Altered mental status, unspecified altered mental status type [R41.82]  Yes   • Acute CVA (cerebrovascular accident) [I63.9]  Yes   • Stage 3a chronic kidney disease [N18.31]  Yes   • Elevated troponin [R77.8]  Yes   • Essential hypertension [I10]  Yes      Resolved Hospital Problems   No resolved problems to display.       Presenting Problem/History of Present Illness:  NSTEMI (non-ST elevated myocardial infarction) [I21.4]  Altered mental status, unspecified altered mental status type [R41.82]  Acute congestive heart failure, unspecified heart failure type [I50.9]     Hospital Course:  The patient is a 87 y.o. male with a history of HTN, CKD Stage 3a, chronic diastolic CHF, and recent bilateral hemispheric cerebral infarcts thought to have been from a cardioembolic source and started on eliquis with subsequent GI bleed after which eliquis dose was reduced without further issues who presented with worsening confusion and shortness of breath. Please see admission H&P for further details. He was found to have acute on chronic diastolic CHF and was started on IV lasix. His troponin levels were elevated and he was evaluated by cardiology. He went for left heart cath on 5/19/23 and this showed mild non-obstructive CAD. His aspirin that had been started on admission was stopped on the recommendation of cardiology. His troponin elevation was from a type 2 NSTEMI from increased demand to perfusion ratio from his acute heart failure and this was augmented by reduced renal function. His symptoms did improve with diuresis and he has been transitioned to oral lasix which he has tolerated well. He had been started on a heparin drip on admission but now he is back on his  "eliquis. He is still confused at times and slightly forgetful but this has been stable. He will be discharged to rehab and should keep cardiology follow up.     Exam Today:  Blood pressure 145/76, pulse 74, temperature 98.4 °F (36.9 °C), temperature source Oral, resp. rate 17, height 175.3 cm (69\"), weight 84 kg (185 lb 3 oz), SpO2 93 %.  Vitals and nursing note reviewed.   Constitutional:       General: He is not in acute distress.     Appearance: He appears chronically ill. He is not toxic-appearing or diaphoretic.   HENT:      Head: Normocephalic and atraumatic.      Nose: Nose normal.      Mouth/Throat:      Mouth: Mucous membranes are moist.      Pharynx: Oropharynx is clear.   Eyes:      Conjunctiva/sclera: Conjunctivae normal.      Pupils: Pupils are equal, round, and reactive to light.   Cardiovascular:      Rate and Rhythm: Normal rate and regular rhythm.      Pulses: Normal pulses.   Pulmonary:      Effort: Pulmonary effort is normal.      Breath sounds: Normal breath sounds. No rales.   Abdominal:      General: Bowel sounds are normal.      Palpations: Abdomen is soft.   Musculoskeletal:         General: Trace BLE edema present. No tenderness.      Cervical back: Normal range of motion and neck supple.   Skin:     General: Skin is warm and dry.      Capillary Refill: Capillary refill takes less than 2 seconds.   Neurological:      General: No focal deficit present.      Mental Status: He is alert.   Psychiatric:         Mood and Affect: Mood normal.         Behavior: Behavior normal.     Procedures Performed:  Procedure(s):  Left Heart Cath  Coronary angiography  Art Mullins  Limited Transthoracic Echocardiogram with Limited Doppler, Color Flow and Contrast  Order# [id number]  Reading physician: Arturo Crowe MD Ordering physician: Arturo Crowe MD Study date: 23     Patient Information    Patient Name   Art Mullins MRN   [id number] Legal Sex   Male  (Age)   1935 (87 " "y.o.)     Admission Information    Admission Date/Time Discharge Date/Time Room/Bed   05/18/23  09:43 AM  N534/1     Patient Hx Of Height, Weight, and Vitals    Height Weight BSA (Calculated - sq m) BMI (Calculated) Retired BMI (kg/m2) Pulse BP   175.3 cm (69\") 82.6 kg (182 lb) 1.98 sq meters 26.9  67 137/86      [facility] PACS Images     Show images for Adult Transthoracic Echo Limited W/ Cont if Necessary Per Protocol   Clinical Indication    Heart Failure, Cardiomyopathy or Systemic or Pulmonary Hypertension; Evaluation of Ventricular Function after ACS   Comments: Limited echocardiogram for left and right ventricular function     Interpretation Summary       •  There is severe hypokinesis of the basal to mid inferolateral wall. The remaining wall segments are hyperdynamic and the ejection fraction is preserved  •  Left ventricular systolic function is normal. Left ventricular ejection fraction appears to be 56 - 60%.  •  Left ventricular wall thickness is consistent with mild concentric hypertrophy.  •  Normal right ventricular cavity size and systolic function noted.  •  Mild to moderate aortic valve regurgitation is present.  •  Mild to moderate mitral valve regurgitation is present.  •  Mild tricuspid valve regurgitation is present  •  Calculated right ventricular systolic pressure from tricuspid regurgitation is 70 mmHg.  •  There is no evidence of pericardial effusion.  Echocardiogram Findings    Left Ventricle Left ventricular systolic function is normal. Left ventricular ejection fraction appears to be 56 - 60%.     The left ventricular cavity is mildly dilated. Left ventricular wall thickness is consistent with mild concentric hypertrophy. Left ventricular diastolic function was indeterminate. No evidence of a left ventricular thrombus present. There is severe hypokinesis of the basal to mid inferolateral wall.  The remaining wall segments are hyperdynamic and the ejection fraction is preserved   Right " Ventricle Normal right ventricular cavity size and systolic function noted.   Left Atrium The left atrial cavity is mildly dilated.   Right Atrium Normal right atrial cavity size noted.   Aortic Valve The aortic valve is abnormal in structure. The aortic valve appears trileaflet. Mild to moderate aortic valve regurgitation is present. No hemodynamically significant aortic valve stenosis is present. The aortic valve leaflets are mildly to moderately calcified   Mitral Valve Mild to moderate mitral valve regurgitation is present. The mitral valve leaflets are thickened   Tricuspid Valve The tricuspid valve is abnormal in structure. Mild tricuspid valve regurgitation is present. Estimated right ventricular systolic pressure from tricuspid regurgitation is markedly elevated (>55 mmHg). Calculated right ventricular systolic pressure from tricuspid regurgitation is 70 mmHg.   Greater Vessels No dilation of the aortic root is present.   Pericardium There is no evidence of pericardial effusion.      [facility]   CARDIAC CATHETERIZATION PROCEDURE REPORT     Patient: Art Mullins  : 1935  MRN: [id number]     Procedure Date:  23     Referring Physician:   Arturo Crowe MD     Interventional Cardiologist:   Ricardo Dela Cruz MD     Indication:  1. NSTEMI     Clinical Presentation:  87-year-old man with HFpEF, CKD stage III, recent CVA, anemia, thrombocytopenia who presented with altered mental status, found to be hypoxic with pulmonary edema and had elevated positive troponin, concerning for NSTEMI so he was referred for left heart catheterization for further evaluation     Procedure performed:  1. Coronary angiography  2. Left heart catheterization     Access Sites:  1. right radial artery     Findings:  1. Coronary Artery Anatomy:  Dominance: right dominant  Left Main: Large-caliber vessel that trifurcates into a large caliber LAD, medium caliber ramus, large caliber circumflex.  This vessel  normal  Left Anterior Descending: Large-caliber vessel with a 30% proximal stenosis before the takeoff of a medium caliber first diagonal which is normal.  The mid to distal LAD wraps around the apex and is normal.  Diagonals: Medium caliber vessel that is free of disease.  Circumflex Artery: Large-caliber vessel that gives rise to a small caliber OM1, large caliber OM 2, and continues as a large caliber AV groove circumflex that gives rise to an LPL.  These vessels are normal  Right Coronary Artery: Large-caliber vessel with a 20% proximal stenosis.  This vessel gives rise to a medium caliber RPDA that is normal.     2. Hemodynamics:  Left Ventricle: 96/10 mmHg  Aorta: 90/54/70 mmHg        Conclusions:  1. Mild CAD  2. Left-sided filling pressure        Recommendations:   1. Continue aggressive risk factor modification        Procedure Status:  Elective     Procedure Details:  Informed consent was obtained with an explanation of the risk and benefits of the procedure. The patient was brought to the Cardiac Catheterization Laboratory and was prepped and draped in a standard sterile fashion. Moderate sedation with Fentanyl and Versed was administered by the circulating nurse. Lidocaine 2% was used to anesthetize the right radial artery and a 6 Fr Slender sheath was placed.  A Anulexterity Ultra catheter was then advanced over a 0.035 guidewire into the ascending aorta.  This catheter was used to cross the aortic valve to measure left ventricular pressures and pullback was performed across the aortic valve to measure the pressure gradient. The catheter was then used to engage the right coronary artery with diagnostic angiography obtained.  The catheter was then exchanged over the wire for a 5 [place] JL 3.5 diagnostic catheter as the ultra was unable to engage the left main.  The JL 3.5 was used to engage the left main with diagnostic injection obtained. The catheter was then removed over a guidewire. The radial  artery sheath was removed without difficulty and a Vasc-Band was placed over the access site with excellent hemostasis.     Patient tolerated the procedure well without any complications, and transferred to the post procedure area for recovery in a stable condition.     Complications:  None.     Estimated Blood Loss:  Minimal.        Ricardo Dela Cruz MD  [facility]  05/19/23  16:56 EDT       Consults:   Consults     Date and Time Order Name Status Description    5/20/2023  9:22 AM Inpatient Nephrology Consult      5/18/2023  2:02 PM [facility] (on-call MD unless specified) Completed     5/18/2023  1:02 PM [facility] (on-call MD unless specified) Details      5/1/2023 11:06 AM Inpatient Cardiology Consult Completed     4/30/2023  5:53 PM Inpatient Neurology Consult Stroke Completed     4/30/2023  1:20 PM Inpatient Neurology Consult Stroke Completed     4/30/2023  1:20 PM Inpatient Neurology Consult Stroke Completed            Discharge Disposition:  Skilled Nursing Facility (DC - External)    Discharge Medications:     Discharge Medications      New Medications      Instructions Start Date   furosemide 40 MG tablet  Commonly known as: LASIX   40 mg, Oral, Daily   Start Date: May 24, 2023        Changes to Medications      Instructions Start Date   apixaban 2.5 MG tablet tablet  Commonly known as: ELIQUIS  What changed:   · medication strength  · how much to take   2.5 mg, Oral, Every 12 Hours Scheduled         Continue These Medications      Instructions Start Date   cholecalciferol 25 MCG (1000 UT) tablet  Commonly known as: VITAMIN D3   1,000 Units, Oral, Daily      clonazePAM 1 MG tablet  Commonly known as: KlonoPIN   1 mg, Oral, Daily      doxazosin 8 MG tablet  Commonly known as: CARDURA   8 mg, Oral, Nightly      metoprolol succinate XL 50 MG 24 hr tablet  Commonly known as: TOPROL-XL   50 mg, Oral, 2 times daily      pantoprazole 40 MG EC tablet  Commonly known as: PROTONIX   40 mg, Oral, Daily      rOPINIRole 2  MG tablet  Commonly known as: REQUIP   2 mg, Oral, Nightly      rosuvastatin 40 MG tablet  Commonly known as: CRESTOR   40 mg, Oral, Daily      VITAMIN B 12 PO   2,500 mcg, Oral, Daily         Stop These Medications    losartan 25 MG tablet  Commonly known as: COZAAR     losartan-hydrochlorothiazide 100-25 MG per tablet  Commonly known as: HYZAAR            Discharge Diet:   Diet Instructions     Diet: Cardiac Diets; Healthy Heart (2-3 Na+); Regular Texture (IDDSI 7); Thin (IDDSI 0)      Discharge Diet: Cardiac Diets    Cardiac Diet: Healthy Heart (2-3 Na+)    Texture: Regular Texture (IDDSI 7)    Fluid Consistency: Thin (IDDSI 0)          Activity at Discharge:   Activity Instructions     Activity as Tolerated            Follow-up Appointments:  Future Appointments   Date Time Provider Department [place]   5/31/2023 10:00 AM [facility]   8/1/2023  2:00 PM Renea Bradford APRN [facility] ITA   11/21/2023 10:30 AM Aashish Thapa MD [facility] NA P [facility]     Additional Instructions for the Follow-ups that You Need to Schedule     Discharge Follow-up with Specialty: general practitioner or PCP at [facility]   As directed      Specialty: general practitioner or PCP at [facility]    Follow Up Details: 1-3d         Discharge Follow-up with Specified Provider: Dr. Thapa (Cardiology)   As directed      To: Dr. Thapa (Cardiology)    Follow Up Details: 2-3 weeks                    Aashish Hatfield MD  05/23/23  13:27 EDT    Time Spent on Discharge Activities: Greater than 30 minutes.

## 2023-05-23 NOTE — THERAPY TREATMENT NOTE
Patient Name: Art Mullins  : 1935    MRN: [id number]                              Today's Date: 2023       Admit Date: 2023    Visit Dx:     ICD-10-CM ICD-9-CM   1. Altered mental status, unspecified altered mental status type  R41.82 780.97   2. Acute congestive heart failure, unspecified heart failure type  I50.9 428.0   3. NSTEMI (non-ST elevated myocardial infarction)  I21.4 410.70   4. CAITLIN (generalized anxiety disorder)  F41.1 300.02     Patient Active Problem List   Diagnosis   • Essential hypertension   • Acute CVA (cerebrovascular accident)   • Stage 3a chronic kidney disease   • BELLA (acute kidney injury)   • Elevated troponin   • Vision changes   • Altered mental status, unspecified altered mental status type   • Acute on chronic diastolic congestive heart failure     Past Medical History:   Diagnosis Date   • Hyperlipidemia    • Hypertension    • PVCs (premature ventricular contractions)      Past Surgical History:   Procedure Laterality Date   • CARDIAC CATHETERIZATION N/A 2023    Procedure: Left Heart Cath;  Surgeon: Ricardo Arrieta MD;  Location: [facility] INVASIVE LOCATION;  Service: Cardiology;  Laterality: N/A;   • CARDIAC CATHETERIZATION N/A 2023    Procedure: Coronary angiography;  Surgeon: Ricardo Arrieta MD;  Location: [facility] INVASIVE LOCATION;  Service: Cardiology;  Laterality: N/A;      General Information     Row Name 23 1221          OT Time and Intention    Document Type therapy note (daily note)  -     Mode of Treatment co-treatment;occupational therapy;physical therapy  -     Row Name 23 1221          General Information    Existing Precautions/Restrictions fall  -     Row Name 23 1221          Cognition    Orientation Status (Cognition) oriented x 3  -     Row Name 23 1221          Safety Issues, Functional Mobility    Impairments Affecting Function (Mobility) balance;coordination;endurance/activity  tolerance;strength;visual/perceptual  -           User Key  (r) = Recorded By, (t) = Taken By, (c) = Cosigned By    Initials Name Provider Type     Marina Khalil OTR Occupational Therapist                 Mobility/ADL's     Row Name 05/23/23 1221          Bed Mobility    Bed Mobility sit-supine  -     Supine-Sit [place] (Bed Mobility) set up;verbal cues;moderate assist (50% patient effort);1 person assist;2 person assist  -     Sit-Supine [place] (Bed Mobility) set up;moderate assist (50% patient effort);2 person assist;verbal cues  -     Assistive Device (Bed Mobility) bed rails;draw sheet;head of bed elevated  -     Row Name 05/23/23 1221          Transfers    Transfers stand-sit transfer;sit-stand transfer  -[facility] Name 05/23/23 1221          Sit-Stand Transfer    Sit-Stand [place] (Transfers) set up;moderate assist (50% patient effort);2 person assist;minimum assist (75% patient effort)  -     Assistive Device (Sit-Stand Transfers) walker, front-wheeled  -     Row Name 05/23/23 1221          Stand-Sit Transfer    Stand-Sit [place] (Transfers) set up;minimum assist (75% patient effort);moderate assist (50% patient effort);1 person assist  -     Assistive Device (Stand-Sit Transfers) walker, front-wheeled  [facility] Name 05/23/23 1221          Functional Mobility    Functional Mobility- Ind. Level verbal cues required;set up required;minimum assist (75% patient effort);moderate assist (50% patient effort);2 person assist required  -     Functional Mobility- Device walker, front-wheeled  -     Functional Mobility- Comment in room, VC when turning to the L  -     Row Name 05/23/23 1221          Grooming Assessment/Training    [place] Level (Grooming) grooming skills;wash face, hands;set up;standby assist  -     Position (Grooming) edge of bed sitting  -     Row Name 05/23/23 1221          Lower Body Dressing Assessment/Training    [place]  Level (Lower Body Dressing) lower body dressing skills;set up;dependent (less than 25% patient effort);socks;don  -KP     Position (Lower Body Dressing) sitting up in bed  -           User Key  (r) = Recorded By, (t) = Taken By, (c) = Cosigned By    Initials Name Provider Type     Marina Khalil, OTR Occupational Therapist               Obj/Interventions     Row Name 05/23/23 1238          Vision Assessment/Intervention    Visual Processing Deficit nigel-inattention/neglect, left;visual search, left;visual attention, left  -     Row Name 05/23/23 1238          Hand (Therapeutic Exercise)    Hand (Therapeutic Exercise) AROM (active range of motion)  -     Hand AROM/AAROM (Therapeutic Exercise) left;AROM (active range of motion);thumb flexion;thumb opposition;10 repetitions  VC TC  -KP     Row Name 05/23/23 1238          Motor Skills    Therapeutic Exercise hand  -     Row Name 05/23/23 1238          Balance    Static Sitting Balance set-up;contact guard  -KP     Dynamic Sitting Balance set-up;contact guard  -KP     Position, Sitting Balance sitting edge of bed  -     Static Standing Balance set-up;minimal assist;2-person assist  -     Position/Device Used, Standing Balance walker, rolling  -KP     Balance Interventions sitting;standing;sit to stand;supported;dynamic;static;occupation based/functional task  -           User Key  (r) = Recorded By, (t) = Taken By, (c) = Cosigned By    Initials Name Provider Type     Marina Khalil, OTR Occupational Therapist               Goals/Plan    No documentation.                Clinical Impression     Row Name 05/23/23 1240          Pain Assessment    Pretreatment Pain Rating 0/10 - no pain  -     Posttreatment Pain Rating 0/10 - no pain  -     Row Name 05/23/23 1240          Plan of Care Review    Plan of Care Reviewed With patient;spouse  -     Progress improving  -     Outcome Evaluation pt worked w OT/PT in co treat session due to  level of A and decr endurance this date. pt completed sup to sit w mod 1-2 and mod x2 for sit to sup. pt completed sitting balance w CGA. pt stood w min/mod x2 and walker. pt walked w wx w VC on L side and to turn L due to visual deficit. pt required min/mod x2 walking and no buckling this date. pt was able to wash his face w SBA and was dep to don his sock. pt completed L hand ex and VC to attend to L side when completing task to decr visual neglect on L side. pt cont to benefit from OT to incr ADL , strength, coordination, balance, and tsf.  -     Row Name 05/23/23 1240          Positioning and Restraints    Pre-Treatment Position in bed  -KP     Post Treatment Position bed  -KP     In Bed fowlers;call light within reach;encouraged to call for assist;exit alarm on;with family/caregiver;with other staff  -           User Key  (r) = Recorded By, (t) = Taken By, (c) = Cosigned By    Initials Name Provider Type    Marina Ocampo OTR Occupational Therapist               Outcome Measures     Row Name 05/23/23 1246          How much help from another is currently needed...    Putting on and taking off regular lower body clothing? 2  -KP     Bathing (including washing, rinsing, and drying) 2  -KP     Toileting (which includes using toilet bed pan or urinal) 1  -KP     Putting on and taking off regular upper body clothing 3  -KP     Taking care of personal grooming (such as brushing teeth) 4  -KP     Eating meals 4  -KP     AM-PAC 6 Clicks Score (OT) 16  -KP     Row Name 05/23/23 1246          Functional Assessment    Outcome Measure Options AM-PAC 6 Clicks Daily Activity (OT)  -           User Key  (r) = Recorded By, (t) = Taken By, (c) = Cosigned By    Initials Name Provider Type    Marina Ocampo OTR Occupational Therapist                Occupational Therapy Education     Title: PT OT SLP Therapies (In Progress)     Topic: Occupational Therapy (In Progress)     Point: ADL training (Done)      Description:   Instruct learner(s) on proper safety adaptation and remediation techniques during self care or transfers.   Instruct in proper use of assistive devices.              Learning Progress Summary           Patient Acceptance, E,TB,D, VU,NR by RE at 5/21/2023 1303   Family Acceptance, E,TB,D, VU,NR by RE at 5/21/2023 1303                   Point: Home exercise program (Not Started)     Description:   Instruct learner(s) on appropriate technique for monitoring, assisting and/or progressing therapeutic exercises/activities.              Learner Progress:  Not documented in this visit.          Point: Precautions (Not Started)     Description:   Instruct learner(s) on prescribed precautions during self-care and functional transfers.              Learner Progress:  Not documented in this visit.          Point: Body mechanics (Not Started)     Description:   Instruct learner(s) on proper positioning and spine alignment during self-care, functional mobility activities and/or exercises.              Learner Progress:  Not documented in this visit.                      User Key     Initials Effective Dates Name Provider Type Discipline    RE 06/16/21 -  Beti Dodge OTR Occupational Therapist OT              OT Recommendation and Plan     Plan of Care Review  Plan of Care Reviewed With: patient, spouse  Progress: improving  Outcome Evaluation: pt worked w OT/PT in co treat session due to level of A and decr endurance this date. pt completed sup to sit w mod 1-2 and mod x2 for sit to sup. pt completed sitting balance w CGA. pt stood w min/mod x2 and walker. pt walked w wx w VC on L side and to turn L due to visual deficit. pt required min/mod x2 walking and no buckling this date. pt was able to wash his face w SBA and was dep to don his sock. pt completed L hand ex and VC to attend to L side when completing task to decr visual neglect on L side. pt cont to benefit from OT to incr ADL , strength, coordination,  balance, and tsf.     Time Calculation:    Time Calculation- OT     Row Name 05/23/23 1247             Time Calculation- OT    OT Start Time 1123  -KP      OT Stop Time 1153  -KP      OT Time Calculation (min) 30 min  -KP      Total Timed Code Minutes- OT 30 minute(s)  -      OT Received On 05/23/23  -      OT - Next Appointment 05/24/23  -         Timed Charges    27995 -  OT Neuromuscular Reeducation Minutes 20  -KP      68134 - OT Self Care/Mgmt Minutes 10  -KP         Total Minutes    Timed Charges Total Minutes 30  -KP       Total Minutes 30  -KP            User Key  (r) = Recorded By, (t) = Taken By, (c) = Cosigned By    Initials Name Provider Type     Marina Khalil OTR Occupational Therapist              Therapy Charges for Today     Code Description Service Date Service Provider Modifiers Qty    38364122929 HC OT SELF CARE/MGMT/TRAIN EA 15 MIN 5/23/2023 Marina Khalil OTR GO 1    99407682008 HC OT NEUROMUSC RE EDUCATION EA 15 MIN 5/23/2023 Marina Khalil OTR GO 1               JENNA Patel  5/23/2023

## 2023-05-23 NOTE — PLAN OF CARE
Goal Outcome Evaluation:  Plan of Care Reviewed With: patient, spouse (spouse entered by end of session, discussed his progress and POC w/wife)        Progress: improving  Outcome Evaluation: Pt willie incr amb dist, less L lean, but did present w/L neglect but improved from yesterday as he listened to educ about improving L neglect ; pt required assist of 2 for safety , amb ~35ft w/rwx , req assist to guide rwx away from objects in room, plans SNU at [place]

## 2023-05-23 NOTE — PLAN OF CARE
Goal Outcome Evaluation:                      Alert x2-3 today.  Up to chair with ax2.  K replaced. VSS.  PVC's noted.

## 2023-05-23 NOTE — THERAPY TREATMENT NOTE
Patient Name: Art Mullins  : 1935    MRN: [id number]                              Today's Date: 2023       Admit Date: 2023    Visit Dx:     ICD-10-CM ICD-9-CM   1. Altered mental status, unspecified altered mental status type  R41.82 780.97   2. Acute congestive heart failure, unspecified heart failure type  I50.9 428.0   3. NSTEMI (non-ST elevated myocardial infarction)  I21.4 410.70   4. CAITLIN (generalized anxiety disorder)  F41.1 300.02     Patient Active Problem List   Diagnosis   • Essential hypertension   • Acute CVA (cerebrovascular accident)   • Stage 3a chronic kidney disease   • BELLA (acute kidney injury)   • Elevated troponin   • Vision changes   • Altered mental status, unspecified altered mental status type   • Acute on chronic diastolic congestive heart failure     Past Medical History:   Diagnosis Date   • Hyperlipidemia    • Hypertension    • PVCs (premature ventricular contractions)      Past Surgical History:   Procedure Laterality Date   • CARDIAC CATHETERIZATION N/A 2023    Procedure: Left Heart Cath;  Surgeon: Ricardo Arrieta MD;  Location: [facility] INVASIVE LOCATION;  Service: Cardiology;  Laterality: N/A;   • CARDIAC CATHETERIZATION N/A 2023    Procedure: Coronary angiography;  Surgeon: Ricardo Arrieta MD;  Location: [facility] INVASIVE LOCATION;  Service: Cardiology;  Laterality: N/A;      General Information     Row Name 23 4165          Physical Therapy Time and Intention    Document Type therapy note (daily note)  -     Mode of Treatment physical therapy;co-treatment;occupational therapy  -     Row Name 23 4340          General Information    Patient Profile Reviewed yes  -     Existing Precautions/Restrictions fall  -     Row Name 23 8147          Cognition    Orientation Status (Cognition) oriented x 3  -     Row Name 23 6667          Safety Issues, Functional Mobility    Impairments Affecting Function (Mobility)  balance;coordination;endurance/activity tolerance;grasp;strength;visual/perceptual  -           User Key  (r) = Recorded By, (t) = Taken By, (c) = Cosigned By    Initials Name Provider Type    Abby Ng PTA Physical Therapist Assistant               Mobility     Row Name 05/23/23 2704          Bed Mobility    Scooting/Bridging [place] (Bed Mobility) 2 person assist;minimum assist (75% patient effort);moderate assist (50% patient effort);verbal cues  used rail  -     Supine-Sit [place] (Bed Mobility) verbal cues;moderate assist (50% patient effort);1 person assist;2 person assist  -     Sit-Supine [place] (Bed Mobility) set up;moderate assist (50% patient effort);2 person assist;verbal cues  -     Assistive Device (Bed Mobility) bed rails;draw sheet;head of bed elevated  -     Comment, (Bed Mobility) pt still w/L neglect, but improved listening skills about improving his L neglect  -     Row Name 05/23/23 7686          Sit-Stand Transfer    Sit-Stand [place] (Transfers) moderate assist (50% patient effort);2 person assist;minimum assist (75% patient effort)  -     Assistive Device (Sit-Stand Transfers) walker, front-wheeled  -     Row Name 05/23/23 1355          Gait/Stairs (Locomotion)    [place] Level (Gait) 2 person assist;minimum assist (75% patient effort);contact guard;verbal cues;nonverbal cues (demo/gesture)  -     Assistive Device (Gait) walker, front-wheeled  -     Distance in Feet (Gait) 35ft, cues for trialing turning to L, pt required some assist to guide rwx, but improving, no knee buckling noted this visit  -     Deviations/Abnormal Patterns (Gait) edmar decreased;stride length decreased  -     Bilateral Gait Deviations forward flexed posture  improved w/cues  -           User Key  (r) = Recorded By, (t) = Taken By, (c) = Cosigned By    Initials Name Provider Type    Abby Ng PTA Physical Therapist Assistant                Obj/Interventions    No documentation.                Goals/Plan    No documentation.                Clinical Impression     Row Name 05/23/23 1401          Pain    Pretreatment Pain Rating 0/10 - no pain  -     Posttreatment Pain Rating 0/10 - no pain  -     Row Name 05/23/23 1401          Plan of Care Review    Plan of Care Reviewed With patient;spouse  spouse entered by end of session, discussed his progress and POC w/wife  -     Outcome Evaluation Pt willie incr amb dist, less L lean, but did present w/L neglect but improved from yesterday as he listened to educ about improving L neglect ; pt required assist of 2 for safety , amb ~35ft w/rwx , req assist to guide rwx away from objects in room, plans SNU at [place]  -     Row Name 05/23/23 1401          Therapy Assessment/Plan (PT)    Rehab Potential (PT) good, to achieve stated therapy goals  -     Criteria for Skilled Interventions Met (PT) yes  -     Row Name 05/23/23 1401          Vital Signs    O2 Delivery Pre Treatment room air  -     Row Name 05/23/23 1401          Positioning and Restraints    Pre-Treatment Position in bed  -     Post Treatment Position bed  -     In Bed fowlers;call light within reach;encouraged to call for assist;exit alarm on;with family/caregiver;with nsg;LUE elevated  -           User Key  (r) = Recorded By, (t) = Taken By, (c) = Cosigned By    Initials Name Provider Type    Abby Ng PTA Physical Therapist Assistant               Outcome Measures     Row Name 05/23/23 1405          How much help from another person do you currently need...    Turning from your back to your side while in flat bed without using bedrails? 3  -JM     Moving from lying on back to sitting on the side of a flat bed without bedrails? 2  -JM     Moving to and from a bed to a chair (including a wheelchair)? 3  -JM     Standing up from a chair using your arms (e.g., wheelchair, bedside chair)? 2  -JM     Climbing 3-5 steps with a  railing? 1  -JM     To walk in hospital room? 3  -     AM-PAC 6 Clicks Score (PT) 14  -     Highest level of mobility 4 --> Transferred to chair/commode  -     Row Name 05/23/23 1246          Functional Assessment    Outcome Measure Options AM-PAC 6 Clicks Daily Activity (OT)  -           User Key  (r) = Recorded By, (t) = Taken By, (c) = Cosigned By    Initials Name Provider Type    Marina Ocampo, OTR Occupational Therapist    Abby Ng, PTA Physical Therapist Assistant                             Physical Therapy Education     Title: PT OT SLP Therapies (In Progress)     Topic: Physical Therapy (Done)     Point: Mobility training (Done)     Learning Progress Summary           Patient Acceptance, E,TB,D, VU,NR by JEB at 5/23/2023 1406    Acceptance, E,D, NR by JEB at 5/22/2023 1605    Acceptance, E,D, DU,VU by DIANA at 5/21/2023 1417    Acceptance, E,D, DU,VU by DIANA at 5/20/2023 1339   Family Acceptance, E,TB,D, VU,NR by JEB at 5/23/2023 1406    Acceptance, E,D, NR by JEB at 5/22/2023 1605                   Point: Home exercise program (Done)     Learning Progress Summary           Patient Acceptance, E,TB,D, VU,NR by JEB at 5/23/2023 1406    Acceptance, E,D, NR by JEB at 5/22/2023 1605    Acceptance, E,D, DU,VU by DIANA at 5/21/2023 1417    Acceptance, E,D, DU,VU by DIANA at 5/20/2023 1339   Family Acceptance, E,TB,D, VU,NR by JEB at 5/23/2023 1406    Acceptance, E,D, NR by JEB at 5/22/2023 1605                   Point: Body mechanics (Done)     Learning Progress Summary           Patient Acceptance, E,TB,D, VU,NR by JEB at 5/23/2023 1406    Acceptance, E,D, NR by JEB at 5/22/2023 1605    Acceptance, E,D, DU,VU by DIANA at 5/21/2023 1417    Acceptance, E,D, DU,VU by DIANA at 5/20/2023 1339   Family Acceptance, E,TB,D, VU,NR by JEB at 5/23/2023 1406    Acceptance, E,D, NR by JEB at 5/22/2023 1605                   Point: Precautions (Done)     Learning Progress Summary           Patient Acceptance, E,TB,D, VU,NR  by JEB at 5/23/2023 1406    Acceptance, E,D, NR by JEB at 5/22/2023 1605    Acceptance, E,D, DU,VU by DIANA at 5/21/2023 1417    Acceptance, E,D, DU,VU by DIANA at 5/20/2023 1339   Family Acceptance, E,TB,D, VU,NR by JEB at 5/23/2023 1406    Acceptance, E,D, NR by JEB at 5/22/2023 1605                               User Key     Initials Effective Dates Name Provider Type Discipline    JEB 03/07/18 -  Abby Abraham PTA Physical Therapist Assistant PT    DIANA 07/02/20 -  Humble Leos, PT DPT Physical Therapist PT              PT Recommendation and Plan     Plan of Care Reviewed With: patient, spouse (spouse entered by end of session, discussed his progress and POC w/wife)  Progress: improving  Outcome Evaluation: Pt willie incr amb dist, less L lean, but did present w/L neglect but improved from yesterday as he listened to educ about improving L neglect ; pt required assist of 2 for safety , amb ~35ft w/rwx , req assist to guide rwx away from objects in room, plans SNU at [place]     Time Calculation:    PT Charges     Row Name 05/23/23 1406             Time Calculation    Start Time 1121  -      Stop Time 1154  -      Time Calculation (min) 33 min  -      PT Received On 05/23/23  -      PT - Next Appointment 05/24/23  -            User Key  (r) = Recorded By, (t) = Taken By, (c) = Cosigned By    Initials Name Provider Type    Abby Ng PTA Physical Therapist Assistant              Therapy Charges for Today     Code Description Service Date Service Provider Modifiers Qty    55995112628 HC PT THER SUPP EA 15 MIN 5/22/2023 Abby Abraham PTA GP 3    46450892444 HC PT THER PROC EA 15 MIN 5/22/2023 Abby Abraham PTA GP 3    51599086002 HC PT THER PROC EA 15 MIN 5/23/2023 Abby Abraham PTA GP 2    51994333198 HC PT THER SUPP EA 15 MIN 5/23/2023 Abby Abraham, MADELYN GP 2          PT G-Codes  Outcome Measure Options: AM-PAC 6 Clicks Daily Activity (OT)  AM-PAC 6 Clicks Score (PT): 14  AM-PAC 6 Clicks Score  (OT): 16  Modified [place] Scale: 4 - Moderately severe disability.  Unable to walk without assistance, and unable to attend to own bodily needs without assistance.  PT Discharge Summary  Anticipated Discharge Disposition (PT): skilled nursing facility    Abby Abraham, PTA  5/23/2023

## 2023-05-24 LAB
MAXIMAL PREDICTED HEART RATE: 134 BPM
STRESS TARGET HR: 114 BPM

## 2023-05-25 NOTE — CASE MANAGEMENT/SOCIAL WORK
Case Management Discharge Note      Final Note: [facility] jeremias jackson rn/ccp    Provided Post Acute Provider List?: Yes  Post Acute Provider List: Nursing Home, Home Health  Provided Post Acute Provider Quality & Resource List?: Yes  Post Acute Provider Quality and Resource List: Nursing Home  Delivered To: Support Person  Method of Delivery: In person    Selected Continued Care - Discharged on 5/23/2023 Admission date: 5/18/2023 - Discharge disposition: Skilled Nursing Facility (DC - External)    Destination Coordination complete.    Service Provider Selected Services Address Phone Fax Patient Preferred    [facility] [address] [facility] [address] [phone] --          Durable Medical Equipment    No services have been selected for the patient.              Dialysis/Infusion    No services have been selected for the patient.              Home Medical Care    No services have been selected for the patient.              Therapy    No services have been selected for the patient.              Community Resources    No services have been selected for the patient.              Community & DME    No services have been selected for the patient.                  Transportation Services  W/C Van: [facility]    Final Discharge Disposition Code: 03 - skilled nursing facility (SNF)

## 2023-05-31 ENCOUNTER — HOSPITAL ENCOUNTER (OUTPATIENT)
Dept: MRI IMAGING | Facility: HOSPITAL | Age: 88
Discharge: HOME | End: 2023-05-31

## 2023-05-31 ENCOUNTER — HOSPITAL ENCOUNTER (INPATIENT)
Facility: HOSPITAL | Age: 88
LOS: 14 days | Discharge: NURSING, REHAB OR LONG-TERM CARE | DRG: 193 | End: 2023-06-14
Attending: EMERGENCY MEDICINE | Admitting: INTERNAL MEDICINE
Payer: MEDICARE

## 2023-05-31 ENCOUNTER — APPOINTMENT (OUTPATIENT)
Dept: GENERAL RADIOLOGY | Facility: HOSPITAL | Age: 88
DRG: 193 | End: 2023-05-31
Payer: MEDICARE

## 2023-05-31 DIAGNOSIS — R09.02 HYPOXIA: ICD-10-CM

## 2023-05-31 DIAGNOSIS — Z86.79 HISTORY OF CONGESTIVE HEART FAILURE: ICD-10-CM

## 2023-05-31 DIAGNOSIS — J18.9 PNEUMONIA DUE TO INFECTIOUS ORGANISM, UNSPECIFIED LATERALITY, UNSPECIFIED PART OF LUNG: Primary | ICD-10-CM

## 2023-05-31 DIAGNOSIS — Z86.73 HISTORY OF STROKE: ICD-10-CM

## 2023-05-31 DIAGNOSIS — D72.829 LEUKOCYTOSIS, UNSPECIFIED TYPE: ICD-10-CM

## 2023-05-31 DIAGNOSIS — F41.1 GAD (GENERALIZED ANXIETY DISORDER): ICD-10-CM

## 2023-05-31 DIAGNOSIS — N18.9 CHRONIC RENAL IMPAIRMENT, UNSPECIFIED CKD STAGE: ICD-10-CM

## 2023-05-31 PROBLEM — I50.32 CHRONIC DIASTOLIC CHF (CONGESTIVE HEART FAILURE): Status: ACTIVE | Noted: 2023-05-18

## 2023-05-31 PROBLEM — I25.10 CORONARY ARTERY DISEASE WITHOUT ANGINA PECTORIS: Status: ACTIVE | Noted: 2023-05-31

## 2023-05-31 PROBLEM — J96.01 ACUTE RESPIRATORY FAILURE WITH HYPOXIA: Status: ACTIVE | Noted: 2023-01-01

## 2023-05-31 LAB
ALBUMIN SERPL-MCNC: 3.7 G/DL (ref 3.5–5.2)
ALBUMIN/GLOB SERPL: 1.4 G/DL
ALP SERPL-CCNC: 207 U/L (ref 39–117)
ALT SERPL W P-5'-P-CCNC: 19 U/L (ref 1–41)
AMORPH URATE CRY URNS QL MICRO: NORMAL /HPF
ANION GAP SERPL CALCULATED.3IONS-SCNC: 10 MMOL/L (ref 5–15)
AST SERPL-CCNC: 36 U/L (ref 1–40)
B PARAPERT DNA SPEC QL NAA+PROBE: NOT DETECTED
B PERT DNA SPEC QL NAA+PROBE: NOT DETECTED
BACTERIA UR QL AUTO: NORMAL /HPF
BASOPHILS # BLD AUTO: 0.06 10*3/MM3 (ref 0–0.2)
BASOPHILS NFR BLD AUTO: 0.3 % (ref 0–1.5)
BILIRUB SERPL-MCNC: 0.7 MG/DL (ref 0–1.2)
BILIRUB UR QL STRIP: NEGATIVE
BUN SERPL-MCNC: 28 MG/DL (ref 8–23)
BUN/CREAT SERPL: 20 (ref 7–25)
C PNEUM DNA NPH QL NAA+NON-PROBE: NOT DETECTED
CALCIUM SPEC-SCNC: 9.1 MG/DL (ref 8.6–10.5)
CHLORIDE SERPL-SCNC: 102 MMOL/L (ref 98–107)
CHOLEST CRY URNS QL MICRO: NORMAL /HPF
CLARITY UR: ABNORMAL
CO2 SERPL-SCNC: 28 MMOL/L (ref 22–29)
COLOR UR: YELLOW
CREAT SERPL-MCNC: 1.4 MG/DL (ref 0.76–1.27)
D-LACTATE SERPL-SCNC: 1.2 MMOL/L (ref 0.5–2)
DEPRECATED RDW RBC AUTO: 41.3 FL (ref 37–54)
EGFRCR SERPLBLD CKD-EPI 2021: 48.9 ML/MIN/1.73
EOSINOPHIL # BLD AUTO: 0.03 10*3/MM3 (ref 0–0.4)
EOSINOPHIL NFR BLD AUTO: 0.2 % (ref 0.3–6.2)
ERYTHROCYTE [DISTWIDTH] IN BLOOD BY AUTOMATED COUNT: 12.5 % (ref 12.3–15.4)
FLUAV SUBTYP SPEC NAA+PROBE: NOT DETECTED
FLUBV RNA ISLT QL NAA+PROBE: NOT DETECTED
GEN 5 2HR TROPONIN T REFLEX: 141 NG/L
GLOBULIN UR ELPH-MCNC: 2.7 GM/DL
GLUCOSE SERPL-MCNC: 112 MG/DL (ref 65–99)
GLUCOSE UR STRIP-MCNC: NEGATIVE MG/DL
HADV DNA SPEC NAA+PROBE: NOT DETECTED
HCOV 229E RNA SPEC QL NAA+PROBE: NOT DETECTED
HCOV HKU1 RNA SPEC QL NAA+PROBE: NOT DETECTED
HCOV NL63 RNA SPEC QL NAA+PROBE: NOT DETECTED
HCOV OC43 RNA SPEC QL NAA+PROBE: NOT DETECTED
HCT VFR BLD AUTO: 35.7 % (ref 37.5–51)
HGB BLD-MCNC: 12.2 G/DL (ref 13–17.7)
HGB UR QL STRIP.AUTO: NEGATIVE
HMPV RNA NPH QL NAA+NON-PROBE: NOT DETECTED
HOLD SPECIMEN: NORMAL
HOLD SPECIMEN: NORMAL
HPIV1 RNA ISLT QL NAA+PROBE: NOT DETECTED
HPIV2 RNA SPEC QL NAA+PROBE: NOT DETECTED
HPIV3 RNA NPH QL NAA+PROBE: NOT DETECTED
HPIV4 P GENE NPH QL NAA+PROBE: NOT DETECTED
HYALINE CASTS UR QL AUTO: NORMAL /LPF
IMM GRANULOCYTES # BLD AUTO: 0.15 10*3/MM3 (ref 0–0.05)
IMM GRANULOCYTES NFR BLD AUTO: 0.8 % (ref 0–0.5)
INR PPP: 1.42 (ref 0.9–1.1)
KETONES UR QL STRIP: ABNORMAL
L PNEUMO1 AG UR QL IA: NEGATIVE
LEUKOCYTE ESTERASE UR QL STRIP.AUTO: NEGATIVE
LYMPHOCYTES # BLD AUTO: 1 10*3/MM3 (ref 0.7–3.1)
LYMPHOCYTES NFR BLD AUTO: 5.2 % (ref 19.6–45.3)
M PNEUMO IGG SER IA-ACNC: NOT DETECTED
MAXIMAL PREDICTED HEART RATE: 134 BPM
MCH RBC QN AUTO: 30.5 PG (ref 26.6–33)
MCHC RBC AUTO-ENTMCNC: 34.2 G/DL (ref 31.5–35.7)
MCV RBC AUTO: 89.3 FL (ref 79–97)
MONOCYTES # BLD AUTO: 1.66 10*3/MM3 (ref 0.1–0.9)
MONOCYTES NFR BLD AUTO: 8.6 % (ref 5–12)
MRSA DNA SPEC QL NAA+PROBE: NORMAL
NEUTROPHILS NFR BLD AUTO: 16.47 10*3/MM3 (ref 1.7–7)
NEUTROPHILS NFR BLD AUTO: 84.9 % (ref 42.7–76)
NITRITE UR QL STRIP: NEGATIVE
NRBC BLD AUTO-RTO: 0 /100 WBC (ref 0–0.2)
NT-PROBNP SERPL-MCNC: 2938 PG/ML (ref 0–1800)
PH UR STRIP.AUTO: 5.5 [PH] (ref 5–8)
PLATELET # BLD AUTO: 177 10*3/MM3 (ref 140–450)
PMV BLD AUTO: 9.5 FL (ref 6–12)
POTASSIUM SERPL-SCNC: 3.9 MMOL/L (ref 3.5–5.2)
PROCALCITONIN SERPL-MCNC: 0.25 NG/ML (ref 0–0.25)
PROT SERPL-MCNC: 6.4 G/DL (ref 6–8.5)
PROT UR QL STRIP: ABNORMAL
PROTHROMBIN TIME: 17.6 SECONDS (ref 11.7–14.2)
QT INTERVAL: 345 MS
RBC # BLD AUTO: 4 10*6/MM3 (ref 4.14–5.8)
RBC # UR STRIP: NORMAL /HPF
REF LAB TEST METHOD: NORMAL
RHINOVIRUS RNA SPEC NAA+PROBE: NOT DETECTED
RSV RNA NPH QL NAA+NON-PROBE: NOT DETECTED
S PNEUM AG SPEC QL LA: NEGATIVE
SARS-COV-2 RNA NPH QL NAA+NON-PROBE: NOT DETECTED
SODIUM SERPL-SCNC: 140 MMOL/L (ref 136–145)
SP GR UR STRIP: 1.02 (ref 1–1.03)
SQUAMOUS #/AREA URNS HPF: NORMAL /HPF
STRESS TARGET HR: 114 BPM
TROPONIN T DELTA: -28 NG/L
TROPONIN T SERPL HS-MCNC: 169 NG/L
UROBILINOGEN UR QL STRIP: ABNORMAL
WBC # UR STRIP: NORMAL /HPF
WBC NRBC COR # BLD: 19.37 10*3/MM3 (ref 3.4–10.8)
WHOLE BLOOD HOLD SPECIMEN: NORMAL

## 2023-05-31 PROCEDURE — 93005 ELECTROCARDIOGRAM TRACING: CPT | Performed by: EMERGENCY MEDICINE

## 2023-05-31 PROCEDURE — 87040 BLOOD CULTURE FOR BACTERIA: CPT | Performed by: EMERGENCY MEDICINE

## 2023-05-31 PROCEDURE — 94799 UNLISTED PULMONARY SVC/PX: CPT

## 2023-05-31 PROCEDURE — 25010000002 CEFTRIAXONE PER 250 MG: Performed by: EMERGENCY MEDICINE

## 2023-05-31 PROCEDURE — 94760 N-INVAS EAR/PLS OXIMETRY 1: CPT

## 2023-05-31 PROCEDURE — 94761 N-INVAS EAR/PLS OXIMETRY MLT: CPT

## 2023-05-31 PROCEDURE — 87449 NOS EACH ORGANISM AG IA: CPT | Performed by: NURSE PRACTITIONER

## 2023-05-31 PROCEDURE — 93010 ELECTROCARDIOGRAM REPORT: CPT | Performed by: INTERNAL MEDICINE

## 2023-05-31 PROCEDURE — 81001 URINALYSIS AUTO W/SCOPE: CPT | Performed by: NURSE PRACTITIONER

## 2023-05-31 PROCEDURE — 87641 MR-STAPH DNA AMP PROBE: CPT | Performed by: INTERNAL MEDICINE

## 2023-05-31 PROCEDURE — 85025 COMPLETE CBC W/AUTO DIFF WBC: CPT | Performed by: EMERGENCY MEDICINE

## 2023-05-31 PROCEDURE — 85610 PROTHROMBIN TIME: CPT | Performed by: EMERGENCY MEDICINE

## 2023-05-31 PROCEDURE — 36415 COLL VENOUS BLD VENIPUNCTURE: CPT | Performed by: EMERGENCY MEDICINE

## 2023-05-31 PROCEDURE — 80053 COMPREHEN METABOLIC PANEL: CPT | Performed by: EMERGENCY MEDICINE

## 2023-05-31 PROCEDURE — 83605 ASSAY OF LACTIC ACID: CPT | Performed by: EMERGENCY MEDICINE

## 2023-05-31 PROCEDURE — 84145 PROCALCITONIN (PCT): CPT | Performed by: EMERGENCY MEDICINE

## 2023-05-31 PROCEDURE — 94640 AIRWAY INHALATION TREATMENT: CPT

## 2023-05-31 PROCEDURE — 83880 ASSAY OF NATRIURETIC PEPTIDE: CPT | Performed by: EMERGENCY MEDICINE

## 2023-05-31 PROCEDURE — 84484 ASSAY OF TROPONIN QUANT: CPT | Performed by: EMERGENCY MEDICINE

## 2023-05-31 PROCEDURE — 0202U NFCT DS 22 TRGT SARS-COV-2: CPT | Performed by: EMERGENCY MEDICINE

## 2023-05-31 PROCEDURE — 25010000002 PIPERACILLIN SOD-TAZOBACTAM PER 1 G: Performed by: INTERNAL MEDICINE

## 2023-05-31 PROCEDURE — 71045 X-RAY EXAM CHEST 1 VIEW: CPT

## 2023-05-31 RX ORDER — SODIUM CHLORIDE 0.9 % (FLUSH) 0.9 %
10 SYRINGE (ML) INJECTION EVERY 12 HOURS SCHEDULED
Status: DISCONTINUED | OUTPATIENT
Start: 2023-05-31 | End: 2023-06-14 | Stop reason: HOSPADM

## 2023-05-31 RX ORDER — ACETAMINOPHEN 160 MG/5ML
650 SOLUTION ORAL EVERY 4 HOURS PRN
Status: DISCONTINUED | OUTPATIENT
Start: 2023-05-31 | End: 2023-06-14 | Stop reason: HOSPADM

## 2023-05-31 RX ORDER — BISACODYL 10 MG
10 SUPPOSITORY, RECTAL RECTAL DAILY PRN
Status: DISCONTINUED | OUTPATIENT
Start: 2023-05-31 | End: 2023-06-14 | Stop reason: HOSPADM

## 2023-05-31 RX ORDER — BISACODYL 5 MG/1
5 TABLET, DELAYED RELEASE ORAL DAILY PRN
Status: DISCONTINUED | OUTPATIENT
Start: 2023-05-31 | End: 2023-06-14 | Stop reason: HOSPADM

## 2023-05-31 RX ORDER — FUROSEMIDE 20 MG/1
40 TABLET ORAL DAILY
Status: DISCONTINUED | OUTPATIENT
Start: 2023-05-31 | End: 2023-06-02

## 2023-05-31 RX ORDER — AMOXICILLIN 250 MG
2 CAPSULE ORAL 2 TIMES DAILY
Status: DISCONTINUED | OUTPATIENT
Start: 2023-05-31 | End: 2023-06-14 | Stop reason: HOSPADM

## 2023-05-31 RX ORDER — SODIUM CHLORIDE FOR INHALATION 7 %
4 VIAL, NEBULIZER (ML) INHALATION
Status: DISCONTINUED | OUTPATIENT
Start: 2023-05-31 | End: 2023-06-14 | Stop reason: HOSPADM

## 2023-05-31 RX ORDER — METOPROLOL SUCCINATE 25 MG/1
50 TABLET, EXTENDED RELEASE ORAL EVERY 12 HOURS SCHEDULED
Status: DISCONTINUED | OUTPATIENT
Start: 2023-05-31 | End: 2023-06-14 | Stop reason: HOSPADM

## 2023-05-31 RX ORDER — PANTOPRAZOLE SODIUM 40 MG/1
40 TABLET, DELAYED RELEASE ORAL DAILY
Status: DISCONTINUED | OUTPATIENT
Start: 2023-05-31 | End: 2023-06-14 | Stop reason: HOSPADM

## 2023-05-31 RX ORDER — ACETAMINOPHEN 325 MG/1
650 TABLET ORAL EVERY 4 HOURS PRN
Status: DISCONTINUED | OUTPATIENT
Start: 2023-05-31 | End: 2023-06-14 | Stop reason: HOSPADM

## 2023-05-31 RX ORDER — TERAZOSIN 5 MG/1
10 CAPSULE ORAL NIGHTLY
Status: DISCONTINUED | OUTPATIENT
Start: 2023-05-31 | End: 2023-06-14 | Stop reason: HOSPADM

## 2023-05-31 RX ORDER — ROPINIROLE 2 MG/1
2 TABLET, FILM COATED ORAL NIGHTLY
Status: DISCONTINUED | OUTPATIENT
Start: 2023-05-31 | End: 2023-06-01

## 2023-05-31 RX ORDER — NITROGLYCERIN 0.4 MG/1
0.4 TABLET SUBLINGUAL
Status: DISCONTINUED | OUTPATIENT
Start: 2023-05-31 | End: 2023-06-14 | Stop reason: HOSPADM

## 2023-05-31 RX ORDER — SODIUM CHLORIDE 0.9 % (FLUSH) 0.9 %
10 SYRINGE (ML) INJECTION AS NEEDED
Status: DISCONTINUED | OUTPATIENT
Start: 2023-05-31 | End: 2023-06-14 | Stop reason: HOSPADM

## 2023-05-31 RX ORDER — ONDANSETRON 2 MG/ML
4 INJECTION INTRAMUSCULAR; INTRAVENOUS EVERY 6 HOURS PRN
Status: DISCONTINUED | OUTPATIENT
Start: 2023-05-31 | End: 2023-06-14 | Stop reason: HOSPADM

## 2023-05-31 RX ORDER — HYDROCORTISONE 25 MG/ML
1 LOTION TOPICAL 2 TIMES DAILY
COMMUNITY

## 2023-05-31 RX ORDER — CLONAZEPAM 0.5 MG/1
1 TABLET ORAL NIGHTLY
Status: DISCONTINUED | OUTPATIENT
Start: 2023-05-31 | End: 2023-06-01

## 2023-05-31 RX ORDER — SODIUM CHLORIDE 9 MG/ML
40 INJECTION, SOLUTION INTRAVENOUS AS NEEDED
Status: DISCONTINUED | OUTPATIENT
Start: 2023-05-31 | End: 2023-06-14 | Stop reason: HOSPADM

## 2023-05-31 RX ORDER — ASPIRIN 81 MG/1
324 TABLET, CHEWABLE ORAL ONCE
Status: DISCONTINUED | OUTPATIENT
Start: 2023-05-31 | End: 2023-05-31

## 2023-05-31 RX ORDER — ROSUVASTATIN CALCIUM 40 MG/1
40 TABLET, COATED ORAL NIGHTLY
Status: DISCONTINUED | OUTPATIENT
Start: 2023-05-31 | End: 2023-06-14 | Stop reason: HOSPADM

## 2023-05-31 RX ORDER — LIDOCAINE 50 MG/G
1 PATCH TOPICAL
Status: DISCONTINUED | OUTPATIENT
Start: 2023-05-31 | End: 2023-06-14 | Stop reason: HOSPADM

## 2023-05-31 RX ORDER — MELATONIN
1000 DAILY
Status: DISCONTINUED | OUTPATIENT
Start: 2023-06-01 | End: 2023-06-14 | Stop reason: HOSPADM

## 2023-05-31 RX ORDER — GUAIFENESIN 600 MG/1
600 TABLET, EXTENDED RELEASE ORAL EVERY 12 HOURS SCHEDULED
Status: DISCONTINUED | OUTPATIENT
Start: 2023-05-31 | End: 2023-06-14 | Stop reason: HOSPADM

## 2023-05-31 RX ORDER — IPRATROPIUM BROMIDE AND ALBUTEROL SULFATE 2.5; .5 MG/3ML; MG/3ML
3 SOLUTION RESPIRATORY (INHALATION)
Status: DISCONTINUED | OUTPATIENT
Start: 2023-05-31 | End: 2023-06-14 | Stop reason: HOSPADM

## 2023-05-31 RX ORDER — POLYETHYLENE GLYCOL 3350 17 G/17G
17 POWDER, FOR SOLUTION ORAL DAILY PRN
Status: DISCONTINUED | OUTPATIENT
Start: 2023-05-31 | End: 2023-06-14 | Stop reason: HOSPADM

## 2023-05-31 RX ADMIN — ROPINIROLE 2 MG: 2 TABLET, FILM COATED ORAL at 21:03

## 2023-05-31 RX ADMIN — Medication 10 ML: at 15:55

## 2023-05-31 RX ADMIN — PANTOPRAZOLE SODIUM 40 MG: 40 TABLET, DELAYED RELEASE ORAL at 15:55

## 2023-05-31 RX ADMIN — DOCUSATE SODIUM 50MG AND SENNOSIDES 8.6MG 2 TABLET: 8.6; 5 TABLET, FILM COATED ORAL at 21:03

## 2023-05-31 RX ADMIN — FUROSEMIDE 40 MG: 20 TABLET ORAL at 18:02

## 2023-05-31 RX ADMIN — Medication 4 ML: at 20:51

## 2023-05-31 RX ADMIN — ROSUVASTATIN CALCIUM 40 MG: 40 TABLET, FILM COATED ORAL at 21:03

## 2023-05-31 RX ADMIN — LIDOCAINE 1 PATCH: 50 PATCH CUTANEOUS at 21:02

## 2023-05-31 RX ADMIN — TERAZOSIN HYDROCHLORIDE 10 MG: 5 CAPSULE ORAL at 21:08

## 2023-05-31 RX ADMIN — IPRATROPIUM BROMIDE AND ALBUTEROL SULFATE 3 ML: 2.5; .5 SOLUTION RESPIRATORY (INHALATION) at 16:39

## 2023-05-31 RX ADMIN — METOPROLOL SUCCINATE 50 MG: 25 TABLET, EXTENDED RELEASE ORAL at 21:02

## 2023-05-31 RX ADMIN — TAZOBACTAM SODIUM AND PIPERACILLIN SODIUM 3.38 G: 375; 3 INJECTION, SOLUTION INTRAVENOUS at 18:02

## 2023-05-31 RX ADMIN — IPRATROPIUM BROMIDE AND ALBUTEROL SULFATE 3 ML: 2.5; .5 SOLUTION RESPIRATORY (INHALATION) at 20:50

## 2023-05-31 RX ADMIN — GUAIFENESIN 600 MG: 600 TABLET, EXTENDED RELEASE ORAL at 15:55

## 2023-05-31 RX ADMIN — Medication 10 ML: at 21:03

## 2023-05-31 RX ADMIN — CLONAZEPAM 1 MG: 0.5 TABLET ORAL at 21:03

## 2023-05-31 RX ADMIN — DOXYCYCLINE 100 MG: 100 INJECTION, POWDER, LYOPHILIZED, FOR SOLUTION INTRAVENOUS at 09:32

## 2023-05-31 RX ADMIN — CEFTRIAXONE SODIUM 1 G: 1 INJECTION, POWDER, FOR SOLUTION INTRAMUSCULAR; INTRAVENOUS at 09:02

## 2023-05-31 RX ADMIN — APIXABAN 2.5 MG: 2.5 TABLET, FILM COATED ORAL at 18:02

## 2023-05-31 NOTE — PLAN OF CARE
Problem: Adult Inpatient Plan of Care  Goal: Plan of Care Review  Outcome: Ongoing, Progressing  Flowsheets (Taken 5/31/2023 1952)  Progress: no change  Plan of Care Reviewed With: patient  Outcome Evaluation: Pt admitted to 4E pneumonia. IV zosyn ordered. RA- 2 L O2 per [place]. Denies CP,n/v, SOB. Alert and oriented but forgetful. Pt stable and needs met at this time.

## 2023-05-31 NOTE — PROGRESS NOTES
Clinical Pharmacy Services: Medication History    Art Mullins is a 87 y.o. male presenting to [facility] for   Chief Complaint   Patient presents with   • Chest Pain   • Cough       He  has a past medical history of Hyperlipidemia, Hypertension, and PVCs (premature ventricular contractions).    Allergies as of 05/31/2023 - Reviewed 05/31/2023   Allergen Reaction Noted   • Sulfa antibiotics  09/01/2016       Medication information was obtained from: Nursing Home   Pharmacy and Phone Number:     Prior to Admission Medications     Prescriptions Last Dose Informant Patient Reported? Taking?    apixaban (ELIQUIS) 2.5 MG tablet tablet  Nursing Home No Yes    Take 1 tablet by mouth Every 12 (Twelve) Hours. Indications: Other - full anticoagulation    cholecalciferol (VITAMIN D3) 1000 UNITS tablet  Nursing Home Yes Yes    Take 1 tablet by mouth Daily.    clonazePAM (KlonoPIN) 1 MG tablet  Nursing Home No Yes    Take 1 tablet by mouth Daily.    Patient taking differently:  Take 1 tablet by mouth Every Night.    Cyanocobalamin (VITAMIN B 12 PO)  Nursing Home Yes Yes    Take 2,500 mcg by mouth Daily.    doxazosin (CARDURA) 8 MG tablet  Nursing Home Yes Yes    Take 1 tablet by mouth Every Night.    furosemide (LASIX) 40 MG tablet  Nursing Home No Yes    Take 1 tablet by mouth Daily.    hydrocortisone 2.5 % lotion  Nursing Home Yes Yes    Apply 1 application topically to the appropriate area as directed 2 (Two) Times a Day.    metoprolol succinate XL (TOPROL-XL) 50 MG 24 hr tablet  Nursing Home No Yes    Take 1 tablet by mouth 2 (two) times a day.    pantoprazole (PROTONIX) 40 MG EC tablet  Nursing Home Yes Yes    Take 1 tablet by mouth Daily.    rOPINIRole (REQUIP) 2 MG tablet  Nursing Home Yes Yes    Take 1 tablet by mouth Every Night.    rosuvastatin (CRESTOR) 40 MG tablet  Nursing Home Yes Yes    Take 1 tablet by mouth Daily.            Medication notes:     This medication list is complete to the best of  my knowledge as of 5/31/2023    Please call if questions.    Oscar Garza  Medication History Technician  [phone]    5/31/2023 09:03 EDT

## 2023-05-31 NOTE — DISCHARGE PLACEMENT REQUEST
"Ron Mullins (87 y.o. Male)     Date of Birth   1935    Social Security Number       Address   [place] [address]    Home Phone   [phone]    MRN   [id number]       Faith   Latter-day    Marital Status                               Admission Date   5/31/23    Admission Type   Emergency    Admitting Provider   Aashish Hatfield MD    Attending Provider   Aashish Hatfield MD    Department, Room/Bed   [address], [address]       Discharge Date       Discharge Disposition       Discharge Destination                               Attending Provider: Aashish Hatfield MD    Allergies: Sulfa Antibiotics    Isolation: None   Infection: None   Code Status: CPR    Ht: 175.3 cm (69\")   Wt: 83.9 kg (185 lb)    Admission Cmt: None   Principal Problem: Pneumonia due to infectious organism, unspecified laterality, unspecified part of lung [J18.9]                 Active Insurance as of 5/31/2023     Primary Coverage     Payor Plan Insurance Group Employer/Plan Group    MEDICARE MEDICARE A & B      Payor Plan Address Payor Plan Phone Number Payor Plan Fax Number Effective Dates    [address] [phone]  6/1/2001 - None Entered    [facility] [id number]       Subscriber Name Subscriber Birth Date Member ID       RON MULLINS 1935 [id number]           Secondary Coverage     Payor Plan Insurance Group Employer/Plan Group    Community [facility] SUPP [id number]     Payor Plan Address Payor Plan Phone Number Payor Plan Fax Number Effective Dates    [address]   12/1/2016 - None Entered    [facility] [id number]       Subscriber Name Subscriber Birth Date Member ID       RON MULLINS 1935 [id number]                 Emergency Contacts      (Rel.) Home Phone Work Phone Mobile Phone    Charito Mullins (Spouse) [phone] -- --    HUNGDORA KELLER (Son) [phone] -- --              "

## 2023-05-31 NOTE — ED PROVIDER NOTES
EMERGENCY DEPARTMENT ENCOUNTER    CHIEF COMPLAINT  Chief Complaint: Chest pain, hypoxia  History given by: Patient  History limited by: Patient status  Room Number: 16/16  PMD: Santos Simmons MD      HPI:  Pt is a 87 y.o. male brought from care facility with report of chest pain and hypoxia with sats 88% on room air at the facility.  Patient reports a cough for 3 days, pain worse with cough to his left upper chest.  Patient unable to describe his discomfort.  Reports some mild shortness of air, denies abdominal pain, nausea/vomiting, changes to his bowel or bladder habits, swelling of extremities.        Aggravating Factors: Cough  Alleviating Factors: Nothing  Treatment before arrival: EMS transport  Chronic or social conditions impacting care: Stroke with left partial hemiparesis    Additional sources:  Discussed/ obtained information from independent historians: EMS    External (non-ED) record review:   Patient admitted to the hospital 5/18 through 5/23/2023 CVA, altered mental status, acute on chronic diastolic congestive heart failure, type II non-STEMI, chronic kidney disease.  It was noted during this admission that patient had recent bilateral cerebral infarcts thought to be cardioembolic with Eliquis administration and subsequent GI bleed with reduction of Eliquis dose.  Cardiac cath 5/19/2023 with mild nonobstructive coronary artery disease  5/18/2023 with EF 55 to 60%, RVSP 70 mmHg      PAST MEDICAL HISTORY  Active Ambulatory Problems     Diagnosis Date Noted   • Essential hypertension 08/31/2021   • Acute CVA (cerebrovascular accident) 04/30/2023   • Stage 3a chronic kidney disease    • BELLA (acute kidney injury)    • Elevated troponin    • Vision changes    • Altered mental status, unspecified altered mental status type 05/18/2023   • Acute on chronic diastolic congestive heart failure 05/18/2023     Resolved Ambulatory Problems     Diagnosis Date Noted   • No Resolved Ambulatory Problems      Past Medical History:   Diagnosis Date   • Hyperlipidemia    • Hypertension    • PVCs (premature ventricular contractions)        PAST SURGICAL HISTORY  Past Surgical History:   Procedure Laterality Date   • CARDIAC CATHETERIZATION N/A 5/19/2023    Procedure: Left Heart Cath;  Surgeon: Ricardo Arrieta MD;  Location:  [facility];  Service: Cardiology;  Laterality: N/A;   • CARDIAC CATHETERIZATION N/A 5/19/2023    Procedure: Coronary angiography;  Surgeon: Ricardo Arrieta MD;  Location:  [facility];  Service: Cardiology;  Laterality: N/A;       FAMILY HISTORY  Family History   Problem Relation Age of Onset   • No Known Problems Mother    • No Known Problems Father    • No Known Problems Sister    • No Known Problems Brother        SOCIAL HISTORY  Social History     Socioeconomic History   • Marital status:    Tobacco Use   • Smoking status: Never   Vaping Use   • Vaping Use: Never used   Substance and Sexual Activity   • Alcohol use: No   • Drug use: No   • Sexual activity: Defer       ALLERGIES  Sulfa antibiotics    REVIEW OF SYSTEMS  Review of Systems    PHYSICAL EXAM  ED Triage Vitals [05/31/23 0737]   Temp Heart Rate Resp BP SpO2   98.8 °F (37.1 °C) 110 18 132/68 96 %      Temp src Heart Rate Source Patient Position BP Location FiO2 (%)   -- -- -- -- --       Physical Exam  Vitals and nursing note reviewed.   Constitutional:       General: He is in acute distress.   HENT:      Head: Normocephalic and atraumatic.   Cardiovascular:      Rate and Rhythm: Normal rate and regular rhythm.      Pulses:           Posterior tibial pulses are 2+ on the right side and 2+ on the left side.      Heart sounds: Normal heart sounds. No murmur heard.  Pulmonary:      Effort: Tachypnea and respiratory distress present.      Breath sounds: Examination of the right-lower field reveals decreased breath sounds. Examination of the left-lower field reveals decreased breath sounds. Decreased breath  sounds present. No wheezing.   Abdominal:      General: Bowel sounds are normal.      Palpations: Abdomen is soft.      Tenderness: There is no abdominal tenderness. There is no guarding or rebound.   Musculoskeletal:         General: Normal range of motion.      Cervical back: Normal range of motion.   Skin:     General: Skin is warm and dry.   Neurological:      Mental Status: He is alert and oriented to person, place, and time.   Psychiatric:         Mood and Affect: Affect normal.         LAB RESULTS  Recent Results (from the past 24 hour(s))   TROPONIN (IN-HOUSE)    Collection Time: 05/30/23 10:53 PM    Specimen: Blood   Result Value Ref Range    HS Troponin T 165 (C) <15 ng/L   CK (aka CPK)    Collection Time: 05/30/23 10:53 PM    Specimen: Blood   Result Value Ref Range    Creatine Kinase 43 20 - 200 U/L   ECG 12 Lead Chest Pain    Collection Time: 05/31/23  8:01 AM   Result Value Ref Range    QT Interval 345 ms   Comprehensive Metabolic Panel    Collection Time: 05/31/23  8:07 AM    Specimen: Blood   Result Value Ref Range    Glucose 112 (H) 65 - 99 mg/dL    BUN 28 (H) 8 - 23 mg/dL    Creatinine 1.40 (H) 0.76 - 1.27 mg/dL    Sodium 140 136 - 145 mmol/L    Potassium 3.9 3.5 - 5.2 mmol/L    Chloride 102 98 - 107 mmol/L    CO2 28.0 22.0 - 29.0 mmol/L    Calcium 9.1 8.6 - 10.5 mg/dL    Total Protein 6.4 6.0 - 8.5 g/dL    Albumin 3.7 3.5 - 5.2 g/dL    ALT (SGPT) 19 1 - 41 U/L    AST (SGOT) 36 1 - 40 U/L    Alkaline Phosphatase 207 (H) 39 - 117 U/L    Total Bilirubin 0.7 0.0 - 1.2 mg/dL    Globulin 2.7 gm/dL    A/G Ratio 1.4 g/dL    BUN/Creatinine Ratio 20.0 7.0 - 25.0    Anion Gap 10.0 5.0 - 15.0 mmol/L    eGFR 48.9 (L) >60.0 mL/min/1.73   High Sensitivity Troponin T    Collection Time: 05/31/23  8:07 AM    Specimen: Blood   Result Value Ref Range    HS Troponin T 169 (C) <15 ng/L   BNP    Collection Time: 05/31/23  8:07 AM    Specimen: Blood   Result Value Ref Range    proBNP 2,938.0 (H) 0.0 - 1,800.0 pg/mL    Respiratory Panel PCR w/COVID-19(SARS-CoV-2) [facility] In-House, NP Swab in UTM/VTM, 3-4 HR TAT - Swab, Nasopharynx    Collection Time: 05/31/23  8:07 AM    Specimen: Nasopharynx; Swab   Result Value Ref Range    ADENOVIRUS, PCR Not Detected Not Detected    Coronavirus 229E Not Detected Not Detected    Coronavirus HKU1 Not Detected Not Detected    Coronavirus NL63 Not Detected Not Detected    Coronavirus OC43 Not Detected Not Detected    COVID19 Not Detected Not Detected - Ref. Range    Human Metapneumovirus Not Detected Not Detected    Human Rhinovirus/Enterovirus Not Detected Not Detected    Influenza A PCR Not Detected Not Detected    Influenza B PCR Not Detected Not Detected    Parainfluenza Virus 1 Not Detected Not Detected    Parainfluenza Virus 2 Not Detected Not Detected    Parainfluenza Virus 3 Not Detected Not Detected    Parainfluenza Virus 4 Not Detected Not Detected    RSV, PCR Not Detected Not Detected    Bordetella pertussis pcr Not Detected Not Detected    Bordetella parapertussis PCR Not Detected Not Detected    Chlamydophila pneumoniae PCR Not Detected Not Detected    Mycoplasma pneumo by PCR Not Detected Not Detected   Green Top (Gel)    Collection Time: 05/31/23  8:07 AM   Result Value Ref Range    Extra Tube Hold for add-ons.    Lavender Top    Collection Time: 05/31/23  8:07 AM   Result Value Ref Range    Extra Tube hold for add-on    Gold Top - SST    Collection Time: 05/31/23  8:07 AM   Result Value Ref Range    Extra Tube Hold for add-ons.    CBC Auto Differential    Collection Time: 05/31/23  8:07 AM    Specimen: Blood   Result Value Ref Range    WBC 19.37 (H) 3.40 - 10.80 10*3/mm3    RBC 4.00 (L) 4.14 - 5.80 10*6/mm3    Hemoglobin 12.2 (L) 13.0 - 17.7 g/dL    Hematocrit 35.7 (L) 37.5 - 51.0 %    MCV 89.3 79.0 - 97.0 fL    MCH 30.5 26.6 - 33.0 pg    MCHC 34.2 31.5 - 35.7 g/dL    RDW 12.5 12.3 - 15.4 %    RDW-SD 41.3 37.0 - 54.0 fl    MPV 9.5 6.0 - 12.0 fL    Platelets  177 140 - 450 10*3/mm3    Neutrophil % 84.9 (H) 42.7 - 76.0 %    Lymphocyte % 5.2 (L) 19.6 - 45.3 %    Monocyte % 8.6 5.0 - 12.0 %    Eosinophil % 0.2 (L) 0.3 - 6.2 %    Basophil % 0.3 0.0 - 1.5 %    Immature Grans % 0.8 (H) 0.0 - 0.5 %    Neutrophils, Absolute 16.47 (H) 1.70 - 7.00 10*3/mm3    Lymphocytes, Absolute 1.00 0.70 - 3.10 10*3/mm3    Monocytes, Absolute 1.66 (H) 0.10 - 0.90 10*3/mm3    Eosinophils, Absolute 0.03 0.00 - 0.40 10*3/mm3    Basophils, Absolute 0.06 0.00 - 0.20 10*3/mm3    Immature Grans, Absolute 0.15 (H) 0.00 - 0.05 10*3/mm3    nRBC 0.0 0.0 - 0.2 /100 WBC   Procalcitonin    Collection Time: 05/31/23  8:07 AM    Specimen: Blood   Result Value Ref Range    Procalcitonin 0.25 0.00 - 0.25 ng/mL   Lactic Acid, Plasma    Collection Time: 05/31/23  8:47 AM    Specimen: Blood   Result Value Ref Range    Lactate 1.2 0.5 - 2.0 mmol/L       I ordered the above labs and reviewed the results    RADIOLOGY  XR Chest 1 View    Result Date: 5/31/2023  XR CHEST 1 VW-  Clinical: Chest pain, shortness of breath, cough  COMPARISON examination 05/18/2023  FINDINGS: Mild cardiac enlargement as before. Atherosclerotic calcification of the aorta. Some prominence of the central vasculature seen. The right lung is clear. Asymmetric right first rib and calcification compared to the left, as before.  Vague opacity demonstrated at the left lung base, minimal airspace disease, atelectasis or effusion may be present. PA and lateral view of the chest would be helpful for further evaluation. The remainder of examination is unremarkable.  This report was finalized on 5/31/2023 8:06 AM by Dr. Hugo Felton M.D.        I ordered the above noted radiological studies. Interpreted by radiologist. Viewed and interpreted by me in PACS -no pneumothorax      PROCEDURES  Procedures      MEDICATIONS GIVEN IN THE EMERGENCY DEPARTMENT  Medications   sodium chloride 0.9 % flush 10 mL (has no administration in time range)   cefTRIAXone  (ROCEPHIN) 1 g in sodium chloride 0.9 % 100 mL IVPB-VTB (0 g Intravenous Stopped 5/31/23 0933)   doxycycline (VIBRAMYCIN) 100 mg in sodium chloride 0.9 % 100 mL IVPB-VTB (0 mg Intravenous Stopped 5/31/23 1032)           MEDICAL DECISION MAKING  Results were reviewed/discussed with the patient and they were also made aware of online access. Pt also made aware that some labs, such as cultures, will not be resulted during ER visit and followup with PMD is necessary.   EKGs and labs independently viewed and interpreted by me.  Discussion below represents my analysis of pertinent findings related to patient's condition, differential diagnosis, treatment plan and final disposition.    Differential diagnosis includes but is not limited to:  Muscle strain, costochondritis, pleurisy, rib fracture,  herpes zoster, tumor, myocardial infarction, coronary syndrome, unstable angina, angina, aortic dissection,  pericarditis, palpitations, pulmonary embolus, pneumonia, pneumothorax, lung cancer, GERD, esophagitis, esophageal spasm      Independent interpretation of labs, radiology studies, and discussions with consultants:  ED Course as of 05/31/23 1238   Wed May 31, 2023   1235 With Dr. Mac Hatfield, on-call for [facility] who is aware of patient's presentation, imaging, labs who agrees to admit for further testing, treatment as needed full admit telemetry. [TO]      ED Course User Index  [TO] Hallie Ba MD     EKG          EKG time: 801  Rhythm/Rate: Sinus tachycardia, multiple PVCs  P waves and AK: Left atrial enlargement, normal MASHA  QRS, axis: Poor R wave progression  ST and T waves: Nonspecific ST/T wave findings diffuse, somewhat limited by baseline artifact    Interpreted Contemporaneously by me, independently viewed  Mildly changed compared to prior 5/19/2023, increased rate, QT improved    PSI/port score 146, risk class V        MDM       DIAGNOSIS  Final diagnoses:   Pneumonia due to infectious organism, unspecified  laterality, unspecified part of lung   Hypoxia   Leukocytosis, unspecified type   History of congestive heart failure   History of stroke   Chronic renal impairment, unspecified CKD stage       DISPOSITION  ADMISSION    Discussed treatment plan and reason for admission with pt/family and admitting physician.  Pt/family voiced understanding of the plan for admission for further testing/treatment as needed.         Latest Documented Vital Signs:  As of 12:38 EDT  BP- 132/68 HR- 101 Temp- 98.8 °F (37.1 °C) O2 sat- 96%    --  Full protective equipment was worn throughout this patient encounter including a face mask, eye protection and gloves. Hand hygiene was performed before donning protective equipment and after removal when leaving the room.     Please note that portions of this note were completed with a voice recognition program.       Note Disclaimer: At [facility], we believe that sharing information builds trust and better relationships. You are receiving this note because you are receiving care at [facility] or recently visited. It is possible you will see health information before a provider has talked with you about it. This kind of information can be easy to misunderstand. To help you fully understand what it means for your health, we urge you to discuss this note with your provider.     aHllie Ba MD  05/31/23 2063

## 2023-05-31 NOTE — ED NOTES
Pt to er via [facility] with c/o cp only when coughing since yesterday. Upon ems arrival pt 89% RA placed on 2L now satting 96%.     Pt at [facility] for rehab of stroke 2 weeks ago.

## 2023-05-31 NOTE — ED NOTES
Nursing report ED to floor  Art Mullins  87 y.o.  male    HPI :   Chief Complaint   Patient presents with    Chest Pain    Cough       Admitting doctor:   Aashish Hatfield MD    Admitting diagnosis:   The primary encounter diagnosis was Pneumonia due to infectious organism, unspecified laterality, unspecified part of lung. Diagnoses of Hypoxia, Leukocytosis, unspecified type, History of congestive heart failure, History of stroke, and Chronic renal impairment, unspecified CKD stage were also pertinent to this visit.    Code status:   Current Code Status       Date Active Code Status Order ID Comments User Context       5/31/2023 1329 CPR (Attempt to Resuscitate) [id number]  Felicita Bull, APRN ED        Question Answer    Code Status (Patient has no pulse and is not breathing) CPR (Attempt to Resuscitate)    Medical Interventions (Patient has pulse or is breathing) Full Support                    Allergies:   Sulfa antibiotics    Isolation:   No active isolations    Intake and Output    Intake/Output Summary (Last 24 hours) at 5/31/2023 1345  Last data filed at 5/31/2023 1032  Gross per 24 hour   Intake 200 ml   Output --   Net 200 ml       Weight:       05/31/23  0818   Weight: 83.9 kg (185 lb)       Most recent vitals:   Vitals:    05/31/23 0900 05/31/23 1000 05/31/23 1100 05/31/23 1200   BP: 122/72 113/59 118/61 115/58   Pulse: 95 94 90 96   Resp: 18 16 16 18   Temp:       SpO2: 90% 90% 90% 92%   Weight:       Height:           Active LDAs/IV Access:   Lines, Drains & Airways       Active LDAs       Name Placement date Placement time Site Days    Peripheral IV 05/31/23 0817 Anterior;Right Forearm 05/31/23  0817  Forearm  less than 1                    Labs (abnormal labs have a star):   Labs Reviewed   COMPREHENSIVE METABOLIC PANEL - Abnormal; Notable for the following components:       Result Value    Glucose 112 (*)     BUN 28 (*)     Creatinine 1.40 (*)     Alkaline Phosphatase 207 (*)     eGFR 48.9 (*)      All other components within normal limits    Narrative:     GFR Normal >60  Chronic Kidney Disease <60  Kidney Failure <15    The GFR formula is only valid for adults with stable renal function between ages 18 and 70.   TROPONIN - Abnormal; Notable for the following components:    HS Troponin T 169 (*)     All other components within normal limits    Narrative:     High Sensitive Troponin T Reference Range:  <10.0 ng/L- Negative Female for AMI  <15.0 ng/L- Negative Male for AMI  >=10 - Abnormal Female indicating possible myocardial injury.  >=15 - Abnormal Male indicating possible myocardial injury.   Clinicians would have to utilize clinical acumen, EKG, Troponin, and serial changes to determine if it is an Acute Myocardial Infarction or myocardial injury due to an underlying chronic condition.        BNP (IN-HOUSE) - Abnormal; Notable for the following components:    proBNP 2,938.0 (*)     All other components within normal limits    Narrative:     Among patients with dyspnea, NT-proBNP is highly sensitive for the detection of acute congestive heart failure. In addition NT-proBNP of <300 pg/ml effectively rules out acute congestive heart failure with 99% negative predictive value.    Results may be falsely decreased if patient taking Biotin.     CBC WITH AUTO DIFFERENTIAL - Abnormal; Notable for the following components:    WBC 19.37 (*)     RBC 4.00 (*)     Hemoglobin 12.2 (*)     Hematocrit 35.7 (*)     Neutrophil % 84.9 (*)     Lymphocyte % 5.2 (*)     Eosinophil % 0.2 (*)     Immature Grans % 0.8 (*)     Neutrophils, Absolute 16.47 (*)     Monocytes, Absolute 1.66 (*)     Immature Grans, Absolute 0.15 (*)     All other components within normal limits   HIGH SENSITIVITIY TROPONIN T 2HR - Abnormal; Notable for the following components:    HS Troponin T 141 (*)     Troponin T Delta -28 (*)     All other components within normal limits    Narrative:     High Sensitive Troponin T Reference Range:  <10.0 ng/L-  "Negative Female for AMI  <15.0 ng/L- Negative Male for AMI  >=10 - Abnormal Female indicating possible myocardial injury.  >=15 - Abnormal Male indicating possible myocardial injury.   Clinicians would have to utilize clinical acumen, EKG, Troponin, and serial changes to determine if it is an Acute Myocardial Infarction or myocardial injury due to an underlying chronic condition.        RESPIRATORY PANEL PCR W/ COVID-19 (SARS-COV-2) [facility] IN-HOUSE, NP SWAB IN UT/VTP, 3-4 HR TAT - Normal    Narrative:     In the setting of a positive respiratory panel with a viral infection PLUS a negative procalcitonin without other underlying concern for bacterial infection, consider observing off antibiotics or discontinuation of antibiotics and continue supportive care. If the respiratory panel is positive for atypical bacterial infection (Bordetella pertussis, Chlamydophila pneumoniae, or Mycoplasma pneumoniae), consider antibiotic de-escalation to target atypical bacterial infection.   PROCALCITONIN - Normal    Narrative:     As a Marker for Sepsis (Non-Neonates):    1. <0.5 ng/mL represents a low risk of severe sepsis and/or septic shock.  2. >2 ng/mL represents a high risk of severe sepsis and/or septic shock.    As a Marker for Lower Respiratory Tract Infections that require antibiotic therapy:    PCT on Admission    Antibiotic Therapy       6-12 Hrs later    >0.5                Strongly Recommended  >0.25 - <0.5        Recommended   0.1 - 0.25          Discouraged              Remeasure/reassess PCT  <0.1                Strongly Discouraged     Remeasure/reassess PCT    As 28 day mortality risk marker: \"Change in Procalcitonin Result\" (>80% or <=80%) if Day 0 (or Day 1) and Day 4 values are available. Refer to http://www.[employer]-pct-calculator.com    Change in PCT <=80%  A decrease of PCT levels below or equal to 80% defines a positive change in PCT test result representing a higher risk for 28-day " all-cause mortality of patients diagnosed with severe sepsis for septic shock.    Change in PCT >80%  A decrease of PCT levels of more than 80% defines a negative change in PCT result representing a lower risk for 28-day all-cause mortality of patients diagnosed with severe sepsis or septic shock.      LACTIC ACID, PLASMA - Normal   BLOOD CULTURE   BLOOD CULTURE   RAINBOW DRAW    Narrative:     The following orders were created for panel order Midnight Draw.  Procedure                               Abnormality         Status                     ---------                               -----------         ------                     Green Top (Gel)[[id number]]                                  Final result               Lavender Top[[id number]]                                     Final result               Gold Top - SST[[id number]]                                   Final result               Light Blue Top[[id number]]                                                                Please view results for these tests on the individual orders.   PROTIME-INR   CBC AND DIFFERENTIAL    Narrative:     The following orders were created for panel order CBC & Differential.  Procedure                               Abnormality         Status                     ---------                               -----------         ------                     CBC Auto Differential[[id number]]        Abnormal            Final result                 Please view results for these tests on the individual orders.   GREEN TOP   LAVENDER TOP   GOLD TOP - SST   LIGHT BLUE TOP       EKG:   ECG 12 Lead Chest Pain   Preliminary Result   HEART RATE= 106  bpm   RR Interval= 566  ms   MA Interval= 149  ms   P Horizontal Axis= 1  deg   P Front Axis= 56  deg   QRSD Interval= 102  ms   QT Interval= 345  ms   QRS Axis= -58  deg   T Wave Axis= 60  deg   - ABNORMAL ECG -   Sinus tachycardia   Multiform ventricular premature complexes   Probable left atrial enlargement    Left anterior fascicular block   Abnormal R-wave progression, late transition   ST elevation, consider inferior injury   Baseline wander in lead(s) I,II,aVR,aVL,aVF,V1,V3,V4,V6   Electronically Signed By:    Date and Time of Study: 2023-05-31 08:01:01      ECG 12 Lead Chest Pain    (Results Pending)       Meds given in ED:   Medications   sodium chloride 0.9 % flush 10 mL (has no administration in time range)   sodium chloride 0.9 % flush 10 mL (has no administration in time range)   sodium chloride 0.9 % flush 10 mL (has no administration in time range)   sodium chloride 0.9 % infusion 40 mL (has no administration in time range)   sennosides-docusate (PERICOLACE) 8.6-50 MG per tablet 2 tablet (has no administration in time range)     And   polyethylene glycol (MIRALAX) packet 17 g (has no administration in time range)     And   bisacodyl (DULCOLAX) EC tablet 5 mg (has no administration in time range)     And   bisacodyl (DULCOLAX) suppository 10 mg (has no administration in time range)   nitroglycerin (NITROSTAT) SL tablet 0.4 mg (has no administration in time range)   acetaminophen (TYLENOL) tablet 650 mg (has no administration in time range)     Or   acetaminophen (TYLENOL) 160 MG/5ML solution 650 mg (has no administration in time range)     Or   acetaminophen (TYLENOL) suppository 650 mg (has no administration in time range)   ondansetron (ZOFRAN) injection 4 mg (has no administration in time range)   cefTRIAXone (ROCEPHIN) 1 g in sodium chloride 0.9 % 100 mL IVPB-VTB (has no administration in time range)   cefTRIAXone (ROCEPHIN) 1 g in sodium chloride 0.9 % 100 mL IVPB-VTB (0 g Intravenous Stopped 5/31/23 0933)   doxycycline (VIBRAMYCIN) 100 mg in sodium chloride 0.9 % 100 mL IVPB-VTB (0 mg Intravenous Stopped 5/31/23 1032)       Imaging results:  No radiology results for the last day    Ambulatory status:  Assist x 1      Social issues:   Social History     Socioeconomic History    Marital status:     Tobacco Use    Smoking status: Never   Vaping Use    Vaping Use: Never used   Substance and Sexual Activity    Alcohol use: No    Drug use: No    Sexual activity: Defer       NIH Stroke Scale:         Danita Bird RN  05/31/23 13:45 EDT

## 2023-05-31 NOTE — H&P
Patient Name:  Art Mullins  YOB: 1935  MRN:  [id number]  Admit Date:  5/31/2023  Patient Care Team:  Santos Simmons MD as PCP - General (Family Medicine)  Aashish Thapa MD as Consulting Physician (Cardiology)      Subjective   History Present Illness     Chief Complaint   Patient presents with   • Chest Pain   • Cough     History of Present Illness   Mr. Mullins is a 87 y.o. non-smoker with a history of recent CVA, CKD3, CAD, chronic diastolic CHF and HTN that presents to [facility] for reported chest pain and cough. The patient is currently a very poor historian as he believes he is at [facility]. Per ED notes, he was sent over from rehab due to chest discomfort with coughing since yesterday as well as some mild hypoxia of oxygen saturations 88-89% on RA.    The patient was recently admitted to this facility 5/18 to 5/23/23 for increased confusion as well as dyspnea. He was admitted here 4/30 to 5/2/23 as well when he presented with double vision and left arm weakness. It was at that time that he was found to have bilateral hemispheric infarcts of presumed cardioembolic source and placed on Eliquis therapy per Cardiology/neurology recommendations. He was discharge to rehab but presented here most recently with CHF exacerbation and elevated troponin. He was diuresed and taken for left heart cath that revealed mild nonobstructive CAD. He was felt to have a NSTEMI secondary to demand for decompensated CHF. He was on reduced dosing of Eliquis at that time secondary to some black tarry stools that resolved with the lower dosing of medication.    In the ED, he was afebrile and hemodynamically stable. Lab work revealed a HsTN of 165, WBC 19.37, hemoglobin 12.2, procal 0.25, BNP 2938 and negative RVP. Creatinine was 1.4 with stable electrolytes. His lactic was 1.2 and blood cultures pending. He was noted to be 87-88% on RA when I entered and improved  to 91% with 1 L supplemental oxygen. CXR was concerning for possible new vague opacity at the left lung base. He was started on antibiotics and is being admitted for further concerns of pneumonia.    Review of Systems   Unable to perform ROS: Mental status change (unable to get ROS secondary to confusion)      Personal History     Past Medical History:   Diagnosis Date   • Hyperlipidemia    • Hypertension    • PVCs (premature ventricular contractions)      Past Surgical History:   Procedure Laterality Date   • CARDIAC CATHETERIZATION N/A 5/19/2023    Procedure: Left Heart Cath;  Surgeon: Ricardo Arrieta MD;  Location: [facility] CATH INVASIVE LOCATION;  Service: Cardiology;  Laterality: N/A;   • CARDIAC CATHETERIZATION N/A 5/19/2023    Procedure: Coronary angiography;  Surgeon: Ricardo Arrieta MD;  Location: [facility] INVASIVE LOCATION;  Service: Cardiology;  Laterality: N/A;     Family History   Problem Relation Age of Onset   • No Known Problems Mother    • No Known Problems Father    • No Known Problems Sister    • No Known Problems Brother      Social History     Tobacco Use   • Smoking status: Never   Vaping Use   • Vaping Use: Never used   Substance Use Topics   • Alcohol use: No   • Drug use: No     (Not in a hospital admission)    Allergies:    Allergies   Allergen Reactions   • Sulfa Antibiotics        Objective    Objective     Vital Signs  Temp:  [98.8 °F (37.1 °C)] 98.8 °F (37.1 °C)  Heart Rate:  [] 96  Resp:  [16-18] 18  BP: (113-132)/(58-72) 115/58  SpO2:  [90 %-96 %] 92 %  on  Flow (L/min):  [2] 2;   Device (Oxygen Therapy): nasal cannula  Body mass index is 27.32 kg/m².    Physical Exam  Vitals and nursing note reviewed.   Constitutional:       Appearance: He is ill-appearing. He is not toxic-appearing.   HENT:      Head: Normocephalic and atraumatic.      Right Ear: External ear normal.      Left Ear: External ear normal.      Nose: Nose normal.   Cardiovascular:      Rate and Rhythm: Normal rate and  regular rhythm.      Pulses: Normal pulses.   Pulmonary:      Effort: Pulmonary effort is normal.      Comments: Diminished breath sounds. On 1 L at 91%  Abdominal:      General: Bowel sounds are normal. There is no distension.      Palpations: Abdomen is soft.      Tenderness: There is no abdominal tenderness.   Musculoskeletal:         General: No swelling or tenderness. Normal range of motion.      Cervical back: Normal range of motion and neck supple.   Skin:     General: Skin is warm and dry.      Findings: No bruising.   Neurological:      General: No focal deficit present.      Mental Status: He is alert. He is disoriented.      Sensory: No sensory deficit.      Motor: Weakness present.      Coordination: Coordination normal.      Comments: Aware of self. Th[place] is [place] the fire [place] and that it is 2019   Psychiatric:         Mood and Affect: Mood normal.         Behavior: Behavior normal.        Results Review:  I reviewed the patient's new clinical results.  I reviewed the patient's new imaging results and agree with the interpretation.  I reviewed the patient's other test results and agree with the interpretation  I personally viewed and interpreted the patient's EKG/Telemetry data    Lab Results (last 24 hours)     Procedure Component Value Units Date/Time    TROPONIN (IN-HOUSE) [[id number]]  (Abnormal) Collected: 05/30/23 2253    Specimen: Blood Updated: 05/31/23 0344     HS Troponin T 165 ng/L     Narrative:      High Sensitive Troponin T Reference Range:  <10.0 ng/L- Negative Female for AMI  <15.0 ng/L- Negative Male for AMI  >=10 - Abnormal Female indicating possible myocardial injury.  >=15 - Abnormal Male indicating possible myocardial injury.   Clinicians would have to utilize clinical acumen, EKG, Troponin, and serial changes to determine if it is an Acute Myocardial Infarction or myocardial injury due to an underlying chronic condition.         CK (aka CPK) [[id number]]  (Normal) Collected:  05/30/23 2253    Specimen: Blood Updated: 05/31/23 0141     Creatine Kinase 43 U/L     CBC & Differential [[id number]]  (Abnormal) Collected: 05/31/23 0807    Specimen: Blood Updated: 05/31/23 0824    Narrative:      The following orders were created for panel order CBC & Differential.  Procedure                               Abnormality         Status                     ---------                               -----------         ------                     CBC Auto Differential[[id number]]        Abnormal            Final result                 Please view results for these tests on the individual orders.    Comprehensive Metabolic Panel [[id number]]  (Abnormal) Collected: 05/31/23 0807    Specimen: Blood Updated: 05/31/23 0838     Glucose 112 mg/dL      BUN 28 mg/dL      Creatinine 1.40 mg/dL      Sodium 140 mmol/L      Potassium 3.9 mmol/L      Chloride 102 mmol/L      CO2 28.0 mmol/L      Calcium 9.1 mg/dL      Total Protein 6.4 g/dL      Albumin 3.7 g/dL      ALT (SGPT) 19 U/L      AST (SGOT) 36 U/L      Alkaline Phosphatase 207 U/L      Total Bilirubin 0.7 mg/dL      Globulin 2.7 gm/dL      A/G Ratio 1.4 g/dL      BUN/Creatinine Ratio 20.0     Anion Gap 10.0 mmol/L      eGFR 48.9 mL/min/1.73     Narrative:      GFR Normal >60  Chronic Kidney Disease <60  Kidney Failure <15    The GFR formula is only valid for adults with stable renal function between ages 18 and 70.    High Sensitivity Troponin T [[id number]]  (Abnormal) Collected: 05/31/23 0807    Specimen: Blood Updated: 05/31/23 0842     HS Troponin T 169 ng/L     Narrative:      High Sensitive Troponin T Reference Range:  <10.0 ng/L- Negative Female for AMI  <15.0 ng/L- Negative Male for AMI  >=10 - Abnormal Female indicating possible myocardial injury.  >=15 - Abnormal Male indicating possible myocardial injury.   Clinicians would have to utilize clinical acumen, EKG, Troponin, and serial changes to determine if it is an Acute Myocardial Infarction or  myocardial injury due to an underlying chronic condition.         BNP [[id number]]  (Abnormal) Collected: 05/31/23 0807    Specimen: Blood Updated: 05/31/23 0836     proBNP 2,938.0 pg/mL     Narrative:      Among patients with dyspnea, NT-proBNP is highly sensitive for the detection of acute congestive heart failure. In addition NT-proBNP of <300 pg/ml effectively rules out acute congestive heart failure with 99% negative predictive value.    Results may be falsely decreased if patient taking Biotin.      Respiratory Panel PCR w/COVID-19(SARS-CoV-2) ITA/KARSON/BERNARDO/PAD/COR/MAD/ZAID In-House, NP Swab in UTM/VTM, 3-4 HR TAT - Swab, Nasopharynx [[id number]]  (Normal) Collected: 05/31/23 0807    Specimen: Swab from Nasopharynx Updated: 05/31/23 0915     ADENOVIRUS, PCR Not Detected     Coronavirus 229E Not Detected     Coronavirus HKU1 Not Detected     Coronavirus NL63 Not Detected     Coronavirus OC43 Not Detected     COVID19 Not Detected     Human Metapneumovirus Not Detected     Human Rhinovirus/Enterovirus Not Detected     Influenza A PCR Not Detected     Influenza B PCR Not Detected     Parainfluenza Virus 1 Not Detected     Parainfluenza Virus 2 Not Detected     Parainfluenza Virus 3 Not Detected     Parainfluenza Virus 4 Not Detected     RSV, PCR Not Detected     Bordetella pertussis pcr Not Detected     Bordetella parapertussis PCR Not Detected     Chlamydophila pneumoniae PCR Not Detected     Mycoplasma pneumo by PCR Not Detected    Narrative:      In the setting of a positive respiratory panel with a viral infection PLUS a negative procalcitonin without other underlying concern for bacterial infection, consider observing off antibiotics or discontinuation of antibiotics and continue supportive care. If the respiratory panel is positive for atypical bacterial infection (Bordetella pertussis, Chlamydophila pneumoniae, or Mycoplasma pneumoniae), consider antibiotic de-escalation to target atypical bacterial infection.  "   CBC Auto Differential [[id number]]  (Abnormal) Collected: 05/31/23 0807    Specimen: Blood Updated: 05/31/23 0824     WBC 19.37 10*3/mm3      RBC 4.00 10*6/mm3      Hemoglobin 12.2 g/dL      Hematocrit 35.7 %      MCV 89.3 fL      MCH 30.5 pg      MCHC 34.2 g/dL      RDW 12.5 %      RDW-SD 41.3 fl      MPV 9.5 fL      Platelets 177 10*3/mm3      Neutrophil % 84.9 %      Lymphocyte % 5.2 %      Monocyte % 8.6 %      Eosinophil % 0.2 %      Basophil % 0.3 %      Immature Grans % 0.8 %      Neutrophils, Absolute 16.47 10*3/mm3      Lymphocytes, Absolute 1.00 10*3/mm3      Monocytes, Absolute 1.66 10*3/mm3      Eosinophils, Absolute 0.03 10*3/mm3      Basophils, Absolute 0.06 10*3/mm3      Immature Grans, Absolute 0.15 10*3/mm3      nRBC 0.0 /100 WBC     Procalcitonin [[id number]]  (Normal) Collected: 05/31/23 0807    Specimen: Blood Updated: 05/31/23 0909     Procalcitonin 0.25 ng/mL     Narrative:      As a Marker for Sepsis (Non-Neonates):    1. <0.5 ng/mL represents a low risk of severe sepsis and/or septic shock.  2. >2 ng/mL represents a high risk of severe sepsis and/or septic shock.    As a Marker for Lower Respiratory Tract Infections that require antibiotic therapy:    PCT on Admission    Antibiotic Therapy       6-12 Hrs later    >0.5                Strongly Recommended  >0.25 - <0.5        Recommended   0.1 - 0.25          Discouraged              Remeasure/reassess PCT  <0.1                Strongly Discouraged     Remeasure/reassess PCT    As 28 day mortality risk marker: \"Change in Procalcitonin Result\" (>80% or <=80%) if Day 0 (or Day 1) and Day 4 values are available. Refer to http://[email or web address]-pct-[email or web address]    Change in PCT <=80%  A decrease of PCT levels below or equal to 80% defines a positive change in PCT test result representing a higher risk for 28-day all-cause mortality of patients diagnosed with severe sepsis for septic shock.    Change in PCT >80%  A decrease of PCT levels of more than " 80% defines a negative change in PCT result representing a lower risk for 28-day all-cause mortality of patients diagnosed with severe sepsis or septic shock.       Blood Culture - Blood, Arm, Left [[id number]] Collected: 05/31/23 0847    Specimen: Blood from Arm, Left Updated: 05/31/23 0850    Lactic Acid, Plasma [[id number]]  (Normal) Collected: 05/31/23 0847    Specimen: Blood Updated: 05/31/23 0915     Lactate 1.2 mmol/L     Blood Culture - Blood, Arm, Left [[id number]] Collected: 05/31/23 0859    Specimen: Blood from Arm, Left Updated: 05/31/23 0907    High Sensitivity Troponin T 2Hr [[id number]]  (Abnormal) Collected: 05/31/23 1240    Specimen: Blood Updated: 05/31/23 1326     HS Troponin T 141 ng/L      Troponin T Delta -28 ng/L     Narrative:      High Sensitive Troponin T Reference Range:  <10.0 ng/L- Negative Female for AMI  <15.0 ng/L- Negative Male for AMI  >=10 - Abnormal Female indicating possible myocardial injury.  >=15 - Abnormal Male indicating possible myocardial injury.   Clinicians would have to utilize clinical acumen, EKG, Troponin, and serial changes to determine if it is an Acute Myocardial Infarction or myocardial injury due to an underlying chronic condition.               Imaging Results (Last 24 Hours)     Procedure Component Value Units Date/Time    XR Chest 1 View [[id number]] Collected: 05/31/23 0804     Updated: 05/31/23 0809    Narrative:      XR CHEST 1 VW-     Clinical: Chest pain, shortness of breath, cough     COMPARISON examination 05/18/2023     FINDINGS: Mild cardiac enlargement as before. Atherosclerotic  calcification of the aorta. Some prominence of the central vasculature  seen. The right lung is clear. Asymmetric right first rib and  calcification compared to the left, as before.     Vague opacity demonstrated at the left lung base, minimal airspace  disease, atelectasis or effusion may be present. PA and lateral view of  the chest would be helpful for further evaluation. The  remainder of  examination is unremarkable.     This report was finalized on 5/31/2023 8:06 AM by Dr. Hugo Felton M.D.             Results for orders placed during the hospital encounter of 05/18/23    Adult Transthoracic Echo Limited W/ Cont if Necessary Per Protocol    Interpretation Summary  •  There is severe hypokinesis of the basal to mid inferolateral wall. The remaining wall segments are hyperdynamic and the ejection fraction is preserved  •  Left ventricular systolic function is normal. Left ventricular ejection fraction appears to be 56 - 60%.  •  Left ventricular wall thickness is consistent with mild concentric hypertrophy.  •  Normal right ventricular cavity size and systolic function noted.  •  Mild to moderate aortic valve regurgitation is present.  •  Mild to moderate mitral valve regurgitation is present.  •  Mild tricuspid valve regurgitation is present  •  Calculated right ventricular systolic pressure from tricuspid regurgitation is 70 mmHg.  •  There is no evidence of pericardial effusion.      ECG 12 Lead Chest Pain   Preliminary Result   HEART RATE= 106  bpm   RR Interval= 566  ms   CO Interval= 149  ms   P Horizontal Axis= 1  deg   P Front Axis= 56  deg   QRSD Interval= 102  ms   QT Interval= 345  ms   QRS Axis= -58  deg   T Wave Axis= 60  deg   - ABNORMAL ECG -   Sinus tachycardia   Multiform ventricular premature complexes   Probable left atrial enlargement   Left anterior fascicular block   Abnormal R-wave progression, late transition   ST elevation, consider inferior injury   Baseline wander in lead(s) I,II,aVR,aVL,aVF,V1,V3,V4,V6   Electronically Signed By:    Date and Time of Study: 2023-05-31 08:01:01      ECG 12 Lead Chest Pain    (Results Pending)        Assessment/Plan     Active Hospital Problems    Diagnosis  POA   • **Pneumonia due to infectious organism, unspecified laterality, unspecified part of lung [J18.9]  Yes   • History of CVA (cerebrovascular accident) [Z86.73]  Not  Applicable   • Acute respiratory failure with hypoxia [J96.01]  Yes   • Coronary artery disease without angina pectoris [I25.10]  Yes   • Altered mental status, unspecified altered mental status type [R41.82]  Yes   • Chronic diastolic CHF (congestive heart failure) [I50.32]  Yes   • Stage 3a chronic kidney disease [N18.31]  Yes   • Elevated troponin [R77.8]  Yes   • Essential hypertension [I10]  Yes     Mr. Mullins is a 87 year old male who presented to the hospital from SNF for chest pain as well as cough. He was noted to be 88-89% on RA. He was recently at this facility twice in the last month related to recent CVA felt secondary to cardioembolic source as well as CHF/type II NSTEMI. He has had some residual confusion during this time.    · Pneumonia: CXR with possible left lung opacity. WBC is 19K and procal borderline normal. He has had some mild hypoxia. Will continue Rocephin therapy. Check urine antigens. RVP was negative. Will wean oxygen as above. Add Mucinex therapy. With his recent confusion, will ask ST to see for any possible overt or silent aspiration that could be occurring (although infiltrate was more left-sided).    · Acute respiratory failure: Mild and secondary to the above. Wean oxygen as able. Add Duonebs.    · Altered mental status: Unclear what his new baseline may be given recent stroke. He has had frequent hospitalizations as well that could be contributing to his confusion. No focal neurological deficits at this time. Will monitor for any changes and see how he responds to treatment of pneumonia. Check UA for completion.    · History of CVA: Resume low dose Eliquis as well as statin therapy. Monitor neuro checks. Monitor on telemetry.    · CAD/chronic diastolic HF: BNP and troponin are lower than prior. He does not appear volume overloaded on exam. Resume home Lasix dosing and monitor. Continue BB therapy.    · CKD3: Presume that his baseline creatinine is likely around 1.4 based on recent  levels. Monitor labs and avoid nephrotoxins.    · HTN: BP stable. Resume home regimen and monitor.    · I discussed the patients findings and my recommendations with patient and Dr. Hatfield.    VTE Prophylaxis - Eliquis (home med).  Code Status - Full code. Per facility paperwork.       CRISTÓBAL Amaya  [facility]  05/31/23  14:13 EDT

## 2023-05-31 NOTE — ED NOTES
Nursing report ED to floor  Art Mullins  87 y.o.  male    HPI :   Chief Complaint   Patient presents with    Chest Pain    Cough       Admitting doctor:   Aashish Hatfield MD    Admitting diagnosis:   The primary encounter diagnosis was Pneumonia due to infectious organism, unspecified laterality, unspecified part of lung. Diagnoses of Hypoxia, Leukocytosis, unspecified type, History of congestive heart failure, History of stroke, and Chronic renal impairment, unspecified CKD stage were also pertinent to this visit.    Code status:   Current Code Status       Date Active Code Status Order ID Comments User Context       Prior            Allergies:   Sulfa antibiotics    Isolation:   Enhanced Droplet/Contact     Intake and Output    Intake/Output Summary (Last 24 hours) at 5/31/2023 1250  Last data filed at 5/31/2023 1032  Gross per 24 hour   Intake 200 ml   Output --   Net 200 ml       Weight:       05/31/23  0818   Weight: 83.9 kg (185 lb)       Most recent vitals:   Vitals:    05/31/23 0900 05/31/23 1000 05/31/23 1100 05/31/23 1200   BP: 122/72 113/59 118/61 115/58   Pulse: 95 94 90 96   Resp: 18 16 16 18   Temp:       SpO2: 90% 90% 90% 92%   Weight:       Height:           Active LDAs/IV Access:   Lines, Drains & Airways       Active LDAs       Name Placement date Placement time Site Days    Peripheral IV 05/31/23 0817 Anterior;Right Forearm 05/31/23  0817  Forearm  less than 1                    Labs (abnormal labs have a star):   Labs Reviewed   COMPREHENSIVE METABOLIC PANEL - Abnormal; Notable for the following components:       Result Value    Glucose 112 (*)     BUN 28 (*)     Creatinine 1.40 (*)     Alkaline Phosphatase 207 (*)     eGFR 48.9 (*)     All other components within normal limits    Narrative:     GFR Normal >60  Chronic Kidney Disease <60  Kidney Failure <15    The GFR formula is only valid for adults with stable renal function between ages 18 and 70.   TROPONIN - Abnormal; Notable for the  following components:    HS Troponin T 169 (*)     All other components within normal limits    Narrative:     High Sensitive Troponin T Reference Range:  <10.0 ng/L- Negative Female for AMI  <15.0 ng/L- Negative Male for AMI  >=10 - Abnormal Female indicating possible myocardial injury.  >=15 - Abnormal Male indicating possible myocardial injury.   Clinicians would have to utilize clinical acumen, EKG, Troponin, and serial changes to determine if it is an Acute Myocardial Infarction or myocardial injury due to an underlying chronic condition.        BNP (IN-HOUSE) - Abnormal; Notable for the following components:    proBNP 2,938.0 (*)     All other components within normal limits    Narrative:     Among patients with dyspnea, NT-proBNP is highly sensitive for the detection of acute congestive heart failure. In addition NT-proBNP of <300 pg/ml effectively rules out acute congestive heart failure with 99% negative predictive value.    Results may be falsely decreased if patient taking Biotin.     CBC WITH AUTO DIFFERENTIAL - Abnormal; Notable for the following components:    WBC 19.37 (*)     RBC 4.00 (*)     Hemoglobin 12.2 (*)     Hematocrit 35.7 (*)     Neutrophil % 84.9 (*)     Lymphocyte % 5.2 (*)     Eosinophil % 0.2 (*)     Immature Grans % 0.8 (*)     Neutrophils, Absolute 16.47 (*)     Monocytes, Absolute 1.66 (*)     Immature Grans, Absolute 0.15 (*)     All other components within normal limits   RESPIRATORY PANEL PCR W/ COVID-19 (SARS-COV-2) ITA/KARSON/BERNARDO/PAD/COR/MAD/ZAID IN-HOUSE, NP SWAB IN [facility], 3-4 HR TAT - Normal    Narrative:     In the setting of a positive respiratory panel with a viral infection PLUS a negative procalcitonin without other underlying concern for bacterial infection, consider observing off antibiotics or discontinuation of antibiotics and continue supportive care. If the respiratory panel is positive for atypical bacterial infection (Bordetella pertussis, Chlamydophila pneumoniae,  "or Mycoplasma pneumoniae), consider antibiotic de-escalation to target atypical bacterial infection.   PROCALCITONIN - Normal    Narrative:     As a Marker for Sepsis (Non-Neonates):    1. <0.5 ng/mL represents a low risk of severe sepsis and/or septic shock.  2. >2 ng/mL represents a high risk of severe sepsis and/or septic shock.    As a Marker for Lower Respiratory Tract Infections that require antibiotic therapy:    PCT on Admission    Antibiotic Therapy       6-12 Hrs later    >0.5                Strongly Recommended  >0.25 - <0.5        Recommended   0.1 - 0.25          Discouraged              Remeasure/reassess PCT  <0.1                Strongly Discouraged     Remeasure/reassess PCT    As 28 day mortality risk marker: \"Change in Procalcitonin Result\" (>80% or <=80%) if Day 0 (or Day 1) and Day 4 values are available. Refer to http://www.[employer]-calculator.com    Change in PCT <=80%  A decrease of PCT levels below or equal to 80% defines a positive change in PCT test result representing a higher risk for 28-day all-cause mortality of patients diagnosed with severe sepsis for septic shock.    Change in PCT >80%  A decrease of PCT levels of more than 80% defines a negative change in PCT result representing a lower risk for 28-day all-cause mortality of patients diagnosed with severe sepsis or septic shock.      LACTIC ACID, PLASMA - Normal   BLOOD CULTURE   BLOOD CULTURE   RAINBOW DRAW    Narrative:     The following orders were created for panel order [place] Draw.  Procedure                               Abnormality         Status                     ---------                               -----------         ------                     Green Top (Gel)[[id number]]                                  Final result               Lavender Top[[id number]]                                     Final result               Gold Top - SST[[id number]]                                   Final result               Light Blue " Top[[id number]]                                                                Please view results for these tests on the individual orders.   PROTIME-INR   HIGH SENSITIVITIY TROPONIN T 2HR   CBC AND DIFFERENTIAL    Narrative:     The following orders were created for panel order CBC & Differential.  Procedure                               Abnormality         Status                     ---------                               -----------         ------                     CBC Auto Differential[[id number]]        Abnormal            Final result                 Please view results for these tests on the individual orders.   GREEN TOP   LAVENDER TOP   GOLD TOP - SST   LIGHT BLUE TOP       EKG:   ECG 12 Lead Chest Pain   Preliminary Result   HEART RATE= 106  bpm   RR Interval= 566  ms   PA Interval= 149  ms   P Horizontal Axis= 1  deg   P Front Axis= 56  deg   QRSD Interval= 102  ms   QT Interval= 345  ms   QRS Axis= -58  deg   T Wave Axis= 60  deg   - ABNORMAL ECG -   Sinus tachycardia   Multiform ventricular premature complexes   Probable left atrial enlargement   Left anterior fascicular block   Abnormal R-wave progression, late transition   ST elevation, consider inferior injury   Baseline wander in lead(s) I,II,aVR,aVL,aVF,V1,V3,V4,V6   Electronically Signed By:    Date and Time of Study: 2023-05-31 08:01:01      ECG 12 Lead Chest Pain    (Results Pending)       Meds given in ED:   Medications   sodium chloride 0.9 % flush 10 mL (has no administration in time range)   cefTRIAXone (ROCEPHIN) 1 g in sodium chloride 0.9 % 100 mL IVPB-VTB (0 g Intravenous Stopped 5/31/23 0933)   doxycycline (VIBRAMYCIN) 100 mg in sodium chloride 0.9 % 100 mL IVPB-VTB (0 mg Intravenous Stopped 5/31/23 1032)       Imaging results:  No radiology results for the last day    Ambulatory status:   Assist x 1    Social issues:   Social History     Socioeconomic History    Marital status:    Tobacco Use    Smoking status: Never   Vaping  Use    Vaping Use: Never used   Substance and Sexual Activity    Alcohol use: No    Drug use: No    Sexual activity: Defer       NIH Stroke Scale:         Danita Bird RN  05/31/23 12:50 EDT

## 2023-05-31 NOTE — PROGRESS NOTES
[facility] Clinical Pharmacy Services: Piperacillin-Tazobactam Consult    Pt Name: Art Mullins   : 1935    Indication: Pneumonia    Relevant clinical data and objective history reviewed:    Past Medical History:   Diagnosis Date    Hyperlipidemia     Hypertension     PVCs (premature ventricular contractions)      Creatinine   Date Value Ref Range Status   2023 1.40 (H) 0.76 - 1.27 mg/dL Final   2023 1.43 (H) 0.76 - 1.27 mg/dL Final   2023 1.48 (H) 0.76 - 1.27 mg/dL Final   2023 1.90 (H) 0.60 - 1.30 mg/dL Final     Comment:     Serial Number: [id number]:  [id number]     BUN   Date Value Ref Range Status   2023 28 (H) 8 - 23 mg/dL Final     Estimated Creatinine Clearance: 44.9 mL/min (A) (by C-G formula based on SCr of 1.4 mg/dL (H)).    Lab Results   Component Value Date    WBC 19.37 (H) 2023     Temp Readings from Last 3 Encounters:   23 99.9 °F (37.7 °C) (Oral)   23 97.9 °F (36.6 °C) (Oral)   23 98.2 °F (36.8 °C) (Oral)      Assessment/Plan  Estimated CrCl >20 mL/min at this time; BMI 27.32 kg/m2  Will start piperacillin-tazobactam 3.375 g IV every 8 hours for 7 days    Pharmacy will continue to follow daily while on piperacillin-tazobactam and adjust as needed. Thank you for this consult.    Lu Viera [facility]  Clinical Pharmacist

## 2023-06-01 LAB
ALBUMIN SERPL-MCNC: 3.1 G/DL (ref 3.5–5.2)
ALBUMIN/GLOB SERPL: 1 G/DL
ALP SERPL-CCNC: 184 U/L (ref 39–117)
ALT SERPL W P-5'-P-CCNC: 17 U/L (ref 1–41)
ANION GAP SERPL CALCULATED.3IONS-SCNC: 12.5 MMOL/L (ref 5–15)
ARTERIAL PATENCY WRIST A: POSITIVE
AST SERPL-CCNC: 31 U/L (ref 1–40)
ATMOSPHERIC PRESS: 747.6 MMHG
BASE EXCESS BLDA CALC-SCNC: 4.1 MMOL/L (ref 0–2)
BDY SITE: ABNORMAL
BILIRUB SERPL-MCNC: 0.8 MG/DL (ref 0–1.2)
BUN SERPL-MCNC: 29 MG/DL (ref 8–23)
BUN/CREAT SERPL: 20.1 (ref 7–25)
CALCIUM SPEC-SCNC: 9.1 MG/DL (ref 8.6–10.5)
CHLORIDE SERPL-SCNC: 101 MMOL/L (ref 98–107)
CO2 SERPL-SCNC: 26.5 MMOL/L (ref 22–29)
CREAT SERPL-MCNC: 1.44 MG/DL (ref 0.76–1.27)
DEPRECATED RDW RBC AUTO: 42 FL (ref 37–54)
EGFRCR SERPLBLD CKD-EPI 2021: 47.3 ML/MIN/1.73
ERYTHROCYTE [DISTWIDTH] IN BLOOD BY AUTOMATED COUNT: 12.8 % (ref 12.3–15.4)
GAS FLOW AIRWAY: 2 LPM
GLOBULIN UR ELPH-MCNC: 3.1 GM/DL
GLUCOSE SERPL-MCNC: 123 MG/DL (ref 65–99)
HCO3 BLDA-SCNC: 27.6 MMOL/L (ref 22–28)
HCT VFR BLD AUTO: 33 % (ref 37.5–51)
HGB BLD-MCNC: 11.2 G/DL (ref 13–17.7)
MAGNESIUM SERPL-MCNC: 2.7 MG/DL (ref 1.6–2.4)
MCH RBC QN AUTO: 30.7 PG (ref 26.6–33)
MCHC RBC AUTO-ENTMCNC: 33.9 G/DL (ref 31.5–35.7)
MCV RBC AUTO: 90.4 FL (ref 79–97)
MODALITY: ABNORMAL
PCO2 BLDA: 36.5 MM HG (ref 35–45)
PH BLDA: 7.49 PH UNITS (ref 7.35–7.45)
PLATELET # BLD AUTO: 189 10*3/MM3 (ref 140–450)
PMV BLD AUTO: 9.9 FL (ref 6–12)
PO2 BLDA: 69.6 MM HG (ref 80–100)
POTASSIUM SERPL-SCNC: 3.6 MMOL/L (ref 3.5–5.2)
POTASSIUM SERPL-SCNC: 3.6 MMOL/L (ref 3.5–5.2)
PROT SERPL-MCNC: 6.2 G/DL (ref 6–8.5)
RBC # BLD AUTO: 3.65 10*6/MM3 (ref 4.14–5.8)
SAO2 % BLDCOA: 95.1 % (ref 92–99)
SODIUM SERPL-SCNC: 140 MMOL/L (ref 136–145)
TOTAL RATE: 20 BREATHS/MINUTE
WBC NRBC COR # BLD: 19.85 10*3/MM3 (ref 3.4–10.8)

## 2023-06-01 PROCEDURE — 85027 COMPLETE CBC AUTOMATED: CPT | Performed by: NURSE PRACTITIONER

## 2023-06-01 PROCEDURE — 94761 N-INVAS EAR/PLS OXIMETRY MLT: CPT

## 2023-06-01 PROCEDURE — 99222 1ST HOSP IP/OBS MODERATE 55: CPT | Performed by: PSYCHIATRY & NEUROLOGY

## 2023-06-01 PROCEDURE — 82803 BLOOD GASES ANY COMBINATION: CPT

## 2023-06-01 PROCEDURE — 94799 UNLISTED PULMONARY SVC/PX: CPT

## 2023-06-01 PROCEDURE — 80053 COMPREHEN METABOLIC PANEL: CPT | Performed by: NURSE PRACTITIONER

## 2023-06-01 PROCEDURE — 92610 EVALUATE SWALLOWING FUNCTION: CPT

## 2023-06-01 PROCEDURE — 25010000002 PIPERACILLIN SOD-TAZOBACTAM PER 1 G: Performed by: INTERNAL MEDICINE

## 2023-06-01 PROCEDURE — 36600 WITHDRAWAL OF ARTERIAL BLOOD: CPT

## 2023-06-01 PROCEDURE — 84132 ASSAY OF SERUM POTASSIUM: CPT | Performed by: HOSPITALIST

## 2023-06-01 PROCEDURE — 94664 DEMO&/EVAL PT USE INHALER: CPT

## 2023-06-01 PROCEDURE — 83735 ASSAY OF MAGNESIUM: CPT | Performed by: HOSPITALIST

## 2023-06-01 RX ORDER — OLANZAPINE 5 MG/1
5 TABLET ORAL
Status: DISCONTINUED | OUTPATIENT
Start: 2023-06-01 | End: 2023-06-02

## 2023-06-01 RX ORDER — POTASSIUM CHLORIDE 750 MG/1
40 TABLET, FILM COATED, EXTENDED RELEASE ORAL ONCE
Status: COMPLETED | OUTPATIENT
Start: 2023-06-01 | End: 2023-06-01

## 2023-06-01 RX ORDER — CLONAZEPAM 0.5 MG/1
0.75 TABLET ORAL NIGHTLY
Status: COMPLETED | OUTPATIENT
Start: 2023-06-01 | End: 2023-06-06

## 2023-06-01 RX ORDER — ROPINIROLE 0.5 MG/1
0.5 TABLET, FILM COATED ORAL NIGHTLY
Status: DISCONTINUED | OUTPATIENT
Start: 2023-06-01 | End: 2023-06-14 | Stop reason: HOSPADM

## 2023-06-01 RX ADMIN — METOPROLOL SUCCINATE 50 MG: 25 TABLET, EXTENDED RELEASE ORAL at 09:02

## 2023-06-01 RX ADMIN — DOCUSATE SODIUM 50MG AND SENNOSIDES 8.6MG 2 TABLET: 8.6; 5 TABLET, FILM COATED ORAL at 09:02

## 2023-06-01 RX ADMIN — IPRATROPIUM BROMIDE AND ALBUTEROL SULFATE 3 ML: 2.5; .5 SOLUTION RESPIRATORY (INHALATION) at 09:08

## 2023-06-01 RX ADMIN — Medication 4 ML: at 19:13

## 2023-06-01 RX ADMIN — TAZOBACTAM SODIUM AND PIPERACILLIN SODIUM 3.38 G: 375; 3 INJECTION, SOLUTION INTRAVENOUS at 16:26

## 2023-06-01 RX ADMIN — ROSUVASTATIN CALCIUM 40 MG: 40 TABLET, FILM COATED ORAL at 21:13

## 2023-06-01 RX ADMIN — Medication 1000 UNITS: at 09:01

## 2023-06-01 RX ADMIN — CLONAZEPAM 0.75 MG: 0.5 TABLET ORAL at 21:12

## 2023-06-01 RX ADMIN — Medication 10 ML: at 09:02

## 2023-06-01 RX ADMIN — APIXABAN 2.5 MG: 2.5 TABLET, FILM COATED ORAL at 21:12

## 2023-06-01 RX ADMIN — DOCUSATE SODIUM 50MG AND SENNOSIDES 8.6MG 2 TABLET: 8.6; 5 TABLET, FILM COATED ORAL at 21:12

## 2023-06-01 RX ADMIN — IPRATROPIUM BROMIDE AND ALBUTEROL SULFATE 3 ML: 2.5; .5 SOLUTION RESPIRATORY (INHALATION) at 12:09

## 2023-06-01 RX ADMIN — PANTOPRAZOLE SODIUM 40 MG: 40 TABLET, DELAYED RELEASE ORAL at 09:01

## 2023-06-01 RX ADMIN — IPRATROPIUM BROMIDE AND ALBUTEROL SULFATE 3 ML: 2.5; .5 SOLUTION RESPIRATORY (INHALATION) at 15:51

## 2023-06-01 RX ADMIN — Medication 10 ML: at 21:13

## 2023-06-01 RX ADMIN — LIDOCAINE 1 PATCH: 50 PATCH CUTANEOUS at 09:07

## 2023-06-01 RX ADMIN — IPRATROPIUM BROMIDE AND ALBUTEROL SULFATE 3 ML: 2.5; .5 SOLUTION RESPIRATORY (INHALATION) at 19:13

## 2023-06-01 RX ADMIN — APIXABAN 2.5 MG: 2.5 TABLET, FILM COATED ORAL at 09:01

## 2023-06-01 RX ADMIN — TAZOBACTAM SODIUM AND PIPERACILLIN SODIUM 3.38 G: 375; 3 INJECTION, SOLUTION INTRAVENOUS at 23:30

## 2023-06-01 RX ADMIN — METOPROLOL SUCCINATE 50 MG: 25 TABLET, EXTENDED RELEASE ORAL at 21:13

## 2023-06-01 RX ADMIN — ROPINIROLE HYDROCHLORIDE 0.5 MG: 0.5 TABLET, FILM COATED ORAL at 21:13

## 2023-06-01 RX ADMIN — TERAZOSIN HYDROCHLORIDE 10 MG: 5 CAPSULE ORAL at 21:13

## 2023-06-01 RX ADMIN — OLANZAPINE 5 MG: 5 TABLET ORAL at 18:47

## 2023-06-01 RX ADMIN — GUAIFENESIN 600 MG: 600 TABLET, EXTENDED RELEASE ORAL at 09:02

## 2023-06-01 RX ADMIN — GUAIFENESIN 600 MG: 600 TABLET, EXTENDED RELEASE ORAL at 21:13

## 2023-06-01 RX ADMIN — FUROSEMIDE 40 MG: 20 TABLET ORAL at 09:49

## 2023-06-01 RX ADMIN — TAZOBACTAM SODIUM AND PIPERACILLIN SODIUM 3.38 G: 375; 3 INJECTION, SOLUTION INTRAVENOUS at 09:01

## 2023-06-01 RX ADMIN — TAZOBACTAM SODIUM AND PIPERACILLIN SODIUM 3.38 G: 375; 3 INJECTION, SOLUTION INTRAVENOUS at 00:15

## 2023-06-01 RX ADMIN — ACETAMINOPHEN 650 MG: 325 TABLET, FILM COATED ORAL at 21:21

## 2023-06-01 RX ADMIN — Medication 4 ML: at 15:51

## 2023-06-01 RX ADMIN — POTASSIUM CHLORIDE 40 MEQ: 750 TABLET, EXTENDED RELEASE ORAL at 14:25

## 2023-06-01 NOTE — PLAN OF CARE
Problem: Adult Inpatient Plan of Care  Goal: Plan of Care Review  Outcome: Ongoing, Progressing  Flowsheets (Taken 6/1/2023 4714)  Progress: no change  Plan of Care Reviewed With: patient  Outcome Evaluation: VSS. Pt on 2-3L O2 per NC. Pt drowsy this morning, ABG obtained. Pt alert and disoriented to place at times. Neurology consulted. Dr. Giles added PO Zyprexa. IV zosyn continued.  SLP rec' bakari, HTL diet, pt tolerating. X1 dose PO potassium given for potassium 3.6. Q2 turn. Optifoam applied to coccyx. Spouse visited. Pt stable and needs met at this time.

## 2023-06-01 NOTE — PROGRESS NOTES
Name: Art Mullins ADMIT: 2023   : 1935  PCP: Santos Simmons MD    MRN: [id number] LOS: 1 days   AGE/SEX: 87 y.o. male  ROOM: [facility]     Subjective   Subjective   CTSP for decreased LOC. I was able to wake him up.  No complaints today--he is tired but o/w doing fine.   Denies any N/V/D/abd pain/F/C/NS/SOA/CP/palp.       Objective   Objective   Vital Signs  Temp:  [97.2 °F (36.2 °C)-99.9 °F (37.7 °C)] 97.9 °F (36.6 °C)  Heart Rate:  [] 92  Resp:  [16-20] 20  BP: (108-142)/(56-81) 125/63  SpO2:  [90 %-98 %] 95 %  on  Flow (L/min):  [2-3] 3;   Device (Oxygen Therapy): nasal cannula  Body mass index is 27.02 kg/m².  Physical Exam  Vitals and nursing note reviewed. Exam conducted with a chaperone present (Wife).   Constitutional:       General: He is not in acute distress.     Appearance: He is ill-appearing. He is not toxic-appearing or diaphoretic.      Comments: Sleepy but I was able to wake him up   HENT:      Head: Normocephalic.      Mouth/Throat:      Mouth: Mucous membranes are moist.      Pharynx: Oropharynx is clear.   Eyes:      General: No scleral icterus.        Right eye: No discharge.         Left eye: No discharge.      Extraocular Movements: Extraocular movements intact.      Conjunctiva/sclera: Conjunctivae normal.   Cardiovascular:      Rate and Rhythm: Normal rate and regular rhythm.      Pulses: Normal pulses.   Pulmonary:      Effort: Pulmonary effort is normal. No respiratory distress.      Breath sounds: Normal breath sounds. No wheezing or rales.      Comments: Anteriorly   Abdominal:      General: Bowel sounds are normal. There is no distension.      Palpations: Abdomen is soft.      Tenderness: There is no abdominal tenderness.   Musculoskeletal:         General: No swelling or deformity. Normal range of motion.      Cervical back: Neck supple. No rigidity.   Lymphadenopathy:      Cervical: No cervical adenopathy.   Skin:     General: Skin is warm and dry.       Capillary Refill: Capillary refill takes less than 2 seconds.      Coloration: Skin is not jaundiced.   Neurological:      Mental Status: He is alert. Mental status is at baseline.      Cranial Nerves: No cranial nerve deficit.      Comments: Oriented to person and place  Left sided hemiparesis noted   Psychiatric:         Mood and Affect: Mood normal.       Results Review     I reviewed the patient's new clinical results.  Results from last 7 days   Lab Units 06/01/23  0523 05/31/23  0807   WBC 10*3/mm3 19.85* 19.37*   HEMOGLOBIN g/dL 11.2* 12.2*   PLATELETS 10*3/mm3 189 177     Results from last 7 days   Lab Units 06/01/23  0523 05/31/23  0807   SODIUM mmol/L 140 140   POTASSIUM mmol/L 3.6 3.9   CHLORIDE mmol/L 101 102   CO2 mmol/L 26.5 28.0   BUN mg/dL 29* 28*   CREATININE mg/dL 1.44* 1.40*   GLUCOSE mg/dL 123* 112*   EGFR mL/min/1.73 47.3* 48.9*     Results from last 7 days   Lab Units 06/01/23  0523 05/31/23  0807   ALBUMIN g/dL 3.1* 3.7   BILIRUBIN mg/dL 0.8 0.7   ALK PHOS U/L 184* 207*   AST (SGOT) U/L 31 36   ALT (SGPT) U/L 17 19     Results from last 7 days   Lab Units 06/01/23  0523 05/31/23  0807   CALCIUM mg/dL 9.1 9.1   ALBUMIN g/dL 3.1* 3.7     Results from last 7 days   Lab Units 05/31/23  0847 05/31/23  0807   PROCALCITONIN ng/mL  --  0.25   LACTATE mmol/L 1.2  --      No results found for: HGBA1C, POCGLU    No radiology results for the last day  I have personally reviewed all medications:  Scheduled Medications  apixaban, 2.5 mg, Oral, Q12H  cholecalciferol, 1,000 Units, Oral, Daily  clonazePAM, 1 mg, Oral, Nightly  furosemide, 40 mg, Oral, Daily  guaiFENesin, 600 mg, Oral, Q12H  ipratropium-albuterol, 3 mL, Nebulization, 4x Daily - RT  lidocaine, 1 patch, Transdermal, Q24H  metoprolol succinate XL, 50 mg, Oral, Q12H  pantoprazole, 40 mg, Oral, Daily  piperacillin-tazobactam, 3.375 g, Intravenous, Q8H  rOPINIRole, 2 mg, Oral, Nightly  rosuvastatin, 40 mg, Oral, Nightly  senna-docusate sodium, 2  tablet, Oral, BID  sodium chloride, 10 mL, Intravenous, Q12H  sodium chloride, 4 mL, Nebulization, BID - RT  terazosin, 10 mg, Oral, Nightly    Infusions  Pharmacy to Dose Zosyn,     Diet  Diet: Regular/House Diet; Feeding Assistance - Nursing; Texture: Pureed (NDD 1); Fluid Consistency: Honey Thick    I have personally reviewed:  [x]  Laboratory   [x]  Microbiology   [x]  Radiology   [x]  EKG/Telemetry  [x]  Cardiology/Vascular   []  Pathology    [x]  Records       Assessment/Plan     Active Hospital Problems    Diagnosis  POA   • **Pneumonia due to infectious organism, unspecified laterality, unspecified part of lung [J18.9]  Yes   • History of CVA (cerebrovascular accident) [Z86.73]  Not Applicable   • Acute respiratory failure with hypoxia [J96.01]  Yes   • 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       88yo gentleman who presented to the hospital from SNF for chest pain as well as cough. He was noted to be 88-89% on RA. He has been admitted at this facility twice in the last month related to recent CVA (felt to be due to cardioembolic source) as well as CHF/type II NSTEMI. Not surprisingly he has had some residual confusion during this time. In ER he was found to have left sided PNA.     • Pneumonia: CXR showed left lung opacity. WBC 19K and PCT borderline normal. He has had some mild hypoxia. Continue Rocephin therapy for tx of left sided PNA. Urine antigens negative. RVP negative. MRSA screen negative. Blood cultures NGTD. Will wean oxygen as able. With his recent confusion asked SLP to see for any possible overt or silent aspiration that could be occurring  (although infiltrate was more left-sided). They have modified diet in place after their eval this AM and will follow.     • Acute respiratory failure: Mild and secondary to the above. Wean oxygen as able. Continue Duonebs.     • Altered mental status: Unclear what his new baseline may be given recent stroke. He has had frequent hospitalizations as well that could be contributing to his confusion. No focal neurological deficits at this time. Will monitor for any changes and see how he responds to treatment of pneumonia. Neuro consult at family's request. Per RN he was awake and alert this AM, but now much more drowsy. ABG ordered and showed only some mild hypoxia. I think he was just very sleepy.     • Recent CVA with left hemiparesis: Continue low dose Eliquis as well as statin therapy. Monitor neuro checks. Monitor on telemetry.     • CAD/chronic diastolic HF: BNP and troponin are lower than prior. He does not appear volume overloaded on exam. Continue home Lasix dosing and monitor. Continue BB therapy.     • CKD3a: Presume that his baseline Cr is likely around 1.4 based on recent levels. Monitor labs and avoid nephrotoxins.     • HTN: BPs acceptable. Continue home regimen and monitor.      · Eliquis (home med) for DVT prophylaxis.  · D/w wife and she thought pt was supposed to be DNR--will review paperwork from facility.  · Discussed with patient and nursing staff. D/w wife and RT at bedside.  · Anticipate discharge back to SNU facility, timing yet to be determined..      Tomy Almanza MD  [facility]  06/01/23  09:59 EDT

## 2023-06-01 NOTE — THERAPY EVALUATION
Acute Care - Speech Language Pathology   Swallow Initial Evaluation [facility]     Patient Name: Art Mullins  : 1935  MRN: [id number]  Today's Date: 2023               Admit Date: 2023    Visit Dx:     ICD-10-CM ICD-9-CM   1. Pneumonia due to infectious organism, unspecified laterality, unspecified part of lung  J18.9 486   2. Hypoxia  R09.02 799.02   3. Leukocytosis, unspecified type  D72.829 288.60   4. History of congestive heart failure  Z86.79 V12.59   5. History of stroke  Z86.73 V12.54   6. 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     Past Medical History:   Diagnosis Date   • Hyperlipidemia    • Hypertension    • PVCs (premature ventricular contractions)      Past Surgical History:   Procedure Laterality Date   • CARDIAC CATHETERIZATION N/A 2023    Procedure: Left Heart Cath;  Surgeon: Ricardo Arrieta MD;  Location: [facility] INVASIVE LOCATION;  Service: Cardiology;  Laterality: N/A;   • CARDIAC CATHETERIZATION N/A 2023    Procedure: Coronary angiography;  Surgeon: Ricardo Arrieta MD;  Location: [facility] INVASIVE LOCATION;  Service: Cardiology;  Laterality: N/A;       SLP Recommendation and Plan  SLP Swallowing Diagnosis: oral dysphagia, suspected pharyngeal dysphagia (23 09)  SLP Diet Recommendation: puree, honey thick liquids (23)  Recommended Precautions and Strategies: upright posture during/after eating,  "small bites of food and sips of liquid, assist with feeding (06/01/23 0900)  SLP Rec. for Method of Medication Administration: meds whole, meds crushed, with thick liquids, with puree, as tolerated (06/01/23 0900)     Monitor for Signs of Aspiration: yes, notify SLP if any concerns (06/01/23 0900)  Recommended Diagnostics: reassess via clinical swallow evaluation (06/01/23 0900)           Therapy Frequency (Swallow): PRN (06/01/23 0900)  Predicted Duration Therapy Intervention (Days): until discharge (06/01/23 0900)                                        Plan of Care Reviewed With: patient  Outcome Evaluation: Patient seen for clinical swallow assessment. Wife present and reports overall decline in strength, but the patient looks \"the worst he's ever been\" this date. Pt lethargic and kept eyes shut during the assessment. Poor performance with oral motor tasks. No overt s/s of aspiration with ice. Intermittent strong cough with thins via cup/straw. Audible swallow with nectar, which could indicate swallow mistiming. Pt impulsive with bolus size stating, \"get this over with\". No overt s/s of aspiration with honey via straw or puree. Pt not appropriate for soft solids d/t fatigue this date. Pt is not at baseline status and this clinician is unsure if aspiration contributed to current PNA, but feel current swallow function could worsen resp status at this time. SLP recs honey and puree. Straws okay. Assist with meals. Meds whole or crushed with puree. If s/s of aspiration persist when patient is more alert, will plan for VFSS.      SWALLOW EVALUATION (last 72 hours)     SLP Adult Swallow Evaluation     Row Name 06/01/23 0900                   Rehab Evaluation    Document Type evaluation  -SH        Patient Effort poor  -SH           General Information    Patient Profile Reviewed yes  -SH        Pertinent History Of Current Problem L lobe pna, hx stroke, no speech therapy hx in epic, order to r/o aspiration  -SH        " "Current Method of Nutrition regular textures;thin liquids  -        Precautions/Limitations, Vision difficult to assess  -        Precautions/Limitations, Hearing WFL;for purposes of eval  -        Prior Level of Function-Communication unknown  -        Prior Level of Function-Swallowing no diet consistency restrictions  -        Plans/Goals Discussed with patient  -        Barriers to Rehab medically complex  -        Patient's Goals for Discharge patient did not state  -           Pain    Additional Documentation Pain Scale: FACES Pre/Post-Treatment (Group)  -           Pain Scale: FACES Pre/Post-Treatment    Pain: FACES Scale, Pretreatment 2-->hurts little bit  -           Oral Musculature and Cranial Nerve Assessment    Oral Motor General Assessment unable to assess  -           Clinical Swallow Eval    Clinical Swallow Evaluation Summary Patient seen for clinical swallow assessment. Wife present and reports overall decline in strength, but the patient looks \"the worst he's ever been\" this date. Pt lethargic and kept eyes shut during the assessment. Poor performance with oral motor tasks. No overt s/s of aspiration with ice. Intermittent strong cough with thins via cup/straw. Audible swallow with nectar, which could indicate swallow mistiming. Pt impulsive with bolus size stating, \"get this over with\". No overt s/s of aspiration with honey via straw or puree. Pt not appropriate for soft solids d/t fatigue this date. Pt is not at baseline status and this clinician is unsure if aspiration contributed to current PNA, but feel current swallow function could worsen resp status at this time. SLP recs honey and puree. Straws okay. Assist with meals. Meds whole or crushed with puree. If s/s of aspiration persist when patient is more alert, will plan for VFSS. Plan to follow up at the bedside at this time.  -           SLP Evaluation Clinical Impression    SLP Swallowing Diagnosis oral " dysphagia;suspected pharyngeal dysphagia  -           Recommendations    Therapy Frequency (Swallow) PRN  -        Predicted Duration Therapy Intervention (Days) until discharge  -        SLP Diet Recommendation puree;honey thick liquids  -        Recommended Diagnostics reassess via clinical swallow evaluation  -        Recommended Precautions and Strategies upright posture during/after eating;small bites of food and sips of liquid;assist with feeding  -        Oral Care Recommendations Oral Care BID/PRN  -        SLP Rec. for Method of Medication Administration meds whole;meds crushed;with thick liquids;with puree;as tolerated  -        Monitor for Signs of Aspiration yes;notify SLP if any concerns  -           Swallow Goals (SLP)    Swallow LTGs Patient will demonstrate functional swallow for  -           (LTG) Patient will demonstrate functional swallow for    Diet Texture (Demonstrate functional swallow) regular textures  -        Liquid viscosity (Demonstrate functional swallow) thin liquids  -        [place] (Demonstrate functional swallow) independently (over 90% accuracy)  -              User Key  (r) = Recorded By, (t) = Taken By, (c) = Cosigned By    Initials Name Effective Dates     Georgia Felder MS CCC-SLP 06/16/21 -                 EDUCATION  The patient has been educated in the following areas:   Dysphagia (Swallowing Impairment).        SLP GOALS     Row Name 06/01/23 0900             (LTG) Patient will demonstrate functional swallow for    Diet Texture (Demonstrate functional swallow) regular textures  -      Liquid viscosity (Demonstrate functional swallow) thin liquids  -      [place] (Demonstrate functional swallow) independently (over 90% accuracy)  -            User Key  (r) = Recorded By, (t) = Taken By, (c) = Cosigned By    Initials Name Provider Type     Georgia Felder MS CCC-SLP Speech and Language Pathologist                   Time  Calculation:    Time Calculation- SLP     Row Name 06/01/23 1513             Time Calculation- SLP    SLP Start Time 0900  -SH      SLP Received On 06/01/23  -         Untimed Charges    81742-EY Eval Oral Pharyng Swallow Minutes 75  -SH         Total Minutes    Untimed Charges Total Minutes 75  -SH       Total Minutes 75  -SH            User Key  (r) = Recorded By, (t) = Taken By, (c) = Cosigned By    Initials Name Provider Type     Georgia Felder MS CCC-SLP Speech and Language Pathologist                Therapy Charges for Today     Code Description Service Date Service Provider Modifiers Qty    [id number]  ST EVAL ORAL PHARYNG SWALLOW 5 6/1/2023 Georgia Felder MS CCC-SLP GN 1               Georgia Felder MS CCC-SLP  6/1/2023

## 2023-06-01 NOTE — PLAN OF CARE
"Goal Outcome Evaluation:  Plan of Care Reviewed With: patient           Outcome Evaluation: Patient seen for clinical swallow assessment. Wife present and reports overall decline in strength, but the patient looks \"the worst he's ever been\" this date. Pt lethargic and kept eyes shut during the assessment. Poor performance with oral motor tasks. No overt s/s of aspiration with ice. Intermittent strong cough with thins via cup/straw. Audible swallow with nectar, which could indicate swallow mistiming. Pt impulsive with bolus size stating, \"get this over with\". No overt s/s of aspiration with honey via straw or puree. Pt not appropriate for soft solids d/t fatigue this date. Pt is not at baseline status and this clinician is unsure if aspiration contributed to current PNA, but feel current swallow function could worsen resp status at this time. SLP recs honey and puree. Straws okay. Assist with meals. Meds whole or crushed with puree. If s/s of aspiration persist when patient is more alert, will plan for VFSS. Plan to follow up at the bedside at this time.          "

## 2023-06-01 NOTE — CASE MANAGEMENT/SOCIAL WORK
"Physicians Statement of Medical Necessity for  Ambulance Transportation    GENERAL INFORMATION     Name: Art Mullins  YOB: 1935  Medicare #: [id number]  Transport Date: _________  (Valid for round trips this date, or for scheduled repetitive trips for 60 days from the date signed below.)  Origin: [facility]  Destination: [facility]  Is the Patient's stay covered under Medicare Part A (PPS/DRG?)Yes  Closest appropriate facility? Yes  If this a hosp-hosp transfer? No  Is this a hospice patient? No    MEDICAL NECESSITY QUESTIONAIRE    Ambulance Transportation is medically necessary only if other means of transportation are contraindicated or would be potentially harmful to the patient.  To meet this requirement, the patient must be either \"bed confined\" or suffer from a condition such that transport by means other than an ambulance is contraindicated by the patient's condition.  The following questions must be answered by the healthcare professional signing below for this form to be valid:     1) Describe the MEDICAL CONDITION (physical and/or mental) of this patient AT THE TIME OF AMBULANCE TRANSPORT that requires the patient to be transported in an ambulance, and why transport by other means is contraindicated by the patient's condition:   Past Medical History:   Diagnosis Date   • Hyperlipidemia    • Hypertension    • PVCs (premature ventricular contractions)       Past Surgical History:   Procedure Laterality Date   • CARDIAC CATHETERIZATION N/A 5/19/2023    Procedure: Left Heart Cath;  Surgeon: Ricardo Arrieta MD;  Location: [facility] CATH INVASIVE LOCATION;  Service: Cardiology;  Laterality: N/A;   • CARDIAC CATHETERIZATION N/A 5/19/2023    Procedure: Coronary angiography;  Surgeon: Ricardo Arrieta MD;  Location: [facility] CATH INVASIVE LOCATION;  Service: Cardiology;  Laterality: N/A;      2) Is this patient \"bed confined\" as defined below?Yes   To be \"bed confined\" the patient must " satisfy all three of the following criteria:  (1) unable to get up from bed without assistance; AND (2) unable to ambulate;  AND (3) unable to sit in a chair or wheelchair.  3) Can this patient safely be transported by car or wheelchair van (I.e., may safely sit during transport, without an attendant or monitoring?)No   4. In addition to completing questions 1-3 above, please check any of the following conditions that apply*:          *Note: supporting documentation for any boxes checked must be maintained in the patient's medical records Patient is confused, Requires oxygen - unable to self administer and Other High falls risk      SIGNATURE OF PHYSICIAN OR OTHER AUTHORIZED HEALTHCARE PROFESSIONAL    I certify that the above information is true and correct based on my evaluation of this patient, and represent that the patient requires transport by ambulance and that other forms of transport are contraindicated.  I understand that this information will be used by the Centers for Medicare and Medicaid Services (CMS) to support the determiniation of medical necessity for ambulance services, and I represent that I have personal knowledge of the patient's condition at the time of transport.       If this box is checked, I also certify that the patient is physically or mentally incapable of signing the ambulance service's claim form and that the institution with which I am affiliated has furnished care, services or assistance to the patient.  My signature below is made on behalf of the patient pursuant to 42 .36(b)(4). In accordance with 42 .37, the specific reason(s) that the patient is physically or mentally incapable of signing the claim for is as follows:     Signature of Physician or Healthcare Professional  Date/Time:        (For Scheduled repetitive transport, this form is not valid for transports performed more than 60 days after this date).                                                                                                                                             --------------------------------------------------------------------------------------------  Printed Name and Credentials of Physician or Authorized Healthcare Professional     *Form must be signed by patient's attending physician for scheduled, repetitive transports,.  For non-repetitive ambulance transports, if unable to obtain the signature of the attending physician, any of the following may sign (please select below):     Physician  Clinical Nurse Specialist  Registered Nurse     Physician Assistant  Discharge Planner  Licensed Practical Nurse     Nurse Practitioner

## 2023-06-01 NOTE — CASE MANAGEMENT/SOCIAL WORK
Discharge Planning Assessment  [facility]     Patient Name: Art Mullins  MRN: [id number]  Today's Date: 6/1/2023    Admit Date: 5/31/2023    Plan: Return to [facility]. No bed hold but can return. Needs ambulance transport.   Discharge Needs Assessment    No documentation.                Discharge Plan     Row Name 06/01/23 1417       Plan    Plan Return to [facility]. No bed hold but can return. Needs ambulance transport.    Patient/Family in Agreement with Plan yes    Plan Comments Received pt from [facility]. Per Aure/Telma, pt is from a skilled bed with no bed hold but may return at D/C. On entering room, pt in bed with eyes closed. Wife at bedside. Discussed D/C planning with wife at pt’s bedside. Explained roll of . Wife states pt is to return to [facility] to continue rehab. States pt had been admitted to the hospital from 5/18 to 5/23/23 with a stroke. At D/C pt went to [facility] and had made good progress. States he was walking with a walker in the diaz at [facility]. Wife states then he got weak and now he cannot do anything. PT eval pending at this time. Wife wants pt to return to [facility] at D/C and is hopeful he will be able to walk again as he was prior to this admit. Pt will need ambulance transport at D/C.              Continued Care and Services - Admitted Since 5/31/2023     Destination Coordination complete.    Service Provider Request Status Selected Services Address Phone Fax Patient Preferred    [facility]  Selected Skilled Nursing [address] [id number] [phone] --            Selected Continued Care - Prior Encounters Includes continued care and service providers with selected services from prior encounters from 3/2/2023 to 6/1/2023    Discharged on 5/23/2023 Admission date: 5/18/2023 - Discharge disposition: Skilled Nursing Facility (DC - External)    Destination      Service Provider Selected Services Address Phone Fax Patient Preferred    [facility] [address] [address] [phone] --                    Expected Discharge Date and Time     Expected Discharge Date Expected Discharge Time    Jun 2, 2023          Demographic Summary    No documentation.                   Malik Kaur RN

## 2023-06-01 NOTE — CONSULTS
Patient Identification:  NAME:  Art Mullins  Age:  87 y.o.   Sex:  male   :  1935   MRN:  [id number]       Chief complaint: He does not have 1, reason for consult change in mental status and patient with recent stroke    History of present illness: Patient is an 87-year-old right-handed white male who has had hyperlipidemia, hypertension, may have had a TIA with brief right-sided weakness last October definitely had a bihemispheric stroke with left-sided weakness in late April of this year.  He has been placed on Eliquis.  Context a patient whose memory is not as good as it used to be and he does become a little confused or agitated at times at night.  He comes to the hospital now with shortness of breath, hypoxemia and evidence of pneumonia.  Modifying factors antibiotic duration the last several days context the patient who has had recent stroke affecting his left side but according to his wife that really has not changed since he came to the hospital.  No seizure activity.  CT of his head on 2023 by my independent eyeball review shows chronic changes only but no evidence of an acute stroke or hemorrhage.  Quality is lethargy and confusion but he has been a little more confused at night.  PO2 earlier today was 69 when he had a blood gas.  He has been on Klonopin 1 mg at night and 2 mg of Requip at night for restless legs.  Additionally.  He has not had an MRI or CAT scan since this admission and does not have a new focal deficit but just the same weakness.  He had in the left arm since the stroke in late April      Past medical history:  Past Medical History:   Diagnosis Date    Hyperlipidemia     Hypertension     PVCs (premature ventricular contractions)        Past surgical history:  Past Surgical History:   Procedure Laterality Date    CARDIAC CATHETERIZATION N/A 2023    Procedure: Left Heart Cath;  Surgeon: Ricardo Arrieta MD;  Location: [facility] CATH INVASIVE LOCATION;  Service: Cardiology;   Laterality: N/A;    CARDIAC CATHETERIZATION N/A 5/19/2023    Procedure: Coronary angiography;  Surgeon: Ricardo Arrieta MD;  Location: [facility] CATH INVASIVE LOCATION;  Service: Cardiology;  Laterality: N/A;       Allergies:  Sulfa antibiotics    Home medications:  Medications Prior to Admission   Medication Sig Dispense Refill Last Dose    apixaban (ELIQUIS) 2.5 MG tablet tablet Take 1 tablet by mouth Every 12 (Twelve) Hours. Indications: Other - full anticoagulation 60 tablet 0     cholecalciferol (VITAMIN D3) 1000 UNITS tablet Take 1 tablet by mouth Daily.       clonazePAM (KlonoPIN) 1 MG tablet Take 1 tablet by mouth Daily. (Patient taking differently: Take 1 tablet by mouth Every Night.) 3 tablet 0     Cyanocobalamin (VITAMIN B 12 PO) Take 2,500 mcg by mouth Daily.       doxazosin (CARDURA) 8 MG tablet Take 1 tablet by mouth Every Night.       furosemide (LASIX) 40 MG tablet Take 1 tablet by mouth Daily. 30 tablet 0     hydrocortisone 2.5 % lotion Apply 1 application topically to the appropriate area as directed 2 (Two) Times a Day.       metoprolol succinate XL (TOPROL-XL) 50 MG 24 hr tablet Take 1 tablet by mouth 2 (two) times a day. 60 tablet 5     pantoprazole (PROTONIX) 40 MG EC tablet Take 1 tablet by mouth Daily.       rOPINIRole (REQUIP) 2 MG tablet Take 1 tablet by mouth Every Night.       rosuvastatin (CRESTOR) 40 MG tablet Take 1 tablet by mouth Daily.           Hospital medications:  apixaban, 2.5 mg, Oral, Q12H  cholecalciferol, 1,000 Units, Oral, Daily  clonazePAM, 0.75 mg, Oral, Nightly  furosemide, 40 mg, Oral, Daily  guaiFENesin, 600 mg, Oral, Q12H  ipratropium-albuterol, 3 mL, Nebulization, 4x Daily - RT  lidocaine, 1 patch, Transdermal, Q24H  metoprolol succinate XL, 50 mg, Oral, Q12H  OLANZapine, 5 mg, Oral, Daily With Dinner  pantoprazole, 40 mg, Oral, Daily  piperacillin-tazobactam, 3.375 g, Intravenous, Q8H  potassium chloride, 40 mEq, Oral, Once  rOPINIRole, 0.5 mg, Oral,  Nightly  rosuvastatin, 40 mg, Oral, Nightly  senna-docusate sodium, 2 tablet, Oral, BID  sodium chloride, 10 mL, Intravenous, Q12H  sodium chloride, 4 mL, Nebulization, BID - RT  terazosin, 10 mg, Oral, Nightly      Pharmacy to Dose Zosyn,         acetaminophen **OR** acetaminophen **OR** acetaminophen    senna-docusate sodium **AND** polyethylene glycol **AND** bisacodyl **AND** bisacodyl    nitroglycerin    ondansetron    Pharmacy to Dose Zosyn    sodium chloride    sodium chloride    sodium chloride    Family history:  Family History   Problem Relation Age of Onset    No Known Problems Mother     No Known Problems Father     No Known Problems Sister     No Known Problems Brother        Social history:  Social History     Tobacco Use    Smoking status: Never   Vaping Use    Vaping Use: Never used   Substance Use Topics    Alcohol use: No    Drug use: No       Review of systems:    He really was too drowsy to give me any review of systems his wife notes he does have some confusion at night has been somewhat demented have about things.  But bigger problems came on after his stroke in early May of this year when he had left-sided weakness and that has persisted.  He is now on Eliquis for that.  Previously had a TIA in last October which he seemed to be weak on the right side briefly.  No current hair eyes ears nose throat skin bone joint  lymphatic hematologic complaints no chest pain abdominal pain bowel bladder incontinence or rash.  He has had shortness of breath does appear to have acute pneumonia.  He has restless leg syndrome    Objective:  Vitals Ranges:   Temp:  [97.2 °F (36.2 °C)-99.9 °F (37.7 °C)] 97.6 °F (36.4 °C)  Heart Rate:  [] 92  Resp:  [16-20] 16  BP: (108-142)/(56-81) 137/62      Physical Exam:  Asleep, awakens and is lethargic tends to fall back asleep.  He counts fingers at 3 feet.  He was oriented x1 only.  Fund of knowledge poor attention span concentration to sleepy.  Recent and remote  memory cannot be tested language function normal.  He is not dysarthric and he is not aphasic.  Pupils are 3 constricting slightly bilaterally extraocular movements are conjugate but he falls asleep while following my commands.  Face is symmetrical.  Symmetrical smile tongue is midline at about all the cranial nerves.  He would give me definite weakness in the left arm compared to the right arm.  Good  on the right 5 out of 5 left  4 out of 5.  He did wiggle his toes.  I could not really get a good motor exam for the lower extremities as he was very sleepy.  No atrophy or fasciculations reflexes trace throughout symmetrical right toe downgoing left toe also downgoing.  Sensation coordination Station and gait were completely impossible for this lethargic man heart is regular without murmur neck supple without bruits extremities no clubbing cyanosis or edema visual acuity he counts 3 fingers at 3 feet  Results review:   I reviewed the patient's new clinical results.    Data review:  Lab Results (last 24 hours)       Procedure Component Value Units Date/Time    Magnesium [[id number]]  (Abnormal) Collected: 06/01/23 1140    Specimen: Blood Updated: 06/01/23 1222     Magnesium 2.7 mg/dL     Potassium [[id number]]  (Normal) Collected: 06/01/23 1140    Specimen: Blood Updated: 06/01/23 1222     Potassium 3.6 mmol/L     Blood Gas, Arterial - [[id number]]  (Abnormal) Collected: 06/01/23 1013    Specimen: Arterial Blood Updated: 06/01/23 1016     Site Arterial: right radial     Brian's Test Positive     pH, Arterial 7.486 pH units      pCO2, Arterial 36.5 mm Hg      pO2, Arterial 69.6 mm Hg      HCO3, Arterial 27.6 mmol/L      Base Excess, Arterial 4.1 mmol/L      O2 Saturation Calculated 95.1 %      Comment: sp02=94% Meter: [id number] : [id number] Steven Cruz        Barometric Pressure for Blood Gas 747.6 mmHg      Modality Cannula     Flow Rate 2 lpm      Rate 20 Breaths/minute     Blood Culture - Blood, Arm,  Left [[id number]]  (Normal) Collected: 05/31/23 0859    Specimen: Blood from Arm, Left Updated: 06/01/23 0915     Blood Culture No growth at 24 hours    Blood Culture - Blood, Arm, Left [[id number]]  (Normal) Collected: 05/31/23 0847    Specimen: Blood from Arm, Left Updated: 06/01/23 0900     Blood Culture No growth at 24 hours    CBC (No Diff) [[id number]]  (Abnormal) Collected: 06/01/23 0523    Specimen: Blood Updated: 06/01/23 0617     WBC 19.85 10*3/mm3      RBC 3.65 10*6/mm3      Hemoglobin 11.2 g/dL      Hematocrit 33.0 %      MCV 90.4 fL      MCH 30.7 pg      MCHC 33.9 g/dL      RDW 12.8 %      RDW-SD 42.0 fl      MPV 9.9 fL      Platelets 189 10*3/mm3     Comprehensive Metabolic Panel [[id number]]  (Abnormal) Collected: 06/01/23 0523    Specimen: Blood Updated: 06/01/23 0608     Glucose 123 mg/dL      BUN 29 mg/dL      Creatinine 1.44 mg/dL      Sodium 140 mmol/L      Potassium 3.6 mmol/L      Chloride 101 mmol/L      CO2 26.5 mmol/L      Calcium 9.1 mg/dL      Total Protein 6.2 g/dL      Albumin 3.1 g/dL      ALT (SGPT) 17 U/L      AST (SGOT) 31 U/L      Alkaline Phosphatase 184 U/L      Total Bilirubin 0.8 mg/dL      Globulin 3.1 gm/dL      A/G Ratio 1.0 g/dL      BUN/Creatinine Ratio 20.1     Anion Gap 12.5 mmol/L      eGFR 47.3 mL/min/1.73     Narrative:      GFR Normal >60  Chronic Kidney Disease <60  Kidney Failure <15    The GFR formula is only valid for adults with stable renal function between ages 18 and 70.    MRSA Screen, PCR (Inpatient) - Swab, Nares [[id number]]  (Normal) Collected: 05/31/23 2107    Specimen: Swab from Nares Updated: 05/31/23 2331     MRSA PCR No MRSA Detected    Narrative:      The negative predictive value of this diagnostic test is high and should only be used to consider de-escalating anti-MRSA therapy. A positive result may indicate colonization with MRSA and must be correlated clinically.    Urinalysis, Microscopic Only - Urine, Clean Catch [[id number]] Collected: 05/31/23  2107    Specimen: Urine, Clean Catch Updated: 05/31/23 2323     RBC, UA None Seen /HPF      WBC, UA 0-2 /HPF      Comment: Urine culture not indicated.        Bacteria, UA None Seen /HPF      Squamous Epithelial Cells, UA 0-2 /HPF      Hyaline Casts, UA None Seen /LPF      Amorphous Crystals, UA Small/1+ /HPF      Cholesterol Crystals, UA Moderate/2+ /HPF      Methodology Manual Light Microscopy    Urinalysis With Culture If Indicated - Urine, Clean Catch [[id number]]  (Abnormal) Collected: 05/31/23 2107    Specimen: Urine, Clean Catch Updated: 05/31/23 2257     Color, UA Yellow     Appearance, UA Cloudy     pH, UA 5.5     Specific Gravity, UA 1.023     Glucose, UA Negative     Ketones, UA Trace     Bilirubin, UA Negative     Blood, UA Negative     Protein,  mg/dL (2+)     Leuk Esterase, UA Negative     Nitrite, UA Negative     Urobilinogen, UA 1.0 E.U./dL    Narrative:      In absence of clinical symptoms, the presence of pyuria, bacteria, and/or nitrites on the urinalysis result does not correlate with infection.    S. Pneumo Ag Urine or CSF - Urine, Urine, Clean Catch [[id number]]  (Normal) Collected: 05/31/23 2107    Specimen: Urine, Clean Catch Updated: 05/31/23 2254     Strep Pneumo Ag Negative    Legionella Antigen, Urine - Urine, Urine, Clean Catch [[id number]]  (Normal) Collected: 05/31/23 2107    Specimen: Urine, Clean Catch Updated: 05/31/23 2254     LEGIONELLA ANTIGEN, URINE Negative    Protime-INR [[id number]]  (Abnormal) Collected: 05/31/23 1546    Specimen: Blood Updated: 05/31/23 1728     Protime 17.6 Seconds      INR 1.42    [facility] Draw [[id number]] Collected: 05/31/23 0807    Specimen: Blood Updated: 05/31/23 1441    Narrative:      The following orders were created for panel order [facility] Draw.  Procedure                               Abnormality         Status                     ---------                               -----------         ------                     Green Top (Gel)[[id number]]                                   Final result               Lavender Top[[id number]]                                     Final result               Gold Top - SST[[id number]]                                   Final result               Light Blue Top[[id number]]                                                                Please view results for these tests on the individual orders.             Imaging:  Imaging Results (Last 24 Hours)       ** No results found for the last 24 hours. **           PPE worn at all times washed before washed afterwards disposed of everything properly is not within 6 feet of them for more than few minutes during my exam no aerosols used at any point    Assessment and Plan:     First of all, this patient has a significant metabolic encephalopathy that is going to be related to the acute pneumonia associated with hypoxic encephalopathy as well.  Note this patient has had some degree of confusion worse at night that may be consistent with some element of sundowning even before he came to the hospital.  He has definitely had some definite dementia with some behavioral changes   For now I would like to cut back the Requip from 2 mg p.o. nightly down to 0.5 mg p.o. nightly.  This is for his restless leg syndrome but the #1 side effect is confusion and hallucinations.  I am going to add Zyprexa 5 mg p.o. q. suppertime and I am going to cut back his nightly dose of Klonopin to 0.75 mg p.o. nightly  I am aware within the last month he has had bihemispheric stroke for which he is on Eliquis but he does not look like an acute stroke at this time nor is he having seizure  Note he has a normal QT interval and Zyprexa is a very friendly QT interval neuroleptic medication.  It should help his confusion without causing respiratory suppression which we definitely want to avoid  So, lets see how he does cutting back the Requip adding on the tiny dose of Zyprexa and cutting back his Klonopin slightly.  I  believe the only thing however that is going to really help this patient is continued treatment of his pneumonia and correction of his hypoxemia.  Note his O2 saturation currently is 94% which is good.    I will see what he looks like tomorrow.  Thanks      Tomy Giles MD  06/01/23  13:42 EDT

## 2023-06-02 ENCOUNTER — APPOINTMENT (OUTPATIENT)
Dept: GENERAL RADIOLOGY | Facility: HOSPITAL | Age: 88
DRG: 193 | End: 2023-06-02
Payer: MEDICARE

## 2023-06-02 LAB
ALBUMIN SERPL-MCNC: 2.9 G/DL (ref 3.5–5.2)
ALBUMIN/GLOB SERPL: 1 G/DL
ALP SERPL-CCNC: 176 U/L (ref 39–117)
ALT SERPL W P-5'-P-CCNC: 18 U/L (ref 1–41)
ANION GAP SERPL CALCULATED.3IONS-SCNC: 12.2 MMOL/L (ref 5–15)
AST SERPL-CCNC: 34 U/L (ref 1–40)
BILIRUB SERPL-MCNC: 0.6 MG/DL (ref 0–1.2)
BUN SERPL-MCNC: 34 MG/DL (ref 8–23)
BUN/CREAT SERPL: 18.7 (ref 7–25)
CALCIUM SPEC-SCNC: 9.2 MG/DL (ref 8.6–10.5)
CHLORIDE SERPL-SCNC: 102 MMOL/L (ref 98–107)
CO2 SERPL-SCNC: 27.8 MMOL/L (ref 22–29)
CREAT SERPL-MCNC: 1.82 MG/DL (ref 0.76–1.27)
DEPRECATED RDW RBC AUTO: 41.6 FL (ref 37–54)
EGFRCR SERPLBLD CKD-EPI 2021: 35.7 ML/MIN/1.73
ERYTHROCYTE [DISTWIDTH] IN BLOOD BY AUTOMATED COUNT: 12.8 % (ref 12.3–15.4)
GLOBULIN UR ELPH-MCNC: 3 GM/DL
GLUCOSE SERPL-MCNC: 103 MG/DL (ref 65–99)
HCT VFR BLD AUTO: 31.3 % (ref 37.5–51)
HGB BLD-MCNC: 10.7 G/DL (ref 13–17.7)
MAGNESIUM SERPL-MCNC: 2.3 MG/DL (ref 1.6–2.4)
MCH RBC QN AUTO: 30.8 PG (ref 26.6–33)
MCHC RBC AUTO-ENTMCNC: 34.2 G/DL (ref 31.5–35.7)
MCV RBC AUTO: 90.2 FL (ref 79–97)
PLATELET # BLD AUTO: 174 10*3/MM3 (ref 140–450)
PMV BLD AUTO: 9.7 FL (ref 6–12)
POTASSIUM SERPL-SCNC: 3.6 MMOL/L (ref 3.5–5.2)
PROT SERPL-MCNC: 5.9 G/DL (ref 6–8.5)
RBC # BLD AUTO: 3.47 10*6/MM3 (ref 4.14–5.8)
SODIUM SERPL-SCNC: 142 MMOL/L (ref 136–145)
TROPONIN T SERPL HS-MCNC: 133 NG/L
WBC NRBC COR # BLD: 16.38 10*3/MM3 (ref 3.4–10.8)

## 2023-06-02 PROCEDURE — 97530 THERAPEUTIC ACTIVITIES: CPT

## 2023-06-02 PROCEDURE — 72040 X-RAY EXAM NECK SPINE 2-3 VW: CPT

## 2023-06-02 PROCEDURE — 80053 COMPREHEN METABOLIC PANEL: CPT | Performed by: HOSPITALIST

## 2023-06-02 PROCEDURE — 94799 UNLISTED PULMONARY SVC/PX: CPT

## 2023-06-02 PROCEDURE — 94664 DEMO&/EVAL PT USE INHALER: CPT

## 2023-06-02 PROCEDURE — 84484 ASSAY OF TROPONIN QUANT: CPT | Performed by: HOSPITALIST

## 2023-06-02 PROCEDURE — 94760 N-INVAS EAR/PLS OXIMETRY 1: CPT

## 2023-06-02 PROCEDURE — 25010000002 PIPERACILLIN SOD-TAZOBACTAM PER 1 G: Performed by: INTERNAL MEDICINE

## 2023-06-02 PROCEDURE — 99232 SBSQ HOSP IP/OBS MODERATE 35: CPT | Performed by: PSYCHIATRY & NEUROLOGY

## 2023-06-02 PROCEDURE — 85027 COMPLETE CBC AUTOMATED: CPT | Performed by: HOSPITALIST

## 2023-06-02 PROCEDURE — 83735 ASSAY OF MAGNESIUM: CPT | Performed by: HOSPITALIST

## 2023-06-02 PROCEDURE — 94761 N-INVAS EAR/PLS OXIMETRY MLT: CPT

## 2023-06-02 PROCEDURE — 97162 PT EVAL MOD COMPLEX 30 MIN: CPT

## 2023-06-02 RX ORDER — OLANZAPINE 2.5 MG/1
2.5 TABLET ORAL
Status: DISCONTINUED | OUTPATIENT
Start: 2023-06-02 | End: 2023-06-14 | Stop reason: HOSPADM

## 2023-06-02 RX ADMIN — Medication 10 ML: at 20:51

## 2023-06-02 RX ADMIN — ROSUVASTATIN CALCIUM 40 MG: 40 TABLET, FILM COATED ORAL at 20:51

## 2023-06-02 RX ADMIN — METOPROLOL SUCCINATE 50 MG: 25 TABLET, EXTENDED RELEASE ORAL at 20:51

## 2023-06-02 RX ADMIN — Medication 4 ML: at 20:28

## 2023-06-02 RX ADMIN — OLANZAPINE 2.5 MG: 2.5 TABLET ORAL at 18:03

## 2023-06-02 RX ADMIN — APIXABAN 2.5 MG: 2.5 TABLET, FILM COATED ORAL at 20:51

## 2023-06-02 RX ADMIN — TAZOBACTAM SODIUM AND PIPERACILLIN SODIUM 3.38 G: 375; 3 INJECTION, SOLUTION INTRAVENOUS at 17:01

## 2023-06-02 RX ADMIN — IPRATROPIUM BROMIDE AND ALBUTEROL SULFATE 3 ML: 2.5; .5 SOLUTION RESPIRATORY (INHALATION) at 15:24

## 2023-06-02 RX ADMIN — Medication 4 ML: at 08:32

## 2023-06-02 RX ADMIN — DOCUSATE SODIUM 50MG AND SENNOSIDES 8.6MG 2 TABLET: 8.6; 5 TABLET, FILM COATED ORAL at 20:51

## 2023-06-02 RX ADMIN — CLONAZEPAM 0.75 MG: 0.5 TABLET ORAL at 20:51

## 2023-06-02 RX ADMIN — Medication 10 ML: at 09:46

## 2023-06-02 RX ADMIN — IPRATROPIUM BROMIDE AND ALBUTEROL SULFATE 3 ML: 2.5; .5 SOLUTION RESPIRATORY (INHALATION) at 20:28

## 2023-06-02 RX ADMIN — METOPROLOL SUCCINATE 50 MG: 25 TABLET, EXTENDED RELEASE ORAL at 09:42

## 2023-06-02 RX ADMIN — LIDOCAINE 1 PATCH: 50 PATCH CUTANEOUS at 09:42

## 2023-06-02 RX ADMIN — TAZOBACTAM SODIUM AND PIPERACILLIN SODIUM 3.38 G: 375; 3 INJECTION, SOLUTION INTRAVENOUS at 22:49

## 2023-06-02 RX ADMIN — IPRATROPIUM BROMIDE AND ALBUTEROL SULFATE 3 ML: 2.5; .5 SOLUTION RESPIRATORY (INHALATION) at 08:31

## 2023-06-02 RX ADMIN — ROPINIROLE HYDROCHLORIDE 0.5 MG: 0.5 TABLET, FILM COATED ORAL at 20:51

## 2023-06-02 RX ADMIN — ACETAMINOPHEN 650 MG: 325 TABLET, FILM COATED ORAL at 10:01

## 2023-06-02 RX ADMIN — TERAZOSIN HYDROCHLORIDE 10 MG: 5 CAPSULE ORAL at 20:51

## 2023-06-02 RX ADMIN — PANTOPRAZOLE SODIUM 40 MG: 40 TABLET, DELAYED RELEASE ORAL at 10:01

## 2023-06-02 RX ADMIN — DOCUSATE SODIUM 50MG AND SENNOSIDES 8.6MG 2 TABLET: 8.6; 5 TABLET, FILM COATED ORAL at 10:01

## 2023-06-02 RX ADMIN — APIXABAN 2.5 MG: 2.5 TABLET, FILM COATED ORAL at 10:00

## 2023-06-02 RX ADMIN — GUAIFENESIN 600 MG: 600 TABLET, EXTENDED RELEASE ORAL at 20:51

## 2023-06-02 RX ADMIN — Medication 1000 UNITS: at 09:42

## 2023-06-02 RX ADMIN — TAZOBACTAM SODIUM AND PIPERACILLIN SODIUM 3.38 G: 375; 3 INJECTION, SOLUTION INTRAVENOUS at 06:51

## 2023-06-02 RX ADMIN — IPRATROPIUM BROMIDE AND ALBUTEROL SULFATE 3 ML: 2.5; .5 SOLUTION RESPIRATORY (INHALATION) at 11:48

## 2023-06-02 RX ADMIN — GUAIFENESIN 600 MG: 600 TABLET, EXTENDED RELEASE ORAL at 09:42

## 2023-06-02 RX ADMIN — ACETAMINOPHEN 650 MG: 325 TABLET, FILM COATED ORAL at 20:51

## 2023-06-02 NOTE — PLAN OF CARE
Problem: Adult Inpatient Plan of Care  Goal: Plan of Care Review  Outcome: Ongoing, Progressing  Flowsheets (Taken 6/2/2023 0203)  Progress: no change  Plan of Care Reviewed With: patient  Outcome Evaluation: Alert to self only, VSS, 3L O2 in use, c/o neck pain, tylenol given and heat applied for comfort, turn q2hrs, tolerating diet change, plan of care continues.

## 2023-06-02 NOTE — PROGRESS NOTES
Name: Art Mullins ADMIT: 2023   : 1935  PCP: Santos Simmons MD    MRN: [id number] LOS: 2 days   AGE/SEX: 87 y.o. male  ROOM: [facility]     Subjective   Subjective   Pt c/o severe neck pain. No injury that he can recall. Started hurting after admission. No pain radiating into either upper extremity. No change in speech, no stridor, no choking. Tolerating modified diet. Voiding well.   Denies any N/V/D/abd pain/F/C/NS/SOA/CP/palp.       Objective   Objective   Vital Signs  Temp:  [97.6 °F (36.4 °C)-99.6 °F (37.6 °C)] 99.4 °F (37.4 °C)  Heart Rate:  [] 100  Resp:  [16-20] 18  BP: ()/(53-62) 115/61  SpO2:  [91 %-97 %] 91 %  on  Flow (L/min):  [2-3] 3;   Device (Oxygen Therapy): nasal cannula  Body mass index is 24.45 kg/m².  Physical Exam  Vitals and nursing note reviewed. Exam conducted with a chaperone present (Wife and RN).   Constitutional:       General: He is not in acute distress.     Appearance: He is ill-appearing (chronically). He is not toxic-appearing or diaphoretic.      Comments: Much more awake, alert, and interactive today   HENT:      Head: Normocephalic.      Mouth/Throat:      Mouth: Mucous membranes are moist.      Pharynx: Oropharynx is clear.   Eyes:      General: No scleral icterus.        Right eye: No discharge.         Left eye: No discharge.      Extraocular Movements: Extraocular movements intact.      Conjunctiva/sclera: Conjunctivae normal.   Cardiovascular:      Rate and Rhythm: Normal rate and regular rhythm.      Pulses: Normal pulses.   Pulmonary:      Effort: Pulmonary effort is normal. No respiratory distress.      Breath sounds: Normal breath sounds. No wheezing or rales.      Comments: Anteriorly   Abdominal:      General: Bowel sounds are normal. There is no distension.      Palpations: Abdomen is soft.      Tenderness: There is no abdominal tenderness.   Musculoskeletal:         General: No swelling or deformity. Normal range of motion.       Cervical back: Neck supple. No rigidity.      Comments: C-spine exam wnl   Lymphadenopathy:      Cervical: No cervical adenopathy.   Skin:     General: Skin is warm and dry.      Capillary Refill: Capillary refill takes less than 2 seconds.      Coloration: Skin is not jaundiced.   Neurological:      Mental Status: He is alert. Mental status is at baseline.      Cranial Nerves: No cranial nerve deficit.      Comments: Oriented to person and place  Left sided hemiparesis noted   Psychiatric:         Mood and Affect: Mood normal.         Behavior: Behavior normal.      Comments: Pleasant       Results Review     I reviewed the patient's new clinical results.  Results from last 7 days   Lab Units 06/02/23 0353 06/01/23 0523 05/31/23  0807   WBC 10*3/mm3 16.38* 19.85* 19.37*   HEMOGLOBIN g/dL 10.7* 11.2* 12.2*   PLATELETS 10*3/mm3 174 189 177     Results from last 7 days   Lab Units 06/02/23 0353 06/01/23  1140 06/01/23 0523 05/31/23  0807   SODIUM mmol/L 142  --  140 140   POTASSIUM mmol/L 3.6 3.6 3.6 3.9   CHLORIDE mmol/L 102  --  101 102   CO2 mmol/L 27.8  --  26.5 28.0   BUN mg/dL 34*  --  29* 28*   CREATININE mg/dL 1.82*  --  1.44* 1.40*   GLUCOSE mg/dL 103*  --  123* 112*   EGFR mL/min/1.73 35.7*  --  47.3* 48.9*     Results from last 7 days   Lab Units 06/02/23 0353 06/01/23 0523 05/31/23  0807   ALBUMIN g/dL 2.9* 3.1* 3.7   BILIRUBIN mg/dL 0.6 0.8 0.7   ALK PHOS U/L 176* 184* 207*   AST (SGOT) U/L 34 31 36   ALT (SGPT) U/L 18 17 19     Results from last 7 days   Lab Units 06/02/23  0353 06/01/23  1140 06/01/23 0523 05/31/23  0807   CALCIUM mg/dL 9.2  --  9.1 9.1   ALBUMIN g/dL 2.9*  --  3.1* 3.7   MAGNESIUM mg/dL 2.3 2.7*  --   --      Results from last 7 days   Lab Units 05/31/23  0847 05/31/23  0807   PROCALCITONIN ng/mL  --  0.25   LACTATE mmol/L 1.2  --      No results found for: HGBA1C, POCGLU    No radiology results for the last day  I have personally reviewed all medications:  Scheduled  Medications  apixaban, 2.5 mg, Oral, Q12H  cholecalciferol, 1,000 Units, Oral, Daily  clonazePAM, 0.75 mg, Oral, Nightly  furosemide, 40 mg, Oral, Daily  guaiFENesin, 600 mg, Oral, Q12H  ipratropium-albuterol, 3 mL, Nebulization, 4x Daily - RT  lidocaine, 1 patch, Transdermal, Q24H  metoprolol succinate XL, 50 mg, Oral, Q12H  OLANZapine, 5 mg, Oral, Daily With Dinner  pantoprazole, 40 mg, Oral, Daily  piperacillin-tazobactam, 3.375 g, Intravenous, Q8H  rOPINIRole, 0.5 mg, Oral, Nightly  rosuvastatin, 40 mg, Oral, Nightly  senna-docusate sodium, 2 tablet, Oral, BID  sodium chloride, 10 mL, Intravenous, Q12H  sodium chloride, 4 mL, Nebulization, BID - RT  terazosin, 10 mg, Oral, Nightly    Infusions   Diet  Diet: Regular/House Diet; Feeding Assistance - Nursing; Texture: Pureed (NDD 1); Fluid Consistency: Honey Thick    I have personally reviewed:  [x]  Laboratory   [x]  Microbiology   [x]  Radiology   [x]  EKG/Telemetry  [x]  Cardiology/Vascular   []  Pathology    [x]  Records       Assessment/Plan     Active Hospital Problems    Diagnosis  POA   • **Pneumonia due to infectious organism, unspecified laterality, unspecified part of lung [J18.9]  Yes   • History of CVA (cerebrovascular accident) [Z86.73]  Not Applicable   • Acute respiratory failure with hypoxia [J96.01]  Yes   • 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       88yo gentleman who presented to the hospital from SNF for chest pain as well as cough. He was noted to be 88-89% on RA. He has been admitted at this facility twice in the last month related to recent CVA (felt to be due to cardioembolic source)  as well as CHF/type II NSTEMI. Not surprisingly he has had some residual confusion during this time. In ER he was found to have left sided PNA.     • Pneumonia: CXR showed left lung opacity. WBC 19K and PCT borderline elevated. He has been hypoxic. Continue IV Zosyn therapy for tx of left sided PNA given recent hospitalization. Urine antigens negative. RVP negative. MRSA screen negative. Blood cultures NGTD. Wean oxygen as able. With his recent confusion asked SLP to see for any possible overt or silent aspiration that could be occurring (although infiltrate was more left-sided). They have modified diet in place and will follow.     • Acute respiratory failure: Mild and secondary to the above. Continue OPEP, add IS, and wean oxygen as able. Continue Duonebs. Still requiring 3L/min.     • Altered mental status: Unclear what his new baseline may be given recent stroke. He has had frequent hospitalizations as well that could be contributing to his confusion. No focal neurological deficits at this time. Will monitor for any changes and see how he responds to treatment of pneumonia. Neuro consulted at family's request. Appreciate Dr. Giles's attention to pt. He feels this is metabolic encephalopathy in setting of what is probably some mild dementia present on admission. He has decreased Requip and Klonopin doses and added Zyprexa.     • Recent CVA with left hemiparesis: Continue low dose Eliquis as well as statin therapy. Monitor neuro-checks. Monitor on telemetry.     • CAD/chronic diastolic HF: BNP and troponin were both elevated on admission but were much lower than prior admission. He does not appear volume overloaded on exam. Hold Lasix as Cr up today and po intake not the best on modified diet. Continue BB therapy.     • CKD3a: Presume that his baseline Cr is likely around 1.4 based on recent levels. Cr up to 1.8 this AM. As po intake not the best on modified diet will hold Lasix and continue to monitor labs and  "avoid nephrotoxins.     • HTN: BPs acceptable. Continue home regimen and monitor.    • Neck pain: check x-ray, Lidoderm patch      · Eliquis (home med) for DVT prophylaxis.  · D/w pt and wife--they want pt to be DNR and thought that he was clearly listed as DNR at facility even though my review of paperwork shows he was a \"full code\" there.  · Discussed with patient and nursing staff.   · Anticipate discharge back [facility], timing yet to be determined.      Tomy Almanza MD  [facility]  06/02/23  09:35 EDT      "

## 2023-06-02 NOTE — CASE MANAGEMENT/SOCIAL WORK
Continued Stay Note  [facility]     Patient Name: Art Mullins  MRN: [id number]  Today's Date: 6/2/2023    Admit Date: 5/31/2023    Plan: Return to [facility]. No bed hold but can return. Needs ambulance transport.   Discharge Plan     Row Name 06/02/23 1507       Plan    Plan Return to [facility]. No bed hold but can return. Needs ambulance transport.    Patient/Family in Agreement with Plan yes    Plan Comments Pt awake today and participated with PT. Plan is to return to [facility] at D/C. No plans to D/C over weekend. aMlik Kaur RN-BC               Discharge Codes    No documentation.               Expected Discharge Date and Time     Expected Discharge Date Expected Discharge Time    Jun 5, 2023             Malik Kaur RN

## 2023-06-02 NOTE — PLAN OF CARE
Goal Outcome Evaluation:  Plan of Care Reviewed With: patient    Pt is 88 y/o M admitted to [facility] on 5/31/23 with chest pain and cough. Noted to have PNA. Was recently admitted for CGA and was d/c to rehab where he was ambulating with rwx per chart review. At baseline pt is indep prior to CVA - per notes pt had been making good progress at rehab. Pt currently demos min A for bed mobility, transfers, and short distance ambulation of 3' to reach recliner with rwx. Noted to have mild R lean initially but resolves with duration of task. On 2 L throughout session. De-sat to 86% with ambulation to chair, but recovers to 90% within 60 sec. Pt demos mobility below baseline and therefore would benefit from acute skilled PT to address functional mobility deficits. Anticipate d/c to SNU for further progression of mobility to baseline.

## 2023-06-02 NOTE — PROGRESS NOTES
Patient Identification:  NAME:  Art Mullins  Age:  87 y.o.   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  He is awake moderately alert able to carry on some conversation did not have any significant confusion during the night by report.      Past medical history:  Past Medical History:   Diagnosis Date   • Hyperlipidemia    • Hypertension    • PVCs (premature ventricular contractions)        Allergies:  Sulfa antibiotics    Home medications:  Medications Prior to Admission   Medication Sig Dispense Refill Last Dose   • apixaban (ELIQUIS) 2.5 MG tablet tablet Take 1 tablet by mouth Every 12 (Twelve) Hours. Indications: Other - full anticoagulation 60 tablet 0    • cholecalciferol (VITAMIN D3) 1000 UNITS tablet Take 1 tablet by mouth Daily.      • clonazePAM (KlonoPIN) 1 MG tablet Take 1 tablet by mouth Daily. (Patient taking differently: Take 1 tablet by mouth Every Night.) 3 tablet 0    • Cyanocobalamin (VITAMIN B 12 PO) Take 2,500 mcg by mouth Daily.      • doxazosin (CARDURA) 8 MG tablet Take 1 tablet by mouth Every Night.      • furosemide (LASIX) 40 MG tablet Take 1 tablet by mouth Daily. 30 tablet 0    • hydrocortisone 2.5 % lotion Apply 1 application topically to the appropriate area as directed 2 (Two) Times a Day.      • metoprolol succinate XL (TOPROL-XL) 50 MG 24 hr tablet Take 1 tablet by mouth 2 (two) times a day. 60 tablet 5    • pantoprazole (PROTONIX) 40 MG EC tablet Take 1 tablet by mouth Daily.      • rOPINIRole (REQUIP) 2 MG tablet Take 1 tablet by mouth Every Night.      • rosuvastatin (CRESTOR) 40 MG tablet Take 1 tablet by mouth Daily.           Hospital medications:  apixaban, 2.5 mg, Oral, Q12H  cholecalciferol, 1,000 Units, Oral, Daily  clonazePAM, 0.75 mg, Oral, Nightly  guaiFENesin, 600 mg, Oral,  Q12H  ipratropium-albuterol, 3 mL, Nebulization, 4x Daily - RT  lidocaine, 1 patch, Transdermal, Q24H  metoprolol succinate XL, 50 mg, Oral, Q12H  OLANZapine, 2.5 mg, Oral, Daily With Dinner  pantoprazole, 40 mg, Oral, Daily  piperacillin-tazobactam, 3.375 g, Intravenous, Q8H  rOPINIRole, 0.5 mg, Oral, Nightly  rosuvastatin, 40 mg, Oral, Nightly  senna-docusate sodium, 2 tablet, Oral, BID  sodium chloride, 10 mL, Intravenous, Q12H  sodium chloride, 4 mL, Nebulization, BID - RT  terazosin, 10 mg, Oral, Nightly         •  acetaminophen **OR** acetaminophen **OR** acetaminophen  •  senna-docusate sodium **AND** polyethylene glycol **AND** bisacodyl **AND** bisacodyl  •  nitroglycerin  •  ondansetron  •  sodium chloride  •  sodium chloride  •  sodium chloride      Objective:  Vitals Ranges:   Temp:  [98.6 °F (37 °C)-99.6 °F (37.6 °C)] 98.6 °F (37 °C)  Heart Rate:  [] 94  Resp:  [18-20] 18  BP: ()/(49-61) 104/49      Physical Exam: Flat affect but awake and alert.  He converses with me some answers yes and no appropriately.  Normal cranial nerves II through VII good  strength right greater than left and toe wiggle reflexes trace toes downgoing    Results review:   I reviewed the patient's new clinical results.    Data review:  Lab Results (last 24 hours)     Procedure Component Value Units Date/Time    High Sensitivity Troponin T [[id number]]  (Abnormal) Collected: 06/02/23 1003    Specimen: Blood Updated: 06/02/23 1039     HS Troponin T 133 ng/L     Narrative:      High Sensitive Troponin T Reference Range:  <10.0 ng/L- Negative Female for AMI  <15.0 ng/L- Negative Male for AMI  >=10 - Abnormal Female indicating possible myocardial injury.  >=15 - Abnormal Male indicating possible myocardial injury.   Clinicians would have to utilize clinical acumen, EKG, Troponin, and serial changes to determine if it is an Acute Myocardial Infarction or myocardial injury due to an underlying chronic condition.          Blood Culture - Blood, Arm, Left [[id number]]  (Normal) Collected: 05/31/23 0859    Specimen: Blood from Arm, Left Updated: 06/02/23 0915     Blood Culture No growth at 2 days    Blood Culture - Blood, Arm, Left [[id number]]  (Normal) Collected: 05/31/23 0847    Specimen: Blood from Arm, Left Updated: 06/02/23 0900     Blood Culture No growth at 2 days    Magnesium [[id number]]  (Normal) Collected: 06/02/23 0353    Specimen: Blood Updated: 06/02/23 0430     Magnesium 2.3 mg/dL     Comprehensive Metabolic Panel [[id number]]  (Abnormal) Collected: 06/02/23 0353    Specimen: Blood Updated: 06/02/23 0430     Glucose 103 mg/dL      BUN 34 mg/dL      Creatinine 1.82 mg/dL      Sodium 142 mmol/L      Potassium 3.6 mmol/L      Chloride 102 mmol/L      CO2 27.8 mmol/L      Calcium 9.2 mg/dL      Total Protein 5.9 g/dL      Albumin 2.9 g/dL      ALT (SGPT) 18 U/L      AST (SGOT) 34 U/L      Alkaline Phosphatase 176 U/L      Total Bilirubin 0.6 mg/dL      Globulin 3.0 gm/dL      A/G Ratio 1.0 g/dL      BUN/Creatinine Ratio 18.7     Anion Gap 12.2 mmol/L      eGFR 35.7 mL/min/1.73     Narrative:      GFR Normal >60  Chronic Kidney Disease <60  Kidney Failure <15    The GFR formula is only valid for adults with stable renal function between ages 18 and 70.    CBC (No Diff) [[id number]]  (Abnormal) Collected: 06/02/23 0353    Specimen: Blood Updated: 06/02/23 0413     WBC 16.38 10*3/mm3      RBC 3.47 10*6/mm3      Hemoglobin 10.7 g/dL      Hematocrit 31.3 %      MCV 90.2 fL      MCH 30.8 pg      MCHC 34.2 g/dL      RDW 12.8 %      RDW-SD 41.6 fl      MPV 9.7 fL      Platelets 174 10*3/mm3            Imaging:  Imaging Results (Last 24 Hours)     Procedure Component Value Units Date/Time    XR Spine Cervical 3 View [[id number]] Collected: 06/02/23 1204     Updated: 06/02/23 1406    Narrative:      X-RAY SPINE CERVICAL THREE-VIEW     CLINICAL INFORMATION: Sudden onset of neck pain, no known injury.     FINDINGS: There is straightening of  the lordotic curvature which could  be due to positioning or muscular spasm. The odontoid is preserved with  satisfactory C1-2 alignment and the prevertebral soft tissues are  normal. There is moderate multilevel disc degeneration which appears  most pronounced at C5-6. There is multilevel facet hypertrophy also  seen. C7 vertebra is obscured on the lateral projection due to the  superimposed shoulders. The appearance on the frontal projection is  within normal limits.     CONCLUSION: Degenerative change as described above. C7 vertebra obscured  on the lateral projection.     This report was finalized on 6/2/2023 2:03 PM by Dr. Hugo Felton M.D.            PPE worn at all times washed before washed up afterwards disposed of everything properly is not within 6 feet of them for more than a few minutes during my exam no aerosols used at any point    Assessment and Plan:     This patient's metabolic encephalopathy and confusion does seem to be better.  Tonight I will decrease the Zyprexa down to 2.5 mg.  He is on a decreased dose of Klonopin down to 0.75 mg and we decreased his Requip to 0.5 mg p.o. nightly  I do not believe this patient has suffered an acute stroke syndrome but has metabolic encephalopathy with some element of psychosis related to the acute pneumonia on top of vascular dementia without behavioral changes      Tomy Giles MD  06/02/23  15:20 EDT

## 2023-06-02 NOTE — PLAN OF CARE
Goal Outcome Evaluation:  Plan of Care Reviewed With: patient        Progress: improving  Outcome Evaluation: Tolerated oob to chair for awhile today. C/O neck pain this am were gone after lidocaine and tylenol. No falls or injury. Some labored breathing in chair this am. No new skin issues. Low grade fever this am. Alert and cooperative but wanted to sleep a lot in the afternoon.

## 2023-06-02 NOTE — THERAPY EVALUATION
Patient Name: Art Mullins  : 1935    MRN: [id number]                              Today's Date: 2023       Admit Date: 2023    Visit Dx:     ICD-10-CM ICD-9-CM   1. Pneumonia due to infectious organism, unspecified laterality, unspecified part of lung  J18.9 486   2. Hypoxia  R09.02 799.02   3. Leukocytosis, unspecified type  D72.829 288.60   4. History of congestive heart failure  Z86.79 V12.59   5. History of stroke  Z86.73 V12.54   6. 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     Past Medical History:   Diagnosis Date   • Hyperlipidemia    • Hypertension    • PVCs (premature ventricular contractions)      Past Surgical History:   Procedure Laterality Date   • CARDIAC CATHETERIZATION N/A 2023    Procedure: Left Heart Cath;  Surgeon: Ricardo Arrieta MD;  Location:  [facility] CATH INVASIVE LOCATION;  Service: Cardiology;  Laterality: N/A;   • CARDIAC CATHETERIZATION N/A 2023    Procedure: Coronary angiography;  Surgeon: Ricadro Arrieta MD;  Location: [facility] CATH INVASIVE LOC[facility];  Service: Cardiology;  Laterality: N/A;      General Information     Row Name 23 1003          Physical Therapy Time and Intention    Document Type evaluation  -ST     Mode of Treatment individual therapy  -ST     Row Name 23 1003          General Information    Patient Profile Reviewed yes  -ST     Prior Level of Function independent:;min assist:  indep at  baseline, from rehab d/t recent CVA  -ST     Existing Precautions/Restrictions fall;oxygen therapy device and L/min  -ST     Barriers to Rehab medically complex;previous functional deficit;cognitive status;hearing deficit  -     Row Name 06/02/23 1003          Living Environment    People in Home spouse  -     Row Name 06/02/23 1003          Cognition    Orientation Status (Cognition) oriented x 3  -ST     Row Name 06/02/23 1003          Safety Issues, Functional Mobility    Safety Issues Affecting Function (Mobility) awareness of need for assistance;insight into deficits/self-awareness;safety precautions follow-through/compliance  -ST     Impairments Affecting Function (Mobility) balance;endurance/activity tolerance;strength;pain  -ST     Comment, Safety Issues/Impairments (Mobility) gait belt, nonskid socks donned  -ST           User Key  (r) = Recorded By, (t) = Taken By, (c) = Cosigned By    Initials Name Provider Type    ST Margret Grace PT Physical Therapist               Mobility     Row Name 06/02/23 1004          Bed Mobility    Bed Mobility supine-sit  -ST     Supine-Sit [place] (Bed Mobility) minimum assist (75% patient effort);verbal cues  -ST     Assistive Device (Bed Mobility) bed rails;head of bed elevated  -ST     Comment, (Bed Mobility) increased time, cues for sequencing  -     Row Name 06/02/23 1004          Sit-Stand Transfer    Sit-Stand [place] (Transfers) minimum assist (75% patient effort)  -ST     Assistive Device (Sit-Stand Transfers) walker, front-wheeled  -ST     Row Name 06/02/23 1004          Gait/Stairs (Locomotion)    [place] Level (Gait) minimum assist (75% patient effort)  -ST     Assistive Device (Gait) walker, front-wheeled  -ST     Distance in Feet (Gait) 3' to reach recliner  -ST     Deviations/Abnormal Patterns (Gait) bilateral deviations;base of support, narrow;gait speed decreased;weight shifting decreased;stride length decreased  -ST     Bilateral  Gait Deviations forward flexed posture;heel strike decreased;weight shift ability decreased  -ST     [place] Level (Stairs) not tested  -ST     Comment, (Gait/Stairs) VC for sequencing throughout  -ST           User Key  (r) = Recorded By, (t) = Taken By, (c) = Cosigned By    Initials Name Provider Type    Margret Harris PT Physical Therapist               Obj/Interventions     Row Name 06/02/23 1005          Range of Motion Comprehensive    Comment, General Range of Motion bilat LE WFL  -ST     Row Name 06/02/23 1005          Strength Comprehensive (MMT)    Comment, General Manual Muscle Testing (MMT) Assessment bilat LE WFL, grossly 4-/5 bilat  -ST     Row Name 06/02/23 1005          Balance    Comment, Balance SBA/CGA for dynamic sitting balance at EOB, CGA/min A for dynamic standing balance using rwx. mild R lean noted initially but appears to resolve with duration of task  -ST           User Key  (r) = Recorded By, (t) = Taken By, (c) = Cosigned By    Initials Name Provider Type    Margret Harris PT Physical Therapist               Goals/Plan     Row Name 06/02/23 1007          Bed Mobility Goal 1 (PT)    Activity/Assistive Device (Bed Mobility Goal 1, PT) bed mobility activities, all  -ST     [place] Level/Cues Needed (Bed Mobility Goal 1, PT) modified independence  -ST     Time Frame (Bed Mobility Goal 1, PT) 1 week  -ST     Progress/Outcomes (Bed Mobility Goal 1, PT) new goal  -     Row Name 06/02/23 1007          Transfer Goal 1 (PT)    Activity/Assistive Device (Transfer Goal 1, PT) sit-to-stand/stand-to-sit;bed-to-chair/chair-to-bed  -ST     [place] Level/Cues Needed (Transfer Goal 1, PT) standby assist  -ST     Time Frame (Transfer Goal 1, PT) 1 week  -ST     Progress/Outcome (Transfer Goal 1, PT) new goal  -ST     Row Name 06/02/23 1007          Gait Training Goal 1 (PT)    Activity/Assistive Device (Gait Training Goal 1, PT) gait (walking locomotion);assistive device  use  -ST     [place] Level (Gait Training Goal 1, PT) standby assist  -ST     Distance (Gait Training Goal 1, PT) 75'  -ST     Time Frame (Gait Training Goal 1, PT) 1 week  -ST     Progress/Outcome (Gait Training Goal 1, PT) new goal  -ST           User Key  (r) = Recorded By, (t) = Taken By, (c) = Cosigned By    Initials Name Provider Type    ST Margret Grace, PT Physical Therapist               Clinical Impression     Row Name 06/02/23 1006          Pain    Pain Location - neck  -ST     Pre/Posttreatment Pain Comment reports neck pain, does not rate. reports improved with positioning in chair  -ST     Pain Intervention(s) Repositioned;Ambulation/increased activity  -ST     Row Name 06/02/23 1006          Plan of Care Review    Plan of Care Reviewed With patient  -ST     Row Name 06/02/23 1006          Therapy Assessment/Plan (PT)    Patient/Family Therapy Goals Statement (PT) Pt is 86 y/o M admitted to [facility] on 5/31/23 with chest pain and cough. Noted to have PNA. Was recently admitted for CGA and was d/c to rehab where he was ambulating with rwx per chart review. At baseline pt is indep prior to CVA - per notes pt had been making good progress at rehab. Pt currently demos min A for bed mobility, transfers, and short distance ambulation of 3' to reach recliner with rwx. Noted to have mild R lean initially but resolves with duration of task. On 2 L throughout session. De-sat to 86% with ambulation to chair, but recovers to 90% within 60 sec. Pt demos mobility below baseline and therefore would benefit from acute skilled PT to address functional mobility deficits. Anticipate d/c to SNU for further progression of mobility to baseline.  -ST     Rehab Potential (PT) good, to achieve stated therapy goals  -ST     Criteria for Skilled Interventions Met (PT) yes  -ST     Therapy Frequency (PT) 5 times/wk  -ST     Predicted Duration of Therapy Intervention (PT) 1 week  -ST     Row Name 06/02/23 1006           Positioning and Restraints    Pre-Treatment Position in bed  -ST     Post Treatment Position chair  -ST     In Chair notified nsg;reclined;call light within reach;encouraged to call for assist;exit alarm on  -ST           User Key  (r) = Recorded By, (t) = Taken By, (c) = Cosigned By    Initials Name Provider Type    Margret Harris PT Physical Therapist               Outcome Measures     Row Name 06/02/23 1007          How much help from another person do you currently need...    Turning from your back to your side while in flat bed without using bedrails? 3  -ST     Moving from lying on back to sitting on the side of a flat bed without bedrails? 3  -ST     Moving to and from a bed to a chair (including a wheelchair)? 2  -ST     Standing up from a chair using your arms (e.g., wheelchair, bedside chair)? 3  -ST     Climbing 3-5 steps with a railing? 2  -ST     To walk in hospital room? 2  -ST     AM-PAC 6 Clicks Score (PT) 15  -ST     Highest level of mobility 4 --> Transferred to chair/commode  -ST     Row Name 06/02/23 1007          Functional Assessment    Outcome Measure Options AM-PAC 6 Clicks Basic Mobility (PT)  -ST           User Key  (r) = Recorded By, (t) = Taken By, (c) = Cosigned By    Initials Name Provider Type    Margret Harris PT Physical Therapist                             Physical Therapy Education     Title: PT OT SLP Therapies (In Progress)     Topic: Physical Therapy (In Progress)     Point: Mobility training (Done)     Learning Progress Summary           Patient Acceptance, E,TB, VU,NR by ST at 6/2/2023 1007                   Point: Home exercise program (Not Started)     Learner Progress:  Not documented in this visit.          Point: Body mechanics (Done)     Learning Progress Summary           Patient Acceptance, E,TB, VU,NR by ST at 6/2/2023 1007                   Point: Precautions (Not Started)     Learner Progress:  Not documented in this visit.                       User Key     Initials Effective Dates Name Provider Type Discipline    ST 09/22/22 -  Margret Grace, PT Physical Therapist PT              PT Recommendation and Plan     Plan of Care Reviewed With: patient     Time Calculation:    PT Charges     Row Name 06/02/23 1010             Time Calculation    Start Time 0837  -ST      Stop Time 0903  -ST      Time Calculation (min) 26 min  -ST      PT Received On 06/02/23  -ST      PT - Next Appointment 06/03/23  -ST      PT Goal Re-Cert Due Date 06/09/23  -ST         Time Calculation- PT    Total Timed Code Minutes- PT 16 minute(s)  -ST         Timed Charges    55899 - PT Therapeutic Activity Minutes 16  -ST         Total Minutes    Timed Charges Total Minutes 16  -ST       Total Minutes 16  -ST            User Key  (r) = Recorded By, (t) = Taken By, (c) = Cosigned By    Initials Name Provider Type    ST Margret Grace, PT Physical Therapist              Therapy Charges for Today     Code Description Service Date Service Provider Modifiers Qty    54832090824 HC PT THERAPEUTIC ACT EA 15 MIN 6/2/2023 Margret Grace, PT GP 1    46328948186 HC PT EVAL MOD COMPLEXITY 2 6/2/2023 Margret Grace, PT GP 1          PT G-Codes  Outcome Measure Options: AM-PAC 6 Clicks Basic Mobility (PT)  AM-PAC 6 Clicks Score (PT): 15  PT Discharge Summary  Anticipated Discharge Disposition (PT): sub acute care setting, skilled nursing facility    Margret Grace, PT  6/2/2023

## 2023-06-03 ENCOUNTER — APPOINTMENT (OUTPATIENT)
Dept: GENERAL RADIOLOGY | Facility: HOSPITAL | Age: 88
DRG: 193 | End: 2023-06-03
Payer: MEDICARE

## 2023-06-03 LAB
ALBUMIN SERPL-MCNC: 2.5 G/DL (ref 3.5–5.2)
ALBUMIN/GLOB SERPL: 0.7 G/DL
ALP SERPL-CCNC: 184 U/L (ref 39–117)
ALT SERPL W P-5'-P-CCNC: 19 U/L (ref 1–41)
ANION GAP SERPL CALCULATED.3IONS-SCNC: 9 MMOL/L (ref 5–15)
AST SERPL-CCNC: 38 U/L (ref 1–40)
BASOPHILS # BLD AUTO: 0.08 10*3/MM3 (ref 0–0.2)
BASOPHILS NFR BLD AUTO: 0.6 % (ref 0–1.5)
BILIRUB SERPL-MCNC: 0.5 MG/DL (ref 0–1.2)
BUN SERPL-MCNC: 37 MG/DL (ref 8–23)
BUN/CREAT SERPL: 22.8 (ref 7–25)
CALCIUM SPEC-SCNC: 9.4 MG/DL (ref 8.6–10.5)
CHLORIDE SERPL-SCNC: 104 MMOL/L (ref 98–107)
CO2 SERPL-SCNC: 28 MMOL/L (ref 22–29)
CREAT SERPL-MCNC: 1.62 MG/DL (ref 0.76–1.27)
DEPRECATED RDW RBC AUTO: 40.6 FL (ref 37–54)
EGFRCR SERPLBLD CKD-EPI 2021: 41.1 ML/MIN/1.73
EOSINOPHIL # BLD AUTO: 0.34 10*3/MM3 (ref 0–0.4)
EOSINOPHIL NFR BLD AUTO: 2.5 % (ref 0.3–6.2)
ERYTHROCYTE [DISTWIDTH] IN BLOOD BY AUTOMATED COUNT: 12.5 % (ref 12.3–15.4)
GLOBULIN UR ELPH-MCNC: 3.6 GM/DL
GLUCOSE SERPL-MCNC: 104 MG/DL (ref 65–99)
HCT VFR BLD AUTO: 31.9 % (ref 37.5–51)
HGB BLD-MCNC: 10.9 G/DL (ref 13–17.7)
IMM GRANULOCYTES # BLD AUTO: 0.09 10*3/MM3 (ref 0–0.05)
IMM GRANULOCYTES NFR BLD AUTO: 0.7 % (ref 0–0.5)
LYMPHOCYTES # BLD AUTO: 0.98 10*3/MM3 (ref 0.7–3.1)
LYMPHOCYTES NFR BLD AUTO: 7.2 % (ref 19.6–45.3)
MAGNESIUM SERPL-MCNC: 2.5 MG/DL (ref 1.6–2.4)
MAGNESIUM SERPL-MCNC: 2.5 MG/DL (ref 1.6–2.4)
MCH RBC QN AUTO: 30.4 PG (ref 26.6–33)
MCHC RBC AUTO-ENTMCNC: 34.2 G/DL (ref 31.5–35.7)
MCV RBC AUTO: 89.1 FL (ref 79–97)
MONOCYTES # BLD AUTO: 1.27 10*3/MM3 (ref 0.1–0.9)
MONOCYTES NFR BLD AUTO: 9.3 % (ref 5–12)
NEUTROPHILS NFR BLD AUTO: 10.9 10*3/MM3 (ref 1.7–7)
NEUTROPHILS NFR BLD AUTO: 79.7 % (ref 42.7–76)
NRBC BLD AUTO-RTO: 0 /100 WBC (ref 0–0.2)
PLATELET # BLD AUTO: 178 10*3/MM3 (ref 140–450)
PMV BLD AUTO: 9.2 FL (ref 6–12)
POTASSIUM SERPL-SCNC: 3.2 MMOL/L (ref 3.5–5.2)
POTASSIUM SERPL-SCNC: 3.8 MMOL/L (ref 3.5–5.2)
PROT SERPL-MCNC: 6.1 G/DL (ref 6–8.5)
RBC # BLD AUTO: 3.58 10*6/MM3 (ref 4.14–5.8)
SODIUM SERPL-SCNC: 141 MMOL/L (ref 136–145)
TROPONIN T SERPL HS-MCNC: 106 NG/L
WBC NRBC COR # BLD: 13.66 10*3/MM3 (ref 3.4–10.8)

## 2023-06-03 PROCEDURE — 94664 DEMO&/EVAL PT USE INHALER: CPT

## 2023-06-03 PROCEDURE — 83735 ASSAY OF MAGNESIUM: CPT | Performed by: HOSPITALIST

## 2023-06-03 PROCEDURE — 94799 UNLISTED PULMONARY SVC/PX: CPT

## 2023-06-03 PROCEDURE — 71046 X-RAY EXAM CHEST 2 VIEWS: CPT

## 2023-06-03 PROCEDURE — 99232 SBSQ HOSP IP/OBS MODERATE 35: CPT | Performed by: PSYCHIATRY & NEUROLOGY

## 2023-06-03 PROCEDURE — 84132 ASSAY OF SERUM POTASSIUM: CPT | Performed by: HOSPITALIST

## 2023-06-03 PROCEDURE — 97116 GAIT TRAINING THERAPY: CPT

## 2023-06-03 PROCEDURE — 85025 COMPLETE CBC W/AUTO DIFF WBC: CPT | Performed by: HOSPITALIST

## 2023-06-03 PROCEDURE — 97110 THERAPEUTIC EXERCISES: CPT

## 2023-06-03 PROCEDURE — 92526 ORAL FUNCTION THERAPY: CPT

## 2023-06-03 PROCEDURE — 84484 ASSAY OF TROPONIN QUANT: CPT | Performed by: HOSPITALIST

## 2023-06-03 PROCEDURE — 25010000002 PIPERACILLIN SOD-TAZOBACTAM PER 1 G: Performed by: INTERNAL MEDICINE

## 2023-06-03 PROCEDURE — 80053 COMPREHEN METABOLIC PANEL: CPT | Performed by: HOSPITALIST

## 2023-06-03 PROCEDURE — 94761 N-INVAS EAR/PLS OXIMETRY MLT: CPT

## 2023-06-03 RX ORDER — POTASSIUM CHLORIDE 750 MG/1
40 TABLET, FILM COATED, EXTENDED RELEASE ORAL EVERY 4 HOURS
Status: COMPLETED | OUTPATIENT
Start: 2023-06-03 | End: 2023-06-03

## 2023-06-03 RX ADMIN — DOCUSATE SODIUM 50MG AND SENNOSIDES 8.6MG 2 TABLET: 8.6; 5 TABLET, FILM COATED ORAL at 08:45

## 2023-06-03 RX ADMIN — METOPROLOL SUCCINATE 50 MG: 25 TABLET, EXTENDED RELEASE ORAL at 20:58

## 2023-06-03 RX ADMIN — Medication 4 ML: at 19:22

## 2023-06-03 RX ADMIN — TAZOBACTAM SODIUM AND PIPERACILLIN SODIUM 3.38 G: 375; 3 INJECTION, SOLUTION INTRAVENOUS at 06:59

## 2023-06-03 RX ADMIN — IPRATROPIUM BROMIDE AND ALBUTEROL SULFATE 3 ML: 2.5; .5 SOLUTION RESPIRATORY (INHALATION) at 11:24

## 2023-06-03 RX ADMIN — LIDOCAINE 1 PATCH: 50 PATCH CUTANEOUS at 08:46

## 2023-06-03 RX ADMIN — ROPINIROLE HYDROCHLORIDE 0.5 MG: 0.5 TABLET, FILM COATED ORAL at 20:58

## 2023-06-03 RX ADMIN — Medication 10 ML: at 22:24

## 2023-06-03 RX ADMIN — ROSUVASTATIN CALCIUM 40 MG: 40 TABLET, FILM COATED ORAL at 20:58

## 2023-06-03 RX ADMIN — OLANZAPINE 2.5 MG: 2.5 TABLET ORAL at 17:18

## 2023-06-03 RX ADMIN — CLONAZEPAM 0.75 MG: 0.5 TABLET ORAL at 20:58

## 2023-06-03 RX ADMIN — IPRATROPIUM BROMIDE AND ALBUTEROL SULFATE 3 ML: 2.5; .5 SOLUTION RESPIRATORY (INHALATION) at 19:21

## 2023-06-03 RX ADMIN — IPRATROPIUM BROMIDE AND ALBUTEROL SULFATE 3 ML: 2.5; .5 SOLUTION RESPIRATORY (INHALATION) at 15:44

## 2023-06-03 RX ADMIN — TERAZOSIN HYDROCHLORIDE 10 MG: 5 CAPSULE ORAL at 20:58

## 2023-06-03 RX ADMIN — Medication 10 ML: at 08:46

## 2023-06-03 RX ADMIN — IPRATROPIUM BROMIDE AND ALBUTEROL SULFATE 3 ML: 2.5; .5 SOLUTION RESPIRATORY (INHALATION) at 08:08

## 2023-06-03 RX ADMIN — APIXABAN 2.5 MG: 2.5 TABLET, FILM COATED ORAL at 20:58

## 2023-06-03 RX ADMIN — PANTOPRAZOLE SODIUM 40 MG: 40 TABLET, DELAYED RELEASE ORAL at 08:46

## 2023-06-03 RX ADMIN — GUAIFENESIN 600 MG: 600 TABLET, EXTENDED RELEASE ORAL at 20:58

## 2023-06-03 RX ADMIN — Medication 1000 UNITS: at 08:46

## 2023-06-03 RX ADMIN — TAZOBACTAM SODIUM AND PIPERACILLIN SODIUM 3.38 G: 375; 3 INJECTION, SOLUTION INTRAVENOUS at 15:50

## 2023-06-03 RX ADMIN — GUAIFENESIN 600 MG: 600 TABLET, EXTENDED RELEASE ORAL at 08:46

## 2023-06-03 RX ADMIN — POTASSIUM CHLORIDE 40 MEQ: 750 TABLET, EXTENDED RELEASE ORAL at 11:54

## 2023-06-03 RX ADMIN — DOCUSATE SODIUM 50MG AND SENNOSIDES 8.6MG 2 TABLET: 8.6; 5 TABLET, FILM COATED ORAL at 20:58

## 2023-06-03 RX ADMIN — APIXABAN 2.5 MG: 2.5 TABLET, FILM COATED ORAL at 08:46

## 2023-06-03 RX ADMIN — POTASSIUM CHLORIDE 40 MEQ: 750 TABLET, EXTENDED RELEASE ORAL at 15:50

## 2023-06-03 RX ADMIN — Medication 4 ML: at 08:08

## 2023-06-03 RX ADMIN — METOPROLOL SUCCINATE 50 MG: 25 TABLET, EXTENDED RELEASE ORAL at 08:45

## 2023-06-03 NOTE — PLAN OF CARE
Problem: Adult Inpatient Plan of Care  Goal: Plan of Care Review  Outcome: Ongoing, Progressing   Goal Outcome Evaluation:   Vss.K+ replaced per protocol.Worked with PT, ST at bedside at this time.Had cxr today.Labs in am.C/o neck pain when moving, no c/o n/v.

## 2023-06-03 NOTE — PROGRESS NOTES
Name: Art Mullins ADMIT: 2023   : 1935  PCP: Santos Simmons MD    MRN: [id number] LOS: 3 days   AGE/SEX: 87 y.o. male  ROOM: [facility]     Subjective   Subjective   Pt reports neck pain is resolved. Tolerating modified diet but doesn't like it. Voiding well. No cough.  Denies any N/V/D/abd pain/F/C/NS/SOA/CP/palp.       Objective   Objective   Vital Signs  Temp:  [97.5 °F (36.4 °C)-98.7 °F (37.1 °C)] 97.5 °F (36.4 °C)  Heart Rate:  [] 109  Resp:  [18] 18  BP: (104-119)/(49-65) 119/65  SpO2:  [86 %-95 %] 91 %  on  Flow (L/min):  [3-4] 3;   Device (Oxygen Therapy): nasal cannula  Body mass index is 25.85 kg/m².  Physical Exam  Vitals and nursing note reviewed. Exam conducted with a chaperone present (CNA).   Constitutional:       General: He is not in acute distress.     Appearance: He is ill-appearing (chronically). He is not toxic-appearing or diaphoretic.      Comments: Much more awake, alert, and interactive today   HENT:      Head: Normocephalic.      Mouth/Throat:      Mouth: Mucous membranes are moist.      Pharynx: Oropharynx is clear.   Eyes:      General: No scleral icterus.        Right eye: No discharge.         Left eye: No discharge.      Extraocular Movements: Extraocular movements intact.      Conjunctiva/sclera: Conjunctivae normal.   Cardiovascular:      Rate and Rhythm: Normal rate and regular rhythm.      Pulses: Normal pulses.   Pulmonary:      Effort: Pulmonary effort is normal. No respiratory distress.      Breath sounds: Normal breath sounds. No wheezing or rales.      Comments: Anteriorly   Abdominal:      General: Bowel sounds are normal. There is no distension.      Palpations: Abdomen is soft.      Tenderness: There is no abdominal tenderness.   Musculoskeletal:         General: No swelling or deformity. Normal range of motion.      Cervical back: Neck supple. No rigidity.   Lymphadenopathy:      Cervical: No cervical adenopathy.   Skin:     General: Skin is  warm and dry.      Capillary Refill: Capillary refill takes less than 2 seconds.      Coloration: Skin is not jaundiced.   Neurological:      Mental Status: He is alert. Mental status is at baseline.      Cranial Nerves: No cranial nerve deficit.      Comments: Oriented to person and place  Left sided hemiparesis noted   Psychiatric:         Mood and Affect: Mood normal.         Behavior: Behavior normal.      Comments: Pleasant     Results Review     I reviewed the patient's new clinical results.  Results from last 7 days   Lab Units 06/03/23 0635 06/02/23 0353 06/01/23 0523 05/31/23  0807   WBC 10*3/mm3 13.66* 16.38* 19.85* 19.37*   HEMOGLOBIN g/dL 10.9* 10.7* 11.2* 12.2*   PLATELETS 10*3/mm3 178 174 189 177       Results from last 7 days   Lab Units 06/03/23 0635 06/02/23 0353 06/01/23  1140 06/01/23 0523 05/31/23  0807   SODIUM mmol/L 141 142  --  140 140   POTASSIUM mmol/L 3.2* 3.6 3.6 3.6 3.9   CHLORIDE mmol/L 104 102  --  101 102   CO2 mmol/L 28.0 27.8  --  26.5 28.0   BUN mg/dL 37* 34*  --  29* 28*   CREATININE mg/dL 1.62* 1.82*  --  1.44* 1.40*   GLUCOSE mg/dL 104* 103*  --  123* 112*   EGFR mL/min/1.73 41.1* 35.7*  --  47.3* 48.9*       Results from last 7 days   Lab Units 06/03/23 0635 06/02/23 0353 06/01/23 0523 05/31/23  0807   ALBUMIN g/dL 2.5* 2.9* 3.1* 3.7   BILIRUBIN mg/dL 0.5 0.6 0.8 0.7   ALK PHOS U/L 184* 176* 184* 207*   AST (SGOT) U/L 38 34 31 36   ALT (SGPT) U/L 19 18 17 19       Results from last 7 days   Lab Units 06/03/23 0635 06/02/23 0353 06/01/23  1140 06/01/23 0523 05/31/23  0807   CALCIUM mg/dL 9.4 9.2  --  9.1 9.1   ALBUMIN g/dL 2.5* 2.9*  --  3.1* 3.7   MAGNESIUM mg/dL 2.5* 2.3 2.7*  --   --        Results from last 7 days   Lab Units 05/31/23  0847 05/31/23  0807   PROCALCITONIN ng/mL  --  0.25   LACTATE mmol/L 1.2  --        No results found for: HGBA1C, POCGLU    No radiology results for the last day  I have personally reviewed all medications:  Scheduled  Medications  apixaban, 2.5 mg, Oral, Q12H  cholecalciferol, 1,000 Units, Oral, Daily  clonazePAM, 0.75 mg, Oral, Nightly  guaiFENesin, 600 mg, Oral, Q12H  ipratropium-albuterol, 3 mL, Nebulization, 4x Daily - RT  lidocaine, 1 patch, Transdermal, Q24H  metoprolol succinate XL, 50 mg, Oral, Q12H  OLANZapine, 2.5 mg, Oral, Daily With Dinner  pantoprazole, 40 mg, Oral, Daily  piperacillin-tazobactam, 3.375 g, Intravenous, Q8H  rOPINIRole, 0.5 mg, Oral, Nightly  rosuvastatin, 40 mg, Oral, Nightly  senna-docusate sodium, 2 tablet, Oral, BID  sodium chloride, 10 mL, Intravenous, Q12H  sodium chloride, 4 mL, Nebulization, BID - RT  terazosin, 10 mg, Oral, Nightly    Infusions   Diet  Diet: Regular/House Diet; Feeding Assistance - Nursing; Texture: Pureed (NDD 1); Fluid Consistency: Honey Thick    I have personally reviewed:  [x]  Laboratory   [x]  Microbiology   [x]  Radiology   [x]  EKG/Telemetry  []  Cardiology/Vascular   []  Pathology    []  Records       Assessment/Plan     Active Hospital Problems    Diagnosis  POA    **Pneumonia due to infectious organism, unspecified laterality, unspecified part of lung [J18.9]  Yes    History of CVA (cerebrovascular accident) [Z86.73]  Not Applicable    Acute respiratory failure with hypoxia [J96.01]  Yes    Coronary artery disease without angina pectoris [I25.10]  Yes    Altered mental status, unspecified altered mental status type [R41.82]  Yes    Chronic diastolic CHF (congestive heart failure) [I50.32]  Yes    Stage 3a chronic kidney disease [N18.31]  Yes    Elevated troponin [R77.8]  Yes    Essential hypertension [I10]  Yes      Resolved Hospital Problems   No resolved problems to display.       86yo gentleman who presented to the hospital from SNF for chest pain as well as cough. He was noted to be 88-89% on RA. He has been admitted at this facility twice in the last month related to recent CVA (felt to be due to cardioembolic source) as well as CHF/type II NSTEMI. Not  surprisingly he has had some residual confusion during this time. In ER he was found to have left sided PNA.     Pneumonia: CXR showed left lung opacity. WBC 19K and PCT borderline elevated. He has been hypoxic. Continue IV Zosyn therapy for tx of left sided PNA given recent hospitalization. Urine antigens negative. RVP negative. MRSA screen negative. Blood cultures NGTD. With his recent confusion asked SLP to see for any possible overt or silent aspiration that could be occurring (although infiltrate was more left-sided). They have modified diet in place and will follow. Still requiring 3L/min--will repeat CXR today.     Acute respiratory failure: Mild and secondary to the above. Continue OPEP, add IS, and wean oxygen as able. Continue Duonebs. Still requiring 3L/min (see above).     Altered mental status: Unclear what his new baseline may be given recent stroke. He has had frequent hospitalizations as well that could be contributing to his confusion. No focal neurological deficits at this time. Will monitor for any changes and see how he responds to treatment of pneumonia. Neuro consulted at family's request. Appreciate Dr. Giles's attention to pt. He feels this is metabolic encephalopathy in setting of what is probably some mild vascular dementia present on admission. He has decreased Requip and Klonopin doses and added Zyprexa.     Recent CVA with left hemiparesis: Continue low dose Eliquis as well as statin therapy.     CAD/chronic diastolic HF: BNP and troponin were both elevated on admission but were much lower than prior admission. He does not appear volume overloaded on exam. Holding Lasix as Cr up yesterday and po intake not the best on modified diet. Continue BB therapy.     CKD3a: Presume that his baseline Cr is likely around 1.4 based on recent levels. Cr improved this AM. As po intake not the best on modified diet will hold Lasix and continue to monitor labs and avoid nephrotoxins.     HTN: BPs  "acceptable. Continue home regimen and monitor.    Neck pain: checked x-ray which was fine--just showed degenerative changes, Lidoderm patch and APAP effective    Hypokalemia: replace with protocol, check Mg++ level      Eliquis (home med) for DVT prophylaxis.  D/w pt and wife--they want pt to be DNR and thought that he was clearly listed as DNR at facility even though my review of paperwork shows he was a \"full code\" there. Have changed code status in Epic to reflect their wishes.  Discussed with patient and nursing staff.   Anticipate discharge back [facility], timing yet to be determined.      Tomy Almanza MD  [facility]  06/03/23  09:59 EDT      "

## 2023-06-03 NOTE — PROGRESS NOTES
Patient Identification:  NAME:  Art Mullins  Age:  87 y.o.   Sex:  male   :  1935   MRN:  [id number]       Chief complaint: Metabolic encephalopathy, sundowning, psychosis, recent right hemisphere stroke    History of present illness: Great today.  He is awake and alert still has a bit of confusion but is getting through the night very well and is denying any hallucinations      Past medical history:  Past Medical History:   Diagnosis Date    Hyperlipidemia     Hypertension     PVCs (premature ventricular contractions)        Allergies:  Sulfa antibiotics    Home medications:  Medications Prior to Admission   Medication Sig Dispense Refill Last Dose    apixaban (ELIQUIS) 2.5 MG tablet tablet Take 1 tablet by mouth Every 12 (Twelve) Hours. Indications: Other - full anticoagulation 60 tablet 0     cholecalciferol (VITAMIN D3) 1000 UNITS tablet Take 1 tablet by mouth Daily.       clonazePAM (KlonoPIN) 1 MG tablet Take 1 tablet by mouth Daily. (Patient taking differently: Take 1 tablet by mouth Every Night.) 3 tablet 0     Cyanocobalamin (VITAMIN B 12 PO) Take 2,500 mcg by mouth Daily.       doxazosin (CARDURA) 8 MG tablet Take 1 tablet by mouth Every Night.       furosemide (LASIX) 40 MG tablet Take 1 tablet by mouth Daily. 30 tablet 0     hydrocortisone 2.5 % lotion Apply 1 application topically to the appropriate area as directed 2 (Two) Times a Day.       metoprolol succinate XL (TOPROL-XL) 50 MG 24 hr tablet Take 1 tablet by mouth 2 (two) times a day. 60 tablet 5     pantoprazole (PROTONIX) 40 MG EC tablet Take 1 tablet by mouth Daily.       rOPINIRole (REQUIP) 2 MG tablet Take 1 tablet by mouth Every Night.       rosuvastatin (CRESTOR) 40 MG tablet Take 1 tablet by mouth Daily.           Hospital medications:  apixaban, 2.5 mg, Oral, Q12H  cholecalciferol, 1,000 Units, Oral, Daily  clonazePAM, 0.75 mg, Oral, Nightly  guaiFENesin, 600 mg, Oral, Q12H  ipratropium-albuterol, 3 mL, Nebulization, 4x Daily  - RT  lidocaine, 1 patch, Transdermal, Q24H  metoprolol succinate XL, 50 mg, Oral, Q12H  OLANZapine, 2.5 mg, Oral, Daily With Dinner  pantoprazole, 40 mg, Oral, Daily  piperacillin-tazobactam, 3.375 g, Intravenous, Q8H  potassium chloride ER, 40 mEq, Oral, Q4H  rOPINIRole, 0.5 mg, Oral, Nightly  rosuvastatin, 40 mg, Oral, Nightly  senna-docusate sodium, 2 tablet, Oral, BID  sodium chloride, 10 mL, Intravenous, Q12H  sodium chloride, 4 mL, Nebulization, BID - RT  terazosin, 10 mg, Oral, Nightly           acetaminophen **OR** acetaminophen **OR** acetaminophen    senna-docusate sodium **AND** polyethylene glycol **AND** bisacodyl **AND** bisacodyl    nitroglycerin    ondansetron    Potassium Replacement - Follow Nurse / BPA Driven Protocol    sodium chloride    sodium chloride    sodium chloride      Objective:  Vitals Ranges:   Temp:  [97.5 °F (36.4 °C)-98.7 °F (37.1 °C)] 97.5 °F (36.4 °C)  Heart Rate:  [] 100  Resp:  [18-20] 20  BP: (111-119)/(57-65) 119/65      Physical Exam: Very awake and alert at this point.  Normal cranial nerves II through VII good  strength right greater than left and symmetrical toe wiggle reflexes trace toes downgoing    Results review:   I reviewed the patient's new clinical results.    Data review:  Lab Results (last 24 hours)       Procedure Component Value Units Date/Time    Magnesium [[id number]]  (Abnormal) Collected: 06/03/23 0946    Specimen: Blood Updated: 06/03/23 1138     Magnesium 2.5 mg/dL     Blood Culture - Blood, Arm, Left [[id number]]  (Normal) Collected: 05/31/23 0859    Specimen: Blood from Arm, Left Updated: 06/03/23 0915     Blood Culture No growth at 3 days    Blood Culture - Blood, Arm, Left [[id number]]  (Normal) Collected: 05/31/23 0847    Specimen: Blood from Arm, Left Updated: 06/03/23 0901     Blood Culture No growth at 3 days    High Sensitivity Troponin T [[id number]]  (Abnormal) Collected: 06/03/23 0635    Specimen: Blood Updated: 06/03/23 0735     HS  Troponin T 106 ng/L     Narrative:      High Sensitive Troponin T Reference Range:  <10.0 ng/L- Negative Female for AMI  <15.0 ng/L- Negative Male for AMI  >=10 - Abnormal Female indicating possible myocardial injury.  >=15 - Abnormal Male indicating possible myocardial injury.   Clinicians would have to utilize clinical acumen, EKG, Troponin, and serial changes to determine if it is an Acute Myocardial Infarction or myocardial injury due to an underlying chronic condition.         Magnesium [[id number]]  (Abnormal) Collected: 06/03/23 0635    Specimen: Blood Updated: 06/03/23 0716     Magnesium 2.5 mg/dL     Comprehensive Metabolic Panel [[id number]]  (Abnormal) Collected: 06/03/23 0635    Specimen: Blood Updated: 06/03/23 0716     Glucose 104 mg/dL      BUN 37 mg/dL      Creatinine 1.62 mg/dL      Sodium 141 mmol/L      Potassium 3.2 mmol/L      Chloride 104 mmol/L      CO2 28.0 mmol/L      Calcium 9.4 mg/dL      Total Protein 6.1 g/dL      Albumin 2.5 g/dL      ALT (SGPT) 19 U/L      AST (SGOT) 38 U/L      Alkaline Phosphatase 184 U/L      Total Bilirubin 0.5 mg/dL      Globulin 3.6 gm/dL      A/G Ratio 0.7 g/dL      BUN/Creatinine Ratio 22.8     Anion Gap 9.0 mmol/L      eGFR 41.1 mL/min/1.73     Narrative:      GFR Normal >60  Chronic Kidney Disease <60  Kidney Failure <15    The GFR formula is only valid for adults with stable renal function between ages 18 and 70.    CBC & Differential [[id number]]  (Abnormal) Collected: 06/03/23 0635    Specimen: Blood Updated: 06/03/23 0659    Narrative:      The following orders were created for panel order CBC & Differential.  Procedure                               Abnormality         Status                     ---------                               -----------         ------                     CBC Auto Differential[[id number]]        Abnormal            Final result                 Please view results for these tests on the individual orders.    CBC Auto Differential  [[id number]]  (Abnormal) Collected: 06/03/23 0635    Specimen: Blood Updated: 06/03/23 0659     WBC 13.66 10*3/mm3      RBC 3.58 10*6/mm3      Hemoglobin 10.9 g/dL      Hematocrit 31.9 %      MCV 89.1 fL      MCH 30.4 pg      MCHC 34.2 g/dL      RDW 12.5 %      RDW-SD 40.6 fl      MPV 9.2 fL      Platelets 178 10*3/mm3      Neutrophil % 79.7 %      Lymphocyte % 7.2 %      Monocyte % 9.3 %      Eosinophil % 2.5 %      Basophil % 0.6 %      Immature Grans % 0.7 %      Neutrophils, Absolute 10.90 10*3/mm3      Lymphocytes, Absolute 0.98 10*3/mm3      Monocytes, Absolute 1.27 10*3/mm3      Eosinophils, Absolute 0.34 10*3/mm3      Basophils, Absolute 0.08 10*3/mm3      Immature Grans, Absolute 0.09 10*3/mm3      nRBC 0.0 /100 WBC              Imaging:  Imaging Results (Last 24 Hours)       Procedure Component Value Units Date/Time    XR Chest PA & Lateral [[id number]] Collected: 06/03/23 1340     Updated: 06/03/23 1345    Narrative:      XR CHEST PA AND LATERAL-     HISTORY: Male who is 87 years-old,  pneumonia, hypoxia     TECHNIQUE: Frontal and lateral views of the chest     COMPARISON: 05/31/2023     FINDINGS: The heart is enlarged. Pulmonary vasculature is unremarkable.  Mildly moderate left pleural effusion is apparent with left basilar  atelectasis or infiltrate. No pneumothorax. No acute osseous process.       Impression:      Left basilar atelectasis/infiltrate/effusion, continued  follow-up advised. Cardiomegaly.     This report was finalized on 6/3/2023 1:42 PM by Dr. Nehemiah Melvin M.D.       XR Spine Cervical 3 View [[id number]] Collected: 06/02/23 1204     Updated: 06/02/23 1406    Narrative:      X-RAY SPINE CERVICAL THREE-VIEW     CLINICAL INFORMATION: Sudden onset of neck pain, no known injury.     FINDINGS: There is straightening of the lordotic curvature which could  be due to positioning or muscular spasm. The odontoid is preserved with  satisfactory C1-2 alignment and the prevertebral soft tissues  are  normal. There is moderate multilevel disc degeneration which appears  most pronounced at C5-6. There is multilevel facet hypertrophy also  seen. C7 vertebra is obscured on the lateral projection due to the  superimposed shoulders. The appearance on the frontal projection is  within normal limits.     CONCLUSION: Degenerative change as described above. C7 vertebra obscured  on the lateral projection.     This report was finalized on 6/2/2023 2:03 PM by Dr. Hugo Felton M.D.              PPE worn at all times washed before washed up afterwards disposed of everything properly is not within 6 feet of her for more than a few minutes during my exam no aerosols used at any    Assessment and Plan:     Metabolic encephalopathy with elements of sundowning and psychosis appears to be better at this point I am going to continue the current dose of Zyprexa but would note at the time of his discharge he might be able to have that discontinued.  The benefits outweigh the risks.    Note I did decrease his Requip down to 0.5 mg p.o. nightly and the Klonopin was decreased down to 0.75 mg p.o. nightly although when he gets out of here he can go back to 1 mg p.o. nightly of the Klonopin    Neurology is going to sign off and follow-up as needed reconsult thanks      oTmy Giles MD  06/03/23  13:53 EDT

## 2023-06-03 NOTE — PLAN OF CARE
Goal Outcome Evaluation:  Plan of Care Reviewed With: patient        Progress: improving  Outcome Evaluation: Pt tolerated treatment well this date. Increased gait distance to 12ft w/ Rw and CGA-min A. Slightly unsteady throughout, though no LOBs noted. Intructed pt on a few seated LE exercises. Limited d/t fatigue.

## 2023-06-03 NOTE — PLAN OF CARE
Goal Outcome Evaluation:                   Bedside swallow re-evaluation completed. Initial evaluation 6.1 recommending puree/HTL with AMS, s/s present at bedside. CXR with ongoing L basilar atelectasis/infiltrate, pt now with increased alertness, new orders for potential upgrade.     Pt requires feeding assist, appropriate responses to wh- questions at conversational level, follows commands with 100% accuracy. Minimal L sided labial weakness with tsp HTL, NTL anterior loss pt aware. Oral transit timely with puree. Mastication mildly extended, functional for solids - sat drop to 89 x1 throughout deglutory phase for regular solids. Pharyngeal phase with no overt s/s aspiration. No reports globus or odynophagia. Vocal quality weak in intensity but unchanged before/after PO.     Recommend: upgrade to mechanical soft and thin liquid diet. Medications: 1 at a time with thins or whole in puree. Compensations: feeding assistance, small bites/sips, upright for all PO.     Pt amenable to participate in VFSS to rule out pharyngeal impairment or silent aspiration given recent hx CVA, ongoing pneumonia concerns. Will schedule for upcoming days.

## 2023-06-03 NOTE — PLAN OF CARE
Problem: Adult Inpatient Plan of Care  Goal: Plan of Care Review  Outcome: Ongoing, Progressing  Flowsheets (Taken 6/3/2023 7430)  Progress: improving  Outcome Evaluation: Alert to self only, VSS, IV antibotics infusing, tylenol given for neck pain, up O2 to 4L, patient gets more labored when eating or taking medication, no cough noted though, appears less agitated and more coherent than night before, plan of care continues.

## 2023-06-03 NOTE — THERAPY TREATMENT NOTE
Patient Name: Art Mullins  : 1935    MRN: [id number]                              Today's Date: 6/3/2023       Admit Date: 2023    Visit Dx:     ICD-10-CM ICD-9-CM   1. Pneumonia due to infectious organism, unspecified laterality, unspecified part of lung  J18.9 486   2. Hypoxia  R09.02 799.02   3. Leukocytosis, unspecified type  D72.829 288.60   4. History of congestive heart failure  Z86.79 V12.59   5. History of stroke  Z86.73 V12.54   6. Chronic renal impairment, unspecified CKD stage  N18.9 585.9     Patient Active Problem List   Diagnosis    Essential hypertension    Acute CVA (cerebrovascular accident)    Stage 3a chronic kidney disease    Elevated troponin    Vision changes    Altered mental status, unspecified altered mental status type    Chronic diastolic CHF (congestive heart failure)    Pneumonia due to infectious organism, unspecified laterality, unspecified part of lung    History of CVA (cerebrovascular accident)    Acute respiratory failure with hypoxia    Coronary artery disease without angina pectoris     Past Medical History:   Diagnosis Date    Hyperlipidemia     Hypertension     PVCs (premature ventricular contractions)      Past Surgical History:   Procedure Laterality Date    CARDIAC CATHETERIZATION N/A 2023    Procedure: Left Heart Cath;  Surgeon: Ricardo Arrieta MD;  Location: [facility] CATH INVASIVE LOCATION;  Service: Cardiology;  Laterality: N/A;    CARDIAC CATHETERIZATION N/A 2023    Procedure: Coronary angiography;  Surgeon: Ricardo Arrieta MD;  Location: [facility] CATH INVASIVE LOCATION;  Service: Cardiology;  Laterality: N/A;      General Information       Row Name 23 1531          Physical Therapy Time and Intention    Document Type therapy note (daily note)  -     Mode of Treatment physical therapy  -       Row Name 23 1531          General Information    Existing Precautions/Restrictions fall;oxygen therapy device and L/min  -       Row Name 23  1531          Cognition    Orientation Status (Cognition) oriented to;person;place  -               User Key  (r) = Recorded By, (t) = Taken By, (c) = Cosigned By      Initials Name Provider Type    Georgia Perdomo PTA Physical Therapist Assistant                   Mobility       Row Name 06/03/23 1547          Bed Mobility    Bed Mobility supine-sit;sit-supine  -     Supine-Sit [place] (Bed Mobility) minimum assist (75% patient effort)  -     Sit-Supine [place] (Bed Mobility) standby assist  -     Assistive Device (Bed Mobility) bed rails;head of bed elevated  -[facility] Name 06/03/23 1547          Sit-Stand Transfer    Sit-Stand [place] (Transfers) minimum assist (75% patient effort)  -     Assistive Device (Sit-Stand Transfers) walker, front-wheeled  -[facility] Name 06/03/23 1547          Gait/Stairs (Locomotion)    [place] Level (Gait) minimum assist (75% patient effort)  -     Assistive Device (Gait) walker, front-wheeled  -     Distance in Feet (Gait) 12  -     Deviations/Abnormal Patterns (Gait) edmar decreased;stride length decreased  -     Bilateral Gait Deviations forward flexed posture  -     Comment, (Gait/Stairs) unsteady, cues to increase step lengths  -               User Key  (r) = Recorded By, (t) = Taken By, (c) = Cosigned By      Initials Name Provider Type    Georgia Perdomo PTA Physical Therapist Assistant                   Obj/Interventions       Row Name 06/03/23 1553          Motor Skills    Therapeutic Exercise --  seated AP, LAQ, marches, standing marches x10 reps  -               User Key  (r) = Recorded By, (t) = Taken By, (c) = Cosigned By      Initials Name Provider Type    Georgia Perdomo PTA Physical Therapist Assistant                   Goals/Plan    No documentation.                  Clinical Impression       Row Name 06/03/23 1553          Pain    Pretreatment Pain Rating 0/10 - no pain  -      Posttreatment Pain Rating 0/10 - no pain  -       Row Name 06/03/23 1553          Positioning and Restraints    Pre-Treatment Position in bed  -     Post Treatment Position bed  -SM     In Bed supine;call light within reach;encouraged to call for assist;exit alarm on;with family/caregiver  -               User Key  (r) = Recorded By, (t) = Taken By, (c) = Cosigned By      Initials Name Provider Type     Georgia Abraham PTA Physical Therapist Assistant                   Outcome Measures       Row Name 06/03/23 1605          How much help from another person do you currently need...    Turning from your back to your side while in flat bed without using bedrails? 3  -SM     Moving from lying on back to sitting on the side of a flat bed without bedrails? 3  -SM     Moving to and from a bed to a chair (including a wheelchair)? 3  -SM     Standing up from a chair using your arms (e.g., wheelchair, bedside chair)? 3  -SM     Climbing 3-5 steps with a railing? 1  -SM     To walk in hospital room? 3  -SM     AM-PAC 6 Clicks Score (PT) 16  -SM     Highest level of mobility 5 --> Static standing  -       Row Name 06/03/23 1605          Functional Assessment    Outcome Measure Options AM-PAC 6 Clicks Basic Mobility (PT)  -               User Key  (r) = Recorded By, (t) = Taken By, (c) = Cosigned By      Initials Name Provider Type    Georgia Perdomo PTA Physical Therapist Assistant                                 Physical Therapy Education       Title: PT OT SLP Therapies (In Progress)       Topic: Physical Therapy (Done)       Point: Mobility training (Done)       Learning Progress Summary             Patient Acceptance, E,TB,D, VU,NR by  at 6/3/2023 1606    Acceptance, E,TB, VU,NR by  at 6/2/2023 1007                         Point: Home exercise program (Done)       Learning Progress Summary             Patient Acceptance, E,TB,D, VU,NR by  at 6/3/2023 1606                         Point: Body  mechanics (Done)       Learning Progress Summary             Patient Acceptance, E,TB,D, VU,NR by  at 6/3/2023 1606    Acceptance, E,TB, VU,NR by  at 6/2/2023 1007                         Point: Precautions (Done)       Learning Progress Summary             Patient Acceptance, E,TB,D, VU,NR by  at 6/3/2023 1606                                         User Key       Initials Effective Dates Name Provider Type Discipline     03/07/18 -  Georgia Abraham PTA Physical Therapist Assistant PT     09/22/22 -  Margret Grace PT Physical Therapist PT                  PT Recommendation and Plan     Plan of Care Reviewed With: patient  Progress: improving  Outcome Evaluation: Pt tolerated treatment well this date. Increased gait distance to 12ft w/ Rw and CGA-min A. Slightly unsteady throughout, though no LOBs noted. Intructed pt on a few seated LE exercises. Limited d/t fatigue.     Time Calculation:    PT Charges       Row Name 06/03/23 1613             Time Calculation    Start Time 1352  -      Stop Time 1420  -      Time Calculation (min) 28 min  -      PT Received On 06/03/23  -      PT - Next Appointment 06/05/23  -                User Key  (r) = Recorded By, (t) = Taken By, (c) = Cosigned By      Initials Name Provider Type     Georgia Abraham PTA Physical Therapist Assistant                  Therapy Charges for Today       Code Description Service Date Service Provider Modifiers Qty    18534137205 HC PT THER PROC EA 15 MIN 6/3/2023 Georgia Abraham PTA GP 1    52641201047 HC GAIT TRAINING EA 15 MIN 6/3/2023 Georgia Abraham PTA GP 1            PT G-Codes  Outcome Measure Options: AM-PAC 6 Clicks Basic Mobility (PT)  AM-PAC 6 Clicks Score (PT): 16  PT Discharge Summary  Anticipated Discharge Disposition (PT): skilled nursing facility    Georgia Abraham PTA  6/3/2023

## 2023-06-03 NOTE — THERAPY RE-EVALUATION
Acute Care - Speech Language Pathology   Swallow Re-Evaluation [facility]     Patient Name: Art Mullins  : 1935  MRN: [id number]  Today's Date: 6/3/2023               Admit Date: 2023    Visit Dx:     ICD-10-CM ICD-9-CM   1. Pneumonia due to infectious organism, unspecified laterality, unspecified part of lung  J18.9 486   2. Hypoxia  R09.02 799.02   3. Leukocytosis, unspecified type  D72.829 288.60   4. History of congestive heart failure  Z86.79 V12.59   5. History of stroke  Z86.73 V12.54   6. Chronic renal impairment, unspecified CKD stage  N18.9 585.9     Patient Active Problem List   Diagnosis    Essential hypertension    Acute CVA (cerebrovascular accident)    Stage 3a chronic kidney disease    Elevated troponin    Vision changes    Altered mental status, unspecified altered mental status type    Chronic diastolic CHF (congestive heart failure)    Pneumonia due to infectious organism, unspecified laterality, unspecified part of lung    History of CVA (cerebrovascular accident)    Acute respiratory failure with hypoxia    Coronary artery disease without angina pectoris     Past Medical History:   Diagnosis Date    Hyperlipidemia     Hypertension     PVCs (premature ventricular contractions)      Past Surgical History:   Procedure Laterality Date    CARDIAC CATHETERIZATION N/A 2023    Procedure: Left Heart Cath;  Surgeon: Ricardo Arrieta MD;  Location: [facility] CATH INVASIVE LOCATION;  Service: Cardiology;  Laterality: N/A;    CARDIAC CATHETERIZATION N/A 2023    Procedure: Coronary angiography;  Surgeon: Ricardo Arrieta MD;  Location:  [facility] CATH INVASIVE LOCATION;  Service: Cardiology;  Laterality: N/A;       SLP Recommendation and Plan    Recommend: upgrade to mechanical soft and thin liquid diet. Medications: 1 at a time with thins or whole in puree. Compensations: feeding assistance, small bites/sips, upright for all PO.     Pt amenable to participate in VFSS to rule out pharyngeal  impairment or silent aspiration given recent hx CVA, ongoing pneumonia concerns. Will schedule for upcoming days.          SWALLOW EVALUATION (last 72 hours)       SLP Adult Swallow Evaluation       Row Name 06/03/23 1500       Rehab Evaluation    Document Type re-evaluation  -AB    Subjective Information no complaints  -AB    Patient Observations alert;cooperative;agree to therapy  -AB    Patient/Family/Caregiver Comments/Observations seen in e466, spouse at bedside throughout  -AB    Patient Effort excellent  -AB    Symptoms Noted During/After Treatment none  -AB                 User Key  (r) = Recorded By, (t) = Taken By, (c) = Cosigned By      Initials Name Effective Dates    AB Mai Hernandes, MS CCC-SLP 12/27/22 -                     EDUCATION  The patient has been educated in the following areas:   Dysphagia (Swallowing Impairment) Modified Diet Instruction.        SLP GOALS       Row Name 06/03/23 1500       (LTG) Patient will demonstrate functional swallow for    Diet Texture (Demonstrate functional swallow) regular textures  -AB    Liquid viscosity (Demonstrate functional swallow) thin liquids  -AB    [place] (Demonstrate functional swallow) independently (over 90% accuracy)  -AB    Progress/Outcomes (Demonstrate functional swallow) good progress toward goal  -AB    Comment (Demonstrate functional swallow) Bedside swallow re-evaluation completed. Initial evaluation 6.1 recommending puree/HTL with AMS, s/s present at bedside. CXR with ongoing L basilar atelectasis/infiltrate, pt now with increased alertness, new orders for potential upgrade.     Pt requires feeding assist, appropriate responses to wh- questions at conversational level, follows commands with 100% accuracy. Minimal L sided labial weakness with tsp HTL, NTL anterior loss pt aware. Oral transit timely with puree. Mastication mildly extended, functional for solids - sat drop to 89 x1 throughout deglutory phase for regular solids.  Pharyngeal phase with no overt s/s aspiration. No reports globus or odynophagia. Vocal quality weak in intensity but unchanged before/after PO.     Recommend: upgrade to mechanical soft and thin liquid diet. Medications: 1 at a time with thins or whole in puree. Compensations: feeding assistance, small bites/sips, upright for all PO.     Pt amenable to participate in VFSS to rule out pharyngeal impairment or silent aspiration given recent hx CVA, ongoing pneumonia concerns. Will schedule for upcoming days.  -AB              User Key  (r) = Recorded By, (t) = Taken By, (c) = Cosigned By      Initials Name Provider Type    Mai Marie, MS CCC-SLP Speech and Language Pathologist                       Time Calculation:    Time Calculation- SLP       Row Name 06/03/23 1533             Time Calculation- SLP    SLP Received On 06/03/23  -AB                User Key  (r) = Recorded By, (t) = Taken By, (c) = Cosigned By      Initials Name Provider Type    Mai Marie MS CCC-SLP Speech and Language Pathologist                    Therapy Charges for Today       Code Description Service Date Service Provider Modifiers Qty    88944460562  ST TREATMENT SWALLOW 3 6/3/2023 Mai Hernandes, MS CCC-SLP GN 1                 Mai Hernandes MS CCC-SLP  6/3/2023

## 2023-06-04 LAB
ALBUMIN SERPL-MCNC: 2.9 G/DL (ref 3.5–5.2)
ALBUMIN/GLOB SERPL: 0.8 G/DL
ALP SERPL-CCNC: 221 U/L (ref 39–117)
ALT SERPL W P-5'-P-CCNC: 21 U/L (ref 1–41)
ANION GAP SERPL CALCULATED.3IONS-SCNC: 11 MMOL/L (ref 5–15)
AST SERPL-CCNC: 53 U/L (ref 1–40)
BILIRUB SERPL-MCNC: 0.5 MG/DL (ref 0–1.2)
BUN SERPL-MCNC: 33 MG/DL (ref 8–23)
BUN/CREAT SERPL: 20 (ref 7–25)
CALCIUM SPEC-SCNC: 8.9 MG/DL (ref 8.6–10.5)
CHLORIDE SERPL-SCNC: 105 MMOL/L (ref 98–107)
CO2 SERPL-SCNC: 24 MMOL/L (ref 22–29)
CREAT SERPL-MCNC: 1.65 MG/DL (ref 0.76–1.27)
DEPRECATED RDW RBC AUTO: 40.9 FL (ref 37–54)
EGFRCR SERPLBLD CKD-EPI 2021: 40.2 ML/MIN/1.73
ERYTHROCYTE [DISTWIDTH] IN BLOOD BY AUTOMATED COUNT: 12.5 % (ref 12.3–15.4)
GLOBULIN UR ELPH-MCNC: 3.5 GM/DL
GLUCOSE SERPL-MCNC: 102 MG/DL (ref 65–99)
HCT VFR BLD AUTO: 32.8 % (ref 37.5–51)
HGB BLD-MCNC: 11 G/DL (ref 13–17.7)
MAGNESIUM SERPL-MCNC: 2.6 MG/DL (ref 1.6–2.4)
MCH RBC QN AUTO: 29.7 PG (ref 26.6–33)
MCHC RBC AUTO-ENTMCNC: 33.5 G/DL (ref 31.5–35.7)
MCV RBC AUTO: 88.6 FL (ref 79–97)
PLATELET # BLD AUTO: 186 10*3/MM3 (ref 140–450)
PMV BLD AUTO: 9.3 FL (ref 6–12)
POTASSIUM SERPL-SCNC: 3.7 MMOL/L (ref 3.5–5.2)
PROT SERPL-MCNC: 6.4 G/DL (ref 6–8.5)
RBC # BLD AUTO: 3.7 10*6/MM3 (ref 4.14–5.8)
SODIUM SERPL-SCNC: 140 MMOL/L (ref 136–145)
WBC NRBC COR # BLD: 13.3 10*3/MM3 (ref 3.4–10.8)

## 2023-06-04 PROCEDURE — 94761 N-INVAS EAR/PLS OXIMETRY MLT: CPT

## 2023-06-04 PROCEDURE — 80053 COMPREHEN METABOLIC PANEL: CPT | Performed by: HOSPITALIST

## 2023-06-04 PROCEDURE — 25010000002 PIPERACILLIN SOD-TAZOBACTAM PER 1 G: Performed by: INTERNAL MEDICINE

## 2023-06-04 PROCEDURE — 94799 UNLISTED PULMONARY SVC/PX: CPT

## 2023-06-04 PROCEDURE — 83735 ASSAY OF MAGNESIUM: CPT | Performed by: HOSPITALIST

## 2023-06-04 PROCEDURE — 85027 COMPLETE CBC AUTOMATED: CPT | Performed by: HOSPITALIST

## 2023-06-04 RX ADMIN — Medication 4 ML: at 07:40

## 2023-06-04 RX ADMIN — IPRATROPIUM BROMIDE AND ALBUTEROL SULFATE 3 ML: 2.5; .5 SOLUTION RESPIRATORY (INHALATION) at 10:59

## 2023-06-04 RX ADMIN — Medication 1000 UNITS: at 08:56

## 2023-06-04 RX ADMIN — IPRATROPIUM BROMIDE AND ALBUTEROL SULFATE 3 ML: 2.5; .5 SOLUTION RESPIRATORY (INHALATION) at 15:08

## 2023-06-04 RX ADMIN — IPRATROPIUM BROMIDE AND ALBUTEROL SULFATE 3 ML: 2.5; .5 SOLUTION RESPIRATORY (INHALATION) at 20:27

## 2023-06-04 RX ADMIN — APIXABAN 2.5 MG: 2.5 TABLET, FILM COATED ORAL at 08:56

## 2023-06-04 RX ADMIN — ROPINIROLE HYDROCHLORIDE 0.5 MG: 0.5 TABLET, FILM COATED ORAL at 22:03

## 2023-06-04 RX ADMIN — GUAIFENESIN 600 MG: 600 TABLET, EXTENDED RELEASE ORAL at 08:55

## 2023-06-04 RX ADMIN — OLANZAPINE 2.5 MG: 2.5 TABLET ORAL at 17:17

## 2023-06-04 RX ADMIN — CLONAZEPAM 0.75 MG: 0.5 TABLET ORAL at 21:01

## 2023-06-04 RX ADMIN — ROSUVASTATIN CALCIUM 40 MG: 40 TABLET, FILM COATED ORAL at 21:01

## 2023-06-04 RX ADMIN — ACETAMINOPHEN 650 MG: 325 TABLET, FILM COATED ORAL at 21:02

## 2023-06-04 RX ADMIN — Medication 10 ML: at 22:03

## 2023-06-04 RX ADMIN — METOPROLOL SUCCINATE 50 MG: 25 TABLET, EXTENDED RELEASE ORAL at 08:55

## 2023-06-04 RX ADMIN — PANTOPRAZOLE SODIUM 40 MG: 40 TABLET, DELAYED RELEASE ORAL at 08:56

## 2023-06-04 RX ADMIN — METOPROLOL SUCCINATE 50 MG: 25 TABLET, EXTENDED RELEASE ORAL at 21:01

## 2023-06-04 RX ADMIN — LIDOCAINE 1 PATCH: 50 PATCH CUTANEOUS at 08:58

## 2023-06-04 RX ADMIN — TERAZOSIN HYDROCHLORIDE 10 MG: 5 CAPSULE ORAL at 21:02

## 2023-06-04 RX ADMIN — TAZOBACTAM SODIUM AND PIPERACILLIN SODIUM 3.38 G: 375; 3 INJECTION, SOLUTION INTRAVENOUS at 15:16

## 2023-06-04 RX ADMIN — TAZOBACTAM SODIUM AND PIPERACILLIN SODIUM 3.38 G: 375; 3 INJECTION, SOLUTION INTRAVENOUS at 00:58

## 2023-06-04 RX ADMIN — DOCUSATE SODIUM 50MG AND SENNOSIDES 8.6MG 2 TABLET: 8.6; 5 TABLET, FILM COATED ORAL at 21:01

## 2023-06-04 RX ADMIN — TAZOBACTAM SODIUM AND PIPERACILLIN SODIUM 3.38 G: 375; 3 INJECTION, SOLUTION INTRAVENOUS at 08:53

## 2023-06-04 RX ADMIN — IPRATROPIUM BROMIDE AND ALBUTEROL SULFATE 3 ML: 2.5; .5 SOLUTION RESPIRATORY (INHALATION) at 07:40

## 2023-06-04 RX ADMIN — Medication 4 ML: at 20:27

## 2023-06-04 RX ADMIN — GUAIFENESIN 600 MG: 600 TABLET, EXTENDED RELEASE ORAL at 21:01

## 2023-06-04 RX ADMIN — Medication 10 ML: at 08:56

## 2023-06-04 RX ADMIN — APIXABAN 2.5 MG: 2.5 TABLET, FILM COATED ORAL at 21:01

## 2023-06-04 NOTE — PLAN OF CARE
Goal Outcome Evaluation:            K recheck WNL.Pt slept most of the night,Sats stable on 3L.Continues on IV ABT,no acute events this shift.WCTM

## 2023-06-04 NOTE — PROGRESS NOTES
"DAILY PROGRESS NOTE  [facility]    Patient Identification:  Name: Art Mullins  Age: 87 y.o.  Sex: male  :  1935  MRN: [id number]         Primary Care Physician: Santos Simmons MD      Subjective  Patient with no new or acute complaints.  States that he is feeling better.    Objective:  General Appearance:  Comfortable, well-appearing, in no acute distress and not in pain.    Vital signs: (most recent): Blood pressure 114/76, pulse 89, temperature 98.1 °F (36.7 °C), temperature source Oral, resp. rate 22, height 175.3 cm (69\"), weight 72.4 kg (159 lb 9.8 oz), SpO2 96 %.    Lungs:  Normal effort and normal respiratory rate.  Breath sounds clear to auscultation.  (O2 saturation 97% on 3 L nasal cannula.  I turned him down to 2 L.)  Heart: Normal rate.  Regular rhythm.    Extremities: There is no dependent edema.    Neurological: Patient is alert.  (Oriented to person and place.).    Skin:  Warm and dry.                Vital signs in last 24 hours:  Temp:  [98.1 °F (36.7 °C)-98.7 °F (37.1 °C)] 98.1 °F (36.7 °C)  Heart Rate:  [] 89  Resp:  [18-22] 22  BP: (109-114)/(60-76) 114/76    Intake/Output:    Intake/Output Summary (Last 24 hours) at 2023 1713  Last data filed at 2023 1516  Gross per 24 hour   Intake 100 ml   Output 1000 ml   Net -900 ml         Results from last 7 days   Lab Units 2335 233 23  0523  0807   WBC 10*3/mm3 13.30* 13.66* 16.38* 19.85* 19.37*   HEMOGLOBIN g/dL 11.0* 10.9* 10.7* 11.2* 12.2*   PLATELETS 10*3/mm3 186 178 174 189 177     Results from last 7 days   Lab Units 23  0523  0635 23  0353 06/01/23  1140 23  0523 23  0807   SODIUM mmol/L 140  --  141 142  --  140 140   POTASSIUM mmol/L 3.7 3.8 3.2* 3.6 3.6 3.6 3.9   CHLORIDE mmol/L 105  --  104 102  --  101 102   CO2 mmol/L 24.0  --  28.0 27.8  --  26.5 28.0   BUN mg/dL 33*  --  37* 34*  --  29* 28* "   CREATININE mg/dL 1.65*  --  1.62* 1.82*  --  1.44* 1.40*   GLUCOSE mg/dL 102*  --  104* 103*  --  123* 112*   Estimated Creatinine Clearance: 32.9 mL/min (A) (by C-G formula based on SCr of 1.65 mg/dL (H)).  Results from last 7 days   Lab Units 06/04/23  0521 06/03/23  0946 06/03/23  0635 06/02/23  0353 06/01/23  1140 06/01/23  0523 05/31/23  0807   CALCIUM mg/dL 8.9  --  9.4 9.2  --  9.1 9.1   ALBUMIN g/dL 2.9*  --  2.5* 2.9*  --  3.1* 3.7   MAGNESIUM mg/dL 2.6* 2.5* 2.5* 2.3 2.7*  --   --      Results from last 7 days   Lab Units 06/04/23  0521 06/03/23  0635 06/02/23  0353 06/01/23  0523 05/31/23  0807   ALBUMIN g/dL 2.9* 2.5* 2.9* 3.1* 3.7   BILIRUBIN mg/dL 0.5 0.5 0.6 0.8 0.7   ALK PHOS U/L 221* 184* 176* 184* 207*   AST (SGOT) U/L 53* 38 34 31 36   ALT (SGPT) U/L 21 19 18 17 19       Assessment:    Pneumonia due to infectious organism, unspecified laterality, unspecified part of lung    Essential hypertension    Stage 3a chronic kidney disease    Elevated troponin    Altered mental status, unspecified altered mental status type    Chronic diastolic CHF (congestive heart failure)    History of CVA (cerebrovascular accident)    Acute respiratory failure with hypoxia    Coronary artery disease without angina pectoris    86yo gentleman who presented to the hospital from SNF for chest pain as well as cough. He was noted to be 88-89% on RA. He has been admitted at this facility twice in the last month related to recent CVA (felt to be due to cardioembolic source) as well as CHF/type II NSTEMI. Not surprisingly he has had some residual confusion during this time. In ER he was found to have left sided PNA.     Pneumonia: CXR showed left lung opacity. WBC 19K and PCT borderline elevated. He has been hypoxic. Continue IV Zosyn therapy for tx of left sided PNA given recent hospitalization. Urine antigens negative. RVP negative. MRSA screen negative. Blood cultures NGTD. With his recent confusion asked SLP to see for  any possible overt or silent aspiration that could be occurring (although infiltrate was more left-sided). They have modified diet in place and will follow. Still requiring 3L/min--will repeat CXR today.     Acute respiratory failure: Mild and secondary to the above. Continue OPEP, add IS, and wean oxygen as able. Continue Duonebs.      Altered mental status: Unclear what his new baseline may be given recent stroke. He has had frequent hospitalizations as well that could be contributing to his confusion. No focal neurological deficits at this time. Will monitor for any changes and see how he responds to treatment of pneumonia. Neuro consulted at family's request. Appreciate Dr. Giles's attention to pt. He feels this is metabolic encephalopathy in setting of what is probably some mild vascular dementia present on admission. He has decreased Requip and Klonopin doses and added Zyprexa.     Recent CVA with left hemiparesis: Continue low dose Eliquis as well as statin therapy.     CAD/chronic diastolic HF: BNP and troponin were both elevated on admission but were much lower than prior admission. He does not appear volume overloaded on exam. Holding Lasix as Cr up yesterday and po intake not the best on modified diet. Continue BB therapy.     CKD3a: Presume that his baseline Cr is likely around 1.4 based on recent levels. Cr improved this AM. As po intake not the best on modified diet will hold Lasix and continue to monitor labs and avoid nephrotoxins.     HTN: BPs acceptable. Continue home regimen and monitor.     Neck pain: checked x-ray which was fine--just showed degenerative changes, Lidoderm patch and APAP effective     Hypokalemia: replace with protocol, check Mg++ level        Eliquis (home med) for DVT prophylaxis.    All problems new to this examiner.    Plan:  Please see above.  Discussed with patient and wife at bedside.    Srini Paez MD  6/4/2023  17:13 EDT

## 2023-06-04 NOTE — PLAN OF CARE
"  Problem: Adult Inpatient Plan of Care  Goal: Plan of Care Review  Outcome: Ongoing, Progressing   Goal Outcome Evaluation:   Vss.Pt slept on and off throughout the day but will awaken to eat, take pills and talk to this RN. Pt states \" Today is Sunday, you are supposed to rest on Sunday.\"Pt remains pleasantly confused.Remains on iv abx.C/o neck pain when moving, lidoderm patch in place.No c/o n/v.                     "

## 2023-06-05 ENCOUNTER — APPOINTMENT (OUTPATIENT)
Dept: GENERAL RADIOLOGY | Facility: HOSPITAL | Age: 88
DRG: 193 | End: 2023-06-05
Payer: MEDICARE

## 2023-06-05 LAB
ANION GAP SERPL CALCULATED.3IONS-SCNC: 11.9 MMOL/L (ref 5–15)
BACTERIA SPEC AEROBE CULT: NORMAL
BACTERIA SPEC AEROBE CULT: NORMAL
BASOPHILS # BLD AUTO: 0.09 10*3/MM3 (ref 0–0.2)
BASOPHILS NFR BLD AUTO: 0.7 % (ref 0–1.5)
BUN SERPL-MCNC: 32 MG/DL (ref 8–23)
BUN/CREAT SERPL: 19.8 (ref 7–25)
CALCIUM SPEC-SCNC: 9.2 MG/DL (ref 8.6–10.5)
CHLORIDE SERPL-SCNC: 103 MMOL/L (ref 98–107)
CO2 SERPL-SCNC: 23.1 MMOL/L (ref 22–29)
CREAT SERPL-MCNC: 1.62 MG/DL (ref 0.76–1.27)
DEPRECATED RDW RBC AUTO: 42.1 FL (ref 37–54)
EGFRCR SERPLBLD CKD-EPI 2021: 41.1 ML/MIN/1.73
EOSINOPHIL # BLD AUTO: 0.33 10*3/MM3 (ref 0–0.4)
EOSINOPHIL NFR BLD AUTO: 2.7 % (ref 0.3–6.2)
ERYTHROCYTE [DISTWIDTH] IN BLOOD BY AUTOMATED COUNT: 12.8 % (ref 12.3–15.4)
GLUCOSE SERPL-MCNC: 94 MG/DL (ref 65–99)
HCT VFR BLD AUTO: 31.9 % (ref 37.5–51)
HGB BLD-MCNC: 10.4 G/DL (ref 13–17.7)
IMM GRANULOCYTES # BLD AUTO: 0.09 10*3/MM3 (ref 0–0.05)
IMM GRANULOCYTES NFR BLD AUTO: 0.7 % (ref 0–0.5)
LYMPHOCYTES # BLD AUTO: 1.1 10*3/MM3 (ref 0.7–3.1)
LYMPHOCYTES NFR BLD AUTO: 9.1 % (ref 19.6–45.3)
MAGNESIUM SERPL-MCNC: 2.6 MG/DL (ref 1.6–2.4)
MCH RBC QN AUTO: 29.4 PG (ref 26.6–33)
MCHC RBC AUTO-ENTMCNC: 32.6 G/DL (ref 31.5–35.7)
MCV RBC AUTO: 90.1 FL (ref 79–97)
MONOCYTES # BLD AUTO: 1.23 10*3/MM3 (ref 0.1–0.9)
MONOCYTES NFR BLD AUTO: 10.2 % (ref 5–12)
NEUTROPHILS NFR BLD AUTO: 76.6 % (ref 42.7–76)
NEUTROPHILS NFR BLD AUTO: 9.26 10*3/MM3 (ref 1.7–7)
NRBC BLD AUTO-RTO: 0.1 /100 WBC (ref 0–0.2)
PLATELET # BLD AUTO: 182 10*3/MM3 (ref 140–450)
PMV BLD AUTO: 9.4 FL (ref 6–12)
POTASSIUM SERPL-SCNC: 3.7 MMOL/L (ref 3.5–5.2)
POTASSIUM SERPL-SCNC: 3.7 MMOL/L (ref 3.5–5.2)
PROCALCITONIN SERPL-MCNC: 0.28 NG/ML (ref 0–0.25)
RBC # BLD AUTO: 3.54 10*6/MM3 (ref 4.14–5.8)
SODIUM SERPL-SCNC: 138 MMOL/L (ref 136–145)
WBC NRBC COR # BLD: 12.1 10*3/MM3 (ref 3.4–10.8)

## 2023-06-05 PROCEDURE — 94664 DEMO&/EVAL PT USE INHALER: CPT

## 2023-06-05 PROCEDURE — 83735 ASSAY OF MAGNESIUM: CPT | Performed by: HOSPITALIST

## 2023-06-05 PROCEDURE — 94799 UNLISTED PULMONARY SVC/PX: CPT

## 2023-06-05 PROCEDURE — 92526 ORAL FUNCTION THERAPY: CPT

## 2023-06-05 PROCEDURE — 97116 GAIT TRAINING THERAPY: CPT

## 2023-06-05 PROCEDURE — 85025 COMPLETE CBC W/AUTO DIFF WBC: CPT | Performed by: HOSPITALIST

## 2023-06-05 PROCEDURE — 84132 ASSAY OF SERUM POTASSIUM: CPT | Performed by: HOSPITALIST

## 2023-06-05 PROCEDURE — 74230 X-RAY XM SWLNG FUNCJ C+: CPT

## 2023-06-05 PROCEDURE — 94761 N-INVAS EAR/PLS OXIMETRY MLT: CPT

## 2023-06-05 PROCEDURE — 84145 PROCALCITONIN (PCT): CPT | Performed by: HOSPITALIST

## 2023-06-05 PROCEDURE — 92611 MOTION FLUOROSCOPY/SWALLOW: CPT

## 2023-06-05 PROCEDURE — 25010000002 PIPERACILLIN SOD-TAZOBACTAM PER 1 G: Performed by: INTERNAL MEDICINE

## 2023-06-05 PROCEDURE — 97110 THERAPEUTIC EXERCISES: CPT

## 2023-06-05 PROCEDURE — 80048 BASIC METABOLIC PNL TOTAL CA: CPT | Performed by: HOSPITALIST

## 2023-06-05 RX ADMIN — LIDOCAINE 1 PATCH: 50 PATCH CUTANEOUS at 08:07

## 2023-06-05 RX ADMIN — GUAIFENESIN 600 MG: 600 TABLET, EXTENDED RELEASE ORAL at 08:08

## 2023-06-05 RX ADMIN — DOCUSATE SODIUM 50MG AND SENNOSIDES 8.6MG 2 TABLET: 8.6; 5 TABLET, FILM COATED ORAL at 08:07

## 2023-06-05 RX ADMIN — METOPROLOL SUCCINATE 50 MG: 25 TABLET, EXTENDED RELEASE ORAL at 08:08

## 2023-06-05 RX ADMIN — ROSUVASTATIN CALCIUM 40 MG: 40 TABLET, FILM COATED ORAL at 21:40

## 2023-06-05 RX ADMIN — BARIUM SULFATE 50 ML: 400 SUSPENSION ORAL at 09:16

## 2023-06-05 RX ADMIN — TERAZOSIN HYDROCHLORIDE 10 MG: 5 CAPSULE ORAL at 21:40

## 2023-06-05 RX ADMIN — TAZOBACTAM SODIUM AND PIPERACILLIN SODIUM 3.38 G: 375; 3 INJECTION, SOLUTION INTRAVENOUS at 08:07

## 2023-06-05 RX ADMIN — IPRATROPIUM BROMIDE AND ALBUTEROL SULFATE 3 ML: 2.5; .5 SOLUTION RESPIRATORY (INHALATION) at 15:18

## 2023-06-05 RX ADMIN — TAZOBACTAM SODIUM AND PIPERACILLIN SODIUM 3.38 G: 375; 3 INJECTION, SOLUTION INTRAVENOUS at 14:55

## 2023-06-05 RX ADMIN — APIXABAN 2.5 MG: 2.5 TABLET, FILM COATED ORAL at 08:08

## 2023-06-05 RX ADMIN — PANTOPRAZOLE SODIUM 40 MG: 40 TABLET, DELAYED RELEASE ORAL at 08:08

## 2023-06-05 RX ADMIN — Medication 4 ML: at 11:10

## 2023-06-05 RX ADMIN — BARIUM SULFATE 4 ML: 980 POWDER, FOR SUSPENSION ORAL at 09:16

## 2023-06-05 RX ADMIN — Medication 10 ML: at 21:41

## 2023-06-05 RX ADMIN — ROPINIROLE HYDROCHLORIDE 0.5 MG: 0.5 TABLET, FILM COATED ORAL at 21:40

## 2023-06-05 RX ADMIN — OLANZAPINE 2.5 MG: 2.5 TABLET ORAL at 17:25

## 2023-06-05 RX ADMIN — METOPROLOL SUCCINATE 50 MG: 25 TABLET, EXTENDED RELEASE ORAL at 21:40

## 2023-06-05 RX ADMIN — IPRATROPIUM BROMIDE AND ALBUTEROL SULFATE 3 ML: 2.5; .5 SOLUTION RESPIRATORY (INHALATION) at 11:04

## 2023-06-05 RX ADMIN — GUAIFENESIN 600 MG: 600 TABLET, EXTENDED RELEASE ORAL at 21:40

## 2023-06-05 RX ADMIN — TAZOBACTAM SODIUM AND PIPERACILLIN SODIUM 3.38 G: 375; 3 INJECTION, SOLUTION INTRAVENOUS at 01:02

## 2023-06-05 RX ADMIN — BARIUM SULFATE 1 TEASPOON(S): 0.6 CREAM ORAL at 09:16

## 2023-06-05 RX ADMIN — BARIUM SULFATE 55 ML: 0.81 POWDER, FOR SUSPENSION ORAL at 09:16

## 2023-06-05 RX ADMIN — IPRATROPIUM BROMIDE AND ALBUTEROL SULFATE 3 ML: 2.5; .5 SOLUTION RESPIRATORY (INHALATION) at 19:32

## 2023-06-05 RX ADMIN — Medication 1000 UNITS: at 08:08

## 2023-06-05 RX ADMIN — Medication 4 ML: at 19:39

## 2023-06-05 RX ADMIN — DOCUSATE SODIUM 50MG AND SENNOSIDES 8.6MG 2 TABLET: 8.6; 5 TABLET, FILM COATED ORAL at 21:40

## 2023-06-05 RX ADMIN — CLONAZEPAM 0.75 MG: 0.5 TABLET ORAL at 21:41

## 2023-06-05 RX ADMIN — Medication 10 ML: at 08:08

## 2023-06-05 NOTE — PLAN OF CARE
Problem: Adult Inpatient Plan of Care  Goal: Plan of Care Review  Outcome: Ongoing, Progressing  Flowsheets (Taken 6/5/2023 1729)  Progress: no change  Plan of Care Reviewed With: patient  Outcome Evaluation: VSS. On 2-3L O2 per NC. Pt had video swallo study, SLP rec' regular thins, tolerating well. Pt ambulated with PT. Sat in chair. IV abx continued. Pt stable and needs met at this time.

## 2023-06-05 NOTE — THERAPY DISCHARGE NOTE
Acute Care - Speech Language Pathology   Swallow Re-Evaluation/Discharge [facility]     Patient Name: rAt Mullins  : 1935  MRN: [id number]  Today's Date: 2023               Admit Date: 2023    Visit Dx:    ICD-10-CM ICD-9-CM   1. Pneumonia due to infectious organism, unspecified laterality, unspecified part of lung  J18.9 486   2. Hypoxia  R09.02 799.02   3. Leukocytosis, unspecified type  D72.829 288.60   4. History of congestive heart failure  Z86.79 V12.59   5. History of stroke  Z86.73 V12.54   6. Chronic renal impairment, unspecified CKD stage  N18.9 585.9     Patient Active Problem List   Diagnosis    Essential hypertension    Acute CVA (cerebrovascular accident)    Stage 3a chronic kidney disease    Elevated troponin    Vision changes    Altered mental status, unspecified altered mental status type    Chronic diastolic CHF (congestive heart failure)    Pneumonia due to infectious organism, unspecified laterality, unspecified part of lung    History of CVA (cerebrovascular accident)    Acute respiratory failure with hypoxia    Coronary artery disease without angina pectoris     Past Medical History:   Diagnosis Date    Hyperlipidemia     Hypertension     PVCs (premature ventricular contractions)      Past Surgical History:   Procedure Laterality Date    CARDIAC CATHETERIZATION N/A 2023    Procedure: Left Heart Cath;  Surgeon: Ricardo Arrieta MD;  Location: [facility] INVASIVE LOCATION;  Service: Cardiology;  Laterality: N/A;    CARDIAC CATHETERIZATION N/A 2023    Procedure: Coronary angiography;  Surgeon: Ricardo Arrieta MD;  Location: [facility] CATH INVASIVE LOCATION;  Service: Cardiology;  Laterality: N/A;       SLP Recommendation and Plan    Recommend: regular and thin. Medications: with thin liquids or whole in puree. Compensations: chin tuck with thin liquids, multiple swallows following solids, small bites/sips, upright for all PO.      SLP will sign off - functional swallow with  min cues for chin tuck, thin liquids (prevents penetration and premature spillage, no aspiration). Please re-consult if difficulties arise.        SWALLOW EVALUATION (last 72 hours)       SLP Adult Swallow Evaluation       Row Name 06/05/23 1300       Rehab Evaluation    Document Type discharge treatment  -AB    Subjective Information no complaints  -AB    Patient Observations alert;cooperative;agree to therapy  -AB    Patient/Family/Caregiver Comments/Observations seen in 466  -AB    Patient Effort good  -AB    Symptoms Noted During/After Treatment none  -AB              User Key  (r) = Recorded By, (t) = Taken By, (c) = Cosigned By      Initials Name Effective Dates    AB Mai Hernandes, MS CCC-SLP 12/27/22 -                     EDUCATION  The patient has been educated in the following areas:   Dysphagia (Swallowing Impairment) Modified Diet Instruction.         SLP GOALS       Row Name 06/05/23 1300       (LTG) Patient will demonstrate functional swallow for    Diet Texture (Demonstrate functional swallow) regular textures  -AB    Liquid viscosity (Demonstrate functional swallow) thin liquids  -AB    [place] (Demonstrate functional swallow) independently (over 90% accuracy)  -AB    Progress/Outcomes (Demonstrate functional swallow) goal met  -AB    Comment (Demonstrate functional swallow) VFSS follow up completed. Following education, pt can recall procedure performed this AM - 100% diet/liquid levels, MIN cues for postural techniques, chin tuck. Written education hung in room and on pt whiteboard. Pt consuming partial lunch - sandwich, fruit, cheesecake, tea. Min cues for chin tuck throughout meal. Mastication and oral transit timely. No residuals. Pharyngeal phase with no overt s/s aspiration. Sat drop x1 to 89 with regular solids, cues to take breaks.   Recommend: regular and thin. Medications: with thin liquids or whole in puree. Compensations: chin tuck with thin liquids, multiple swallows following  solids, small bites/sips, upright for all PO.      SLP will sign off - functional swallow with min cues for chin tuck, thin liquids (prevents penetration and premature spillage, no aspiration). Please re-consult if difficulties arise.  -AB       (STG) Swallow Management Recall Goal 1 (SLP)    Activity (Swallow Management Recall Goal 1, SLP) postural techniques  -AB    [place]/Accuracy (Swallow Management Recall Goal 1, SLP) independently (over 90% accuracy)  -AB    Progress/Outcomes (Swallow Management Recall Goal 1, SLP) goal met  -AB              User Key  (r) = Recorded By, (t) = Taken By, (c) = Cosigned By      Initials Name Provider Type    Mai Marie MS CCC-SLP Speech and Language Pathologist                         Time Calculation:    Time Calculation- SLP       Row Name 06/05/23 1339 06/05/23 1009          Time Calculation- SLP    SLP Start Time -- 0845  -AB     SLP Received On 06/05/23  -AB 06/05/23  -AB               User Key  (r) = Recorded By, (t) = Taken By, (c) = Cosigned By      Initials Name Provider Type    Mai Marie, MS CCC-SLP Speech and Language Pathologist                    Therapy Charges for Today       Code Description Service Date Service Provider Modifiers Qty    02333613369 HC ST MOTION FLUORO EVAL SWALLOW 6 6/5/2023 Mai Hernandes, MS CCC-SLP GN 1    09535392176 HC ST TREATMENT SWALLOW 2 6/5/2023 Mai Hernandes, MS CCC-SLP GN 1                      Mai Hernandes MS CCC-SLP  6/5/2023

## 2023-06-05 NOTE — PLAN OF CARE
Goal Outcome Evaluation:  Plan of Care Reviewed With: patient        Progress: improving  Outcome Evaluation: Pt tolerated treatment well this date. Increased gait distance to 20ft, then 12ft w/ Rw and CGA-min A. Slightly unsteady at times, though no overt LOBs. Instructed pt on a few seated LE exercises. Encouraged pt to sit up in chair through dinner time if possible, then nsg to assist back to bed.

## 2023-06-05 NOTE — PLAN OF CARE
Goal Outcome Evaluation:         VSS.Pt slept most of the night,Continues on 3L 02 via N/C,sats stable above 90%.Still on IV ABT,no acute events this shift.WCTM.

## 2023-06-05 NOTE — MBS/VFSS/FEES
Acute Care - Speech Language Pathology   Swallow Initial Evaluation [facility]     Patient Name: Art Mullins  : 1935  MRN: [id number]  Today's Date: 2023               Admit Date: 2023    Visit Dx:     ICD-10-CM ICD-9-CM   1. Pneumonia due to infectious organism, unspecified laterality, unspecified part of lung  J18.9 486   2. Hypoxia  R09.02 799.02   3. Leukocytosis, unspecified type  D72.829 288.60   4. History of congestive heart failure  Z86.79 V12.59   5. History of stroke  Z86.73 V12.54   6. Chronic renal impairment, unspecified CKD stage  N18.9 585.9     Patient Active Problem List   Diagnosis    Essential hypertension    Acute CVA (cerebrovascular accident)    Stage 3a chronic kidney disease    Elevated troponin    Vision changes    Altered mental status, unspecified altered mental status type    Chronic diastolic CHF (congestive heart failure)    Pneumonia due to infectious organism, unspecified laterality, unspecified part of lung    History of CVA (cerebrovascular accident)    Acute respiratory failure with hypoxia    Coronary artery disease without angina pectoris     Past Medical History:   Diagnosis Date    Hyperlipidemia     Hypertension     PVCs (premature ventricular contractions)      Past Surgical History:   Procedure Laterality Date    CARDIAC CATHETERIZATION N/A 2023    Procedure: Left Heart Cath;  Surgeon: Ricardo Arrieta MD;  Location: [facility] INVASIVE LOCATION;  Service: Cardiology;  Laterality: N/A;    CARDIAC CATHETERIZATION N/A 2023    Procedure: Coronary angiography;  Surgeon: Ricardo Arrieta MD;  Location: [facility] CATH INVASIVE LOCATION;  Service: Cardiology;  Laterality: N/A;       SLP Recommendation and Plan  Video swallow study completed. Mild oropharyngeal dysphagia characterized by premature spillage, penetration before the swallow with thin liquids in head neutral position, penetration with mixed consistency. Chin tuck aids in laryngeal vestibular  closure and premature spillage. Minimal base of tongue and valleculae residue with solids, clears with second swallow. No significant incidents of aspiration.     Recommend: regular and thin. Medications: with thin liquids or whole in puree. Compensations: chin tuck with thin liquids, multiple swallows following solids, small bites/sips, upright for all PO.     SLP will follow for carryover and additional interventions as indicated.       SWALLOW EVALUATION (last 72 hours)       SLP Adult Swallow Evaluation       Row Name 06/05/23 0900       Rehab Evaluation    Document Type evaluation  -AB    Subjective Information no complaints  -AB    Patient Observations lethargic  -AB    Patient/Family/Caregiver Comments/Observations pt seen in fluoro suite  -AB    Patient Effort good  -AB    Symptoms Noted During/After Treatment none  -AB    Symptoms Noted, Comment VFSS  -AB       General Information    Patient Profile Reviewed yes  -AB    Pertinent History Of Current Problem pneumonia  -AB    Current Method of Nutrition mechanical ground textures;thin liquids  -AB    Precautions/Limitations, Vision WFL  -AB    Precautions/Limitations, Hearing WFL;for purposes of eval  -AB    Prior Level of Function-Communication unknown  -AB    Prior Level of Function-Swallowing no diet consistency restrictions  -AB    Plans/Goals Discussed with patient  -AB    Barriers to Rehab medically complex  -AB       Oral Motor Structure and Function    Oral Lesions or Structural Abnormalities and/or variants none  -AB    Dentition Assessment natural, present and adequate  -AB    Secretion Management WNL/WFL  -AB    Mucosal Quality moist, healthy  -AB    Gag Response --  did not test  -AB    Volitional Swallow WFL  -AB    Volitional Cough weak  -AB       Oral Musculature and Cranial Nerve Assessment    Oral Motor General Assessment generalized oral motor weakness  -AB       General Eating/Swallowing Observations    Respiratory Support Currently in Use  nasal cannula  -AB       MBS/VFSS Interpretation    VFSS Summary Radiologist Dr. Pierce present. Video fluoroscopic swallow study completed with 90 degree hip flexion, visualized in a lateral position. Trials assessed included: thins by cup/straw, NTL by cup, puree, mechanical soft/mixed consistency, and regular solids. Lingual movements brisk. Mastication and bolus recollection mildly extended. Base of tongue retraction, hyolaryngeal elevation adequate. Epiglottic inversion adequate in achieving laryngeal vestibular closure. Base of tongue, valleculae residue with thins, puree clears with a second swallow. Premature spillage to valleculae with NTL, pyriform sinus with thins by cup and straw, mixed consistency. Chin tuck aids premature spillage to level of valleculae with thins. Penetration before the swallow, above vocal folds, ejected post prandial with thins by cup x1. Penetration during the swallow, above vocal folds, ejected post prandial with mixed consistency.  -AB       SLP Communication to Radiology    Severity Level of Dysphagia mild dysphagia;pharyngeal dysfunction  -AB    Consistencies Aspirated/Penetrated penetrated;thin liquids;mixed consistency  -AB    Summary Statement Radiologist Dr. Pierce present. Video fluoroscopic swallow study completed with 90 degree hip flexion, visualized in a lateral position. Trials assessed included: thins by cup/straw, NTL by cup, puree, mechanical soft/mixed consistency, and regular solids. Lingual movements brisk. Mastication and bolus recollection mildly extended. Base of tongue retraction, hyolaryngeal elevation adequate. Epiglottic inversion adequate in achieving laryngeal vestibular closure. Base of tongue, valleculae residue with thins, puree clears with a second swallow. Premature spillage to valleculae with NTL, pyriform sinus with thins by cup and straw, mixed consistency. Chin tuck aids premature spillage to level of valleculae with thins. Penetration before  the swallow, above vocal folds, ejected post prandial with thins by cup x1. Penetration during the swallow, above vocal folds, ejected post prandial with mixed consistency.  -AB       SLP Evaluation Clinical Impression    SLP Swallowing Diagnosis oral dysphagia;pharyngeal dysphagia  -AB    Functional Impact risk of aspiration/pneumonia  -AB    Rehab Potential/Prognosis, Swallowing good, to achieve stated therapy goals  -AB    Swallow Criteria for Skilled Therapeutic Interventions Met demonstrates skilled criteria  -AB       SLP Treatment Clinical Impressions    Care Plan Review evaluation/treatment results reviewed;care plan/treatment goals reviewed;risks/benefits reviewed;current/potential barriers reviewed;patient/other agree to care plan  -AB       Recommendations    Therapy Frequency (Swallow) PRN  -AB    Predicted Duration Therapy Intervention (Days) until discharge  -AB    SLP Diet Recommendation regular textures;thin liquids  -AB    Recommended Precautions and Strategies chin tuck;upright posture during/after eating;small bites of food and sips of liquid  -AB    Oral Care Recommendations Oral Care BID/PRN  -AB    SLP Rec. for Method of Medication Administration with thin liquids;with puree  -AB    Monitor for Signs of Aspiration yes;notify SLP if any concerns  -AB       Swallow Goals (SLP)    Swallow LTGs Patient will demonstrate functional swallow for  -AB    Swallow STGs swallow management recall goal selection (SLP)  -AB    Swallow Management Recall Goal Selection (SLP) swallow management recall, SLP goal 1  -AB       (LTG) Patient will demonstrate functional swallow for    Diet Texture (Demonstrate functional swallow) regular textures  -AB    Liquid viscosity (Demonstrate functional swallow) thin liquids  -AB    [place] (Demonstrate functional swallow) independently (over 90% accuracy)  -AB    Progress/Outcomes (Demonstrate functional swallow) good progress toward goal  -AB       (STG) Swallow  Management Recall Goal 1 (SLP)    Activity (Swallow Management Recall Goal 1, SLP) postural techniques  -AB    [place]/Accuracy (Swallow Management Recall Goal 1, SLP) independently (over 90% accuracy)  -AB    Progress/Outcomes (Swallow Management Recall Goal 1, SLP) new goal  -AB              User Key  (r) = Recorded By, (t) = Taken By, (c) = Cosigned By      Initials Name Effective Dates    Mai Marie MS CCC-SLP 12/27/22 -                     EDUCATION  The patient has been educated in the following areas:   Dysphagia (Swallowing Impairment) Modified Diet Instruction.        SLP GOALS       Row Name 06/05/23 0900       (LTG) Patient will demonstrate functional swallow for    Diet Texture (Demonstrate functional swallow) regular textures  -AB    Liquid viscosity (Demonstrate functional swallow) thin liquids  -AB    [place] (Demonstrate functional swallow) independently (over 90% accuracy)  -AB    Progress/Outcomes (Demonstrate functional swallow) good progress toward goal  -AB    Comment (Demonstrate functional swallow) --       (STG) Swallow Management Recall Goal 1 (SLP)    Activity (Swallow Management Recall Goal 1, SLP) postural techniques  -AB    [place]/Accuracy (Swallow Management Recall Goal 1, SLP) independently (over 90% accuracy)  -AB    Progress/Outcomes (Swallow Management Recall Goal 1, SLP) new goal  -AB              User Key  (r) = Recorded By, (t) = Taken By, (c) = Cosigned By      Initials Name Provider Type    Mai Marie, MS CCC-SLP Speech and Language Pathologist                       Time Calculation:    Time Calculation- SLP       Row Name 06/05/23 1009             Time Calculation- SLP    SLP Start Time 0845  -AB      SLP Received On 06/05/23  -AB                User Key  (r) = Recorded By, (t) = Taken By, (c) = Cosigned By      Initials Name Provider Type    Mai Marie MS CCC-SLP Speech and Language Pathologist                    Therapy Charges  for Today       Code Description Service Date Service Provider Modifiers Qty    81856194647 HC ST MOTION FLUORO EVAL SWALLOW 6 6/5/2023 Mai Hernandes, MS CCC-SLP GN 1                 Mai Hernandes MS CCC-SLP  6/5/2023

## 2023-06-05 NOTE — PROGRESS NOTES
"DAILY PROGRESS NOTE  [facility]    Patient Identification:  Name: Art Mullins  Age: 87 y.o.  Sex: male  :  1935  MRN: [id number]         Primary Care Physician: Santos Simmons MD      Subjective  Patient with no acute complaints.  On questioning he now does not recall having left-sided chest discomfort.    Objective:  General Appearance:  Comfortable, well-appearing, in no acute distress and not in pain.    Vital signs: (most recent): Blood pressure 110/55, pulse 91, temperature 98 °F (36.7 °C), temperature source Oral, resp. rate 20, height 175.3 cm (69\"), weight 74.5 kg (164 lb 3.9 oz), SpO2 92 %.    Lungs:  Normal effort and normal respiratory rate.  Breath sounds clear to auscultation.    Heart: Normal rate.  Regular rhythm.    Extremities: There is no dependent edema.    Neurological: Patient is alert.  (Oriented to person and place.  Cooperative and pleasant.).    Skin:  Warm and dry.                Vital signs in last 24 hours:  Temp:  [98 °F (36.7 °C)-99.3 °F (37.4 °C)] 98 °F (36.7 °C)  Heart Rate:  [] 91  Resp:  [16-20] 20  BP: (104-123)/(50-69) 110/55    Intake/Output:    Intake/Output Summary (Last 24 hours) at 2023 1806  Last data filed at 2023 1340  Gross per 24 hour   Intake 100 ml   Output 500 ml   Net -400 ml         Results from last 7 days   Lab Units 23  0535 23  0521 23  0635 23  0353 23  0523 23  0807   WBC 10*3/mm3 12.10* 13.30* 13.66* 16.38* 19.85* 19.37*   HEMOGLOBIN g/dL 10.4* 11.0* 10.9* 10.7* 11.2* 12.2*   PLATELETS 10*3/mm3 182 186 178 174 189 177     Results from last 7 days   Lab Units 23  1538 23  0535 23  0521 23  19523  0635 23  0353 23  1140 23  0523 23  0807   SODIUM mmol/L  --  138 140  --  141 142  --  140 140   POTASSIUM mmol/L 3.7 3.7 3.7 3.8 3.2* 3.6 3.6 3.6 3.9   CHLORIDE mmol/L  --  103 105  --  104 102  --  101 102   CO2 mmol/L  --  " 23.1 24.0  --  28.0 27.8  --  26.5 28.0   BUN mg/dL  --  32* 33*  --  37* 34*  --  29* 28*   CREATININE mg/dL  --  1.62* 1.65*  --  1.62* 1.82*  --  1.44* 1.40*   GLUCOSE mg/dL  --  94 102*  --  104* 103*  --  123* 112*   Estimated Creatinine Clearance: 34.5 mL/min (A) (by C-G formula based on SCr of 1.62 mg/dL (H)).  Results from last 7 days   Lab Units 06/05/23  1538 06/05/23  0535 06/04/23  0521 06/03/23  0946 06/03/23  0635 06/02/23  0353 06/01/23  1140 06/01/23  0523 05/31/23  0807   CALCIUM mg/dL  --  9.2 8.9  --  9.4 9.2  --  9.1 9.1   ALBUMIN g/dL  --   --  2.9*  --  2.5* 2.9*  --  3.1* 3.7   MAGNESIUM mg/dL 2.6*  --  2.6* 2.5* 2.5* 2.3 2.7*  --   --      Results from last 7 days   Lab Units 06/04/23  0521 06/03/23  0635 06/02/23  0353 06/01/23  0523 05/31/23  0807   ALBUMIN g/dL 2.9* 2.5* 2.9* 3.1* 3.7   BILIRUBIN mg/dL 0.5 0.5 0.6 0.8 0.7   ALK PHOS U/L 221* 184* 176* 184* 207*   AST (SGOT) U/L 53* 38 34 31 36   ALT (SGPT) U/L 21 19 18 17 19       Assessment:    Pneumonia due to infectious organism, unspecified laterality, unspecified part of lung    Essential hypertension    Stage 3a chronic kidney disease    Elevated troponin    Altered mental status, unspecified altered mental status type    Chronic diastolic CHF (congestive heart failure)    History of CVA (cerebrovascular accident)    Acute respiratory failure with hypoxia    Coronary artery disease without angina pectoris    88yo gentleman who presented to the hospital from SNF for chest pain as well as cough. He was noted to be 88-89% on RA. He has been admitted at this facility twice in the last month related to recent CVA (felt to be due to cardioembolic source) as well as CHF/type II NSTEMI. Not surprisingly he has had some residual confusion during this time. In ER he was found to have left sided PNA.     Left base consolidation: Patient being treated as pneumonia.  Certainly reasonable with a leukocytosis with a left shift and his risk factors.   Do not seem to be making any progress in weaning off O2 however.  Reviewing x-rays this appears more like an effusion to me than an infiltrate.  Parapneumonic effusion possibility.  We will check CT of the chest and get pulmonary involved.  Acute respiratory failure: Please see above.  Hypoxemia persists.  Dysphagia: Mild and on a regular diet.  Speech therapy input appreciated.  Altered mental status: Appreciate Dr. Giles's attention to pt. He feels this is metabolic encephalopathy in setting of what is probably some mild vascular dementia present on admission. He has decreased Requip and Klonopin doses and added Zyprexa.  Suspect he is presently at his baseline.  Recent CVA with left hemiparesis: Continue low dose Eliquis as well as statin therapy.  CAD: Stable.  Chronic diastolic HF: Presently appears euvolemic.  Lasix was held secondary to increasing creatinine.  Look to resume in the next day or 2.    CKD3a: Creatinine presently at 1.6.  Baseline appears to be 1.4.  Monitor closely.    HTN: BPs acceptable. Continue home regimen and monitor.  Neck pain: checked x-ray which was fine--just showed degenerative changes, Lidoderm patch and APAP effective  Hypokalemia: replace with protocol, check Mg++ level     Eliquis (home med) for DVT prophylaxis.    Plan:  Please see above.  Discussed with patient and wife at bedside.    Discussed with RN.    Srini Paez MD  6/5/2023  18:06 EDT

## 2023-06-05 NOTE — CASE MANAGEMENT/SOCIAL WORK
Continued Stay Note  [facility]     Patient Name: Art Mullins  MRN: [id number]  Today's Date: 6/5/2023    Admit Date: 5/31/2023    Plan: Retrun to [facility]. Needs EMS   Discharge Plan       Row Name 06/05/23 1426       Plan    Plan Retrun to [facility]. Needs EMS    Plan Comments Clinical chart reviewed. Plan remains to return to [facility] [place].  Patient will need EMS at [place]. [facility] continues to follow for any dc needs pending clinical course. RISSA, CSW                   Discharge Codes    No documentation.                 Expected Discharge Date and Time       Expected Discharge Date Expected Discharge Time    Jun 5, 2023               ALTHEA Valdes

## 2023-06-05 NOTE — PLAN OF CARE
Goal Outcome Evaluation:                 Video swallow study completed. Mild oropharyngeal dysphagia characterized by premature spillage, penetration before the swallow with thin liquids in head neutral position, penetration with mixed consistency. Chin tuck aids in laryngeal vestibular closure and premature spillage. Minimal base of tongue and valleculae residue with solids, clears with second swallow. No significant incidents of aspiration.     Recommend: regular and thin. Medications: with thin liquids or whole in puree. Compensations: chin tuck with thin liquids, multiple swallows following solids, small bites/sips, upright for all PO.     SLP will follow for carryover and additional interventions as indicated.

## 2023-06-05 NOTE — CONSULTS
Pulmonary Consultation     Patient Name: Art Mullins  Age/Sex: 87 y.o. male  : 1935  MRN: [id number]    Date of Admission: 2023  Date of Encounter Visit: 23  Encounter Provider: Grant Azevedo MD  Referring Provider: Aashish Hatfield MD  Place of Service: [facility]  Patient Care Team:  Santos Simmons MD as PCP - General (Family Medicine)  Aashish Thapa MD as Consulting Physician (Cardiology)      Subjective:     Consulted for: Abnormal chest x-ray    Chief Complaint: Came in with hypoxemia and shortness of breath    History of Present Illness:  Art Mullins is a 87 y.o. male with history of recent stroke, history of congestive heart failure with prior admission with the above, on anticoagulation, history of hypertension and hyperlipidemia who presented from the rehab facility with increased confusion, chest pain and cough, his chest x-ray showed left lower lobe infiltrate was started on antibiotic, he did have some leukocytosis as well with left shift consistent with pneumonia however on the antibiotic the patient did not get much better clinically and his chest x-ray showing persistent infiltrate with a lateral film showing some effusion so we were consulted for further management  The patient denies any fever or chills  No productive cough  No pleurisy  He is not the best historian  His labs on presentation showed elevated white blood cell count of 19.37 with negative procalcitonin, BNP was slightly up at 2938.  Patient is still oxygen dependent at 2 L/min with sat of 88%  No hemoptysis  No purulent secretion  No history of recurrent pneumonias that he knows of  No nausea or vomiting or choking on food.    Pulmonary Functions Testing Results:    No results found for: FEV1, FVC, BIN8NBE, TLC, DLCO    Review of Systems:   Review of Systems   Unable to perform ROS: Mental status change   Past Medical History:  Past Medical History:   Diagnosis Date    Hyperlipidemia      Hypertension     PVCs (premature ventricular contractions)        Past Surgical History:   Procedure Laterality Date    CARDIAC CATHETERIZATION N/A 5/19/2023    Procedure: Left Heart Cath;  Surgeon: Ricardo Arrieta MD;  Location: [facility] CATH INVASIVE LOCATION;  Service: Cardiology;  Laterality: N/A;    CARDIAC CATHETERIZATION N/A 5/19/2023    Procedure: Coronary angiography;  Surgeon: Ricardo Arrieta MD;  Location: [facility] CATH INVASIVE LOCATION;  Service: Cardiology;  Laterality: N/A;       Home Medications:   Medications Prior to Admission   Medication Sig Dispense Refill Last Dose    apixaban (ELIQUIS) 2.5 MG tablet tablet Take 1 tablet by mouth Every 12 (Twelve) Hours. Indications: Other - full anticoagulation 60 tablet 0     cholecalciferol (VITAMIN D3) 1000 UNITS tablet Take 1 tablet by mouth Daily.       clonazePAM (KlonoPIN) 1 MG tablet Take 1 tablet by mouth Daily. (Patient taking differently: Take 1 tablet by mouth Every Night.) 3 tablet 0     Cyanocobalamin (VITAMIN B 12 PO) Take 2,500 mcg by mouth Daily.       doxazosin (CARDURA) 8 MG tablet Take 1 tablet by mouth Every Night.       furosemide (LASIX) 40 MG tablet Take 1 tablet by mouth Daily. 30 tablet 0     hydrocortisone 2.5 % lotion Apply 1 application topically to the appropriate area as directed 2 (Two) Times a Day.       metoprolol succinate XL (TOPROL-XL) 50 MG 24 hr tablet Take 1 tablet by mouth 2 (two) times a day. 60 tablet 5     pantoprazole (PROTONIX) 40 MG EC tablet Take 1 tablet by mouth Daily.       rOPINIRole (REQUIP) 2 MG tablet Take 1 tablet by mouth Every Night.       rosuvastatin (CRESTOR) 40 MG tablet Take 1 tablet by mouth Daily.          Inpatient Medications:  Scheduled Meds:apixaban, 2.5 mg, Oral, Q12H  cholecalciferol, 1,000 Units, Oral, Daily  clonazePAM, 0.75 mg, Oral, Nightly  guaiFENesin, 600 mg, Oral, Q12H  ipratropium-albuterol, 3 mL, Nebulization, 4x Daily - RT  lidocaine, 1 patch, Transdermal, Q24H  metoprolol succinate XL,  50 mg, Oral, Q12H  OLANZapine, 2.5 mg, Oral, Daily With Dinner  pantoprazole, 40 mg, Oral, Daily  piperacillin-tazobactam, 3.375 g, Intravenous, Q8H  rOPINIRole, 0.5 mg, Oral, Nightly  rosuvastatin, 40 mg, Oral, Nightly  senna-docusate sodium, 2 tablet, Oral, BID  sodium chloride, 10 mL, Intravenous, Q12H  sodium chloride, 4 mL, Nebulization, BID - RT  terazosin, 10 mg, Oral, Nightly      Continuous Infusions:   PRN Meds:.  acetaminophen **OR** acetaminophen **OR** acetaminophen    senna-docusate sodium **AND** polyethylene glycol **AND** bisacodyl **AND** bisacodyl    nitroglycerin    ondansetron    Potassium Replacement - Follow Nurse / BPA Driven Protocol    sodium chloride    sodium chloride    sodium chloride    Allergies:  Allergies   Allergen Reactions    Sulfa Antibiotics        Past Social History:  Social History     Socioeconomic History    Marital status:    Tobacco Use    Smoking status: Never   Vaping Use    Vaping Use: Never used   Substance and Sexual Activity    Alcohol use: No    Drug use: No    Sexual activity: Defer       Past Family History:  Family History   Problem Relation Age of Onset    No Known Problems Mother     No Known Problems Father     No Known Problems Sister     No Known Problems Brother            Objective:   Temp:  [98 °F (36.7 °C)-99.3 °F (37.4 °C)] 98 °F (36.7 °C)  Heart Rate:  [] 91  Resp:  [16-20] 20  BP: (104-123)/(50-69) 110/55  SpO2:  [90 %-96 %] 92 %  on  Flow (L/min):  [2-3] 2 Device (Oxygen Therapy): nasal cannula     Intake/Output Summary (Last 24 hours) at 6/5/2023 1831  Last data filed at 6/5/2023 1340  Gross per 24 hour   Intake 100 ml   Output 500 ml   Net -400 ml     Body mass index is 24.25 kg/m².      06/03/23  0600 06/04/23  0500 06/05/23  0517   Weight: 79.4 kg (175 lb 0.7 oz) 72.4 kg (159 lb 9.8 oz) 74.5 kg (164 lb 3.9 oz)     Weight change: 2.1 kg (4 lb 10.1 oz)    Physical Exam:   Physical Exam   General:    No acute distress, alert and  oriented x2, pleasant, on nasal cannula oxygen                    Head:    Normocephalic, atraumatic. External ears and nose are normal   Eyes:          Conjunctivae and sclerae normal, no icterus, PERRLA, no  discharge   Throat:   No oral lesions, no thrush, oral mucosa moist.    Neck:   Supple, trachea midline. No JVD, no cervical or supraclavicular lymphadenopathy    Lungs:     Normal chest on inspection, diminished to exam on the left side, with dullness to percussion as well, no crackles. Respirations regular, even and unlabored.      Heart:    Regular rhythm and normal rate.  No murmurs, gallops, or rubs noted.   Abdomen:     Soft, nontender, nondistended, positive bowel sounds. No hepatosplenomegaly    Extremities:   No clubbing, cyanosis, minimal edema.     Pulses:   Pulses palpable and equal bilaterally.    Skin:   No bleeding or rash. No bumps, good turgor pressure    Neuro:   Nonfocal.  Moves all extremities well. Strength 5/5 and symmetrical, no sensory deficit    Psychiatric:   Normal mood and affect.     Lab Review:   Results from last 7 days   Lab Units 06/05/23  1538 06/05/23  0535 06/04/23  0521 06/03/23  1951 06/03/23  0635 06/02/23  0353 06/01/23  1140 06/01/23  0523 05/31/23  0807   SODIUM mmol/L  --  138 140  --  141 142  --  140 140   POTASSIUM mmol/L 3.7 3.7 3.7 3.8 3.2* 3.6 3.6 3.6 3.9   CHLORIDE mmol/L  --  103 105  --  104 102  --  101 102   CO2 mmol/L  --  23.1 24.0  --  28.0 27.8  --  26.5 28.0   BUN mg/dL  --  32* 33*  --  37* 34*  --  29* 28*   CREATININE mg/dL  --  1.62* 1.65*  --  1.62* 1.82*  --  1.44* 1.40*   GLUCOSE mg/dL  --  94 102*  --  104* 103*  --  123* 112*   CALCIUM mg/dL  --  9.2 8.9  --  9.4 9.2  --  9.1 9.1   AST (SGOT) U/L  --   --  53*  --  38 34  --  31 36   ALT (SGPT) U/L  --   --  21  --  19 18  --  17 19   ALBUMIN g/dL  --   --  2.9*  --  2.5* 2.9*  --  3.1* 3.7     Results from last 7 days   Lab Units 06/03/23  0635 06/02/23  1003 05/31/23  1240 05/31/23  0807  05/30/23  2253   CK TOTAL U/L  --   --   --   --  43   HSTROP T ng/L 106* 133* 141* 169* 165*     Results from last 7 days   Lab Units 06/05/23  0535 06/04/23  0521 06/03/23  0635 06/02/23  0353 06/01/23 0523 05/31/23  0807   WBC 10*3/mm3 12.10* 13.30* 13.66* 16.38* 19.85* 19.37*   HEMOGLOBIN g/dL 10.4* 11.0* 10.9* 10.7* 11.2* 12.2*   HEMATOCRIT % 31.9* 32.8* 31.9* 31.3* 33.0* 35.7*   PLATELETS 10*3/mm3 182 186 178 174 189 177   MCV fL 90.1 88.6 89.1 90.2 90.4 89.3   MCH pg 29.4 29.7 30.4 30.8 30.7 30.5   MCHC g/dL 32.6 33.5 34.2 34.2 33.9 34.2   RDW % 12.8 12.5 12.5 12.8 12.8 12.5   RDW-SD fl 42.1 40.9 40.6 41.6 42.0 41.3   MPV fL 9.4 9.3 9.2 9.7 9.9 9.5   NEUTROPHIL % % 76.6*  --  79.7*  --   --  84.9*   LYMPHOCYTE % % 9.1*  --  7.2*  --   --  5.2*   MONOCYTES % % 10.2  --  9.3  --   --  8.6   EOSINOPHIL % % 2.7  --  2.5  --   --  0.2*   BASOPHIL % % 0.7  --  0.6  --   --  0.3   IMM GRAN % % 0.7*  --  0.7*  --   --  0.8*   NEUTROS ABS 10*3/mm3 9.26*  --  10.90*  --   --  16.47*   LYMPHS ABS 10*3/mm3 1.10  --  0.98  --   --  1.00   MONOS ABS 10*3/mm3 1.23*  --  1.27*  --   --  1.66*   EOS ABS 10*3/mm3 0.33  --  0.34  --   --  0.03   BASOS ABS 10*3/mm3 0.09  --  0.08  --   --  0.06   IMMATURE GRANS (ABS) 10*3/mm3 0.09*  --  0.09*  --   --  0.15*   NRBC /100 WBC 0.1  --  0.0  --   --  0.0     Results from last 7 days   Lab Units 05/31/23  1546   INR  1.42*     Results from last 7 days   Lab Units 06/05/23  1538 06/04/23  0521 06/03/23  0946 06/03/23  0635 06/02/23  0353 06/01/23  1140   MAGNESIUM mg/dL 2.6* 2.6* 2.5* 2.5* 2.3 2.7*           Invalid input(s): LDLCALC  Results from last 7 days   Lab Units 05/31/23  0807   PROBNP pg/mL 2,938.0*         Results from last 7 days   Lab Units 06/01/23  1013   PH, ARTERIAL pH units 7.486*   PCO2, ARTERIAL mm Hg 36.5   PO2 ART mm Hg 69.6*   HCO3 ART mmol/L 27.6     Results from last 7 days   Lab Units 06/05/23  0535 05/31/23  0847 05/31/23  0807   PROCALCITONIN ng/mL 0.28*   --  0.25   LACTATE mmol/L  --  1.2  --              Results from last 7 days   Lab Units 05/31/23  2107 05/31/23  0859 05/31/23  0847   BLOODCX   --  No growth at 5 days No growth at 5 days   STREP PNEUMO AG  Negative  --   --    L. PNEUMOPHILA SEROGP 1 UR AG  Negative  --   --      Results from last 7 days   Lab Units 05/31/23  2107   NITRITE UA  Negative   WBC UA /HPF 0-2   BACTERIA UA /HPF None Seen   SQUAM EPITHEL UA /HPF 0-2     Results from last 7 days   Lab Units 05/31/23  0807   COVID19  Not Detected   ADENOVIRUS DETECTION BY PCR  Not Detected   CORONAVIRUS 229E  Not Detected   CORONAVIRUS HKU1  Not Detected   CORONAVIRUS NL63  Not Detected   CORONAVIRUS OC43  Not Detected   HUMAN METAPNEUMOVIRUS  Not Detected   HUMAN RHINOVIRUS/ENTEROVIRUS  Not Detected   INFLUENZA B PCR  Not Detected   PARAINFLUENZA 1  Not Detected   PARAINFLUENZA VIRUS 2  Not Detected   PARAINFLUENZA VIRUS 3  Not Detected   PARAINFLUENZA VIRUS 4  Not Detected   BORDETELLA PERTUSSIS PCR  Not Detected   BORDETELLA PARAPERTUSSIS PCR  Not Detected   CHLAMYDOPHILA PNEUMONIAE PCR  Not Detected   MYCOPLAMA PNEUMO PCR  Not Detected   RSV, PCR  Not Detected             Echocardiogram 5/18/2023:      There is severe hypokinesis of the basal to mid inferolateral wall. The remaining wall segments are hyperdynamic and the ejection fraction is preserved    Left ventricular systolic function is normal. Left ventricular ejection fraction appears to be 56 - 60%.    Left ventricular wall thickness is consistent with mild concentric hypertrophy.    Normal right ventricular cavity size and systolic function noted.    Mild to moderate aortic valve regurgitation is present.    Mild to moderate mitral valve regurgitation is present.    Mild tricuspid valve regurgitation is present    Calculated right ventricular systolic pressure from tricuspid regurgitation is 70 mmHg.    There is no evidence of pericardial effusion.     Imaging:  Imaging Results (Most Recent)        Procedure Component Value Units Date/Time    FL Video Swallow Single Contrast [[id number]] Collected: 06/05/23 1412     Updated: 06/05/23 1658    Narrative:      VIDEO SWALLOW STUDY     HISTORY: Dysphagia.     1 minute 50 seconds fluoroscopy was provided for the speech pathologist  during a video swallow study. 5.57 mGy     Penetration was seen.       Impression:      Fluoroscopy was provided for the speech pathologist during a  video swallow study. For full details please see the speech pathology  report     This report was finalized on 6/5/2023 4:55 PM by Dr. Issac Pierce M.D.       XR Chest PA & Lateral [[id number]] Collected: 06/03/23 1340     Updated: 06/03/23 1345    Narrative:      XR CHEST PA AND LATERAL-     HISTORY: Male who is 87 years-old,  pneumonia, hypoxia     TECHNIQUE: Frontal and lateral views of the chest     COMPARISON: 05/31/2023     FINDINGS: The heart is enlarged. Pulmonary vasculature is unremarkable.  Mildly moderate left pleural effusion is apparent with left basilar  atelectasis or infiltrate. No pneumothorax. No acute osseous process.       Impression:      Left basilar atelectasis/infiltrate/effusion, continued  follow-up advised. Cardiomegaly.     This report was finalized on 6/3/2023 1:42 PM by Dr. Nehemiah Melvin M.D.       XR Spine Cervical 3 View [[id number]] Collected: 06/02/23 1204     Updated: 06/02/23 1406    Narrative:      X-RAY SPINE CERVICAL THREE-VIEW     CLINICAL INFORMATION: Sudden onset of neck pain, no known injury.     FINDINGS: There is straightening of the lordotic curvature which could  be due to positioning or muscular spasm. The odontoid is preserved with  satisfactory C1-2 alignment and the prevertebral soft tissues are  normal. There is moderate multilevel disc degeneration which appears  most pronounced at C5-6. There is multilevel facet hypertrophy also  seen. C7 vertebra is obscured on the lateral projection due to the  superimposed shoulders. The  appearance on the frontal projection is  within normal limits.     CONCLUSION: Degenerative change as described above. C7 vertebra obscured  on the lateral projection.     This report was finalized on 6/2/2023 2:03 PM by Dr. Hugo Felton M.D.       XR Chest 1 View [[id number]] Collected: 05/31/23 0804     Updated: 05/31/23 0809    Narrative:      XR CHEST 1 VW-     Clinical: Chest pain, shortness of breath, cough     COMPARISON examination 05/18/2023     FINDINGS: Mild cardiac enlargement as before. Atherosclerotic  calcification of the aorta. Some prominence of the central vasculature  seen. The right lung is clear. Asymmetric right first rib and  calcification compared to the left, as before.     Vague opacity demonstrated at the left lung base, minimal airspace  disease, atelectasis or effusion may be present. PA and lateral view of  the chest would be helpful for further evaluation. The remainder of  examination is unremarkable.     This report was finalized on 5/31/2023 8:06 AM by Dr. Hugo Felton M.D.               I personally viewed and interpreted the patient's imaging studies.    Assessment:   Left lower lobe pneumonia with risk for aspiration  Left-sided pleural effusion on the chest x-ray  Congestive heart failure chronic diastolic with possible acute on chronic  History of CVA  Severe pulmonary hypertension  Hypoxemia  Coronary artery disease  Chronic kidney disease stage III  Metabolic encephalopathy    Plan:      patient is not unstable but he is on oxygen, his not improving clinically and his chest x-ray as per the primary team is strongly suggestive of possible effusion and need to be further evaluated  Agree with the CT of the chest as a neck step in the evaluation, if there is pleural effusion in a patient who has leukocytosis and possible sepsis, this will need to be drained  The pleural effusion can be potentially cardiac related from the congestive heart failure with subsequent pneumonias  causing seeding of the effusion and complicating the effusion  Consider holding the Eliquis in case thoracentesis is needed  The CAT scan is already ordered we will follow-up on the images once done and we will discuss with the family again  Discussed with the patient and with the spouse  Notes reviewed  Further recommendations to follow       Thank you for allowing me to participate in the care of Art PERCY Susanna. Feel free to contact me directly with any further questions or concerns.    Grant Azevedo MD  [facility]   06/05/23  18:31 EDT    Dictated utilizing Dragon dictation

## 2023-06-05 NOTE — THERAPY TREATMENT NOTE
Patient Name: Art Mullins  : 1935    MRN: [id number]                              Today's Date: 2023       Admit Date: 2023    Visit Dx:     ICD-10-CM ICD-9-CM   1. Pneumonia due to infectious organism, unspecified laterality, unspecified part of lung  J18.9 486   2. Hypoxia  R09.02 799.02   3. Leukocytosis, unspecified type  D72.829 288.60   4. History of congestive heart failure  Z86.79 V12.59   5. History of stroke  Z86.73 V12.54   6. Chronic renal impairment, unspecified CKD stage  N18.9 585.9     Patient Active Problem List   Diagnosis    Essential hypertension    Acute CVA (cerebrovascular accident)    Stage 3a chronic kidney disease    Elevated troponin    Vision changes    Altered mental status, unspecified altered mental status type    Chronic diastolic CHF (congestive heart failure)    Pneumonia due to infectious organism, unspecified laterality, unspecified part of lung    History of CVA (cerebrovascular accident)    Acute respiratory failure with hypoxia    Coronary artery disease without angina pectoris     Past Medical History:   Diagnosis Date    Hyperlipidemia     Hypertension     PVCs (premature ventricular contractions)      Past Surgical History:   Procedure Laterality Date    CARDIAC CATHETERIZATION N/A 2023    Procedure: Left Heart Cath;  Surgeon: Ricardo Arrieta MD;  Location: [facility] CATH INVASIVE LOCATION;  Service: Cardiology;  Laterality: N/A;    CARDIAC CATHETERIZATION N/A 2023    Procedure: Coronary angiography;  Surgeon: Ricardo Arrieta MD;  Location: [facility] CATH INVASIVE LOCATION;  Service: Cardiology;  Laterality: N/A;      General Information       Row Name 23 1605          Physical Therapy Time and Intention    Document Type therapy note (daily note)  -     Mode of Treatment physical therapy  -       Row Name 23 1605          General Information    Existing Precautions/Restrictions fall;oxygen therapy device and L/min  -       Row Name 23  1605          Cognition    Orientation Status (Cognition) oriented x 3  -SM               User Key  (r) = Recorded By, (t) = Taken By, (c) = Cosigned By      Initials Name Provider Type    Georgia Perdomo PTA Physical Therapist Assistant                   Mobility       Row Name 06/05/23 1605          Bed Mobility    Bed Mobility supine-sit  -SM     Supine-Sit Logan (Bed Mobility) minimum assist (75% patient effort)  -     Assistive Device (Bed Mobility) bed rails;head of bed elevated  -       Row Name 06/05/23 1605          Sit-Stand Transfer    Sit-Stand Logan (Transfers) contact guard  -     Assistive Device (Sit-Stand Transfers) walker, front-wheeled  -       Row Name 06/05/23 1605          Gait/Stairs (Locomotion)    Logan Level (Gait) contact guard;minimum assist (75% patient effort)  -     Assistive Device (Gait) walker, front-wheeled  -     Distance in Feet (Gait) 20ft, then 12ft  -     Deviations/Abnormal Patterns (Gait) edmar decreased;stride length decreased  -     Bilateral Gait Deviations forward flexed posture  -               User Key  (r) = Recorded By, (t) = Taken By, (c) = Cosigned By      Initials Name Provider Type    Georgia Perdomo PTA Physical Therapist Assistant                   Obj/Interventions       Row Name 06/05/23 1606          Motor Skills    Therapeutic Exercise --  seated AP and LAQ x10 reps  -               User Key  (r) = Recorded By, (t) = Taken By, (c) = Cosigned By      Initials Name Provider Type    Georgia Perdomo PTA Physical Therapist Assistant                   Goals/Plan    No documentation.                  Clinical Impression       Row Name 06/05/23 1607          Pain    Pretreatment Pain Rating 0/10 - no pain  -     Posttreatment Pain Rating 0/10 - no pain  -       Row Name 06/05/23 1607          Positioning and Restraints    Pre-Treatment Position in bed  -     Post Treatment Position chair  -      In Chair reclined;call light within reach;encouraged to call for assist;exit alarm on;with nsg  -SM               User Key  (r) = Recorded By, (t) = Taken By, (c) = Cosigned By      Initials Name Provider Type    Georgia Perdomo PTA Physical Therapist Assistant                   Outcome Measures       Row Name 06/05/23 1608 06/05/23 0808       How much help from another person do you currently need...    Turning from your back to your side while in flat bed without using bedrails? 3  -SM 3  -LK    Moving from lying on back to sitting on the side of a flat bed without bedrails? 3  -SM 3  -LK    Moving to and from a bed to a chair (including a wheelchair)? 3  -SM 3  -LK    Standing up from a chair using your arms (e.g., wheelchair, bedside chair)? 3  -SM 2  -LK    Climbing 3-5 steps with a railing? 2  -SM 2  -LK    To walk in hospital room? 3  -SM 2  -LK    AM-PAC 6 Clicks Score (PT) 17  -SM 15  -LK    Highest level of mobility 5 --> Static standing  -SM 4 --> Transferred to chair/commode  -LK      Row Name 06/05/23 1608          Functional Assessment    Outcome Measure Options AM-PAC 6 Clicks Basic Mobility (PT)  -               User Key  (r) = Recorded By, (t) = Taken By, (c) = Cosigned By      Initials Name Provider Type    Georgia Perdomo PTA Physical Therapist Assistant    Nanette Holt RN Registered Nurse                                 Physical Therapy Education       Title: PT OT SLP Therapies (In Progress)       Topic: Physical Therapy (Done)       Point: Mobility training (Done)       Learning Progress Summary             Patient Acceptance, E,TB,D, VU,NR by  at 6/5/2023 1608    Acceptance, E,TB,D, VU,NR by  at 6/3/2023 1606    Acceptance, E,TB, VU,NR by  at 6/2/2023 1007                         Point: Home exercise program (Done)       Learning Progress Summary             Patient Acceptance, E,TB,D, VU,NR by  at 6/5/2023 1608    Acceptance, E,TB,D, VU,NR by  at 6/3/2023  1606                         Point: Body mechanics (Done)       Learning Progress Summary             Patient Acceptance, E,TB,D, VU,NR by  at 6/5/2023 1608    Acceptance, E,TB,D, VU,NR by  at 6/3/2023 1606    Acceptance, E,TB, VU,NR by  at 6/2/2023 1007                         Point: Precautions (Done)       Learning Progress Summary             Patient Acceptance, E,TB,D, VU,NR by  at 6/5/2023 1608    Acceptance, E,TB,D, VU,NR by  at 6/3/2023 1606                                         User Key       Initials Effective Dates Name Provider Type Discipline     03/07/18 -  Georgia Abraham PTA Physical Therapist Assistant PT     09/22/22 -  Margret Grace PT Physical Therapist PT                  PT Recommendation and Plan     Plan of Care Reviewed With: patient  Progress: improving  Outcome Evaluation: Pt tolerated treatment well this date. Increased gait distance to 20ft, then 12ft w/ Rw and CGA-min A. Slightly unsteady at times, though no overt LOBs. Instructed pt on a few seated LE exercises. Encouraged pt to sit up in chair through dinner time if possible, then nsg to assist back to bed.     Time Calculation:    PT Charges       Row Name 06/05/23 1609             Time Calculation    Start Time 1428  -      Stop Time 1457  -      Time Calculation (min) 29 min  -      PT Received On 06/05/23  -      PT - Next Appointment 06/06/23  -                User Key  (r) = Recorded By, (t) = Taken By, (c) = Cosigned By      Initials Name Provider Type     Georgia Abraham PTA Physical Therapist Assistant                  Therapy Charges for Today       Code Description Service Date Service Provider Modifiers Qty    16473342230 HC PT THER PROC EA 15 MIN 6/5/2023 Georgia Abraham PTA GP 1    [id number] HC GAIT TRAINING EA 15 MIN 6/5/2023 Georgia Abraham PTA GP 1            PT G-Codes  Outcome Measure Options: AM-PAC 6 Clicks Basic Mobility (PT)  AM-PAC 6 Clicks Score (PT):  17  PT Discharge Summary  Anticipated Discharge Disposition (PT): skilled nursing facility    Georgia Abraham, PTA  6/5/2023

## 2023-06-06 ENCOUNTER — APPOINTMENT (OUTPATIENT)
Dept: CT IMAGING | Facility: HOSPITAL | Age: 88
DRG: 193 | End: 2023-06-06
Payer: MEDICARE

## 2023-06-06 PROCEDURE — 94760 N-INVAS EAR/PLS OXIMETRY 1: CPT

## 2023-06-06 PROCEDURE — 94799 UNLISTED PULMONARY SVC/PX: CPT

## 2023-06-06 PROCEDURE — 71250 CT THORAX DX C-: CPT

## 2023-06-06 PROCEDURE — 25010000002 PIPERACILLIN SOD-TAZOBACTAM PER 1 G: Performed by: INTERNAL MEDICINE

## 2023-06-06 PROCEDURE — 94664 DEMO&/EVAL PT USE INHALER: CPT

## 2023-06-06 RX ADMIN — GUAIFENESIN 600 MG: 600 TABLET, EXTENDED RELEASE ORAL at 08:43

## 2023-06-06 RX ADMIN — ROSUVASTATIN CALCIUM 40 MG: 40 TABLET, FILM COATED ORAL at 20:33

## 2023-06-06 RX ADMIN — CLONAZEPAM 0.75 MG: 0.5 TABLET ORAL at 20:33

## 2023-06-06 RX ADMIN — LIDOCAINE 1 PATCH: 50 PATCH CUTANEOUS at 08:43

## 2023-06-06 RX ADMIN — Medication 10 ML: at 08:44

## 2023-06-06 RX ADMIN — Medication 1000 UNITS: at 08:43

## 2023-06-06 RX ADMIN — PANTOPRAZOLE SODIUM 40 MG: 40 TABLET, DELAYED RELEASE ORAL at 08:44

## 2023-06-06 RX ADMIN — IPRATROPIUM BROMIDE AND ALBUTEROL SULFATE 3 ML: 2.5; .5 SOLUTION RESPIRATORY (INHALATION) at 19:57

## 2023-06-06 RX ADMIN — Medication 4 ML: at 19:58

## 2023-06-06 RX ADMIN — TAZOBACTAM SODIUM AND PIPERACILLIN SODIUM 3.38 G: 375; 3 INJECTION, SOLUTION INTRAVENOUS at 14:52

## 2023-06-06 RX ADMIN — METOPROLOL SUCCINATE 50 MG: 25 TABLET, EXTENDED RELEASE ORAL at 08:43

## 2023-06-06 RX ADMIN — GUAIFENESIN 600 MG: 600 TABLET, EXTENDED RELEASE ORAL at 20:33

## 2023-06-06 RX ADMIN — IPRATROPIUM BROMIDE AND ALBUTEROL SULFATE 3 ML: 2.5; .5 SOLUTION RESPIRATORY (INHALATION) at 11:40

## 2023-06-06 RX ADMIN — METOPROLOL SUCCINATE 50 MG: 25 TABLET, EXTENDED RELEASE ORAL at 20:33

## 2023-06-06 RX ADMIN — TAZOBACTAM SODIUM AND PIPERACILLIN SODIUM 3.38 G: 375; 3 INJECTION, SOLUTION INTRAVENOUS at 00:23

## 2023-06-06 RX ADMIN — Medication 4 ML: at 08:14

## 2023-06-06 RX ADMIN — Medication 10 ML: at 21:51

## 2023-06-06 RX ADMIN — TAZOBACTAM SODIUM AND PIPERACILLIN SODIUM 3.38 G: 375; 3 INJECTION, SOLUTION INTRAVENOUS at 06:23

## 2023-06-06 RX ADMIN — IPRATROPIUM BROMIDE AND ALBUTEROL SULFATE 3 ML: 2.5; .5 SOLUTION RESPIRATORY (INHALATION) at 15:32

## 2023-06-06 RX ADMIN — TERAZOSIN HYDROCHLORIDE 10 MG: 5 CAPSULE ORAL at 20:32

## 2023-06-06 RX ADMIN — IPRATROPIUM BROMIDE AND ALBUTEROL SULFATE 3 ML: 2.5; .5 SOLUTION RESPIRATORY (INHALATION) at 08:13

## 2023-06-06 RX ADMIN — ROPINIROLE HYDROCHLORIDE 0.5 MG: 0.5 TABLET, FILM COATED ORAL at 20:32

## 2023-06-06 RX ADMIN — TAZOBACTAM SODIUM AND PIPERACILLIN SODIUM 3.38 G: 375; 3 INJECTION, SOLUTION INTRAVENOUS at 23:19

## 2023-06-06 RX ADMIN — OLANZAPINE 2.5 MG: 2.5 TABLET ORAL at 17:43

## 2023-06-06 NOTE — PROGRESS NOTES
"  PROGRESS NOTE  Patient Name: Art Mullins  Age/Sex: 87 y.o. male  : 1935  MRN: [id number]    Date of Admission: 2023  Date of Encounter Visit: 23   LOS: 6 days   Patient Care Team:  Santos Simmons MD as PCP - General (Family Medicine)  Aashish Thapa MD as Consulting Physician (Cardiology)    Chief Complaint:SOA, effusion     Hospital course: dyspnea and pneumonia with dysphagia risks.CXR looked more like effusion and CT was ordered  The AC was held for possible thoracentesis , he is afebrile, still on 6 lpm nasalk O 2         REVIEW OF SYSTEMS:   CONSTITUTIONAL: no fever or chills  CARDIOVASCULAR: No chest pain, chest pressure or chest discomfort. No palpitations or edema.   RESPIRATORY:  shortness of breath, No cough or sputum.   GASTROINTESTINAL: No anorexia, nausea, vomiting or diarrhea. No abdominal pain or blood.   HEMATOLOGIC: No bleeding or bruising.     Ventilator/Non-Invasive Ventilation Settings (From admission, onward)      Nasal o2 6 lpm               Vital Signs  Temp:  [97.6 °F (36.4 °C)-98.5 °F (36.9 °C)] 98.5 °F (36.9 °C)  Heart Rate:  [83-96] 92  Resp:  [18-20] 18  BP: (114-134)/(54-73) 120/73  SpO2:  [85 %-96 %] 95 %  on  Flow (L/min):  [2-7] 5 Device (Oxygen Therapy): high-flow nasal cannula;humidified    Intake/Output Summary (Last 24 hours) at 2023 1354  Last data filed at 2023 0700  Gross per 24 hour   Intake 220 ml   Output 200 ml   Net 20 ml     Flowsheet Rows      Flowsheet Row First Filed Value   Admission Height 175.3 cm (69\") Documented at 2023 0818   Admission Weight 83.9 kg (185 lb) Documented at 2023 0818          Body mass index is 25.04 kg/m².      23  0500 23  0517 23  0509   Weight: 72.4 kg (159 lb 9.8 oz) 74.5 kg (164 lb 3.9 oz) 76.9 kg (169 lb 8.5 oz)       Physical Exam:  GEN:  No acute distress, alert, cooperative, well developed   EYES:   Sclerae clear. No icterus. PERRL. Normal EOM  ENT:   " External ears/nose normal, no oral lesions, no thrush, mucous membranes moist  NECK:  Supple, midline trachea, no JVD  LUNGS: Normal chest on inspection, decreased BS and Dullness on the left minimal to no crackles , no wheezing, labored when trying to talk   CV:  Regular rhythm and rate. Normal S1/S2. No murmurs, gallops, or rubs noted.  ABD:  Soft, nontender and nondistended. Normal bowel sounds. No guarding  EXT:  Moves all extremities well. No cyanosis. No redness. No edema.   Skin: Dry, intact, no bleeding    Results Review:    Results From Last 14 Days   Lab Units 05/31/23  0847   LACTATE mmol/L 1.2     Results from last 7 days   Lab Units 06/05/23  1538 06/05/23  0535 06/04/23  0521 06/03/23  1951 06/03/23  0635 06/02/23  0353 06/01/23  1140 06/01/23  0523 05/31/23  0807   SODIUM mmol/L  --  138 140  --  141 142  --  140 140   POTASSIUM mmol/L 3.7 3.7 3.7 3.8 3.2* 3.6 3.6 3.6 3.9   CHLORIDE mmol/L  --  103 105  --  104 102  --  101 102   CO2 mmol/L  --  23.1 24.0  --  28.0 27.8  --  26.5 28.0   BUN mg/dL  --  32* 33*  --  37* 34*  --  29* 28*   CREATININE mg/dL  --  1.62* 1.65*  --  1.62* 1.82*  --  1.44* 1.40*   CALCIUM mg/dL  --  9.2 8.9  --  9.4 9.2  --  9.1 9.1   AST (SGOT) U/L  --   --  53*  --  38 34  --  31 36   ALT (SGPT) U/L  --   --  21  --  19 18  --  17 19   ANION GAP mmol/L  --  11.9 11.0  --  9.0 12.2  --  12.5 10.0   ALBUMIN g/dL  --   --  2.9*  --  2.5* 2.9*  --  3.1* 3.7     Results from last 7 days   Lab Units 06/03/23  0635 06/02/23  1003 05/31/23  1240 05/31/23  0807 05/30/23  2253   CK TOTAL U/L  --   --   --   --  43   HSTROP T ng/L 106* 133* 141*   < > 165*    < > = values in this interval not displayed.         Results from last 7 days   Lab Units 05/31/23  0807   PROBNP pg/mL 2,938.0*     Results from last 7 days   Lab Units 06/05/23  0535 06/04/23  0521 06/03/23  0635 06/02/23  0353 06/01/23  0523 05/31/23  0807   WBC 10*3/mm3 12.10* 13.30* 13.66* 16.38* 19.85* 19.37*   HEMOGLOBIN  g/dL 10.4* 11.0* 10.9* 10.7* 11.2* 12.2*   HEMATOCRIT % 31.9* 32.8* 31.9* 31.3* 33.0* 35.7*   PLATELETS 10*3/mm3 182 186 178 174 189 177   MCV fL 90.1 88.6 89.1 90.2 90.4 89.3   NEUTROPHIL % % 76.6*  --  79.7*  --   --  84.9*   LYMPHOCYTE % % 9.1*  --  7.2*  --   --  5.2*   MONOCYTES % % 10.2  --  9.3  --   --  8.6   EOSINOPHIL % % 2.7  --  2.5  --   --  0.2*   BASOPHIL % % 0.7  --  0.6  --   --  0.3   IMM GRAN % % 0.7*  --  0.7*  --   --  0.8*     Results from last 7 days   Lab Units 05/31/23  1546   INR  1.42*     Results from last 7 days   Lab Units 06/05/23  1538 06/04/23  0521 06/03/23  0946 06/03/23  0635 06/02/23  0353 06/01/23  1140   MAGNESIUM mg/dL 2.6* 2.6* 2.5* 2.5* 2.3 2.7*           Invalid input(s): LDLCALC  Results from last 7 days   Lab Units 06/01/23  1013   PH, ARTERIAL pH units 7.486*   PCO2, ARTERIAL mm Hg 36.5   PO2 ART mm Hg 69.6*   HCO3 ART mmol/L 27.6         No results found for: POCGLU  Results from last 7 days   Lab Units 06/05/23  0535 05/31/23  0847 05/31/23  0807   PROCALCITONIN ng/mL 0.28*  --  0.25   LACTATE mmol/L  --  1.2  --      Results from last 7 days   Lab Units 05/31/23 2107 05/31/23  0859 05/31/23  0847   BLOODCX   --  No growth at 5 days No growth at 5 days   STREP PNEUMO AG  Negative  --   --    L. PNEUMOPHILA SEROGP 1 UR AG  Negative  --   --      Results from last 7 days   Lab Units 05/31/23 2107   NITRITE UA  Negative   WBC UA /HPF 0-2   BACTERIA UA /HPF None Seen   SQUAM EPITHEL UA /HPF 0-2     Results from last 7 days   Lab Units 05/31/23  0807   COVID19  Not Detected   ADENOVIRUS DETECTION BY PCR  Not Detected   CORONAVIRUS 229E  Not Detected   CORONAVIRUS HKU1  Not Detected   CORONAVIRUS NL63  Not Detected   CORONAVIRUS OC43  Not Detected   HUMAN METAPNEUMOVIRUS  Not Detected   HUMAN RHINOVIRUS/ENTEROVIRUS  Not Detected   INFLUENZA B PCR  Not Detected   PARAINFLUENZA 1  Not Detected   PARAINFLUENZA VIRUS 2  Not Detected   PARAINFLUENZA VIRUS 3  Not Detected    PARAINFLUENZA VIRUS 4  Not Detected   BORDETELLA PERTUSSIS PCR  Not Detected   BORDETELLA PARAPERTUSSIS PCR  Not Detected   CHLAMYDOPHILA PNEUMONIAE PCR  Not Detected   MYCOPLAMA PNEUMO PCR  Not Detected   RSV, PCR  Not Detected             Echocardiogram 5/18/2023:      There is severe hypokinesis of the basal to mid inferolateral wall. The remaining wall segments are hyperdynamic and the ejection fraction is preserved    Left ventricular systolic function is normal. Left ventricular ejection fraction appears to be 56 - 60%.    Left ventricular wall thickness is consistent with mild concentric hypertrophy.    Normal right ventricular cavity size and systolic function noted.    Mild to moderate aortic valve regurgitation is present.    Mild to moderate mitral valve regurgitation is present.    Mild tricuspid valve regurgitation is present    Calculated right ventricular systolic pressure from tricuspid regurgitation is 70 mmHg.    There is no evidence of pericardial effusion.  Imaging:   Imaging Results (All)       Procedure Component Value Units Date/Time            I reviewed the patient's new clinical results.  I personally viewed and interpreted the patient's imaging results:        Medication Review:   cholecalciferol, 1,000 Units, Oral, Daily  clonazePAM, 0.75 mg, Oral, Nightly  guaiFENesin, 600 mg, Oral, Q12H  ipratropium-albuterol, 3 mL, Nebulization, 4x Daily - RT  lidocaine, 1 patch, Transdermal, Q24H  metoprolol succinate XL, 50 mg, Oral, Q12H  OLANZapine, 2.5 mg, Oral, Daily With Dinner  pantoprazole, 40 mg, Oral, Daily  piperacillin-tazobactam, 3.375 g, Intravenous, Q8H  rOPINIRole, 0.5 mg, Oral, Nightly  rosuvastatin, 40 mg, Oral, Nightly  senna-docusate sodium, 2 tablet, Oral, BID  sodium chloride, 10 mL, Intravenous, Q12H  sodium chloride, 4 mL, Nebulization, BID - RT  terazosin, 10 mg, Oral, Nightly        ASSESSMENT:   Left lower lobe pneumonia with risk for aspiration, VERSUS PASSIVE ATELECTASIS    Left-sided pleural effusion , moderate to large  Congestive heart failure chronic diastolic with possible acute on chronic  History of CVA  Severe pulmonary hypertension  Hypoxemia  Coronary artery disease  Chronic kidney disease stage III  Metabolic encephalopathy    PLAN:    CT films reviewed, the patient does have some atelectasis especially on the left lower lobe, cannot definitely rule out pneumonia but the majority of the process seems to be passive atelectasis because of the moderate to large pleural effusion.  Anticoagulation was discontinued yesterday and patient should be cleared for thoracentesis tomorrow for both therapeutic and diagnostic reasons  He will be off the eliquis for 48 hours by then         discussed with patient and called and discussed with thr son (wife was not responding)     Disposition: per primary team     Grant Azevedo MD  06/06/23  13:54 EDT           Dictated utilizing Dragon dictation

## 2023-06-06 NOTE — PLAN OF CARE
Problem: Adult Inpatient Plan of Care  Goal: Plan of Care Review  6/6/2023 1602 by Nanette Macias, RN  Outcome: Ongoing, Progressing  Flowsheets (Taken 6/6/2023 1602)  Progress: no change  Plan of Care Reviewed With: patient  Outcome Evaluation: VSS. Pt on 7L highflow NC, currently on 4L O2 humidified O2. CT scan complete. Thoracentesis planned for tomorrow. Eliquis being held. IV zosyn continued. Q2 turn. Pt stable and needs met at this time.

## 2023-06-06 NOTE — PROGRESS NOTES
"DAILY PROGRESS NOTE  [facility]    Patient Identification:  Name: Art Mullins  Age: 87 y.o.  Sex: male  :  1935  MRN: [id number]         Primary Care Physician: Santos Simmons MD      Subjective  Patient with no acute complaints.  Feels about the same.    Objective:  General Appearance:  Comfortable, well-appearing, not in pain and in no acute distress.    Vital signs: (most recent): Blood pressure 120/73, pulse 81, temperature 98.6 °F (37 °C), temperature source Oral, resp. rate 18, height 175.3 cm (69\"), weight 76.9 kg (169 lb 8.5 oz), SpO2 94 %.    Lungs:  Normal effort and normal respiratory rate.  He is not in respiratory distress.  No stridor.  There are rales and decreased breath sounds.  No wheezes or rhonchi.  (A few fine scattered rales on the right.  Dullness left base when compared to the right.)  Heart: Normal rate.  Regular rhythm.    Extremities: There is no dependent edema.    Neurological: Patient is alert.  (Oriented to person and place.  Cooperative and pleasant.).    Skin:  Warm and dry.                Vital signs in last 24 hours:  Temp:  [97.6 °F (36.4 °C)-98.6 °F (37 °C)] 98.6 °F (37 °C)  Heart Rate:  [81-96] 81  Resp:  [18-20] 18  BP: (114-134)/(54-73) 120/73    Intake/Output:    Intake/Output Summary (Last 24 hours) at 2023 1821  Last data filed at 2023 1300  Gross per 24 hour   Intake 700 ml   Output 200 ml   Net 500 ml         Results from last 7 days   Lab Units 23  0535 23  0521 23  0635 23  0353 23  0523 23  0807   WBC 10*3/mm3 12.10* 13.30* 13.66* 16.38* 19.85* 19.37*   HEMOGLOBIN g/dL 10.4* 11.0* 10.9* 10.7* 11.2* 12.2*   PLATELETS 10*3/mm3 182 186 178 174 189 177     Results from last 7 days   Lab Units 23  1538 23  0535 23  0521 23  1951 23  0635 23  0353 23  1140 23  0523 23  0807   SODIUM mmol/L  --  138 140  --  141 142  --  140 140   POTASSIUM " mmol/L 3.7 3.7 3.7 3.8 3.2* 3.6 3.6 3.6 3.9   CHLORIDE mmol/L  --  103 105  --  104 102  --  101 102   CO2 mmol/L  --  23.1 24.0  --  28.0 27.8  --  26.5 28.0   BUN mg/dL  --  32* 33*  --  37* 34*  --  29* 28*   CREATININE mg/dL  --  1.62* 1.65*  --  1.62* 1.82*  --  1.44* 1.40*   GLUCOSE mg/dL  --  94 102*  --  104* 103*  --  123* 112*   Estimated Creatinine Clearance: 35.6 mL/min (A) (by C-G formula based on SCr of 1.62 mg/dL (H)).  Results from last 7 days   Lab Units 06/05/23  1538 06/05/23  0535 06/04/23  0521 06/03/23  0946 06/03/23  0635 06/02/23  0353 06/01/23  1140 06/01/23  0523 05/31/23  0807   CALCIUM mg/dL  --  9.2 8.9  --  9.4 9.2  --  9.1 9.1   ALBUMIN g/dL  --   --  2.9*  --  2.5* 2.9*  --  3.1* 3.7   MAGNESIUM mg/dL 2.6*  --  2.6* 2.5* 2.5* 2.3 2.7*  --   --      Results from last 7 days   Lab Units 06/04/23  0521 06/03/23  0635 06/02/23  0353 06/01/23  0523 05/31/23  0807   ALBUMIN g/dL 2.9* 2.5* 2.9* 3.1* 3.7   BILIRUBIN mg/dL 0.5 0.5 0.6 0.8 0.7   ALK PHOS U/L 221* 184* 176* 184* 207*   AST (SGOT) U/L 53* 38 34 31 36   ALT (SGPT) U/L 21 19 18 17 19       Assessment:    Pneumonia due to infectious organism, unspecified laterality, unspecified part of lung    Essential hypertension    Stage 3a chronic kidney disease    Elevated troponin    Altered mental status, unspecified altered mental status type    Chronic diastolic CHF (congestive heart failure)    History of CVA (cerebrovascular accident)    Acute respiratory failure with hypoxia    Coronary artery disease without angina pectoris    88yo gentleman who presented to the hospital from SNF for chest pain as well as cough. He was noted to be 88-89% on RA. He has been admitted at this facility twice in the last month related to recent CVA (felt to be due to cardioembolic source) as well as CHF/type II NSTEMI. Not surprisingly he has had some residual confusion during this time. In ER he was found to have left sided PNA.     Left pleural effusion,  pneumonia, atelectasis: Pulmonary input greatly appreciated.  Scheduled for thoracentesis tomorrow.  Acute respiratory failure: Please see above.  Hypoxemia persists.  Dysphagia: Mild and on a regular diet.  Speech therapy input appreciated.  Altered mental status: Appreciate Dr. Giles's attention to pt. He feels this is metabolic encephalopathy in setting of what is probably some mild vascular dementia present on admission. He has decreased Requip and Klonopin doses and added Zyprexa.  Suspect he is presently at his baseline.  Recent CVA with left hemiparesis: Eliquis on hold.  Resume status post thoracentesis.    CAD: Stable.  Chronic diastolic HF: Presently appears euvolemic.  Lasix was held secondary to increasing creatinine.  Look to resume in the next day or 2.    CKD3a: Creatinine presently at 1.6.  Baseline appears to be 1.4.  Monitor closely.    HTN: BPs acceptable. Continue home regimen and monitor.  Neck pain: checked x-ray which was fine--just showed degenerative changes, Lidoderm patch and APAP effective  Hypokalemia: replace with protocol, check Mg++ level    Plan:  Please see above.  Discussed with patient and wife at bedside.    Srini Paez MD  6/6/2023  18:21 EDT

## 2023-06-07 ENCOUNTER — APPOINTMENT (OUTPATIENT)
Dept: ULTRASOUND IMAGING | Facility: HOSPITAL | Age: 88
DRG: 193 | End: 2023-06-07
Payer: MEDICARE

## 2023-06-07 ENCOUNTER — APPOINTMENT (OUTPATIENT)
Dept: GENERAL RADIOLOGY | Facility: HOSPITAL | Age: 88
DRG: 193 | End: 2023-06-07
Payer: MEDICARE

## 2023-06-07 LAB
ANION GAP SERPL CALCULATED.3IONS-SCNC: 9.9 MMOL/L (ref 5–15)
APPEARANCE FLD: ABNORMAL
BASOPHILS # BLD AUTO: 0.07 10*3/MM3 (ref 0–0.2)
BASOPHILS NFR BLD AUTO: 0.5 % (ref 0–1.5)
BUN SERPL-MCNC: 28 MG/DL (ref 8–23)
BUN/CREAT SERPL: 20.7 (ref 7–25)
CALCIUM SPEC-SCNC: 9.2 MG/DL (ref 8.6–10.5)
CHLORIDE SERPL-SCNC: 106 MMOL/L (ref 98–107)
CO2 SERPL-SCNC: 25.1 MMOL/L (ref 22–29)
COLOR FLD: ABNORMAL
CREAT SERPL-MCNC: 1.35 MG/DL (ref 0.76–1.27)
DEPRECATED RDW RBC AUTO: 41.5 FL (ref 37–54)
EGFRCR SERPLBLD CKD-EPI 2021: 51.1 ML/MIN/1.73
EOSINOPHIL # BLD AUTO: 0.3 10*3/MM3 (ref 0–0.4)
EOSINOPHIL NFR BLD AUTO: 2.3 % (ref 0.3–6.2)
EOSINOPHIL NFR FLD MANUAL: 1 %
ERYTHROCYTE [DISTWIDTH] IN BLOOD BY AUTOMATED COUNT: 12.8 % (ref 12.3–15.4)
GLUCOSE FLD-MCNC: 116 MG/DL
GLUCOSE SERPL-MCNC: 95 MG/DL (ref 65–99)
HCT VFR BLD AUTO: 30.8 % (ref 37.5–51)
HGB BLD-MCNC: 10.2 G/DL (ref 13–17.7)
IMM GRANULOCYTES # BLD AUTO: 0.08 10*3/MM3 (ref 0–0.05)
IMM GRANULOCYTES NFR BLD AUTO: 0.6 % (ref 0–0.5)
LDH FLD-CCNC: 468 U/L
LYMPHOCYTES # BLD AUTO: 1.13 10*3/MM3 (ref 0.7–3.1)
LYMPHOCYTES NFR BLD AUTO: 8.6 % (ref 19.6–45.3)
LYMPHOCYTES NFR FLD MANUAL: 22 %
MCH RBC QN AUTO: 29.4 PG (ref 26.6–33)
MCHC RBC AUTO-ENTMCNC: 33.1 G/DL (ref 31.5–35.7)
MCV RBC AUTO: 88.8 FL (ref 79–97)
METHOD: ABNORMAL
MONOCYTES # BLD AUTO: 1.23 10*3/MM3 (ref 0.1–0.9)
MONOCYTES NFR BLD AUTO: 9.4 % (ref 5–12)
MONOCYTES NFR FLD: 11 %
MONOS+MACROS NFR FLD: 60 %
NEUTROPHILS NFR BLD AUTO: 10.26 10*3/MM3 (ref 1.7–7)
NEUTROPHILS NFR BLD AUTO: 78.6 % (ref 42.7–76)
NEUTROPHILS NFR FLD MANUAL: 6 %
NRBC BLD AUTO-RTO: 0 /100 WBC (ref 0–0.2)
NUC CELL # FLD: 2620 /MM3
PLATELET # BLD AUTO: 195 10*3/MM3 (ref 140–450)
PMV BLD AUTO: 9.8 FL (ref 6–12)
POTASSIUM SERPL-SCNC: 3.8 MMOL/L (ref 3.5–5.2)
PROT FLD-MCNC: 3.2 G/DL
RBC # BLD AUTO: 3.47 10*6/MM3 (ref 4.14–5.8)
RBC # FLD AUTO: ABNORMAL /MM3
SODIUM SERPL-SCNC: 141 MMOL/L (ref 136–145)
WBC NRBC COR # BLD: 13.07 10*3/MM3 (ref 3.4–10.8)

## 2023-06-07 PROCEDURE — 87206 SMEAR FLUORESCENT/ACID STAI: CPT | Performed by: INTERNAL MEDICINE

## 2023-06-07 PROCEDURE — 85025 COMPLETE CBC W/AUTO DIFF WBC: CPT | Performed by: HOSPITALIST

## 2023-06-07 PROCEDURE — 80048 BASIC METABOLIC PNL TOTAL CA: CPT | Performed by: HOSPITALIST

## 2023-06-07 PROCEDURE — 87070 CULTURE OTHR SPECIMN AEROBIC: CPT | Performed by: INTERNAL MEDICINE

## 2023-06-07 PROCEDURE — 94799 UNLISTED PULMONARY SVC/PX: CPT

## 2023-06-07 PROCEDURE — 0 LIDOCAINE 1 % SOLUTION: Performed by: RADIOLOGY

## 2023-06-07 PROCEDURE — 97530 THERAPEUTIC ACTIVITIES: CPT

## 2023-06-07 PROCEDURE — 25010000002 PIPERACILLIN SOD-TAZOBACTAM PER 1 G: Performed by: INTERNAL MEDICINE

## 2023-06-07 PROCEDURE — 88112 CYTOPATH CELL ENHANCE TECH: CPT | Performed by: INTERNAL MEDICINE

## 2023-06-07 PROCEDURE — 94761 N-INVAS EAR/PLS OXIMETRY MLT: CPT

## 2023-06-07 PROCEDURE — 76942 ECHO GUIDE FOR BIOPSY: CPT

## 2023-06-07 PROCEDURE — 83615 LACTATE (LD) (LDH) ENZYME: CPT | Performed by: INTERNAL MEDICINE

## 2023-06-07 PROCEDURE — 25010000002 MORPHINE PER 10 MG: Performed by: RADIOLOGY

## 2023-06-07 PROCEDURE — 87116 MYCOBACTERIA CULTURE: CPT | Performed by: INTERNAL MEDICINE

## 2023-06-07 PROCEDURE — 87075 CULTR BACTERIA EXCEPT BLOOD: CPT | Performed by: INTERNAL MEDICINE

## 2023-06-07 PROCEDURE — 84157 ASSAY OF PROTEIN OTHER: CPT | Performed by: INTERNAL MEDICINE

## 2023-06-07 PROCEDURE — 87102 FUNGUS ISOLATION CULTURE: CPT | Performed by: INTERNAL MEDICINE

## 2023-06-07 PROCEDURE — 88305 TISSUE EXAM BY PATHOLOGIST: CPT | Performed by: INTERNAL MEDICINE

## 2023-06-07 PROCEDURE — 94664 DEMO&/EVAL PT USE INHALER: CPT

## 2023-06-07 PROCEDURE — 82945 GLUCOSE OTHER FLUID: CPT | Performed by: INTERNAL MEDICINE

## 2023-06-07 PROCEDURE — 71046 X-RAY EXAM CHEST 2 VIEWS: CPT

## 2023-06-07 PROCEDURE — 89051 BODY FLUID CELL COUNT: CPT | Performed by: INTERNAL MEDICINE

## 2023-06-07 PROCEDURE — 87205 SMEAR GRAM STAIN: CPT | Performed by: INTERNAL MEDICINE

## 2023-06-07 PROCEDURE — 97116 GAIT TRAINING THERAPY: CPT

## 2023-06-07 RX ORDER — LIDOCAINE HYDROCHLORIDE 10 MG/ML
10 INJECTION, SOLUTION INFILTRATION; PERINEURAL ONCE
Status: COMPLETED | OUTPATIENT
Start: 2023-06-07 | End: 2023-06-07

## 2023-06-07 RX ORDER — MORPHINE SULFATE 2 MG/ML
2 INJECTION, SOLUTION INTRAMUSCULAR; INTRAVENOUS ONCE
Status: COMPLETED | OUTPATIENT
Start: 2023-06-07 | End: 2023-06-07

## 2023-06-07 RX ADMIN — ROSUVASTATIN CALCIUM 40 MG: 40 TABLET, FILM COATED ORAL at 20:34

## 2023-06-07 RX ADMIN — METOPROLOL SUCCINATE 50 MG: 25 TABLET, EXTENDED RELEASE ORAL at 20:34

## 2023-06-07 RX ADMIN — Medication 10 ML: at 20:35

## 2023-06-07 RX ADMIN — ROPINIROLE HYDROCHLORIDE 0.5 MG: 0.5 TABLET, FILM COATED ORAL at 20:33

## 2023-06-07 RX ADMIN — APIXABAN 2.5 MG: 2.5 TABLET, FILM COATED ORAL at 20:34

## 2023-06-07 RX ADMIN — IPRATROPIUM BROMIDE AND ALBUTEROL SULFATE 3 ML: 2.5; .5 SOLUTION RESPIRATORY (INHALATION) at 19:50

## 2023-06-07 RX ADMIN — OLANZAPINE 2.5 MG: 2.5 TABLET ORAL at 18:10

## 2023-06-07 RX ADMIN — LIDOCAINE HYDROCHLORIDE 5 ML: 10 INJECTION, SOLUTION INFILTRATION; PERINEURAL at 10:05

## 2023-06-07 RX ADMIN — IPRATROPIUM BROMIDE AND ALBUTEROL SULFATE 3 ML: 2.5; .5 SOLUTION RESPIRATORY (INHALATION) at 11:41

## 2023-06-07 RX ADMIN — LIDOCAINE 1 PATCH: 50 PATCH CUTANEOUS at 08:45

## 2023-06-07 RX ADMIN — Medication 1000 UNITS: at 08:44

## 2023-06-07 RX ADMIN — Medication 10 ML: at 08:45

## 2023-06-07 RX ADMIN — TAZOBACTAM SODIUM AND PIPERACILLIN SODIUM 3.38 G: 375; 3 INJECTION, SOLUTION INTRAVENOUS at 08:44

## 2023-06-07 RX ADMIN — MORPHINE SULFATE 2 MG: 2 INJECTION, SOLUTION INTRAMUSCULAR; INTRAVENOUS at 10:43

## 2023-06-07 RX ADMIN — PANTOPRAZOLE SODIUM 40 MG: 40 TABLET, DELAYED RELEASE ORAL at 08:44

## 2023-06-07 RX ADMIN — METOPROLOL SUCCINATE 50 MG: 25 TABLET, EXTENDED RELEASE ORAL at 08:44

## 2023-06-07 RX ADMIN — GUAIFENESIN 600 MG: 600 TABLET, EXTENDED RELEASE ORAL at 20:33

## 2023-06-07 RX ADMIN — TERAZOSIN HYDROCHLORIDE 10 MG: 5 CAPSULE ORAL at 20:34

## 2023-06-07 RX ADMIN — IPRATROPIUM BROMIDE AND ALBUTEROL SULFATE 3 ML: 2.5; .5 SOLUTION RESPIRATORY (INHALATION) at 08:08

## 2023-06-07 RX ADMIN — IPRATROPIUM BROMIDE AND ALBUTEROL SULFATE 3 ML: 2.5; .5 SOLUTION RESPIRATORY (INHALATION) at 15:55

## 2023-06-07 RX ADMIN — ACETAMINOPHEN 650 MG: 325 TABLET, FILM COATED ORAL at 10:34

## 2023-06-07 RX ADMIN — TAZOBACTAM SODIUM AND PIPERACILLIN SODIUM 3.38 G: 375; 3 INJECTION, SOLUTION INTRAVENOUS at 16:53

## 2023-06-07 RX ADMIN — Medication 4 ML: at 08:08

## 2023-06-07 RX ADMIN — Medication 4 ML: at 19:52

## 2023-06-07 RX ADMIN — GUAIFENESIN 600 MG: 600 TABLET, EXTENDED RELEASE ORAL at 08:44

## 2023-06-07 NOTE — PLAN OF CARE
Goal Outcome Evaluation:             Problem: Adult Inpatient Plan of Care  Goal: Plan of Care Review  Outcome: Ongoing, Progressing  Flowsheets (Taken 6/7/2023 6873)  Progress: improving  Outcome Evaluation: Vitals stable. Thoracentesis completed today. Pt maintaining O2 sat on 3L NC. Pt worked w PT today. Eliquis to resume this evening. Pt needs met and questions answered. Will continue to monitor.

## 2023-06-07 NOTE — PLAN OF CARE
Goal Outcome Evaluation:  Plan of Care Reviewed With: patient        Progress: no change  Outcome Evaluation: pt seen for PT this PM. reports he is feeling better following thoracentesis this morning, however feels tired this afternoon. Fatigue noted with decrease ambulation distance tolerated. 10' x 2 with walker and min A from therapist with tactile and verbal cues for technique. Pt with intermittent R lean - assist to w/s to L side. on 4.5L throughout, O2 sats 89% and above. He continues to benefit from skilled PT to address balance, endurance, strengthe. recommend SNU at d/c.

## 2023-06-07 NOTE — PROGRESS NOTES
"  PROGRESS NOTE  Patient Name: Art Mullins  Age/Sex: 87 y.o. male  : 1935  MRN: [id number]    Date of Admission: 2023  Date of Encounter Visit: 23   LOS: 7 days   Patient Care Team:  Santos Simmons MD as PCP - General (Family Medicine)  Aashish Thapa MD as Consulting Physician (Cardiology)    Chief Complaint:SOA, effusion     Hospital course: dyspnea and pneumonia with dysphagia risks.CXR looked more like effusion and CT confirmed the presence of moderate to large effusion with secondary atelectasis.  Patient had thoracentesis today, 600 cc of fluid removed looks exhilarated but is not empyema  Patient had some chest discomfort at the end of the procedure that has resolved by the time of my bedside assessment         REVIEW OF SYSTEMS:   CONSTITUTIONAL: no fever or chills  CARDIOVASCULAR: No chest pain, chest pressure or chest discomfort. No palpitations or edema.   RESPIRATORY:  shortness of breath, No cough or sputum.   GASTROINTESTINAL: No anorexia, nausea, vomiting or diarrhea. No abdominal pain or blood.   HEMATOLOGIC: No bleeding or bruising.     Ventilator/Non-Invasive Ventilation Settings (From admission, onward)      Nasal o2 4 lpm down from 6              Vital Signs  Temp:  [98.1 °F (36.7 °C)-98.6 °F (37 °C)] 98.4 °F (36.9 °C)  Heart Rate:  [] 92  Resp:  [16-20] 16  BP: (116-145)/(60-68) 127/67  SpO2:  [90 %-94 %] 94 %  on  Flow (L/min):  [3-5] 4 Device (Oxygen Therapy): nasal cannula    Intake/Output Summary (Last 24 hours) at 2023 1252  Last data filed at 2023 1030  Gross per 24 hour   Intake 480 ml   Output 600 ml   Net -120 ml       Flowsheet Rows      Flowsheet Row First Filed Value   Admission Height 175.3 cm (69\") Documented at 2023 0818   Admission Weight 83.9 kg (185 lb) Documented at 2023 0818          Body mass index is 26.05 kg/m².      23  0517 23  0509 23  0500   Weight: 74.5 kg (164 lb 3.9 oz) 76.9 kg (169 " lb 8.5 oz) 80 kg (176 lb 5.9 oz)       Physical Exam:  GEN:  No acute distress, alert, cooperative, well developed   EYES:   Sclerae clear. No icterus. PERRL. Normal EOM  ENT:   External ears/nose normal, no oral lesions, no thrush, mucous membranes moist  NECK:  Supple, midline trachea, no JVD  LUNGS: Normal chest on inspection, still diminished on the left side with minimal audible crackles.  No wheezes, overall diminished breath sounds  CV:  Regular rhythm and rate. Normal S1/S2. No murmurs, gallops, or rubs noted.  ABD:  Soft, nontender and nondistended. Normal bowel sounds. No guarding  EXT:  Moves all extremities well. No cyanosis. No redness. No edema.   Skin: Dry, intact, no bleeding    Results Review:    Results From Last 14 Days   Lab Units 05/31/23  0847   LACTATE mmol/L 1.2        Latest Reference Range & Units Most Recent   Color, Fluid  Red  6/7/23 10:06   Appearance, Fluid Clear  Cloudy !  6/7/23 10:06   RBC, Fluid /mm3 70,000  6/7/23 10:06   Neutrophils, Fluid % 6  6/7/23 10:06   Lymphocytes, Fluid % 22  6/7/23 10:06   Monocytes, Fluid % 11  6/7/23 10:06   Mononuclear, Fluid % 60  6/7/23 10:06   Eosinophils, Fluid % 1  6/7/23 10:06   Nucleated Cells, Fluid /mm3 2,620  6/7/23 10:06   Glucose, Fluid mg/dL 116  6/7/23 10:06   LD, Fluid U/L 468  6/7/23 10:06   Protein, Total, Fluid g/dL 3.2  6/7/23 10:06   !: Data is abnormal  Results from last 7 days   Lab Units 06/07/23  0339 06/05/23  1538 06/05/23  0535 06/04/23  0521 06/03/23  1951 06/03/23  0635 06/02/23  0353 06/01/23  1140 06/01/23  0523   SODIUM mmol/L 141  --  138 140  --  141 142  --  140   POTASSIUM mmol/L 3.8 3.7 3.7 3.7 3.8 3.2* 3.6   < > 3.6   CHLORIDE mmol/L 106  --  103 105  --  104 102  --  101   CO2 mmol/L 25.1  --  23.1 24.0  --  28.0 27.8  --  26.5   BUN mg/dL 28*  --  32* 33*  --  37* 34*  --  29*   CREATININE mg/dL 1.35*  --  1.62* 1.65*  --  1.62* 1.82*  --  1.44*   CALCIUM mg/dL 9.2  --  9.2 8.9  --  9.4 9.2  --  9.1   AST (SGOT)  U/L  --   --   --  53*  --  38 34  --  31   ALT (SGPT) U/L  --   --   --  21  --  19 18  --  17   ANION GAP mmol/L 9.9  --  11.9 11.0  --  9.0 12.2  --  12.5   ALBUMIN g/dL  --   --   --  2.9*  --  2.5* 2.9*  --  3.1*    < > = values in this interval not displayed.       Results from last 7 days   Lab Units 06/03/23 0635 06/02/23  1003   HSTROP T ng/L 106* 133*                 Results from last 7 days   Lab Units 06/07/23  0339 06/05/23 0535 06/04/23 0521 06/03/23 0635 06/02/23 0353 06/01/23  0523   WBC 10*3/mm3 13.07* 12.10* 13.30* 13.66* 16.38* 19.85*   HEMOGLOBIN g/dL 10.2* 10.4* 11.0* 10.9* 10.7* 11.2*   HEMATOCRIT % 30.8* 31.9* 32.8* 31.9* 31.3* 33.0*   PLATELETS 10*3/mm3 195 182 186 178 174 189   MCV fL 88.8 90.1 88.6 89.1 90.2 90.4   NEUTROPHIL % % 78.6* 76.6*  --  79.7*  --   --    LYMPHOCYTE % % 8.6* 9.1*  --  7.2*  --   --    MONOCYTES % % 9.4 10.2  --  9.3  --   --    EOSINOPHIL % % 2.3 2.7  --  2.5  --   --    BASOPHIL % % 0.5 0.7  --  0.6  --   --    IMM GRAN % % 0.6* 0.7*  --  0.7*  --   --        Results from last 7 days   Lab Units 05/31/23  1546   INR  1.42*       Results from last 7 days   Lab Units 06/05/23  1538 06/04/23  0521 06/03/23  0946 06/03/23  0635 06/02/23  0353 06/01/23  1140   MAGNESIUM mg/dL 2.6* 2.6* 2.5* 2.5* 2.3 2.7*             Invalid input(s): LDLCALC  Results from last 7 days   Lab Units 06/01/23  1013   PH, ARTERIAL pH units 7.486*   PCO2, ARTERIAL mm Hg 36.5   PO2 ART mm Hg 69.6*   HCO3 ART mmol/L 27.6           No results found for: POCGLU  Results from last 7 days   Lab Units 06/05/23  0535   PROCALCITONIN ng/mL 0.28*       Results from last 7 days   Lab Units 05/31/23  2107   STREP PNEUMO AG  Negative   L. PNEUMOPHILA SEROGP 1 UR AG  Negative       Results from last 7 days   Lab Units 05/31/23  2107   NITRITE UA  Negative   WBC UA /HPF 0-2   BACTERIA UA /HPF None Seen   SQUAM EPITHEL UA /HPF 0-2                     Echocardiogram 5/18/2023:      There is severe  hypokinesis of the basal to mid inferolateral wall. The remaining wall segments are hyperdynamic and the ejection fraction is preserved    Left ventricular systolic function is normal. Left ventricular ejection fraction appears to be 56 - 60%.    Left ventricular wall thickness is consistent with mild concentric hypertrophy.    Normal right ventricular cavity size and systolic function noted.    Mild to moderate aortic valve regurgitation is present.    Mild to moderate mitral valve regurgitation is present.    Mild tricuspid valve regurgitation is present    Calculated right ventricular systolic pressure from tricuspid regurgitation is 70 mmHg.    There is no evidence of pericardial effusion.  Imaging:   Imaging Results (All)       Procedure Component Value Units Date/Time                I reviewed the patient's new clinical results.  I personally viewed and interpreted the patient's imaging results:        Medication Review:   cholecalciferol, 1,000 Units, Oral, Daily  guaiFENesin, 600 mg, Oral, Q12H  ipratropium-albuterol, 3 mL, Nebulization, 4x Daily - RT  lidocaine, 1 patch, Transdermal, Q24H  metoprolol succinate XL, 50 mg, Oral, Q12H  OLANZapine, 2.5 mg, Oral, Daily With Dinner  pantoprazole, 40 mg, Oral, Daily  piperacillin-tazobactam, 3.375 g, Intravenous, Q8H  rOPINIRole, 0.5 mg, Oral, Nightly  rosuvastatin, 40 mg, Oral, Nightly  senna-docusate sodium, 2 tablet, Oral, BID  sodium chloride, 10 mL, Intravenous, Q12H  sodium chloride, 4 mL, Nebulization, BID - RT  terazosin, 10 mg, Oral, Nightly        ASSESSMENT:   Left lower lobe pneumonia with risk for aspiration, VERSUS PASSIVE ATELECTASIS   Left-sided pleural effusion , moderate to large  Congestive heart failure chronic diastolic with possible acute on chronic  History of CVA  Severe pulmonary hypertension  Hypoxemia  Coronary artery disease  Chronic kidney disease stage III  Metabolic encephalopathy    PLAN:  Pleural fluid preliminary analysis are  positive for exudative effusion but not empyema, there is minimal white blood cells and no neutrophils predominance.  This can still be parapneumonic and I am sure there are some elements of congestive heart failure as well  Repeat chest x-ray showed decrease in the level of the effusion postthoracentesis, meanwhile patient is encouraged to work on the deep breathing exercises, need to consider trapped lung if the fluid reaccumulate in a short interval.  Finish current antibiotic regimen for aspiration pneumonia coverage, his white count is still borderline at 13, will assess daily if we can transition to oral  Discussed with the patient in details today, he is coherent and cognitively intact and he prefers to tell the family himself and asked me not to call them today.       discussed with patient, labs reviewed    Disposition: per primary team     Grant Azevedo MD  06/07/23  12:52 EDT           Dictated utilizing Dragon dictation

## 2023-06-07 NOTE — PROGRESS NOTES
"DAILY PROGRESS NOTE  [facility]    Patient Identification:  Name: Art Mullins  Age: 87 y.o.  Sex: male  :  1935  MRN: [id number]         Primary Care Physician: Santos Simmons MD      Subjective  Presently with no acute complaints.  Had noted left shoulder pain after the thoracentesis but that has resolved.  States he feels like he is breathing about the same.    Objective:  General Appearance:  Comfortable, well-appearing, in no acute distress and not in pain.    Vital signs: (most recent): Blood pressure 122/60, pulse 79, temperature 97.9 °F (36.6 °C), temperature source Oral, resp. rate 18, height 175.3 cm (69\"), weight 80 kg (176 lb 5.9 oz), SpO2 93 %.    Lungs:  Normal effort and normal respiratory rate.  Breath sounds clear to auscultation.    Heart: Normal rate.  Regular rhythm.    Extremities: There is no dependent edema.    Neurological: Patient is alert and oriented to person, place and time.    Skin:  Warm and dry.                Vital signs in last 24 hours:  Temp:  [97.9 °F (36.6 °C)-98.6 °F (37 °C)] 97.9 °F (36.6 °C)  Heart Rate:  [] 79  Resp:  [16-20] 18  BP: (116-145)/(60-68) 122/60    Intake/Output:    Intake/Output Summary (Last 24 hours) at 2023 1703  Last data filed at 2023 1030  Gross per 24 hour   Intake 0 ml   Output 600 ml   Net -600 ml         Results from last 7 days   Lab Units 23  0339 23  0535 23  0521 23  0635 23  0353 23  0523   WBC 10*3/mm3 13.07* 12.10* 13.30* 13.66* 16.38* 19.85*   HEMOGLOBIN g/dL 10.2* 10.4* 11.0* 10.9* 10.7* 11.2*   PLATELETS 10*3/mm3 195 182 186 178 174 189     Results from last 7 days   Lab Units 23  0339 23  1538 23  0535 23  0521 23  1951 23  0635 23  0353 23  1140 23   SODIUM mmol/L 141  --  138 140  --  141 142  --  140   POTASSIUM mmol/L 3.8 3.7 3.7 3.7 3.8 3.2* 3.6   < > 3.6   CHLORIDE mmol/L 106  --  103 105  -- "  104 102  --  101   CO2 mmol/L 25.1  --  23.1 24.0  --  28.0 27.8  --  26.5   BUN mg/dL 28*  --  32* 33*  --  37* 34*  --  29*   CREATININE mg/dL 1.35*  --  1.62* 1.65*  --  1.62* 1.82*  --  1.44*   GLUCOSE mg/dL 95  --  94 102*  --  104* 103*  --  123*    < > = values in this interval not displayed.   Estimated Creatinine Clearance: 44.4 mL/min (A) (by C-G formula based on SCr of 1.35 mg/dL (H)).  Results from last 7 days   Lab Units 06/07/23  0339 06/05/23  1538 06/05/23  0535 06/04/23  0521 06/03/23  0946 06/03/23  0635 06/02/23  0353 06/01/23  1140 06/01/23  0523   CALCIUM mg/dL 9.2  --  9.2 8.9  --  9.4 9.2  --  9.1   ALBUMIN g/dL  --   --   --  2.9*  --  2.5* 2.9*  --  3.1*   MAGNESIUM mg/dL  --  2.6*  --  2.6* 2.5* 2.5* 2.3 2.7*  --      Results from last 7 days   Lab Units 06/04/23  0521 06/03/23  0635 06/02/23  0353 06/01/23  0523   ALBUMIN g/dL 2.9* 2.5* 2.9* 3.1*   BILIRUBIN mg/dL 0.5 0.5 0.6 0.8   ALK PHOS U/L 221* 184* 176* 184*   AST (SGOT) U/L 53* 38 34 31   ALT (SGPT) U/L 21 19 18 17       Assessment:    Pneumonia due to infectious organism, unspecified laterality, unspecified part of lung    Essential hypertension    Stage 3a chronic kidney disease    Elevated troponin    Altered mental status, unspecified altered mental status type    Chronic diastolic CHF (congestive heart failure)    History of CVA (cerebrovascular accident)    Acute respiratory failure with hypoxia    Coronary artery disease without angina pectoris    86yo gentleman who presented to the hospital from SNF for chest pain as well as cough. He was noted to be 88-89% on RA. He has been admitted at this facility twice in the last month related to recent CVA (felt to be due to cardioembolic source) as well as CHF/type II NSTEMI. Not surprisingly he has had some residual confusion during this time. In ER he was found to have left sided PNA.     Left pleural effusion, pneumonia, atelectasis: Pulmonary input greatly appreciated.  Status  post thoracentesis.  70,000 RBCs-possibly associated with Eliquis.  Nucleated cells 2600 but only 6% neutrophils.  Acute respiratory failure: Please see above.  Hypoxemia persists.  Dysphagia: Mild and on a regular diet.  Speech therapy input appreciated.  Altered mental status: Appreciate Dr. Giles's attention to pt. He feels this is metabolic encephalopathy in setting of what is probably some mild vascular dementia present on admission. He has decreased Requip and Klonopin doses and added Zyprexa.  Suspect he is presently at his baseline.  Recent CVA with left hemiparesis: Eliquis held for thoracentesis.  We will resume today.  CAD: Stable.  Chronic diastolic HF: Presently appears euvolemic.  Lasix was held secondary to increasing creatinine.  Creatinine improved today.  Monitor fluid status closely and resume Lasix as needed.    CKD3a: Creatinine increased.  Now back to baseline..  Baseline appears to be 1.4.  Monitor closely.    HTN: BPs acceptable. Continue home regimen and monitor.  Neck pain: checked x-ray which was fine--just showed degenerative changes, Lidoderm patch and APAP effective  Hypokalemia: replace with protocol, check Mg++ level    Plan:  Please see above.  Discussed with patient.    Srini Paez MD  6/7/2023  17:03 EDT

## 2023-06-07 NOTE — THERAPY TREATMENT NOTE
Patient Name: Art Mullins  : 1935    MRN: [id number]                              Today's Date: 2023       Admit Date: 2023    Visit Dx:     ICD-10-CM ICD-9-CM   1. Pneumonia due to infectious organism, unspecified laterality, unspecified part of lung  J18.9 486   2. Hypoxia  R09.02 799.02   3. Leukocytosis, unspecified type  D72.829 288.60   4. History of congestive heart failure  Z86.79 V12.59   5. History of stroke  Z86.73 V12.54   6. Chronic renal impairment, unspecified CKD stage  N18.9 585.9     Patient Active Problem List   Diagnosis    Essential hypertension    Acute CVA (cerebrovascular accident)    Stage 3a chronic kidney disease    Elevated troponin    Vision changes    Altered mental status, unspecified altered mental status type    Chronic diastolic CHF (congestive heart failure)    Pneumonia due to infectious organism, unspecified laterality, unspecified part of lung    History of CVA (cerebrovascular accident)    Acute respiratory failure with hypoxia    Coronary artery disease without angina pectoris     Past Medical History:   Diagnosis Date    Hyperlipidemia     Hypertension     PVCs (premature ventricular contractions)      Past Surgical History:   Procedure Laterality Date    CARDIAC CATHETERIZATION N/A 2023    Procedure: Left Heart Cath;  Surgeon: Ricardo Arrieta MD;  Location:  [facility] CATH INVASIVE LOCATION;  Service: Cardiology;  Laterality: N/A;    CARDIAC CATHETERIZATION N/A 2023    Procedure: Coronary angiography;  Surgeon: Ricardo Arrieta MD;  [facility] C[facility];  Service: Cardiology;  Laterality: N/A;      General Information       Row Name 23 1412          Physical Therapy Time and Intention    Document Type therapy note (daily note)  -ST     Mode of Treatment physical therapy  -ST       Row Name 23 1412          General Information    Patient Profile Reviewed yes  -ST     Existing Precautions/Restrictions fall;oxygen therapy device  and L/min  -ST       Row Name 06/07/23 1412          Cognition    Orientation Status (Cognition) oriented x 3  -ST       Row Name 06/07/23 1412          Safety Issues, Functional Mobility    Impairments Affecting Function (Mobility) balance;endurance/activity tolerance;strength;pain  -ST     Comment, Safety Issues/Impairments (Mobility) gait belt, nonskid socks donned  -ST               User Key  (r) = Recorded By, (t) = Taken By, (c) = Cosigned By      Initials Name Provider Type    Margret Harris PT Physical Therapist                   Mobility       Row Name 06/07/23 1412          Bed Mobility    Bed Mobility supine-sit  -ST     Supine-Sit [place] (Bed Mobility) minimum assist (75% patient effort)  -ST     Sit-Supine [place] (Bed Mobility) standby assist;contact guard  -ST     Assistive Device (Bed Mobility) bed rails;head of bed elevated  -ST     Comment, (Bed Mobility) CGA with LE back into bed  -ST       Row Name 06/07/23 1412          Sit-Stand Transfer    Sit-Stand [place] (Transfers) contact guard;minimum assist (75% patient effort)  -ST     Assistive Device (Sit-Stand Transfers) walker, front-wheeled  -ST     Comment, (Sit-Stand Transfer) min A first attempt, CGA second and third attempt  -       Row Name 06/07/23 1412          Gait/Stairs (Locomotion)    [place] Level (Gait) minimum assist (75% patient effort)  -ST     Assistive Device (Gait) walker, front-wheeled  -ST     Distance in Feet (Gait) 10' x 2  -ST     Deviations/Abnormal Patterns (Gait) edmar decreased;stride length decreased  -ST     Bilateral Gait Deviations forward flexed posture  -ST     Left Sided Gait Deviations weight shift ability decreased  -ST     Comment, (Gait/Stairs) R lean noted - responds well to tactile cues. VC for bilat foot clearance  -ST               User Key  (r) = Recorded By, (t) = Taken By, (c) = Cosigned By      Initials Name Provider Type    Margret Harris PT Physical  Therapist                   Obj/Interventions       Row Name 06/07/23 1414          Balance    Comment, Balance SBA for sitting balance EOB, CGA with dynamic task of weight shifting and scooting. CGA to min A with static and dynamic balance - min A most needed with increased R lean intermittently  -ST               User Key  (r) = Recorded By, (t) = Taken By, (c) = Cosigned By      Initials Name Provider Type    Margret Harris, PT Physical Therapist                   Goals/Plan    No documentation.                  Clinical Impression       Row Name 06/07/23 1414          Pain    Pretreatment Pain Rating 0/10 - no pain  -ST     Posttreatment Pain Rating 0/10 - no pain  -ST       Row Name 06/07/23 1414          Plan of Care Review    Plan of Care Reviewed With patient  -ST     Progress no change  -ST     Outcome Evaluation pt seen for PT this PM. reports he is feeling better following thoracentesis this morning, however feels tired this afternoon. Fatigue noted with decrease ambulation distance tolerated. 10' x 2 with walker and min A from therapist with tactile and verbal cues for technique. Pt with intermittent R lean - assist to w/s to L side. on 4.5L throughout, O2 sats 89% and above. He continues to benefit from skilled PT to address balance, endurance, strengthe. recommend SNU at d/c.  -       Row Name 06/07/23 1414          Therapy Assessment/Plan (PT)    Rehab Potential (PT) good, to achieve stated therapy goals  -ST     Criteria for Skilled Interventions Met (PT) yes  -ST     Therapy Frequency (PT) 5 times/wk  -       Row Name 06/07/23 1414          Positioning and Restraints    Pre-Treatment Position in bed  -ST     Post Treatment Position bed  -ST     In Bed notified nsg;supine;call light within reach;encouraged to call for assist;exit alarm on  -ST               User Key  (r) = Recorded By, (t) = Taken By, (c) = Cosigned By      Initials Name Provider Type    Margret Harris, PT  Physical Therapist                   Outcome Measures       Row Name 06/07/23 1416          How much help from another person do you currently need...    Turning from your back to your side while in flat bed without using bedrails? 3  -ST     Moving from lying on back to sitting on the side of a flat bed without bedrails? 3  -ST     Moving to and from a bed to a chair (including a wheelchair)? 2  -ST     Standing up from a chair using your arms (e.g., wheelchair, bedside chair)? 3  -ST     Climbing 3-5 steps with a railing? 2  -ST     To walk in hospital room? 3  -ST     AM-PAC 6 Clicks Score (PT) 16  -ST     Highest level of mobility 5 --> Static standing  -ST       Row Name 06/07/23 1416          Functional Assessment    Outcome Measure Options AM-PAC 6 Clicks Basic Mobility (PT)  -ST               User Key  (r) = Recorded By, (t) = Taken By, (c) = Cosigned By      Initials Name Provider Type    Margret Harris PT Physical Therapist                                 Physical Therapy Education       Title: PT OT SLP Therapies (In Progress)       Topic: Physical Therapy (In Progress)       Point: Mobility training (In Progress)       Learning Progress Summary             Patient Acceptance, E,TB, NR by  at 6/7/2023 1416    Acceptance, E,TB,D, VU,NR by  at 6/5/2023 1608    Acceptance, E,TB,D, VU,NR by  at 6/3/2023 1606    Acceptance, E,TB, VU,NR by  at 6/2/2023 1007                         Point: Home exercise program (Done)       Learning Progress Summary             Patient Acceptance, E,TB,D, VU,NR by  at 6/5/2023 1608    Acceptance, E,TB,D, VU,NR by  at 6/3/2023 1606                         Point: Body mechanics (In Progress)       Learning Progress Summary             Patient Acceptance, E,TB, NR by  at 6/7/2023 1416    Acceptance, E,TB,D, VU,NR by  at 6/5/2023 1608    Acceptance, E,TB,D, VU,NR by  at 6/3/2023 1606    Acceptance, E,TB, VU,NR by  at 6/2/2023 1007                          Point: Precautions (Done)       Learning Progress Summary             Patient Acceptance, E,TB,D, VU,NR by  at 6/5/2023 1608    Acceptance, E,TB,D, VU,NR by  at 6/3/2023 1606                                         User Key       Initials Effective Dates Name Provider Type Discipline     03/07/18 -  Georgia Abraham, PTA Physical Therapist Assistant PT     09/22/22 -  Margret Grace PT Physical Therapist PT                  PT Recommendation and Plan     Plan of Care Reviewed With: patient  Progress: no change  Outcome Evaluation: pt seen for PT this PM. reports he is feeling better following thoracentesis this morning, however feels tired this afternoon. Fatigue noted with decrease ambulation distance tolerated. 10' x 2 with walker and min A from therapist with tactile and verbal cues for technique. Pt with intermittent R lean - assist to w/s to L side. on 4.5L throughout, O2 sats 89% and above. He continues to benefit from skilled PT to address balance, endurance, strengthe. recommend SNU at d/c.     Time Calculation:    PT Charges       Row Name 06/07/23 1417             Time Calculation    Start Time 1316  -ST      Stop Time 1340  -ST      Time Calculation (min) 24 min  -ST      PT Received On 06/07/23  -ST      PT - Next Appointment 06/08/23  -ST         Time Calculation- PT    Total Timed Code Minutes- PT 24 minute(s)  -ST         Timed Charges    68071 - Gait Training Minutes  11  -ST      97101 - PT Therapeutic Activity Minutes 13  -ST         Total Minutes    Timed Charges Total Minutes 24  -ST       Total Minutes 24  -ST                User Key  (r) = Recorded By, (t) = Taken By, (c) = Cosigned By      Initials Name Provider Type     Margret Grace PT Physical Therapist                  Therapy Charges for Today       Code Description Service Date Service Provider Modifiers Qty    45604265865 HC GAIT TRAINING EA 15 MIN 6/7/2023 Margret Grace PT GP 1    [id number] HC PT  THERAPEUTIC ACT EA 15 MIN 6/7/2023 Margret Grace, PT GP 1            PT G-Codes  Outcome Measure Options: AM-PAC 6 Clicks Basic Mobility (PT)  AM-PAC 6 Clicks Score (PT): 16  PT Discharge Summary  Anticipated Discharge Disposition (PT): sub acute care setting, skilled nursing facility    Margret Grace, PT  6/7/2023

## 2023-06-07 NOTE — PLAN OF CARE
Goal Outcome Evaluation:           Progress: no change  Outcome Evaluation: Pt is now on 4L humidified sats in the 90's. Pulmonary probably will do a Thoracentesis for today. Pt turned/ repositoned per staff. Pt is forgetful at times. Oriented x4. Very [place]. Rested well between care. CTM, safety maintained.

## 2023-06-08 LAB
ANION GAP SERPL CALCULATED.3IONS-SCNC: 9 MMOL/L (ref 5–15)
BASOPHILS # BLD AUTO: 0.07 10*3/MM3 (ref 0–0.2)
BASOPHILS NFR BLD AUTO: 0.5 % (ref 0–1.5)
BUN SERPL-MCNC: 25 MG/DL (ref 8–23)
BUN/CREAT SERPL: 17 (ref 7–25)
CALCIUM SPEC-SCNC: 8.5 MG/DL (ref 8.6–10.5)
CHLORIDE SERPL-SCNC: 106 MMOL/L (ref 98–107)
CO2 SERPL-SCNC: 23 MMOL/L (ref 22–29)
CREAT SERPL-MCNC: 1.47 MG/DL (ref 0.76–1.27)
CYTO UR: NORMAL
DEPRECATED RDW RBC AUTO: 40.3 FL (ref 37–54)
EGFRCR SERPLBLD CKD-EPI 2021: 46.2 ML/MIN/1.73
EOSINOPHIL # BLD AUTO: 0.21 10*3/MM3 (ref 0–0.4)
EOSINOPHIL NFR BLD AUTO: 1.5 % (ref 0.3–6.2)
ERYTHROCYTE [DISTWIDTH] IN BLOOD BY AUTOMATED COUNT: 12.7 % (ref 12.3–15.4)
GLUCOSE SERPL-MCNC: 120 MG/DL (ref 65–99)
HCT VFR BLD AUTO: 32 % (ref 37.5–51)
HGB BLD-MCNC: 10.8 G/DL (ref 13–17.7)
IMM GRANULOCYTES # BLD AUTO: 0.12 10*3/MM3 (ref 0–0.05)
IMM GRANULOCYTES NFR BLD AUTO: 0.9 % (ref 0–0.5)
LAB AP CASE REPORT: NORMAL
LYMPHOCYTES # BLD AUTO: 1.39 10*3/MM3 (ref 0.7–3.1)
LYMPHOCYTES NFR BLD AUTO: 9.9 % (ref 19.6–45.3)
MCH RBC QN AUTO: 29.5 PG (ref 26.6–33)
MCHC RBC AUTO-ENTMCNC: 33.8 G/DL (ref 31.5–35.7)
MCV RBC AUTO: 87.4 FL (ref 79–97)
MONOCYTES # BLD AUTO: 1.26 10*3/MM3 (ref 0.1–0.9)
MONOCYTES NFR BLD AUTO: 9 % (ref 5–12)
NEUTROPHILS NFR BLD AUTO: 10.94 10*3/MM3 (ref 1.7–7)
NEUTROPHILS NFR BLD AUTO: 78.2 % (ref 42.7–76)
NRBC BLD AUTO-RTO: 0 /100 WBC (ref 0–0.2)
PATH REPORT.FINAL DX SPEC: NORMAL
PATH REPORT.GROSS SPEC: NORMAL
PLATELET # BLD AUTO: 180 10*3/MM3 (ref 140–450)
PMV BLD AUTO: 9.5 FL (ref 6–12)
POTASSIUM SERPL-SCNC: 3.5 MMOL/L (ref 3.5–5.2)
POTASSIUM SERPL-SCNC: 4.7 MMOL/L (ref 3.5–5.2)
RBC # BLD AUTO: 3.66 10*6/MM3 (ref 4.14–5.8)
SODIUM SERPL-SCNC: 138 MMOL/L (ref 136–145)
WBC NRBC COR # BLD: 13.99 10*3/MM3 (ref 3.4–10.8)

## 2023-06-08 PROCEDURE — 94664 DEMO&/EVAL PT USE INHALER: CPT

## 2023-06-08 PROCEDURE — 97530 THERAPEUTIC ACTIVITIES: CPT

## 2023-06-08 PROCEDURE — 85025 COMPLETE CBC W/AUTO DIFF WBC: CPT | Performed by: INTERNAL MEDICINE

## 2023-06-08 PROCEDURE — 94760 N-INVAS EAR/PLS OXIMETRY 1: CPT

## 2023-06-08 PROCEDURE — 94799 UNLISTED PULMONARY SVC/PX: CPT

## 2023-06-08 PROCEDURE — 94761 N-INVAS EAR/PLS OXIMETRY MLT: CPT

## 2023-06-08 PROCEDURE — 84132 ASSAY OF SERUM POTASSIUM: CPT | Performed by: INTERNAL MEDICINE

## 2023-06-08 PROCEDURE — 80048 BASIC METABOLIC PNL TOTAL CA: CPT | Performed by: INTERNAL MEDICINE

## 2023-06-08 RX ORDER — CLONAZEPAM 1 MG/1
1 TABLET ORAL NIGHTLY
Status: COMPLETED | OUTPATIENT
Start: 2023-06-08 | End: 2023-06-13

## 2023-06-08 RX ORDER — POTASSIUM CHLORIDE 750 MG/1
40 TABLET, FILM COATED, EXTENDED RELEASE ORAL EVERY 4 HOURS
Status: COMPLETED | OUTPATIENT
Start: 2023-06-08 | End: 2023-06-08

## 2023-06-08 RX ADMIN — Medication 10 ML: at 21:11

## 2023-06-08 RX ADMIN — LIDOCAINE 1 PATCH: 50 PATCH CUTANEOUS at 08:02

## 2023-06-08 RX ADMIN — IPRATROPIUM BROMIDE AND ALBUTEROL SULFATE 3 ML: 2.5; .5 SOLUTION RESPIRATORY (INHALATION) at 21:18

## 2023-06-08 RX ADMIN — Medication 1000 UNITS: at 08:02

## 2023-06-08 RX ADMIN — IPRATROPIUM BROMIDE AND ALBUTEROL SULFATE 3 ML: 2.5; .5 SOLUTION RESPIRATORY (INHALATION) at 15:00

## 2023-06-08 RX ADMIN — GUAIFENESIN 600 MG: 600 TABLET, EXTENDED RELEASE ORAL at 21:02

## 2023-06-08 RX ADMIN — ROPINIROLE HYDROCHLORIDE 0.5 MG: 0.5 TABLET, FILM COATED ORAL at 21:02

## 2023-06-08 RX ADMIN — APIXABAN 2.5 MG: 2.5 TABLET, FILM COATED ORAL at 08:02

## 2023-06-08 RX ADMIN — ROSUVASTATIN CALCIUM 40 MG: 40 TABLET, FILM COATED ORAL at 21:02

## 2023-06-08 RX ADMIN — Medication 4 ML: at 07:21

## 2023-06-08 RX ADMIN — METOPROLOL SUCCINATE 50 MG: 25 TABLET, EXTENDED RELEASE ORAL at 21:02

## 2023-06-08 RX ADMIN — CLONAZEPAM 1 MG: 1 TABLET ORAL at 21:03

## 2023-06-08 RX ADMIN — APIXABAN 2.5 MG: 2.5 TABLET, FILM COATED ORAL at 21:03

## 2023-06-08 RX ADMIN — PANTOPRAZOLE SODIUM 40 MG: 40 TABLET, DELAYED RELEASE ORAL at 08:02

## 2023-06-08 RX ADMIN — METOPROLOL SUCCINATE 50 MG: 25 TABLET, EXTENDED RELEASE ORAL at 08:02

## 2023-06-08 RX ADMIN — TERAZOSIN HYDROCHLORIDE 10 MG: 5 CAPSULE ORAL at 21:02

## 2023-06-08 RX ADMIN — CLONAZEPAM 1 MG: 1 TABLET ORAL at 03:24

## 2023-06-08 RX ADMIN — IPRATROPIUM BROMIDE AND ALBUTEROL SULFATE 3 ML: 2.5; .5 SOLUTION RESPIRATORY (INHALATION) at 07:20

## 2023-06-08 RX ADMIN — POTASSIUM CHLORIDE 40 MEQ: 10 TABLET, EXTENDED RELEASE ORAL at 09:41

## 2023-06-08 RX ADMIN — IPRATROPIUM BROMIDE AND ALBUTEROL SULFATE 3 ML: 2.5; .5 SOLUTION RESPIRATORY (INHALATION) at 11:16

## 2023-06-08 RX ADMIN — POTASSIUM CHLORIDE 40 MEQ: 10 TABLET, EXTENDED RELEASE ORAL at 13:35

## 2023-06-08 RX ADMIN — Medication 4 ML: at 21:18

## 2023-06-08 RX ADMIN — Medication 10 ML: at 08:02

## 2023-06-08 RX ADMIN — OLANZAPINE 2.5 MG: 2.5 TABLET ORAL at 17:36

## 2023-06-08 RX ADMIN — GUAIFENESIN 600 MG: 600 TABLET, EXTENDED RELEASE ORAL at 08:02

## 2023-06-08 NOTE — CASE MANAGEMENT/SOCIAL WORK
Continued Stay Note  [facility]     Patient Name: Art Mullins  MRN: [id number]  Today's Date: 6/8/2023    Admit Date: 5/31/2023    Plan: Return to [facility].  Per [facility] can accept tomorrow if D/C. [employer] [facility] Van setup for tomorrow at 1500 for poss D/C. Discussed with Wife.   Discharge Plan       Row Name 06/08/23 1632       Plan    Plan Return to [facility].  Per [facility] can accept tomorrow if D/C. [employer] [facility] Van setup for tomorrow at 1500 for poss D/C. Discussed with Wife.    Patient/Family in Agreement with Plan yes    Plan Comments Per pulmonary not, may D/C tomorrow. Called and spoke to [facility] and [facility] can accept tomorrow if D/C. Setup [employer] van for transport tomorrow 6/9 at 1500. Discussed possible D/C tomorrow and plan with wife at bedside. CXR and CBC in AM. O2@2LNC. Malik Kaur RN-BC                   Discharge Codes    No documentation.                 Expected Discharge Date and Time       Expected Discharge Date Expected Discharge Time    Jun 9, 2023               Malik Kaur RN

## 2023-06-08 NOTE — PLAN OF CARE
Goal Outcome Evaluation:  Plan of Care Reviewed With: patient          Problem: Adult Inpatient Plan of Care  Goal: Plan of Care Review  Outcome: Ongoing, Progressing  Flowsheets (Taken 6/8/2023 1600)  Progress: improving  Plan of Care Reviewed With: patient  Outcome Evaluation: Vitals stable. Pt maintaining O2 sat on 2L NC. Pt worked w OT and PT today. Up to chair w staff. Pt needs met and questions answered. Will continue to monitor.

## 2023-06-08 NOTE — PROGRESS NOTES
" LOS: 8 days     Name: Art Mullins  Age: 87 y.o.  Sex: male  :  1935  MRN: [id number]         Primary Care Physician: Santos Simmons MD    Subjective   Subjective  Denies any shortness of breath or chest pain.  Currently on 3 L.  No cough or fever.    Objective   Vital Signs  Temp:  [97.6 °F (36.4 °C)-98.9 °F (37.2 °C)] 98.9 °F (37.2 °C)  Heart Rate:  [79-99] 98  Resp:  [16-22] 20  BP: (107-127)/(57-67) 123/58  Body mass index is 26.34 kg/m².    Objective:  General Appearance:  Comfortable, in no acute distress and ill-appearing (To stated age, weak and deconditioned).    Vital signs: (most recent): Blood pressure 123/58, pulse 98, temperature 98.9 °F (37.2 °C), temperature source Oral, resp. rate 20, height 175.3 cm (69\"), weight 80.9 kg (178 lb 5.6 oz), SpO2 92 %.    Lungs:  Normal effort and normal respiratory rate.  There are decreased breath sounds.    Heart: Normal rate.  Regular rhythm.    Abdomen: Abdomen is soft.  Bowel sounds are normal.   There is no abdominal tenderness.     Extremities: There is no dependent edema or local swelling.    Neurological: Patient is alert and oriented to person, place and time.    Skin:  Warm and dry.              Results Review:       I reviewed the patient's new clinical results.    Results from last 7 days   Lab Units 23  0829 23  0339 23  0535 23  0521 23  0635 23  0353   WBC 10*3/mm3 13.99* 13.07* 12.10* 13.30* 13.66* 16.38*   HEMOGLOBIN g/dL 10.8* 10.2* 10.4* 11.0* 10.9* 10.7*   PLATELETS 10*3/mm3 180 195 182 186 178 174     Results from last 7 days   Lab Units 23  0829 23  0339 23  1538 23  0535 23  0521 2335 23  0353   SODIUM mmol/L 138 141  --  138 140  --  141 142   POTASSIUM mmol/L 3.5 3.8 3.7 3.7 3.7 3.8 3.2* 3.6   CHLORIDE mmol/L 106 106  --  103 105  --  104 102   CO2 mmol/L 23.0 25.1  --  23.1 24.0  --  28.0 27.8   BUN mg/dL 25* 28*  --  32* 33*  " --  37* 34*   CREATININE mg/dL 1.47* 1.35*  --  1.62* 1.65*  --  1.62* 1.82*   CALCIUM mg/dL 8.5* 9.2  --  9.2 8.9  --  9.4 9.2   GLUCOSE mg/dL 120* 95  --  94 102*  --  104* 103*                 Scheduled Meds:   apixaban, 2.5 mg, Oral, Q12H  cholecalciferol, 1,000 Units, Oral, Daily  clonazePAM, 1 mg, Oral, Nightly  guaiFENesin, 600 mg, Oral, Q12H  ipratropium-albuterol, 3 mL, Nebulization, 4x Daily - RT  lidocaine, 1 patch, Transdermal, Q24H  metoprolol succinate XL, 50 mg, Oral, Q12H  OLANZapine, 2.5 mg, Oral, Daily With Dinner  pantoprazole, 40 mg, Oral, Daily  potassium chloride ER, 40 mEq, Oral, Q4H  rOPINIRole, 0.5 mg, Oral, Nightly  rosuvastatin, 40 mg, Oral, Nightly  senna-docusate sodium, 2 tablet, Oral, BID  sodium chloride, 10 mL, Intravenous, Q12H  sodium chloride, 4 mL, Nebulization, BID - RT  terazosin, 10 mg, Oral, Nightly      PRN Meds:     acetaminophen **OR** acetaminophen **OR** acetaminophen    senna-docusate sodium **AND** polyethylene glycol **AND** bisacodyl **AND** bisacodyl    hold    nitroglycerin    ondansetron    Potassium Replacement - Follow Nurse / BPA Driven Protocol    sodium chloride    sodium chloride    sodium chloride  Continuous Infusions:  hold, 1 each        Assessment & Plan   Active Hospital Problems    Diagnosis  POA    **Pneumonia due to infectious organism, unspecified laterality, unspecified part of lung [J18.9]  Yes    History of CVA (cerebrovascular accident) [Z86.73]  Not Applicable    Acute respiratory failure with hypoxia [J96.01]  Yes    Coronary artery disease without angina pectoris [I25.10]  Yes    Altered mental status, unspecified altered mental status type [R41.82]  Yes    Chronic diastolic CHF (congestive heart failure) [I50.32]  Yes    Stage 3a chronic kidney disease [N18.31]  Yes    Elevated troponin [R77.8]  Yes    Essential hypertension [I10]  Yes      Resolved Hospital Problems   No resolved problems to display.       Assessment & Plan    86yo  gentleman who presented to the hospital from SNF for chest pain as well as cough. He was noted to be 88-89% on RA. He has been admitted at this facility twice in the last month related to recent CVA (felt to be due to cardioembolic source) as well as CHF/type II NSTEMI. In ER he was found to have left sided PNA.     Left pleural effusion, pneumonia, atelectasis: Pulmonary input greatly appreciated.  Status post thoracentesis.  Fluid studies suggestive of exudate.  Cultures negative thus far.  Has completed a 7-day course of Zosyn and will defer any additional antibiotic needs to pulmonology.  Acute respiratory failure: Improving.  Wean as tolerated.  Currently on 3 L.  Dysphagia: Mild and on a regular diet.  Speech therapy input appreciated.  Altered mental status: Neurology feels this is metabolic encephalopathy in setting of what is probably some mild vascular dementia present on admission.  Continue present regimen.  Suspect he is presently at his baseline.  Recent CVA with left hemiparesis: Eliquis resumed status postthoracentesis.  CAD: Stable.  Chronic diastolic HF: Presently appears euvolemic.  Lasix was held secondary to increasing creatinine.  Renal function appears stable today.  CKD3a: Renal function at baseline.  HTN: BPs acceptable. Continue home regimen and monitor.  Neck pain: X-ray unremarkable.  Continue conservative measures  Hypokalemia: Monitor and replace as needed      Expected Discharge Date: 6/9/2023; Expected Discharge Time:      Art Hernandez MD  [facility]  06/08/23  10:29 EDT

## 2023-06-08 NOTE — PLAN OF CARE
Problem: Adult Inpatient Plan of Care  Goal: Plan of Care Review  Outcome: Ongoing, Progressing  Flowsheets (Taken 6/8/2023 0338)  Outcome Evaluation: VSS. Patient awake most of the shift. Confused. AOx2, confused to time and situation. Home Clonopin restarted. All due medications given. Will continue to monitor. To be endorsed accordingly.

## 2023-06-08 NOTE — PROGRESS NOTES
"Nutrition Services    Patient Name:  Art Mullins  YOB: 1935  MRN: [id number]  Admit Date:  5/31/2023    Assessment Date:  06/08/23    NUTRITION SCREENING      Reason for Encounter LOS 8   Diagnosis/Problem L Pleural effusion, PNA- s/p thoracentesis 6/7, acute respiratory failure-on 3L O2, altered MS, recent CVA with L hemiparesis, CAD, chronic diastolic HF, CKD3a, HTN, hypokalemia       PO Diet Diet: Regular/House Diet; Texture: Regular Texture (IDDSI 7); Fluid Consistency: Thin (IDDSI 0)   PO Supplements/Snacks -   PO Intake % 100% po intake of meals       Labs  Listed below, reviewed   Physical Findings Alert, disoriented, confused, [place], L sided hemiparesis, on Oxygen therapy   GI Function Last BM 6/5   Skin Status Bruised, skin intact       Height:  Weight:  BMI:   Category  Weight Trend Height: 175.3 cm (69\")  Weight: 80.9 kg (178 lb 5.6 oz) (06/08/23 0535)  Body mass index is 26.34 kg/m².  Overweight (25 - 29.9)  Stable       Intervention/Plan Pt tolerating diet well with 100%  po intake. Noted plans for d/c tomorrow per MD notes.   Will follow per protocol.       Results from last 7 days   Lab Units 06/08/23  0829 06/07/23  0339 06/05/23  1538 06/05/23  0535 06/04/23  0521 06/03/23  1951 06/03/23  0635 06/02/23  0353   SODIUM mmol/L 138 141  --  138 140  --  141 142   POTASSIUM mmol/L 3.5 3.8 3.7 3.7 3.7   < > 3.2* 3.6   CHLORIDE mmol/L 106 106  --  103 105  --  104 102   CO2 mmol/L 23.0 25.1  --  23.1 24.0  --  28.0 27.8   BUN mg/dL 25* 28*  --  32* 33*  --  37* 34*   CREATININE mg/dL 1.47* 1.35*  --  1.62* 1.65*  --  1.62* 1.82*   CALCIUM mg/dL 8.5* 9.2  --  9.2 8.9  --  9.4 9.2   BILIRUBIN mg/dL  --   --   --   --  0.5  --  0.5 0.6   ALK PHOS U/L  --   --   --   --  221*  --  184* 176*   ALT (SGPT) U/L  --   --   --   --  21  --  19 18   AST (SGOT) U/L  --   --   --   --  53*  --  38 34   GLUCOSE mg/dL 120* 95  --  94 102*  --  104* 103*    < > = values in this interval not displayed. "     Results from last 7 days   Lab Units 06/08/23  0829 06/07/23  0339 06/05/23  1538 06/05/23  0535 06/04/23  0521 06/03/23  0946   MAGNESIUM mg/dL  --   --  2.6*  --  2.6* 2.5*   HEMOGLOBIN g/dL 10.8*   < >  --    < > 11.0*  --    HEMATOCRIT % 32.0*   < >  --    < > 32.8*  --     < > = values in this interval not displayed.     COVID19   Date Value Ref Range Status   05/31/2023 Not Detected Not Detected - Ref. Range Final     Lab Results   Component Value Date    HGBA1C 5.30 05/01/2023       RD to follow up per protocol.    Electronically signed by:  Gabi Xie RD  06/08/23 17:14 EDT

## 2023-06-08 NOTE — THERAPY TREATMENT NOTE
Patient Name: Art Mullins  : 1935    MRN: [id number]                              Today's Date: 2023       Admit Date: 2023    Visit Dx:     ICD-10-CM ICD-9-CM   1. Pneumonia due to infectious organism, unspecified laterality, unspecified part of lung  J18.9 486   2. Hypoxia  R09.02 799.02   3. Leukocytosis, unspecified type  D72.829 288.60   4. History of congestive heart failure  Z86.79 V12.59   5. History of stroke  Z86.73 V12.54   6. Chronic renal impairment, unspecified CKD stage  N18.9 585.9     Patient Active Problem List   Diagnosis    Essential hypertension    Acute CVA (cerebrovascular accident)    Stage 3a chronic kidney disease    Elevated troponin    Vision changes    Altered mental status, unspecified altered mental status type    Chronic diastolic CHF (congestive heart failure)    Pneumonia due to infectious organism, unspecified laterality, unspecified part of lung    History of CVA (cerebrovascular accident)    Acute respiratory failure with hypoxia    Coronary artery disease without angina pectoris     Past Medical History:   Diagnosis Date    Hyperlipidemia     Hypertension     PVCs (premature ventricular contractions)      Past Surgical History:   Procedure Laterality Date    CARDIAC CATHETERIZATION N/A 2023    Procedure: Left Heart Cath;  Surgeon: Ricardo Arrieta MD;  Location: [facility] CATH INVASIVE LOCATION;  Service: Cardiology;  Laterality: N/A;    CARDIAC CATHETERIZATION N/A 2023    Procedure: Coronary angiography;  Surgeon: Ricardo Arrieta MD;  Location: [facility] CATH INVASIVE LOCATION;  Service: Cardiology;  Laterality: N/A;      General Information       Row Name 23 1639          Physical Therapy Time and Intention    Document Type therapy note (daily note)  -EM     Mode of Treatment physical therapy  -EM       Row Name 23 1639          General Information    Existing Precautions/Restrictions fall;oxygen therapy device and L/min  -EM               User Key   (r) = Recorded By, (t) = Taken By, (c) = Cosigned By      Initials Name Provider Type    Yaima Felipe PT Physical Therapist                   Mobility       Row Name 06/08/23 1640          Bed Mobility    Supine-Sit [place] (Bed Mobility) minimum assist (75% patient effort)  -EM     Sit-Supine [place] (Bed Mobility) contact guard  -EM     Assistive Device (Bed Mobility) head of bed elevated  -EM       Row Name 06/08/23 1640          Sit-Stand Transfer    Sit-Stand [place] (Transfers) contact guard  -EM     Assistive Device (Sit-Stand Transfers) walker, front-wheeled  -EM       Row Name 06/08/23 1640          Gait/Stairs (Locomotion)    [place] Level (Gait) minimum assist (75% patient effort)  -EM     Assistive Device (Gait) walker, front-wheeled  -EM     Distance in Feet (Gait) 10 x2 with one seated rest break  -EM     Deviations/Abnormal Patterns (Gait) stride length decreased;gait speed decreased  -EM     Comment, (Gait/Stairs) unsteady but no gross LOB, very SOA with minimal activity  -EM               User Key  (r) = Recorded By, (t) = Taken By, (c) = Cosigned By      Initials Name Provider Type    Yaima Felipe PT Physical Therapist                   Obj/Interventions    No documentation.                  Goals/Plan    No documentation.                  Clinical Impression       Row Name 06/08/23 1648          Pain    Pretreatment Pain Rating 0/10 - no pain  -EM       Row Name 06/08/23 1648          Plan of Care Review    Plan of Care Reviewed With patient  -EM     Outcome Evaluation Pt in supine, awake and alert, agreeable to therapy. Pt up to EOB with CGA, sit to stand with CGA, ambulated short distance with rwx and Bekah. Pt slightly unsteady with ambualtion, fatigues quickly although o2 sats at 97% on 3L o2. d/c plans are to return to SNU.  -EM       Row Name 06/08/23 1640          Positioning and Restraints    Pre-Treatment Position in bed  -EM     Post Treatment  Position bed  -EM     In Bed supine;with family/caregiver;notified nsg;call light within reach;exit alarm on  -EM               User Key  (r) = Recorded By, (t) = Taken By, (c) = Cosigned By      Initials Name Provider Type    Yaima Felipe PT Physical Therapist                   Outcome Measures       Row Name 06/08/23 1652          How much help from another person do you currently need...    Turning from your back to your side while in flat bed without using bedrails? 3  -EM     Moving from lying on back to sitting on the side of a flat bed without bedrails? 3  -EM     Moving to and from a bed to a chair (including a wheelchair)? 3  -EM     Standing up from a chair using your arms (e.g., wheelchair, bedside chair)? 3  -EM     Climbing 3-5 steps with a railing? 2  -EM     To walk in hospital room? 3  -EM     AM-PAC 6 Clicks Score (PT) 17  -EM     Highest level of mobility 5 --> Static standing  -EM               User Key  (r) = Recorded By, (t) = Taken By, (c) = Cosigned By      Initials Name Provider Type    Yaima Felipe PT Physical Therapist                                 Physical Therapy Education       Title: PT OT SLP Therapies (Done)       Topic: Physical Therapy (Done)       Point: Mobility training (Done)       Learning Progress Summary             Patient Acceptance, E, VU by EM at 6/8/2023 1653    Acceptance, E, VU by AP at 6/8/2023 1545    Acceptance, E,TB, NR by ST at 6/7/2023 1416    Acceptance, E,TB,D, VU,NR by  at 6/5/2023 1608    Acceptance, E,TB,D, VU,NR by  at 6/3/2023 1606    Acceptance, E,TB, VU,NR by ST at 6/2/2023 1007                         Point: Home exercise program (Done)       Learning Progress Summary             Patient Acceptance, E, VU by AP at 6/8/2023 1545    Acceptance, E,TB,D, VU,NR by  at 6/5/2023 1608    Acceptance, E,TB,D, VU,NR by  at 6/3/2023 1606                         Point: Body mechanics (Done)       Learning Progress Summary              Patient Acceptance, E, VU by AP at 6/8/2023 1545    Acceptance, E,TB, NR by  at 6/7/2023 1416    Acceptance, E,TB,D, VU,NR by  at 6/5/2023 1608    Acceptance, E,TB,D, VU,NR by  at 6/3/2023 1606    Acceptance, E,TB, VU,NR by  at 6/2/2023 1007                         Point: Precautions (Done)       Learning Progress Summary             Patient Acceptance, E, VU by AP at 6/8/2023 1545    Acceptance, E,TB,D, VU,NR by  at 6/5/2023 1608    Acceptance, E,TB,D, VU,NR by  at 6/3/2023 1606                                         User Key       Initials Effective Dates Name Provider Type Discipline     06/16/21 -  Yaima Shoemaker, PT Physical Therapist PT     03/07/18 -  Georgia Abraham, PTA Physical Therapist Assistant PT     09/22/22 -  Margret Grace, PT Physical Therapist PT     01/17/23 -  Kellen Ortiz RN Registered Nurse Nurse                  PT Recommendation and Plan     Plan of Care Reviewed With: patient  Outcome Evaluation: Pt in supine, awake and alert, agreeable to therapy. Pt up to EOB with CGA, sit to stand with CGA, ambulated short distance with rwx and Bekah. Pt slightly unsteady with ambualtion, fatigues quickly although o2 sats at 97% on 3L o2. d/c plans are to return to SNU.     Time Calculation:    PT Charges       Row Name 06/08/23 1654             Time Calculation    Start Time 1511  -EM      Stop Time 1534  -EM      Time Calculation (min) 23 min  -EM      PT Received On 06/08/23  -EM      PT - Next Appointment 06/09/23  -EM         Time Calculation- PT    Total Timed Code Minutes- PT 23 minute(s)  -EM         Timed Charges    70341 - PT Therapeutic Activity Minutes 23  -EM         Total Minutes    Timed Charges Total Minutes 23  -EM       Total Minutes 23  -EM                User Key  (r) = Recorded By, (t) = Taken By, (c) = Cosigned By      Initials Name Provider Type    EM Yaima Shoemaker, PT Physical Therapist                  Therapy Charges for  Today       Code Description Service Date Service Provider Modifiers Qty    86225302713 HC PT THERAPEUTIC ACT EA 15 MIN 6/8/2023 Yaima Shoemaker, PT GP 2            PT G-Codes  Outcome Measure Options: AM-PAC 6 Clicks Basic Mobility (PT)  AM-PAC 6 Clicks Score (PT): 17       Yaima Shoemaker, PT  6/8/2023

## 2023-06-08 NOTE — PROGRESS NOTES
"  PROGRESS NOTE  Patient Name: Art Mullins  Age/Sex: 87 y.o. male  : 1935  MRN: [id number]    Date of Admission: 2023  Date of Encounter Visit: 23   LOS: 8 days   Patient Care Team:  Santos Simmons MD as PCP - General (Family Medicine)  Aashish Thapa MD as Consulting Physician (Cardiology)    Chief Complaint:SOA, effusion     Hospital course: dyspnea and pneumonia with dysphagia risks.CXR looked more like effusion and CT confirmed the presence of moderate to large effusion with secondary atelectasis.  Patient had thoracentesis  on 2023, 600 cc of fluid removed looks exhilarated but is not empyema  He is doing better today, he is down to 2 L nasal cannula oxygen  No more chest tightness or chest pain or discomfort       REVIEW OF SYSTEMS:   CONSTITUTIONAL: no fever or chills  CARDIOVASCULAR: No chest pain, chest pressure or chest discomfort. No palpitations or edema.   RESPIRATORY: Improving oxygen requirement with no shortness of breath  GASTROINTESTINAL: No anorexia, nausea, vomiting or diarrhea. No abdominal pain or blood.   HEMATOLOGIC: No bleeding or bruising.     Ventilator/Non-Invasive Ventilation Settings (From admission, onward)      Nasal o2 2 lpm down from 6              Vital Signs  Temp:  [97.6 °F (36.4 °C)-98.9 °F (37.2 °C)] 98.9 °F (37.2 °C)  Heart Rate:  [79-99] 97  Resp:  [18-24] 24  BP: (107-123)/(57-60) 123/58  SpO2:  [91 %-98 %] 91 %  on  Flow (L/min):  [2-5] 2 Device (Oxygen Therapy): humidified;nasal cannula    Intake/Output Summary (Last 24 hours) at 2023 1228  Last data filed at 2023 0802  Gross per 24 hour   Intake 720 ml   Output --   Net 720 ml       Flowsheet Rows      Flowsheet Row First Filed Value   Admission Height 175.3 cm (69\") Documented at 2023 0818   Admission Weight 83.9 kg (185 lb) Documented at 2023 0818          Body mass index is 26.34 kg/m².      23  0509 23  0500 23  0535   Weight: 76.9 kg " (169 lb 8.5 oz) 80 kg (176 lb 5.9 oz) 80.9 kg (178 lb 5.6 oz)       Physical Exam: No significant changes from yesterday  GEN:  No acute distress, alert, cooperative, well developed   EYES:   Sclerae clear. No icterus. PERRL. Normal EOM  ENT:   External ears/nose normal, no oral lesions, no thrush, mucous membranes moist  NECK:  Supple, midline trachea, no JVD  LUNGS: Normal chest on inspection, still diminished on the left side with minimal audible crackles.  No wheezes, overall diminished breath sounds  CV:  Regular rhythm and rate. Normal S1/S2. No murmurs, gallops, or rubs noted.  ABD:  Soft, nontender and nondistended. Normal bowel sounds. No guarding  EXT:  Moves all extremities well. No cyanosis. No redness. No edema.   Skin: Dry, intact, no bleeding    Results Review:    Results From Last 14 Days   Lab Units 05/31/23  0847   LACTATE mmol/L 1.2        Latest Reference Range & Units Most Recent   Color, Fluid  Red  6/7/23 10:06   Appearance, Fluid Clear  Cloudy !  6/7/23 10:06   RBC, Fluid /mm3 70,000  6/7/23 10:06   Neutrophils, Fluid % 6  6/7/23 10:06   Lymphocytes, Fluid % 22  6/7/23 10:06   Monocytes, Fluid % 11  6/7/23 10:06   Mononuclear, Fluid % 60  6/7/23 10:06   Eosinophils, Fluid % 1  6/7/23 10:06   Nucleated Cells, Fluid /mm3 2,620  6/7/23 10:06   Glucose, Fluid mg/dL 116  6/7/23 10:06   LD, Fluid U/L 468  6/7/23 10:06   Protein, Total, Fluid g/dL 3.2  6/7/23 10:06   !: Data is abnormal  Results from last 7 days   Lab Units 06/08/23  0829 06/07/23  0339 06/05/23  1538 06/05/23  0535 06/04/23  0521 06/03/23  1951 06/03/23  0635 06/02/23  0353   SODIUM mmol/L 138 141  --  138 140  --  141 142   POTASSIUM mmol/L 3.5 3.8 3.7 3.7 3.7 3.8 3.2* 3.6   CHLORIDE mmol/L 106 106  --  103 105  --  104 102   CO2 mmol/L 23.0 25.1  --  23.1 24.0  --  28.0 27.8   BUN mg/dL 25* 28*  --  32* 33*  --  37* 34*   CREATININE mg/dL 1.47* 1.35*  --  1.62* 1.65*  --  1.62* 1.82*   CALCIUM mg/dL 8.5* 9.2  --  9.2 8.9  --  9.4  9.2   AST (SGOT) U/L  --   --   --   --  53*  --  38 34   ALT (SGPT) U/L  --   --   --   --  21  --  19 18   ANION GAP mmol/L 9.0 9.9  --  11.9 11.0  --  9.0 12.2   ALBUMIN g/dL  --   --   --   --  2.9*  --  2.5* 2.9*       Results from last 7 days   Lab Units 06/03/23  0635 06/02/23  1003   HSTROP T ng/L 106* 133*                 Results from last 7 days   Lab Units 06/08/23  0829 06/07/23  0339 06/05/23  0535 06/04/23  0521 06/03/23 0635 06/02/23  0353   WBC 10*3/mm3 13.99* 13.07* 12.10* 13.30* 13.66* 16.38*   HEMOGLOBIN g/dL 10.8* 10.2* 10.4* 11.0* 10.9* 10.7*   HEMATOCRIT % 32.0* 30.8* 31.9* 32.8* 31.9* 31.3*   PLATELETS 10*3/mm3 180 195 182 186 178 174   MCV fL 87.4 88.8 90.1 88.6 89.1 90.2   NEUTROPHIL % % 78.2* 78.6* 76.6*  --  79.7*  --    LYMPHOCYTE % % 9.9* 8.6* 9.1*  --  7.2*  --    MONOCYTES % % 9.0 9.4 10.2  --  9.3  --    EOSINOPHIL % % 1.5 2.3 2.7  --  2.5  --    BASOPHIL % % 0.5 0.5 0.7  --  0.6  --    IMM GRAN % % 0.9* 0.6* 0.7*  --  0.7*  --              Results from last 7 days   Lab Units 06/05/23  1538 06/04/23  0521 06/03/23  0946 06/03/23 0635 06/02/23  0353   MAGNESIUM mg/dL 2.6* 2.6* 2.5* 2.5* 2.3             Invalid input(s): LDLCALC            No results found for: POCGLU  Results from last 7 days   Lab Units 06/05/23  0535   PROCALCITONIN ng/mL 0.28*       Results from last 7 days   Lab Units 06/07/23  1006   BODYFLDCX  No growth                           Echocardiogram 5/18/2023:      There is severe hypokinesis of the basal to mid inferolateral wall. The remaining wall segments are hyperdynamic and the ejection fraction is preserved    Left ventricular systolic function is normal. Left ventricular ejection fraction appears to be 56 - 60%.    Left ventricular wall thickness is consistent with mild concentric hypertrophy.    Normal right ventricular cavity size and systolic function noted.    Mild to moderate aortic valve regurgitation is present.    Mild to moderate mitral valve  regurgitation is present.    Mild tricuspid valve regurgitation is present    Calculated right ventricular systolic pressure from tricuspid regurgitation is 70 mmHg.    There is no evidence of pericardial effusion.  Imaging:   Imaging Results (All)       Procedure Component Value Units Date/Time                I reviewed the patient's new clinical results.  I personally viewed and interpreted the patient's imaging results:        Medication Review:   apixaban, 2.5 mg, Oral, Q12H  cholecalciferol, 1,000 Units, Oral, Daily  clonazePAM, 1 mg, Oral, Nightly  guaiFENesin, 600 mg, Oral, Q12H  ipratropium-albuterol, 3 mL, Nebulization, 4x Daily - RT  lidocaine, 1 patch, Transdermal, Q24H  metoprolol succinate XL, 50 mg, Oral, Q12H  OLANZapine, 2.5 mg, Oral, Daily With Dinner  pantoprazole, 40 mg, Oral, Daily  potassium chloride ER, 40 mEq, Oral, Q4H  rOPINIRole, 0.5 mg, Oral, Nightly  rosuvastatin, 40 mg, Oral, Nightly  senna-docusate sodium, 2 tablet, Oral, BID  sodium chloride, 10 mL, Intravenous, Q12H  sodium chloride, 4 mL, Nebulization, BID - RT  terazosin, 10 mg, Oral, Nightly        ASSESSMENT:   Left lower lobe pneumonia with risk for aspiration, VERSUS PASSIVE ATELECTASIS   Left-sided pleural effusion , moderate to large  Congestive heart failure chronic diastolic with possible acute on chronic  History of CVA  Severe pulmonary hypertension  Hypoxemia  Coronary artery disease  Chronic kidney disease stage III  Metabolic encephalopathy    PLAN:  Pleural fluid preliminary analysis are positive for exudative effusion but not empyema, there is minimal white blood cells and no neutrophils predominance.  This can still be parapneumonic and I am sure there are some elements of congestive heart failure as well  We will repeat the chest x-ray in a.m. and repeat his CBC, hopefully home tomorrow  Patient is done with an antibiotic course  He is not on any scheduled diuretics but need to make sure that he does not retain any  fluid on follow-up.  Need to have a follow-up with a chest x-ray in 6 weeks  We will repeat his CBC tomorrow since he had persistent leukocytosis and we will also check another procalcitonin in a.m.       discussed with patient, labs reviewed    Disposition: per primary team     Grant Azevedo MD  06/08/23  12:28 EDT           Dictated utilizing Dragon dictation

## 2023-06-08 NOTE — PLAN OF CARE
Goal Outcome Evaluation:  Plan of Care Reviewed With: patient           Outcome Evaluation: Pt in supine, awake and alert, agreeable to therapy. Pt up to EOB with CGA, sit to stand with CGA, ambulated short distance with rwx and Bekah. Pt slightly unsteady with ambualtion, fatigues quickly although o2 sats at 97% on 3L o2. d/c plans are to return to SNU.

## 2023-06-09 ENCOUNTER — APPOINTMENT (OUTPATIENT)
Dept: GENERAL RADIOLOGY | Facility: HOSPITAL | Age: 88
DRG: 193 | End: 2023-06-09
Payer: MEDICARE

## 2023-06-09 LAB
ANION GAP SERPL CALCULATED.3IONS-SCNC: 8.5 MMOL/L (ref 5–15)
BUN SERPL-MCNC: 22 MG/DL (ref 8–23)
BUN/CREAT SERPL: 16.2 (ref 7–25)
CALCIUM SPEC-SCNC: 8.7 MG/DL (ref 8.6–10.5)
CHLORIDE SERPL-SCNC: 108 MMOL/L (ref 98–107)
CO2 SERPL-SCNC: 22.5 MMOL/L (ref 22–29)
CREAT SERPL-MCNC: 1.36 MG/DL (ref 0.76–1.27)
DEPRECATED RDW RBC AUTO: 41.7 FL (ref 37–54)
EGFRCR SERPLBLD CKD-EPI 2021: 50.7 ML/MIN/1.73
ERYTHROCYTE [DISTWIDTH] IN BLOOD BY AUTOMATED COUNT: 12.9 % (ref 12.3–15.4)
GLUCOSE SERPL-MCNC: 99 MG/DL (ref 65–99)
HCT VFR BLD AUTO: 28.5 % (ref 37.5–51)
HGB BLD-MCNC: 9.5 G/DL (ref 13–17.7)
MCH RBC QN AUTO: 29.8 PG (ref 26.6–33)
MCHC RBC AUTO-ENTMCNC: 33.3 G/DL (ref 31.5–35.7)
MCV RBC AUTO: 89.3 FL (ref 79–97)
PLATELET # BLD AUTO: 159 10*3/MM3 (ref 140–450)
PMV BLD AUTO: 9.5 FL (ref 6–12)
POTASSIUM SERPL-SCNC: 4.3 MMOL/L (ref 3.5–5.2)
RBC # BLD AUTO: 3.19 10*6/MM3 (ref 4.14–5.8)
SODIUM SERPL-SCNC: 139 MMOL/L (ref 136–145)
WBC NRBC COR # BLD: 12.18 10*3/MM3 (ref 3.4–10.8)

## 2023-06-09 PROCEDURE — 71046 X-RAY EXAM CHEST 2 VIEWS: CPT

## 2023-06-09 PROCEDURE — 80048 BASIC METABOLIC PNL TOTAL CA: CPT | Performed by: INTERNAL MEDICINE

## 2023-06-09 PROCEDURE — 94761 N-INVAS EAR/PLS OXIMETRY MLT: CPT

## 2023-06-09 PROCEDURE — 94799 UNLISTED PULMONARY SVC/PX: CPT

## 2023-06-09 PROCEDURE — 85027 COMPLETE CBC AUTOMATED: CPT | Performed by: INTERNAL MEDICINE

## 2023-06-09 PROCEDURE — 94664 DEMO&/EVAL PT USE INHALER: CPT

## 2023-06-09 PROCEDURE — 94760 N-INVAS EAR/PLS OXIMETRY 1: CPT

## 2023-06-09 RX ADMIN — LIDOCAINE 1 PATCH: 50 PATCH CUTANEOUS at 09:00

## 2023-06-09 RX ADMIN — ROPINIROLE HYDROCHLORIDE 0.5 MG: 0.5 TABLET, FILM COATED ORAL at 21:21

## 2023-06-09 RX ADMIN — CLONAZEPAM 1 MG: 1 TABLET ORAL at 21:21

## 2023-06-09 RX ADMIN — Medication 4 ML: at 20:01

## 2023-06-09 RX ADMIN — TERAZOSIN HYDROCHLORIDE 10 MG: 5 CAPSULE ORAL at 21:21

## 2023-06-09 RX ADMIN — APIXABAN 2.5 MG: 2.5 TABLET, FILM COATED ORAL at 21:22

## 2023-06-09 RX ADMIN — IPRATROPIUM BROMIDE AND ALBUTEROL SULFATE 3 ML: 2.5; .5 SOLUTION RESPIRATORY (INHALATION) at 16:05

## 2023-06-09 RX ADMIN — GUAIFENESIN 600 MG: 600 TABLET, EXTENDED RELEASE ORAL at 09:00

## 2023-06-09 RX ADMIN — Medication 4 ML: at 07:51

## 2023-06-09 RX ADMIN — ROSUVASTATIN CALCIUM 40 MG: 40 TABLET, FILM COATED ORAL at 21:22

## 2023-06-09 RX ADMIN — APIXABAN 2.5 MG: 2.5 TABLET, FILM COATED ORAL at 09:14

## 2023-06-09 RX ADMIN — Medication 10 ML: at 21:23

## 2023-06-09 RX ADMIN — GUAIFENESIN 600 MG: 600 TABLET, EXTENDED RELEASE ORAL at 21:22

## 2023-06-09 RX ADMIN — IPRATROPIUM BROMIDE AND ALBUTEROL SULFATE 3 ML: 2.5; .5 SOLUTION RESPIRATORY (INHALATION) at 20:00

## 2023-06-09 RX ADMIN — Medication 1000 UNITS: at 09:00

## 2023-06-09 RX ADMIN — DOCUSATE SODIUM 50MG AND SENNOSIDES 8.6MG 2 TABLET: 8.6; 5 TABLET, FILM COATED ORAL at 21:23

## 2023-06-09 RX ADMIN — IPRATROPIUM BROMIDE AND ALBUTEROL SULFATE 3 ML: 2.5; .5 SOLUTION RESPIRATORY (INHALATION) at 07:51

## 2023-06-09 RX ADMIN — OLANZAPINE 2.5 MG: 2.5 TABLET ORAL at 18:47

## 2023-06-09 RX ADMIN — ACETAMINOPHEN 650 MG: 325 TABLET, FILM COATED ORAL at 01:03

## 2023-06-09 RX ADMIN — Medication 10 ML: at 09:00

## 2023-06-09 RX ADMIN — PANTOPRAZOLE SODIUM 40 MG: 40 TABLET, DELAYED RELEASE ORAL at 09:00

## 2023-06-09 RX ADMIN — IPRATROPIUM BROMIDE AND ALBUTEROL SULFATE 3 ML: 2.5; .5 SOLUTION RESPIRATORY (INHALATION) at 11:33

## 2023-06-09 RX ADMIN — METOPROLOL SUCCINATE 50 MG: 25 TABLET, EXTENDED RELEASE ORAL at 21:21

## 2023-06-09 NOTE — PLAN OF CARE
Problem: Adult Inpatient Plan of Care  Goal: Plan of Care Review  Outcome: Ongoing, Progressing   Goal Outcome Evaluation:   Vss.Had cxr.No c/o pain or n/v. Possible d/c tomorrow.

## 2023-06-09 NOTE — PLAN OF CARE
Problem: Adult Inpatient Plan of Care  Goal: Plan of Care Review  Outcome: Ongoing, Progressing  Flowsheets (Taken 6/9/2023 0353)  Outcome Evaluation: VSS. Patient able to sleep majority of the shift. 1 episode of nosebleed, LHA aware. Possible discharge today, back to SNU. All due medications given. Given PRN pain medication per patient's request. Will continue to monitor. To be endorsed accordingly.

## 2023-06-09 NOTE — CASE MANAGEMENT/SOCIAL WORK
Continued Stay Note  [facility]     Patient Name: Art Mullins  MRN: [id number]  Today's Date: 6/9/2023    Admit Date: 5/31/2023    Plan: Return to [facility]. Needs WC Van transport. Per [facility] can accept over weekend.   Discharge Plan     Row Name 06/09/23 1744       Plan    Plan Return to [facility] SNF. Needs WC Van transport. Per [facility] can accept over weekend.    Patient/Family in Agreement with Plan yes    Plan Comments Waiting for pulmonary to see and evaluate CXR. Per [facility], if D/C over weekend [facility] can accept. Will need WC van transport. Malik Kaur RN-BC               Discharge Codes    No documentation.               Expected Discharge Date and Time     Expected Discharge Date Expected Discharge Time    Jun 12, 2023             Malik Kaur RN

## 2023-06-09 NOTE — PROGRESS NOTES
"  PROGRESS NOTE  Patient Name: Art Mullins  Age/Sex: 87 y.o. male  : 1935  MRN: [id number]    Date of Admission: 2023  Date of Encounter Visit: 23   LOS: 9 days   Patient Care Team:  Santos Simmons MD as PCP - General (Family Medicine)  Aashish Thapa MD as Consulting Physician (Cardiology)    Chief Complaint:SOA, effusion     Hospital course: d patient has his thoracentesis with initial improvement, his oxygen requirements were down from 6L to 2 L/min however he is back up to 4 L with a chest x-ray showing reaccumulation with more loculation.  Patient does feel slightly worse as well       REVIEW OF SYSTEMS:   CONSTITUTIONAL: no fever or chills  CARDIOVASCULAR: No chest pain, chest pressure or chest discomfort. No palpitations or edema.   RESPIRATORY: Increased oxygen requirements  GASTROINTESTINAL: No anorexia, nausea, vomiting or diarrhea. No abdominal pain or blood.   HEMATOLOGIC: No bleeding or bruising.     Ventilator/Non-Invasive Ventilation Settings (From admission, onward)      Nasal o2 4 L/min              Vital Signs  Temp:  [98.2 °F (36.8 °C)-98.9 °F (37.2 °C)] 98.3 °F (36.8 °C)  Heart Rate:  [81-98] 98  Resp:  [20] 20  BP: ()/(52-80) 117/61  SpO2:  [87 %-100 %] 95 %  on  Flow (L/min):  [2-4.5] 4 Device (Oxygen Therapy): nasal cannula    Intake/Output Summary (Last 24 hours) at 2023 1542  Last data filed at 2023 1313  Gross per 24 hour   Intake 0 ml   Output 200 ml   Net -200 ml     Flowsheet Rows      Flowsheet Row First Filed Value   Admission Height 175.3 cm (69\") Documented at 2023 08   Admission Weight 83.9 kg (185 lb) Documented at 2023 0818          Body mass index is 22.95 kg/m².      23  0500 23  0535 23  0500   Weight: 80 kg (176 lb 5.9 oz) 80.9 kg (178 lb 5.6 oz) 70.5 kg (155 lb 6.8 oz)       Physical Exam: No significant changes from yesterday  GEN:  No acute distress, alert, cooperative, well developed "   EYES:   Sclerae clear. No icterus. PERRL. Normal EOM  ENT:   External ears/nose normal, no oral lesions, no thrush, mucous membranes moist  NECK:  Supple, midline trachea, no JVD  LUNGS: Normal chest on inspection, diminished breath sounds bilaterally, no wheezes, dullness to percussion noted  CV:  Regular rhythm and rate. Normal S1/S2. No murmurs, gallops, or rubs noted.  ABD:  Soft, nontender and nondistended. Normal bowel sounds. No guarding  EXT:  Moves all extremities well. No cyanosis. No redness. No edema.   Skin: Dry, intact, no bleeding    Results Review:    Results From Last 14 Days   Lab Units 05/31/23  0847   LACTATE mmol/L 1.2      Latest Reference Range & Units Most Recent   Color, Fluid  Red  6/7/23 10:06   Appearance, Fluid Clear  Cloudy !  6/7/23 10:06   RBC, Fluid /mm3 70,000  6/7/23 10:06   Neutrophils, Fluid % 6  6/7/23 10:06   Lymphocytes, Fluid % 22  6/7/23 10:06   Monocytes, Fluid % 11  6/7/23 10:06   Mononuclear, Fluid % 60  6/7/23 10:06   Eosinophils, Fluid % 1  6/7/23 10:06   Nucleated Cells, Fluid /mm3 2,620  6/7/23 10:06   Glucose, Fluid mg/dL 116  6/7/23 10:06   LD, Fluid U/L 468  6/7/23 10:06   Protein, Total, Fluid g/dL 3.2  6/7/23 10:06   !: Data is abnormal  Results from last 7 days   Lab Units 06/09/23  0407 06/08/23  1837 06/08/23  0829 06/07/23  0339 06/05/23  1538 06/05/23  0535 06/04/23  0521 06/03/23  1951 06/03/23  0635   SODIUM mmol/L 139  --  138 141  --  138 140  --  141   POTASSIUM mmol/L 4.3 4.7 3.5 3.8 3.7 3.7 3.7   < > 3.2*   CHLORIDE mmol/L 108*  --  106 106  --  103 105  --  104   CO2 mmol/L 22.5  --  23.0 25.1  --  23.1 24.0  --  28.0   BUN mg/dL 22  --  25* 28*  --  32* 33*  --  37*   CREATININE mg/dL 1.36*  --  1.47* 1.35*  --  1.62* 1.65*  --  1.62*   CALCIUM mg/dL 8.7  --  8.5* 9.2  --  9.2 8.9  --  9.4   AST (SGOT) U/L  --   --   --   --   --   --  53*  --  38   ALT (SGPT) U/L  --   --   --   --   --   --  21  --  19   ANION GAP mmol/L 8.5  --  9.0 9.9  --   11.9 11.0  --  9.0   ALBUMIN g/dL  --   --   --   --   --   --  2.9*  --  2.5*    < > = values in this interval not displayed.     Results from last 7 days   Lab Units 06/03/23  0635   HSTROP T ng/L 106*               Results from last 7 days   Lab Units 06/09/23  0407 06/08/23  0829 06/07/23  0339 06/05/23  0535 06/04/23  0521 06/03/23  0635   WBC 10*3/mm3 12.18* 13.99* 13.07* 12.10* 13.30* 13.66*   HEMOGLOBIN g/dL 9.5* 10.8* 10.2* 10.4* 11.0* 10.9*   HEMATOCRIT % 28.5* 32.0* 30.8* 31.9* 32.8* 31.9*   PLATELETS 10*3/mm3 159 180 195 182 186 178   MCV fL 89.3 87.4 88.8 90.1 88.6 89.1   NEUTROPHIL % %  --  78.2* 78.6* 76.6*  --  79.7*   LYMPHOCYTE % %  --  9.9* 8.6* 9.1*  --  7.2*   MONOCYTES % %  --  9.0 9.4 10.2  --  9.3   EOSINOPHIL % %  --  1.5 2.3 2.7  --  2.5   BASOPHIL % %  --  0.5 0.5 0.7  --  0.6   IMM GRAN % %  --  0.9* 0.6* 0.7*  --  0.7*           Results from last 7 days   Lab Units 06/05/23  1538 06/04/23  0521 06/03/23  0946 06/03/23  0635   MAGNESIUM mg/dL 2.6* 2.6* 2.5* 2.5*           Invalid input(s): LDLCALC            No results found for: POCGLU  Results from last 7 days   Lab Units 06/05/23  0535   PROCALCITONIN ng/mL 0.28*     Results from last 7 days   Lab Units 06/07/23  1006   BODYFLDCX  No growth at 2 days                         Echocardiogram 5/18/2023:    •  There is severe hypokinesis of the basal to mid inferolateral wall. The remaining wall segments are hyperdynamic and the ejection fraction is preserved  •  Left ventricular systolic function is normal. Left ventricular ejection fraction appears to be 56 - 60%.  •  Left ventricular wall thickness is consistent with mild concentric hypertrophy.  •  Normal right ventricular cavity size and systolic function noted.  •  Mild to moderate aortic valve regurgitation is present.  •  Mild to moderate mitral valve regurgitation is present.  •  Mild tricuspid valve regurgitation is present  •  Calculated right ventricular systolic pressure from  tricuspid regurgitation is 70 mmHg.  •  There is no evidence of pericardial effusion.  Imaging:   Imaging Results (All)       Procedure Component Value Units Date/Time                I reviewed the patient's new clinical results.  I personally viewed and interpreted the patient's imaging results:        Medication Review:   apixaban, 2.5 mg, Oral, Q12H  cholecalciferol, 1,000 Units, Oral, Daily  clonazePAM, 1 mg, Oral, Nightly  guaiFENesin, 600 mg, Oral, Q12H  ipratropium-albuterol, 3 mL, Nebulization, 4x Daily - RT  lidocaine, 1 patch, Transdermal, Q24H  metoprolol succinate XL, 50 mg, Oral, Q12H  OLANZapine, 2.5 mg, Oral, Daily With Dinner  pantoprazole, 40 mg, Oral, Daily  rOPINIRole, 0.5 mg, Oral, Nightly  rosuvastatin, 40 mg, Oral, Nightly  senna-docusate sodium, 2 tablet, Oral, BID  sodium chloride, 10 mL, Intravenous, Q12H  sodium chloride, 4 mL, Nebulization, BID - RT  terazosin, 10 mg, Oral, Nightly        ASSESSMENT:   1. Left lower lobe pneumonia with risk for aspiration, VERSUS PASSIVE ATELECTASIS   2. Left-sided pleural effusion , moderate to large  3. Congestive heart failure chronic diastolic with possible acute on chronic  4. History of CVA  5. Severe pulmonary hypertension  6. Hypoxemia  7. Coronary artery disease  8. Chronic kidney disease stage III  9. Metabolic encephalopathy    PLAN:  Pleural fluid preliminary analysis are positive for exudative effusion but not empyema, there is minimal white blood cells and no neutrophils predominance.    Patient had transient improvement but his oxygen requirements are up again and his chest x-ray showed more reaccumulation with more loculation at this time  This might require thoracic surgery intervention, possible differential include trapped lung, the patient will have repeat CAT scan and then recommend consulting thoracic surgery to decide for chest tube placement versus possible VATS with decortication.  Patient finished a course of antibiotic, no  suspected recurrent sepsis at this point       discussed with patient, And with the family at the bedside, labs and films results are discussed with them too    Disposition: per primary team     Grant Azevedo MD  06/09/23  15:42 EDT           Dictated utilizing Dragon dictation

## 2023-06-09 NOTE — PROGRESS NOTES
" LOS: 9 days     Name: Art Mullins  Age: 87 y.o.  Sex: male  :  1935  MRN: [id number]         Primary Care Physician: Santos Simmons MD    Subjective   Subjective  Feels about the same as yesterday.  Eating 100% of his meals.  No change in respiratory symptoms.  Currently requiring 4 L of oxygen.  Asking when he can go home.    Objective   Vital Signs  Temp:  [98.2 °F (36.8 °C)-99.4 °F (37.4 °C)] 98.7 °F (37.1 °C)  Heart Rate:  [81-97] 83  Resp:  [20-24] 20  BP: ()/(51-80) 99/52  Body mass index is 22.95 kg/m².    Objective:  General Appearance:  Comfortable, in no acute distress and ill-appearing.    Vital signs: (most recent): Blood pressure 99/52, pulse 83, temperature 98.7 °F (37.1 °C), temperature source Oral, resp. rate 20, height 175.3 cm (69\"), weight 70.5 kg (155 lb 6.8 oz), SpO2 91 %.    Lungs:  Normal effort and normal respiratory rate.  There are decreased breath sounds.    Heart: Normal rate.  Regular rhythm.    Abdomen: Abdomen is soft.  Bowel sounds are normal.   There is no abdominal tenderness.     Extremities: There is no dependent edema or local swelling.    Neurological: Patient is alert and oriented to person, place and time.    Skin:  Warm and dry.              Results Review:       I reviewed the patient's new clinical results.    Results from last 7 days   Lab Units 23  0407 23  0829 23  0339 23  0535 23  0521 23  0635   WBC 10*3/mm3 12.18* 13.99* 13.07* 12.10* 13.30* 13.66*   HEMOGLOBIN g/dL 9.5* 10.8* 10.2* 10.4* 11.0* 10.9*   PLATELETS 10*3/mm3 159 180 195 182 186 178     Results from last 7 days   Lab Units 23  0407 23  1837 23  0829 23  0339 23  1538 23  0535 23  0521 2335   SODIUM mmol/L 139  --  138 141  --  138 140  --  141   POTASSIUM mmol/L 4.3 4.7 3.5 3.8 3.7 3.7 3.7   < > 3.2*   CHLORIDE mmol/L 108*  --  106 106  --  103 105  --  104   CO2 mmol/L 22.5  -- "  23.0 25.1  --  23.1 24.0  --  28.0   BUN mg/dL 22  --  25* 28*  --  32* 33*  --  37*   CREATININE mg/dL 1.36*  --  1.47* 1.35*  --  1.62* 1.65*  --  1.62*   CALCIUM mg/dL 8.7  --  8.5* 9.2  --  9.2 8.9  --  9.4   GLUCOSE mg/dL 99  --  120* 95  --  94 102*  --  104*    < > = values in this interval not displayed.                 Scheduled Meds:   apixaban, 2.5 mg, Oral, Q12H  cholecalciferol, 1,000 Units, Oral, Daily  clonazePAM, 1 mg, Oral, Nightly  guaiFENesin, 600 mg, Oral, Q12H  ipratropium-albuterol, 3 mL, Nebulization, 4x Daily - RT  lidocaine, 1 patch, Transdermal, Q24H  metoprolol succinate XL, 50 mg, Oral, Q12H  OLANZapine, 2.5 mg, Oral, Daily With Dinner  pantoprazole, 40 mg, Oral, Daily  rOPINIRole, 0.5 mg, Oral, Nightly  rosuvastatin, 40 mg, Oral, Nightly  senna-docusate sodium, 2 tablet, Oral, BID  sodium chloride, 10 mL, Intravenous, Q12H  sodium chloride, 4 mL, Nebulization, BID - RT  terazosin, 10 mg, Oral, Nightly      PRN Meds:     acetaminophen **OR** acetaminophen **OR** acetaminophen    senna-docusate sodium **AND** polyethylene glycol **AND** bisacodyl **AND** bisacodyl    hold    nitroglycerin    ondansetron    Potassium Replacement - Follow Nurse / BPA Driven Protocol    sodium chloride    sodium chloride    sodium chloride  Continuous Infusions:  hold, 1 each        Assessment & Plan   Active Hospital Problems    Diagnosis  POA    **Pneumonia due to infectious organism, unspecified laterality, unspecified part of lung [J18.9]  Yes    History of CVA (cerebrovascular accident) [Z86.73]  Not Applicable    Acute respiratory failure with hypoxia [J96.01]  Yes    Coronary artery disease without angina pectoris [I25.10]  Yes    Altered mental status, unspecified altered mental status type [R41.82]  Yes    Chronic diastolic CHF (congestive heart failure) [I50.32]  Yes    Stage 3a chronic kidney disease [N18.31]  Yes    Elevated troponin [R77.8]  Yes    Essential hypertension [I10]  Yes      Resolved  Hospital Problems   No resolved problems to display.       Assessment & Plan    88yo gentleman who presented to the hospital from SNF for chest pain as well as cough. He was noted to be 88-89% on RA. He has been admitted at this facility twice in the last month related to recent CVA (felt to be due to cardioembolic source) as well as CHF/type II NSTEMI. In ER he was found to have left sided PNA.     Left pleural effusion, pneumonia, atelectasis: Pulmonary input greatly appreciated.  Status post thoracentesis.  Fluid studies suggestive of exudate.  Cultures negative thus far.  Has completed a 7-day course of Zosyn.  Repeat chest x-ray scheduled for today.  Acute respiratory failure: Currently requiring 4 L.  Wean as tolerated.  Dysphagia: Mild and on a regular diet.  Speech therapy input appreciated.  Altered mental status: Neurology feels this is metabolic encephalopathy in setting of what is probably some mild vascular dementia present on admission.  Continue present regimen.  Suspect he is presently at his baseline.  Recent CVA with left hemiparesis: Eliquis resumed status post thoracentesis.  CAD: Stable.  Chronic diastolic HF: Presently appears euvolemic.  Lasix was held secondary to increasing creatinine.  Renal function appears stable today.  Monitor for signs of volume overload.  CKD3a: Renal function at baseline.  HTN: Blood pressure soft this morning.  He is asymptomatic.  Holding parameters placed on the metoprolol.  Monitor.  Neck pain: X-ray unremarkable.  Continue conservative measures  Hypokalemia: Monitor and replace as needed      Expected Discharge Date: 6/9/2023; Expected Discharge Time:      Art Hernandez MD  [facility]  06/09/23  09:44 EDT

## 2023-06-10 ENCOUNTER — APPOINTMENT (OUTPATIENT)
Dept: CT IMAGING | Facility: HOSPITAL | Age: 88
DRG: 193 | End: 2023-06-10
Payer: MEDICARE

## 2023-06-10 PROCEDURE — 94799 UNLISTED PULMONARY SVC/PX: CPT

## 2023-06-10 PROCEDURE — 97164 PT RE-EVAL EST PLAN CARE: CPT

## 2023-06-10 PROCEDURE — 97530 THERAPEUTIC ACTIVITIES: CPT

## 2023-06-10 PROCEDURE — 71250 CT THORAX DX C-: CPT

## 2023-06-10 RX ORDER — FUROSEMIDE 20 MG/1
20 TABLET ORAL DAILY
Status: DISCONTINUED | OUTPATIENT
Start: 2023-06-10 | End: 2023-06-14 | Stop reason: HOSPADM

## 2023-06-10 RX ADMIN — METOPROLOL SUCCINATE 50 MG: 25 TABLET, EXTENDED RELEASE ORAL at 20:55

## 2023-06-10 RX ADMIN — Medication 10 ML: at 08:08

## 2023-06-10 RX ADMIN — ROPINIROLE HYDROCHLORIDE 0.5 MG: 0.5 TABLET, FILM COATED ORAL at 20:55

## 2023-06-10 RX ADMIN — APIXABAN 2.5 MG: 2.5 TABLET, FILM COATED ORAL at 08:08

## 2023-06-10 RX ADMIN — TERAZOSIN HYDROCHLORIDE 10 MG: 5 CAPSULE ORAL at 20:55

## 2023-06-10 RX ADMIN — FUROSEMIDE 20 MG: 20 TABLET ORAL at 12:36

## 2023-06-10 RX ADMIN — APIXABAN 2.5 MG: 2.5 TABLET, FILM COATED ORAL at 20:55

## 2023-06-10 RX ADMIN — CLONAZEPAM 1 MG: 1 TABLET ORAL at 20:55

## 2023-06-10 RX ADMIN — LIDOCAINE 1 PATCH: 50 PATCH CUTANEOUS at 08:07

## 2023-06-10 RX ADMIN — Medication 1000 UNITS: at 08:08

## 2023-06-10 RX ADMIN — Medication 10 ML: at 22:59

## 2023-06-10 RX ADMIN — METOPROLOL SUCCINATE 50 MG: 25 TABLET, EXTENDED RELEASE ORAL at 08:08

## 2023-06-10 RX ADMIN — IPRATROPIUM BROMIDE AND ALBUTEROL SULFATE 3 ML: 2.5; .5 SOLUTION RESPIRATORY (INHALATION) at 11:22

## 2023-06-10 RX ADMIN — GUAIFENESIN 600 MG: 600 TABLET, EXTENDED RELEASE ORAL at 20:55

## 2023-06-10 RX ADMIN — ROSUVASTATIN CALCIUM 40 MG: 40 TABLET, FILM COATED ORAL at 20:55

## 2023-06-10 RX ADMIN — OLANZAPINE 2.5 MG: 2.5 TABLET ORAL at 17:19

## 2023-06-10 RX ADMIN — Medication 4 ML: at 11:24

## 2023-06-10 RX ADMIN — GUAIFENESIN 600 MG: 600 TABLET, EXTENDED RELEASE ORAL at 08:08

## 2023-06-10 RX ADMIN — DOCUSATE SODIUM 50MG AND SENNOSIDES 8.6MG 2 TABLET: 8.6; 5 TABLET, FILM COATED ORAL at 08:07

## 2023-06-10 RX ADMIN — PANTOPRAZOLE SODIUM 40 MG: 40 TABLET, DELAYED RELEASE ORAL at 08:08

## 2023-06-10 NOTE — PLAN OF CARE
Goal Outcome Evaluation:  Plan of Care Reviewed With: patient        Progress: no change  Outcome Evaluation: Pt resting in bed but agreeable to participate in PT to please his wife. Pt req min A and vc for sequencing to sit EOB; CGA to stand with r wx at EOB. Pt amb 10' around bed to reclinber with r wx and min A of 1-2, O2 decreases to low 80s so O2 increased from 4L to 6L while moving. Posture is flexed, balance is slightly unsteady but no LOB, endurance is poor. O2 returned to 90s with deep breathing. Pt placed in recliner and encouraged to use IS and perform LE ther ex. Pt requesting to call his wife and report his progress. Pt progressing slowly, limited by poor endurance. Cont PT to address functional deficits and prepare for d/c as appropriate. All goals remain

## 2023-06-10 NOTE — PROGRESS NOTES
" LOS: 10 days     Name: Art Mullins  Age: 87 y.o.  Sex: male  :  1935  MRN: [id number]         Primary Care Physician: Santos Simmons MD    Subjective   Subjective  Reports no change in his breathing.  Currently requiring 4 L nasal cannula.  Denies cough or fever.    Objective   Vital Signs  Temp:  [97.5 °F (36.4 °C)-99.3 °F (37.4 °C)] 98.3 °F (36.8 °C)  Heart Rate:  [] 96  Resp:  [18-20] 20  BP: (111-131)/(61-97) 131/77  Body mass index is 24.78 kg/m².    Objective:  General Appearance:  Comfortable and in no acute distress.    Vital signs: (most recent): Blood pressure 131/77, pulse 96, temperature 98.3 °F (36.8 °C), temperature source Oral, resp. rate 20, height 175.3 cm (69\"), weight 76.1 kg (167 lb 12.3 oz), SpO2 95 %.    Lungs:  Normal effort and normal respiratory rate.  He is not in respiratory distress.  There are decreased breath sounds.    Heart: Normal rate.  Regular rhythm.    Abdomen: Abdomen is soft.  Bowel sounds are normal.   There is no abdominal tenderness.     Extremities: There is no dependent edema or local swelling.    Neurological: Patient is alert and oriented to person, place and time.    Skin:  Warm and dry.              Results Review:       I reviewed the patient's new clinical results.    Results from last 7 days   Lab Units 23  0407 23  0829 23  0339 23  0535 23  0521   WBC 10*3/mm3 12.18* 13.99* 13.07* 12.10* 13.30*   HEMOGLOBIN g/dL 9.5* 10.8* 10.2* 10.4* 11.0*   PLATELETS 10*3/mm3 159 180 195 182 186     Results from last 7 days   Lab Units 23  0407 23  1837 23  0829 23  0339 23  1538 23  0535 23  0521   SODIUM mmol/L 139  --  138 141  --  138 140   POTASSIUM mmol/L 4.3 4.7 3.5 3.8 3.7 3.7 3.7   CHLORIDE mmol/L 108*  --  106 106  --  103 105   CO2 mmol/L 22.5  --  23.0 25.1  --  23.1 24.0   BUN mg/dL 22  --  25* 28*  --  32* 33*   CREATININE mg/dL 1.36*  --  1.47* 1.35*  --  1.62* 1.65* "   CALCIUM mg/dL 8.7  --  8.5* 9.2  --  9.2 8.9   GLUCOSE mg/dL 99  --  120* 95  --  94 102*                 Scheduled Meds:   apixaban, 2.5 mg, Oral, Q12H  cholecalciferol, 1,000 Units, Oral, Daily  clonazePAM, 1 mg, Oral, Nightly  guaiFENesin, 600 mg, Oral, Q12H  ipratropium-albuterol, 3 mL, Nebulization, 4x Daily - RT  lidocaine, 1 patch, Transdermal, Q24H  metoprolol succinate XL, 50 mg, Oral, Q12H  OLANZapine, 2.5 mg, Oral, Daily With Dinner  pantoprazole, 40 mg, Oral, Daily  rOPINIRole, 0.5 mg, Oral, Nightly  rosuvastatin, 40 mg, Oral, Nightly  senna-docusate sodium, 2 tablet, Oral, BID  sodium chloride, 10 mL, Intravenous, Q12H  sodium chloride, 4 mL, Nebulization, BID - RT  terazosin, 10 mg, Oral, Nightly      PRN Meds:     acetaminophen **OR** acetaminophen **OR** acetaminophen    senna-docusate sodium **AND** polyethylene glycol **AND** bisacodyl **AND** bisacodyl    hold    nitroglycerin    ondansetron    Potassium Replacement - Follow Nurse / BPA Driven Protocol    sodium chloride    sodium chloride    sodium chloride  Continuous Infusions:  hold, 1 each        Assessment & Plan   Active Hospital Problems    Diagnosis  POA    **Pneumonia due to infectious organism, unspecified laterality, unspecified part of lung [J18.9]  Yes    History of CVA (cerebrovascular accident) [Z86.73]  Not Applicable    Acute respiratory failure with hypoxia [J96.01]  Yes    Coronary artery disease without angina pectoris [I25.10]  Yes    Altered mental status, unspecified altered mental status type [R41.82]  Yes    Chronic diastolic CHF (congestive heart failure) [I50.32]  Yes    Stage 3a chronic kidney disease [N18.31]  Yes    Elevated troponin [R77.8]  Yes    Essential hypertension [I10]  Yes      Resolved Hospital Problems   No resolved problems to display.       Assessment & Plan    88yo gentleman who presented to the hospital from SNF for chest pain as well as cough. He was noted to be 88-89% on RA. He has been admitted  at this facility twice in the last month related to recent CVA (felt to be due to cardioembolic source) as well as CHF/type II NSTEMI. In ER he was found to have left sided PNA.     Left pleural effusion, pneumonia, atelectasis: Pulmonary input greatly appreciated.  Status post thoracentesis.  Fluid studies suggestive of exudate.  Cultures negative thus far.  Has completed a 7-day course of Zosyn.  Repeat chest x-ray noted along with recommendations from pulmonology for CT chest and possible thoracic surgery consult.  Acute respiratory failure: Currently requiring 4 L.  Wean as tolerated.  Dysphagia: Mild and on a regular diet.  Speech therapy input appreciated.  Altered mental status: Neurology feels this is metabolic encephalopathy in setting of what is probably some mild vascular dementia present on admission.  Continue present regimen.  Suspect he is presently at his baseline.  Recent CVA with left hemiparesis: Eliquis resumed status post thoracentesis.  May need to place this back on hold depending on results of chest CT  CAD: Stable.  Chronic diastolic HF: Presently appears euvolemic.  Lasix was held secondary to increasing creatinine.  Renal function appears stable.  Restart diuretic and observe.  CKD3a: Renal function at baseline.  HTN: Blood pressure overall improved and stable.  Neck pain: X-ray unremarkable.  Continue conservative measures  Hypokalemia: Monitor and replace as needed         Expected Discharge Date: 6/12/2023; Expected Discharge Time:      Art Hernandez MD  [facility]  06/10/23  10:45 EDT

## 2023-06-10 NOTE — PLAN OF CARE
Goal Outcome Evaluation:            .VSSPt slept most of the night,Sats stable on 3L 02 via N/C.Pending CT,no acute events this shift.WCTM.             decreased activity level

## 2023-06-10 NOTE — PLAN OF CARE
Problem: Adult Inpatient Plan of Care  Goal: Plan of Care Review  Outcome: Ongoing, Progressing  Flowsheets (Taken 6/10/2023 2900)  Progress: improving  Plan of Care Reviewed With: patient  Outcome Evaluation: VSS. Pt on 3-4L O2 per humidified NC. Denies pain/SOB. Pt with confusion earlier this morning, pt now alert and oriented x4, forgetful. PO lasix restarted. Q2turn. +BM. Family visited. Pt stable and needs met at this time.

## 2023-06-10 NOTE — PROGRESS NOTES
[facility] INPATIENT PROGRESS NOTE         [address]    6/10/2023      PATIENT IDENTIFICATION:  Name: Art Mullins ADMIT: 2023   : 1935  PCP: Santos Simmons MD    MRN: [id number] LOS: 10 days   AGE/SEX: 87 y.o. male  ROOM: [facility]                     LOS 10    Reason for visit: Pneumonia, pleural effusion pulm hypertension        SUBJECTIVE:          Objective   OBJECTIVE:    ViPatient is very confused.  Discussed with family at bedside.  Chart reviewed.  Diminished breath sounds bases left greater than right.  I am seeing the patient for the first time today.  All patient problems are new to me.  marbella Sign Min/Max for last 24 hours  Temp  Min: 97.5 °F (36.4 °C)  Max: 99.3 °F (37.4 °C)   BP  Min: 111/67  Max: 139/68   Pulse  Min: 91  Max: 104   Resp  Min: 18  Max: 26   SpO2  Min: 91 %  Max: 99 %   No data recorded   Weight  Min: 76.1 kg (167 lb 12.3 oz)  Max: 76.1 kg (167 lb 12.3 oz)                         Body mass index is 24.78 kg/m².    Intake/Output Summary (Last 24 hours) at 6/10/2023 1337  Last data filed at 6/10/2023 0808  Gross per 24 hour   Intake 100 ml   Output --   Net 100 ml         Exam:  GEN:  No distress, appears stated age  EYES:   PERRL, anicteric sclerae  ENT:    External ears/nose normal, OP clear  NECK:  No adenopathy, midline trachea  LUNGS: Normal chest on inspection, palpation and diminished breath sounds bilaterally left greater than right on auscultation  CV:  Normal S1S2, without murmur  ABD:  Nontender, nondistended, no hepatosplenomegaly, +BS  EXT:  No edema.  No cyanosis or clubbing.  No mottling and normal cap refill.    Assessment     Scheduled meds:  apixaban, 2.5 mg, Oral, Q12H  cholecalciferol, 1,000 Units, Oral, Daily  clonazePAM, 1 mg, Oral, Nightly  furosemide, 20 mg, Oral, Daily  guaiFENesin, 600 mg, Oral, Q12H  ipratropium-albuterol, 3 mL, Nebulization, 4x Daily - RT  lidocaine, 1 patch, Transdermal, Q24H  metoprolol succinate XL, 50  mg, Oral, Q12H  OLANZapine, 2.5 mg, Oral, Daily With Dinner  pantoprazole, 40 mg, Oral, Daily  rOPINIRole, 0.5 mg, Oral, Nightly  rosuvastatin, 40 mg, Oral, Nightly  senna-docusate sodium, 2 tablet, Oral, BID  sodium chloride, 10 mL, Intravenous, Q12H  sodium chloride, 4 mL, Nebulization, BID - RT  terazosin, 10 mg, Oral, Nightly      IV meds:                      hold, 1 each      Data Review:  Results from last 7 days   Lab Units 06/09/23  0407 06/08/23  1837 06/08/23  0829 06/07/23  0339 06/05/23  1538 06/05/23  0535 06/04/23  0521   SODIUM mmol/L 139  --  138 141  --  138 140   POTASSIUM mmol/L 4.3 4.7 3.5 3.8 3.7 3.7 3.7   CHLORIDE mmol/L 108*  --  106 106  --  103 105   CO2 mmol/L 22.5  --  23.0 25.1  --  23.1 24.0   BUN mg/dL 22  --  25* 28*  --  32* 33*   CREATININE mg/dL 1.36*  --  1.47* 1.35*  --  1.62* 1.65*   GLUCOSE mg/dL 99  --  120* 95  --  94 102*   CALCIUM mg/dL 8.7  --  8.5* 9.2  --  9.2 8.9         Estimated Creatinine Clearance: 42 mL/min (A) (by C-G formula based on SCr of 1.36 mg/dL (H)).  Results from last 7 days   Lab Units 06/09/23  0407 06/08/23  0829 06/07/23  0339 06/05/23  0535 06/04/23  0521   WBC 10*3/mm3 12.18* 13.99* 13.07* 12.10* 13.30*   HEMOGLOBIN g/dL 9.5* 10.8* 10.2* 10.4* 11.0*   PLATELETS 10*3/mm3 159 180 195 182 186         Results from last 7 days   Lab Units 06/04/23  0521   ALT (SGPT) U/L 21   AST (SGOT) U/L 53*         Results from last 7 days   Lab Units 06/05/23  0535   PROCALCITONIN ng/mL 0.28*         No results found for: HGBA1C, POCGLU    Chest x-ray 6/9 reviewed: Cardiac enlargement with pulmonary vasculature stable.  Increased left pleural effusion with atelectasis/infiltrate.          Microbiology reviewed                  Active Hospital Problems    Diagnosis  POA    **Pneumonia due to infectious organism, unspecified laterality, unspecified part of lung [J18.9]  Yes    History of CVA (cerebrovascular accident) [Z86.73]  Not Applicable    Acute respiratory  failure with hypoxia [J96.01]  Yes    Coronary artery disease without angina pectoris [I25.10]  Yes    Altered mental status, unspecified altered mental status type [R41.82]  Yes    Chronic diastolic CHF (congestive heart failure) [I50.32]  Yes    Stage 3a chronic kidney disease [N18.31]  Yes    Elevated troponin [R77.8]  Yes    Essential hypertension [I10]  Yes      Resolved Hospital Problems   No resolved problems to display.         ASSESSMENT:  Left lower lobe pneumonia  Left-sided, exudative pleural effusion  Acute on chronic diastolic CHF  Severe pulm hypertension  CAD  Chronic kidney disease  Metabolic encephalopathy  History of stroke      PLAN:    Encourage pulmonary toilet.  Aspiration precautions.  Noted reaccumulation of fluid with signs of loculation.  May have a component of trapped lung.  CT chest for further evaluation.  May benefit from thoracic surgery evaluation if signs of loculation on repeat CT.        Juanjose Skinner MD  Pulmonary and Critical Care Medicine  [facility], [facility]  6/10/2023    13:37 EDT

## 2023-06-10 NOTE — THERAPY PROGRESS REPORT/RE-CERT
Patient Name: Art Mullins  : 1935    MRN: [id number]                              Today's Date: 6/10/2023       Admit Date: 2023    Visit Dx:     ICD-10-CM ICD-9-CM   1. Pneumonia due to infectious organism, unspecified laterality, unspecified part of lung  J18.9 486   2. Hypoxia  R09.02 799.02   3. Leukocytosis, unspecified type  D72.829 288.60   4. History of congestive heart failure  Z86.79 V12.59   5. History of stroke  Z86.73 V12.54   6. Chronic renal impairment, unspecified CKD stage  N18.9 585.9     Patient Active Problem List   Diagnosis    Essential hypertension    Acute CVA (cerebrovascular accident)    Stage 3a chronic kidney disease    Elevated troponin    Vision changes    Altered mental status, unspecified altered mental status type    Chronic diastolic CHF (congestive heart failure)    Pneumonia due to infectious organism, unspecified laterality, unspecified part of lung    History of CVA (cerebrovascular accident)    Acute respiratory failure with hypoxia    Coronary artery disease without angina pectoris     Past Medical History:   Diagnosis Date    Hyperlipidemia     Hypertension     PVCs (premature ventricular contractions)      Past Surgical History:   Procedure Laterality Date    CARDIAC CATHETERIZATION N/A 2023    Procedure: Left Heart Cath;  Surgeon: Ricardo Arrieta MD;  Location:  [facility] CATH INVASIVE LOCATION;  Service: Cardiology;  Laterality: N/A;    CARDIAC CATHETERIZATION N/A 2023    Procedure: Coronary angiography;  Surgeon: Ricardo Arrieta MD;  Location: [facility] CATH INVASIVE LOCATION;  Service: Cardiology;  Laterality: N/A;      General Information       Row Name 06/10/23 1616          Physical Therapy Time and Intention    Document Type therapy note (daily note);progress note/recertification  -DJ     Mode of Treatment individual therapy;physical therapy  -DJ       Row Name 06/10/23 1616          General Information    Patient Profile Reviewed yes  -DJ     Existing  Precautions/Restrictions fall;oxygen therapy device and L/min  4L  -DJ     Barriers to Rehab hearing deficit  -DJ       Row Name 06/10/23 1616          Cognition    Orientation Status (Cognition) oriented x 3  -DJ       Row Name 06/10/23 1616          Safety Issues, Functional Mobility    Comment, Safety Issues/Impairments (Mobility) gt belt, nonskid socks  -DJ               User Key  (r) = Recorded By, (t) = Taken By, (c) = Cosigned By      Initials Name Provider Type    Joyce Matthews, PT Physical Therapist                   Mobility       Row Name 06/10/23 1618          Bed Mobility    Bed Mobility supine-sit  -DJ     Supine-Sit [place] (Bed Mobility) minimum assist (75% patient effort)  -DJ     Sit-Supine [place] (Bed Mobility) not tested  -DJ     Comment, (Bed Mobility) pt pulls up on therapist to sit EOB  -DJ       Row Name 06/10/23 1618          Transfers    Comment, (Transfers) sit/stand from EOB  -DJ       Row Name 06/10/23 1618          Bed-Chair Transfer    Bed-Chair [place] (Transfers) not tested  -DJ       Row Name 06/10/23 1618          Sit-Stand Transfer    Sit-Stand [place] (Transfers) contact guard;minimum assist (75% patient effort);verbal cues;2 person assist  -DJ     Assistive Device (Sit-Stand Transfers) walker, front-wheeled  -DJ       Row Name 06/10/23 1618          Gait/Stairs (Locomotion)    [place] Level (Gait) minimum assist (75% patient effort);1 person assist;2 person assist;verbal cues  -DJ     Assistive Device (Gait) walker, front-wheeled  -DJ     Distance in Feet (Gait) 10' around bed to recliner  -DJ     Deviations/Abnormal Patterns (Gait) stride length decreased;gait speed decreased  -DJ     Bilateral Gait Deviations forward flexed posture  -DJ     [place] Level (Stairs) not tested  -DJ     Comment, (Gait/Stairs) Pt amb 10' around bed to reclinber with r wx and min A of 1-2, O2 decreases to low 80s so O2 increased from 4L to 6L while moving. Posture is  flexed, balance is slightly unsteady but no LOB, endurance is poor. O2 returned to 90s with deep breathing.  -DJ               User Key  (r) = Recorded By, (t) = Taken By, (c) = Cosigned By      Initials Name Provider Type    Joyce Matthews, [place] Physical Therapist                   Obj/Interventions       Row Name 06/10/23 1623          Motor Skills    Motor Skills functional endurance  -DJ     Functional Endurance poor  -DJ     Therapeutic Exercise other (see comments)  AP, IS  -DJ       Row Name 06/10/23 1623          Balance    Balance Assessment standing static balance;standing dynamic balance  -DJ     Static Standing Balance contact guard;verbal cues  -DJ     Dynamic Standing Balance minimal assist;verbal cues;1-person assist;2-person assist  -DJ     Position/Device Used, Standing Balance supported;walker, front-wheeled  -DJ     Balance Interventions sitting;standing;sit to stand;supported;weight shifting activity  -DJ     Comment, Balance no LOB  -DJ               User Key  (r) = Recorded By, (t) = Taken By, (c) = Cosigned By      Initials Name Provider Type    Joyce Matthews, NAV[place]O Physical Therapist                   Goals/Plan       Row Name 06/10/23 1627          Bed Mobility Goal 1 (PT)    Time Frame (Bed Mobility Goal 1, PT) 10 days  -DJ     Progress/Outcomes (Bed Mobility Goal 1, PT) goal ongoing;progress slower than expected  -DJ       Row Name 06/10/23 1627          Transfer Goal 1 (PT)    Time Frame (Transfer Goal 1, PT) 10 days  -DJ     Progress/Outcome (Transfer Goal 1, PT) goal ongoing;progress slower than expected  -DJ       Row Name 06/10/23 1627          Gait Training Goal 1 (PT)    Time Frame (Gait Training Goal 1, PT) 10 days  -DJ     Progress/Outcome (Gait Training Goal 1, PT) goal ongoing;progress slower than expected  -DJ               User Key  (r) = Recorded By, (t) = Taken By, (c) = Cosigned By      Initials Name Provider Type    Joyce Matthews, PT Physical Therapist                    Clinical Impression       Row Name 06/10/23 1624          Pain    Pretreatment Pain Rating 0/10 - no pain  -DJ       Row Name 06/10/23 1624          Plan of Care Review    Plan of Care Reviewed With patient  -DJ     Progress no change  -DJ     Outcome Evaluation Pt resting in bed but agreeable to participate in PT to please his wife. Pt req min A and vc for sequencing to sit EOB; CGA to stand with r wx at EOB. Pt amb 10' around bed to reclinber with r wx and min A of 1-2, O2 decreases to low 80s so O2 increased from 4L to 6L while moving. Posture is flexed, balance is slightly unsteady but no LOB, endurance is poor. O2 returned to 90s with deep breathing. Pt placed in recliner and encouraged to use IS and perform LE ther ex. Pt requesting to call his wife and report his progress. Pt progressing slowly, limited by poor endurance. Cont PT to address functional deficits and prepare for d/c as appropriate.  -DJ       Row Name 06/10/23 1624          Therapy Assessment/Plan (PT)    Criteria for Skilled Interventions Met (PT) skilled treatment is necessary  -DJ       Row Name 06/10/23 1624          Vital Signs    O2 Delivery Pre Treatment nasal cannula  4L  -DJ     Intra SpO2 (%) 81  -DJ     O2 Delivery Intra Treatment nasal cannula  6L  -DJ     Post SpO2 (%) 91  -DJ     O2 Delivery Post Treatment nasal cannula  4L  -DJ     Pre Patient Position Supine  -DJ     Intra Patient Position Standing  -DJ     Post Patient Position Sitting  -DJ       Row Name 06/10/23 1624          Positioning and Restraints    Pre-Treatment Position in bed  -DJ     Post Treatment Position chair  -DJ     In Chair notified nsg;reclined;call light within reach;encouraged to call for assist;exit alarm on  -DJ               User Key  (r) = Recorded By, (t) = Taken By, (c) = Cosigned By      Initials Name Provider Type    Joyce Matthews, PT Physical Therapist                   Outcome Measures       Row Name 06/10/23 1628 06/10/23 0808       How much  help from another person do you currently need...    Turning from your back to your side while in flat bed without using bedrails? 3  -DJ 3  -LK    Moving from lying on back to sitting on the side of a flat bed without bedrails? 3  -DJ 3  -LK    Moving to and from a bed to a chair (including a wheelchair)? 3  -DJ 3  -LK    Standing up from a chair using your arms (e.g., wheelchair, bedside chair)? 3  -DJ 2  -LK    Climbing 3-5 steps with a railing? 2  -DJ 2  -LK    To walk in hospital room? 2  -DJ 2  -LK    AM-PAC 6 Clicks Score (PT) 16  -DJ 15  -LK    Highest level of mobility 5 --> Static standing  -DJ 4 --> Transferred to chair/commode  -LK      Row Name 06/10/23 1628          Functional Assessment    Outcome Measure Options AM-PAC 6 Clicks Basic Mobility (PT)  -DJ               User Key  (r) = Recorded By, (t) = Taken By, (c) = Cosigned By      Initials Name Provider Type    Joyce Matthews, PT Physical Therapist    Nanette Holt, RN Registered Nurse                                 Physical Therapy Education       Title: PT OT SLP Therapies (Done)       Topic: Physical Therapy (Done)       Point: Mobility training (Done)       Learning Progress Summary             Patient Acceptance, E, VU,NR by DJ at 6/10/2023 1629    Acceptance, E, VU by EM at 6/8/2023 1653    Acceptance, E, VU by AP at 6/8/2023 1545    Acceptance, E,TB, NR by ST at 6/7/2023 1416    Acceptance, E,TB,D, VU,NR by  at 6/5/2023 1608    Acceptance, E,TB,D, VU,NR by  at 6/3/2023 1606    Acceptance, E,TB, VU,NR by ST at 6/2/2023 1007                         Point: Home exercise program (Done)       Learning Progress Summary             Patient Acceptance, E, VU,NR by DJ at 6/10/2023 1629    Acceptance, E, VU by AP at 6/8/2023 1545    Acceptance, E,TB,D, VU,NR by SM at 6/5/2023 1608    Acceptance, E,TB,D, VU,NR by  at 6/3/2023 1606                         Point: Body mechanics (Done)       Learning Progress Summary             Patient  Acceptance, E, VU,NR by DJ at 6/10/2023 1629    Acceptance, E, VU by AP at 6/8/2023 1545    Acceptance, E,TB, NR by ST at 6/7/2023 1416    Acceptance, E,TB,D, VU,NR by  at 6/5/2023 1608    Acceptance, E,TB,D, VU,NR by  at 6/3/2023 1606    Acceptance, E,TB, VU,NR by  at 6/2/2023 1007                         Point: Precautions (Done)       Learning Progress Summary             Patient Acceptance, E, VU,NR by DJ at 6/10/2023 1629    Acceptance, E, VU by AP at 6/8/2023 1545    Acceptance, E,TB,D, VU,NR by  at 6/5/2023 1608    Acceptance, E,TB,D, VU,NR by  at 6/3/2023 1606                                         User Key       Initials Effective Dates Name Provider Type Discipline    EM 06/16/21 -  Yaima Shoemaker, PT Physical Therapist PT     03/07/18 -  Georgia Abraham, PTA Physical Therapist Assistant PT    DJ 10/25/19 -  Joyce Gerard, PT Physical Therapist PT     09/22/22 -  Margret Grace, PT Physical Therapist PT     01/17/23 -  Kellen Ortiz, RN Registered Nurse Nurse                  PT Recommendation and Plan     Plan of Care Reviewed With: patient  Progress: no change  Outcome Evaluation: Pt resting in bed but agreeable to participate in PT to please his wife. Pt req min A and vc for sequencing to sit EOB; CGA to stand with r wx at EOB. Pt amb 10' around bed to reclinber with r wx and min A of 1-2, O2 decreases to low 80s so O2 increased from 4L to 6L while moving. Posture is flexed, balance is slightly unsteady but no LOB, endurance is poor. O2 returned to 90s with deep breathing. Pt placed in recliner and encouraged to use IS and perform LE ther ex. Pt requesting to call his wife and report his progress. Pt progressing slowly, limited by poor endurance. Cont PT to address functional deficits and prepare for d/c as appropriate.     Time Calculation:    PT Charges       Row Name 06/10/23 1630             Time Calculation    Start Time 1556  -DJ      Stop Time 1616  -DJ       Time Calculation (min) 20 min  -DJ      PT Non-Billable Time (min) 10 min  -DJ      PT Received On 06/10/23  -MORRIS      PT - Next Appointment 06/12/23  -MORRIS      PT Goal Re-Cert Due Date 06/20/23  -MORRIS                User Key  (r) = Recorded By, (t) = Taken By, (c) = Cosigned By      Initials Name Provider Type    Joyce Matthews PT Physical Therapist                  Therapy Charges for Today       Code Description Service Date Service Provider Modifiers Qty    48053446071 HC PT RE-EVAL ESTABLISHED PLAN 2 6/10/2023 Joyce Gerard, PT GP 1    65561723620 HC PT THERAPEUTIC ACT EA 15 MIN 6/10/2023 Joyce Gerard, PT GP 1    45698224584 HC PT THER SUPP EA 15 MIN 6/10/2023 Joyce Gerard, PT GP 1            PT G-Codes  Outcome Measure Options: AM-PAC 6 Clicks Basic Mobility (PT)  AM-PAC 6 Clicks Score (PT): 16       Joyce Gerard PT  6/10/2023

## 2023-06-11 LAB
ANION GAP SERPL CALCULATED.3IONS-SCNC: 8 MMOL/L (ref 5–15)
BUN SERPL-MCNC: 21 MG/DL (ref 8–23)
BUN/CREAT SERPL: 16.2 (ref 7–25)
CALCIUM SPEC-SCNC: 8.8 MG/DL (ref 8.6–10.5)
CHLORIDE SERPL-SCNC: 105 MMOL/L (ref 98–107)
CO2 SERPL-SCNC: 25 MMOL/L (ref 22–29)
CREAT SERPL-MCNC: 1.3 MG/DL (ref 0.76–1.27)
DEPRECATED RDW RBC AUTO: 43.5 FL (ref 37–54)
EGFRCR SERPLBLD CKD-EPI 2021: 53.5 ML/MIN/1.73
ERYTHROCYTE [DISTWIDTH] IN BLOOD BY AUTOMATED COUNT: 13.2 % (ref 12.3–15.4)
GLUCOSE SERPL-MCNC: 96 MG/DL (ref 65–99)
HCT VFR BLD AUTO: 33.1 % (ref 37.5–51)
HGB BLD-MCNC: 10.7 G/DL (ref 13–17.7)
MCH RBC QN AUTO: 29.1 PG (ref 26.6–33)
MCHC RBC AUTO-ENTMCNC: 32.3 G/DL (ref 31.5–35.7)
MCV RBC AUTO: 89.9 FL (ref 79–97)
PLATELET # BLD AUTO: 194 10*3/MM3 (ref 140–450)
PMV BLD AUTO: 9.6 FL (ref 6–12)
POTASSIUM SERPL-SCNC: 3.6 MMOL/L (ref 3.5–5.2)
POTASSIUM SERPL-SCNC: 4 MMOL/L (ref 3.5–5.2)
RBC # BLD AUTO: 3.68 10*6/MM3 (ref 4.14–5.8)
SODIUM SERPL-SCNC: 138 MMOL/L (ref 136–145)
WBC NRBC COR # BLD: 13.05 10*3/MM3 (ref 3.4–10.8)

## 2023-06-11 PROCEDURE — 94761 N-INVAS EAR/PLS OXIMETRY MLT: CPT

## 2023-06-11 PROCEDURE — 94799 UNLISTED PULMONARY SVC/PX: CPT

## 2023-06-11 PROCEDURE — 80048 BASIC METABOLIC PNL TOTAL CA: CPT | Performed by: INTERNAL MEDICINE

## 2023-06-11 PROCEDURE — 85027 COMPLETE CBC AUTOMATED: CPT | Performed by: INTERNAL MEDICINE

## 2023-06-11 PROCEDURE — 84132 ASSAY OF SERUM POTASSIUM: CPT | Performed by: INTERNAL MEDICINE

## 2023-06-11 PROCEDURE — 94664 DEMO&/EVAL PT USE INHALER: CPT

## 2023-06-11 RX ORDER — POTASSIUM CHLORIDE 1.5 G/1.77G
40 POWDER, FOR SOLUTION ORAL EVERY 4 HOURS
Status: COMPLETED | OUTPATIENT
Start: 2023-06-11 | End: 2023-06-11

## 2023-06-11 RX ADMIN — TERAZOSIN HYDROCHLORIDE 10 MG: 5 CAPSULE ORAL at 20:42

## 2023-06-11 RX ADMIN — ROPINIROLE HYDROCHLORIDE 0.5 MG: 0.5 TABLET, FILM COATED ORAL at 20:42

## 2023-06-11 RX ADMIN — IPRATROPIUM BROMIDE AND ALBUTEROL SULFATE 3 ML: 2.5; .5 SOLUTION RESPIRATORY (INHALATION) at 08:51

## 2023-06-11 RX ADMIN — Medication 1000 UNITS: at 08:15

## 2023-06-11 RX ADMIN — Medication 10 ML: at 08:16

## 2023-06-11 RX ADMIN — METOPROLOL SUCCINATE 50 MG: 25 TABLET, EXTENDED RELEASE ORAL at 20:42

## 2023-06-11 RX ADMIN — LIDOCAINE 1 PATCH: 50 PATCH CUTANEOUS at 12:25

## 2023-06-11 RX ADMIN — GUAIFENESIN 600 MG: 600 TABLET, EXTENDED RELEASE ORAL at 08:15

## 2023-06-11 RX ADMIN — Medication 4 ML: at 21:18

## 2023-06-11 RX ADMIN — Medication 10 ML: at 20:43

## 2023-06-11 RX ADMIN — Medication 4 ML: at 08:53

## 2023-06-11 RX ADMIN — FUROSEMIDE 20 MG: 20 TABLET ORAL at 08:15

## 2023-06-11 RX ADMIN — METOPROLOL SUCCINATE 50 MG: 25 TABLET, EXTENDED RELEASE ORAL at 08:15

## 2023-06-11 RX ADMIN — DOCUSATE SODIUM 50MG AND SENNOSIDES 8.6MG 2 TABLET: 8.6; 5 TABLET, FILM COATED ORAL at 20:42

## 2023-06-11 RX ADMIN — POTASSIUM CHLORIDE 40 MEQ: 1.5 POWDER, FOR SOLUTION ORAL at 08:14

## 2023-06-11 RX ADMIN — CLONAZEPAM 1 MG: 1 TABLET ORAL at 20:42

## 2023-06-11 RX ADMIN — APIXABAN 2.5 MG: 2.5 TABLET, FILM COATED ORAL at 08:14

## 2023-06-11 RX ADMIN — IPRATROPIUM BROMIDE AND ALBUTEROL SULFATE 3 ML: 2.5; .5 SOLUTION RESPIRATORY (INHALATION) at 21:14

## 2023-06-11 RX ADMIN — GUAIFENESIN 600 MG: 600 TABLET, EXTENDED RELEASE ORAL at 20:43

## 2023-06-11 RX ADMIN — POTASSIUM CHLORIDE 40 MEQ: 1.5 POWDER, FOR SOLUTION ORAL at 12:25

## 2023-06-11 RX ADMIN — PANTOPRAZOLE SODIUM 40 MG: 40 TABLET, DELAYED RELEASE ORAL at 08:14

## 2023-06-11 RX ADMIN — OLANZAPINE 2.5 MG: 2.5 TABLET ORAL at 18:29

## 2023-06-11 RX ADMIN — IPRATROPIUM BROMIDE AND ALBUTEROL SULFATE 3 ML: 2.5; .5 SOLUTION RESPIRATORY (INHALATION) at 11:10

## 2023-06-11 RX ADMIN — IPRATROPIUM BROMIDE AND ALBUTEROL SULFATE 3 ML: 2.5; .5 SOLUTION RESPIRATORY (INHALATION) at 15:52

## 2023-06-11 RX ADMIN — ROSUVASTATIN CALCIUM 40 MG: 40 TABLET, FILM COATED ORAL at 20:42

## 2023-06-11 RX ADMIN — DOCUSATE SODIUM 50MG AND SENNOSIDES 8.6MG 2 TABLET: 8.6; 5 TABLET, FILM COATED ORAL at 08:14

## 2023-06-11 RX ADMIN — ACETAMINOPHEN 650 MG: 325 TABLET, FILM COATED ORAL at 20:56

## 2023-06-11 NOTE — PROGRESS NOTES
" LOS: 11 days     Name: Art Mullins  Age: 87 y.o.  Sex: male  :  1935  MRN: [id number]         Primary Care Physician: Santos Simmons MD    Subjective   Subjective  Lying on the bed and appears to be breathing better per patient as well as the family.  Currently requiring 42.3 L nasal cannula.  Denies cough or fever.    Objective   Vital Signs  Temp:  [98.1 °F (36.7 °C)-99.6 °F (37.6 °C)] 98.1 °F (36.7 °C)  Heart Rate:  [] 97  Resp:  [18-20] 18  BP: (104-139)/(50-74) 127/51  Body mass index is 24.68 kg/m².    Objective:  General Appearance:  Comfortable and in no acute distress.    Vital signs: (most recent): Blood pressure 127/51, pulse 97, temperature 98.1 °F (36.7 °C), temperature source Oral, resp. rate 18, height 175.3 cm (69\"), weight 75.8 kg (167 lb 1.7 oz), SpO2 91 %.    Lungs:  Normal effort and normal respiratory rate.  He is not in respiratory distress.  There are decreased breath sounds.    Heart: Normal rate.  Regular rhythm.    Abdomen: Abdomen is soft.  Bowel sounds are normal.   There is no abdominal tenderness.     Extremities: There is no dependent edema or local swelling.    Neurological: Patient is alert and oriented to person, place and time.    Skin:  Warm and dry.              Results Review:       I reviewed the patient's new clinical results.    Results from last 7 days   Lab Units 23  0344 23  0407 23  0829 23  0339 23  0535   WBC 10*3/mm3 13.05* 12.18* 13.99* 13.07* 12.10*   HEMOGLOBIN g/dL 10.7* 9.5* 10.8* 10.2* 10.4*   PLATELETS 10*3/mm3 194 159 180 195 182       Results from last 7 days   Lab Units 23  0344 23  0407 23  1837 23  0829 23  0339 23  1538 23  0535   SODIUM mmol/L 138 139  --  138 141  --  138   POTASSIUM mmol/L 3.6 4.3 4.7 3.5 3.8 3.7 3.7   CHLORIDE mmol/L 105 108*  --  106 106  --  103   CO2 mmol/L 25.0 22.5  --  23.0 25.1  --  23.1   BUN mg/dL 21 22  --  25* 28*  --  32* "   CREATININE mg/dL 1.30* 1.36*  --  1.47* 1.35*  --  1.62*   CALCIUM mg/dL 8.8 8.7  --  8.5* 9.2  --  9.2   GLUCOSE mg/dL 96 99  --  120* 95  --  94                   Scheduled Meds:   apixaban, 2.5 mg, Oral, Q12H  cholecalciferol, 1,000 Units, Oral, Daily  clonazePAM, 1 mg, Oral, Nightly  furosemide, 20 mg, Oral, Daily  guaiFENesin, 600 mg, Oral, Q12H  ipratropium-albuterol, 3 mL, Nebulization, 4x Daily - RT  lidocaine, 1 patch, Transdermal, Q24H  metoprolol succinate XL, 50 mg, Oral, Q12H  OLANZapine, 2.5 mg, Oral, Daily With Dinner  pantoprazole, 40 mg, Oral, Daily  rOPINIRole, 0.5 mg, Oral, Nightly  rosuvastatin, 40 mg, Oral, Nightly  senna-docusate sodium, 2 tablet, Oral, BID  sodium chloride, 10 mL, Intravenous, Q12H  sodium chloride, 4 mL, Nebulization, BID - RT  terazosin, 10 mg, Oral, Nightly      PRN Meds:     acetaminophen **OR** acetaminophen **OR** acetaminophen    senna-docusate sodium **AND** polyethylene glycol **AND** bisacodyl **AND** bisacodyl    hold    hold    nitroglycerin    ondansetron    Potassium Replacement - Follow Nurse / BPA Driven Protocol    sodium chloride    sodium chloride    sodium chloride  Continuous Infusions:  hold, 1 each  hold, 1 each        Assessment & Plan   Active Hospital Problems    Diagnosis  POA    **Pneumonia due to infectious organism, unspecified laterality, unspecified part of lung [J18.9]  Yes    History of CVA (cerebrovascular accident) [Z86.73]  Not Applicable    Acute respiratory failure with hypoxia [J96.01]  Yes    Coronary artery disease without angina pectoris [I25.10]  Yes    Altered mental status, unspecified altered mental status type [R41.82]  Yes    Chronic diastolic CHF (congestive heart failure) [I50.32]  Yes    Stage 3a chronic kidney disease [N18.31]  Yes    Elevated troponin [R77.8]  Yes    Essential hypertension [I10]  Yes      Resolved Hospital Problems   No resolved problems to display.       Assessment & Plan    88yo gentleman who presented  to the hospital from SNF for chest pain as well as cough. He was noted to be 88-89% on RA. He has been admitted at this facility twice in the last month related to recent CVA (felt to be due to cardioembolic source) as well as CHF/type II NSTEMI. In ER he was found to have left sided PNA.     Left pleural effusion, pneumonia, atelectasis: Status post thoracentesis and fluid analysis was consistent with exudate and so far cultures did not reveal any growth.  Completed a course of Zosyn for 7 days and repeat CT no significant change.  Pulmonary feels may need repeat thoracentesis therefore Eliquis will be held for the moment.  Acute respiratory failure: Secondary to above and currently requiring 2 to 3 days of oxygen.  Dysphagia: Mild and on a regular diet.  Speech therapy input appreciated.  Altered mental status: Neurology feels this is metabolic encephalopathy in setting of what is probably some mild vascular dementia present on admission.  Continue present regimen.  Suspect he is presently at his baseline.  Recent CVA with left hemiparesis: Eliquis resumed status post thoracentesis.  May need to place this back on hold depending on results of chest CT  CAD: Stable.  Chronic diastolic HF: Presently appears euvolemic.  Lasix was held secondary to increasing creatinine.  Renal function appears stable.  Diuretics resumed at a lower dose and continue to monitor ins and outs closely.  CKD3a: Renal function at baseline.  HTN: Blood pressure overall improved and stable.  Neck pain: X-ray unremarkable.  Continue conservative measures  Hypokalemia: Monitor and replace as needed     Estimated discharge date, to be reminded    Copied text on this note has been reviewed by me on 6/11/2023    David Montoya MD  [facility]  06/11/23  10:45 EDT

## 2023-06-11 NOTE — PLAN OF CARE
Goal Outcome Evaluation:    Progress: no change  Outcome Evaluation: Vitals stable. Weaned to 3L. Confusion continues - answers all questions but forgetful on place and situation at times. Sat up in chair this AM. Thoracentesis ordered for tomorrow - eliquis must be held for 24 hours (last given 6/11 @ 0814). Son at bedside. Patient stable and needs met at this time.

## 2023-06-11 NOTE — PROGRESS NOTES
[facility] INPATIENT PROGRESS NOTE         [address]    2023      PATIENT IDENTIFICATION:  Name: Art Mullins ADMIT: 2023   : 1935  PCP: Santos Simmons MD    MRN: [id number] LOS: 11 days   AGE/SEX: 87 y.o. male  ROOM: [facility]                     LOS 11    Reason for visit: Pneumonia, pleural effusion pulm hypertension        SUBJECTIVE:      Patient is still a bit confused but answers questions appropriately.  Denies productive cough or chest pain.      Objective   OBJECTIVE:    Vital Sign Min/Max for last 24 hours  Temp  Min: 97.8 °F (36.6 °C)  Max: 99.6 °F (37.6 °C)   BP  Min: 104/53  Max: 139/72   Pulse  Min: 97  Max: 108   Resp  Min: 18  Max: 26   SpO2  Min: 90 %  Max: 96 %   No data recorded   Weight  Min: 75.8 kg (167 lb 1.7 oz)  Max: 75.8 kg (167 lb 1.7 oz)                         Body mass index is 24.68 kg/m².    Intake/Output Summary (Last 24 hours) at 2023 1252  Last data filed at 2023 1225  Gross per 24 hour   Intake 480 ml   Output --   Net 480 ml         Exam:  GEN:  No distress, appears stated age  EYES:   PERRL, anicteric sclerae  ENT:    External ears/nose normal, OP clear  NECK:  No adenopathy, midline trachea  LUNGS: Normal chest on inspection, palpation and diminished breath sounds bilaterally left greater than right on auscultation  CV:  Normal S1S2, without murmur  ABD:  Nontender, nondistended, no hepatosplenomegaly, +BS  EXT:  No edema.  No cyanosis or clubbing.  No mottling and normal cap refill.    Assessment     Scheduled meds:  apixaban, 2.5 mg, Oral, Q12H  cholecalciferol, 1,000 Units, Oral, Daily  clonazePAM, 1 mg, Oral, Nightly  furosemide, 20 mg, Oral, Daily  guaiFENesin, 600 mg, Oral, Q12H  ipratropium-albuterol, 3 mL, Nebulization, 4x Daily - RT  lidocaine, 1 patch, Transdermal, Q24H  metoprolol succinate XL, 50 mg, Oral, Q12H  OLANZapine, 2.5 mg, Oral, Daily With Dinner  pantoprazole, 40 mg, Oral, Daily  rOPINIRole, 0.5 mg,  Oral, Nightly  rosuvastatin, 40 mg, Oral, Nightly  senna-docusate sodium, 2 tablet, Oral, BID  sodium chloride, 10 mL, Intravenous, Q12H  sodium chloride, 4 mL, Nebulization, BID - RT  terazosin, 10 mg, Oral, Nightly      IV meds:                      hold, 1 each      Data Review:  Results from last 7 days   Lab Units 06/11/23  0344 06/09/23  0407 06/08/23  1837 06/08/23  0829 06/07/23  0339 06/05/23  1538 06/05/23  0535   SODIUM mmol/L 138 139  --  138 141  --  138   POTASSIUM mmol/L 3.6 4.3 4.7 3.5 3.8   < > 3.7   CHLORIDE mmol/L 105 108*  --  106 106  --  103   CO2 mmol/L 25.0 22.5  --  23.0 25.1  --  23.1   BUN mg/dL 21 22  --  25* 28*  --  32*   CREATININE mg/dL 1.30* 1.36*  --  1.47* 1.35*  --  1.62*   GLUCOSE mg/dL 96 99  --  120* 95  --  94   CALCIUM mg/dL 8.8 8.7  --  8.5* 9.2  --  9.2    < > = values in this interval not displayed.         Estimated Creatinine Clearance: 43.7 mL/min (A) (by C-G formula based on SCr of 1.3 mg/dL (H)).  Results from last 7 days   Lab Units 06/11/23  0344 06/09/23  0407 06/08/23  0829 06/07/23  0339 06/05/23  0535   WBC 10*3/mm3 13.05* 12.18* 13.99* 13.07* 12.10*   HEMOGLOBIN g/dL 10.7* 9.5* 10.8* 10.2* 10.4*   PLATELETS 10*3/mm3 194 159 180 195 182                   Results from last 7 days   Lab Units 06/05/23  0535   PROCALCITONIN ng/mL 0.28*         No results found for: HGBA1C, POCGLU    Chest x-ray 6/9 reviewed: Cardiac enlargement with pulmonary vasculature stable.  Increased left pleural effusion with atelectasis/infiltrate.        CT chest 6/10 reviewed shows left sided pleural effusion with atelectasis of the left lower lobe.  Small amount of pleural fluid tracking into the major fissure.  Hiatal hernia noted.        Microbiology reviewed                  Active Hospital Problems    Diagnosis  POA    **Pneumonia due to infectious organism, unspecified laterality, unspecified part of lung [J18.9]  Yes    History of CVA (cerebrovascular accident) [Z86.73]  Not  Applicable    Acute respiratory failure with hypoxia [J96.01]  Yes    Coronary artery disease without angina pectoris [I25.10]  Yes    Altered mental status, unspecified altered mental status type [R41.82]  Yes    Chronic diastolic CHF (congestive heart failure) [I50.32]  Yes    Stage 3a chronic kidney disease [N18.31]  Yes    Elevated troponin [R77.8]  Yes    Essential hypertension [I10]  Yes      Resolved Hospital Problems   No resolved problems to display.         ASSESSMENT:  Left lower lobe pneumonia  Left-sided, exudative pleural effusion  Acute on chronic diastolic CHF  Severe pulm hypertension  CAD  Chronic kidney disease  Metabolic encephalopathy  History of stroke      PLAN:    Encourage pulmonary toilet.  Aspiration precautions.  Noted Dr. Azevedo's note suggesting questionable loculation and possible thoracic surgery consultation however repeat CT shows pleural fluid but it does not really look loculated.  I think for step is just to repeat thoracentesis.        Juanjose Skinner MD  Pulmonary and Critical Care Medicine  [facility]  6/11/2023    12:52 EDT

## 2023-06-11 NOTE — PLAN OF CARE
Goal Outcome Evaluation:               CT chest completed last night.VSS,continues on 4L 02 via humidified N/C,denies any SOB.Pt A&O*3,Q2H turns WCTM.

## 2023-06-12 ENCOUNTER — TELEPHONE (OUTPATIENT)
Dept: NEUROLOGY | Facility: CLINIC | Age: 88
End: 2023-06-12
Payer: MEDICARE

## 2023-06-12 ENCOUNTER — APPOINTMENT (OUTPATIENT)
Dept: ULTRASOUND IMAGING | Facility: HOSPITAL | Age: 88
DRG: 193 | End: 2023-06-12
Payer: MEDICARE

## 2023-06-12 LAB
ALBUMIN FLD-MCNC: 2 G/DL
ANION GAP SERPL CALCULATED.3IONS-SCNC: 9 MMOL/L (ref 5–15)
APPEARANCE FLD: ABNORMAL
BACTERIA FLD CULT: NORMAL
BACTERIA SPEC ANAEROBE CULT: NORMAL
BASOPHILS # BLD AUTO: 0.09 10*3/MM3 (ref 0–0.2)
BASOPHILS NFR BLD AUTO: 0.7 % (ref 0–1.5)
BUN SERPL-MCNC: 20 MG/DL (ref 8–23)
BUN/CREAT SERPL: 15 (ref 7–25)
CALCIUM SPEC-SCNC: 8.7 MG/DL (ref 8.6–10.5)
CHLORIDE SERPL-SCNC: 107 MMOL/L (ref 98–107)
CO2 SERPL-SCNC: 25 MMOL/L (ref 22–29)
COLOR FLD: ABNORMAL
CREAT SERPL-MCNC: 1.33 MG/DL (ref 0.76–1.27)
DEPRECATED RDW RBC AUTO: 43.4 FL (ref 37–54)
EGFRCR SERPLBLD CKD-EPI 2021: 52.1 ML/MIN/1.73
EOSINOPHIL # BLD AUTO: 0.22 10*3/MM3 (ref 0–0.4)
EOSINOPHIL NFR BLD AUTO: 1.8 % (ref 0.3–6.2)
ERYTHROCYTE [DISTWIDTH] IN BLOOD BY AUTOMATED COUNT: 13.2 % (ref 12.3–15.4)
GLUCOSE FLD-MCNC: 108 MG/DL
GLUCOSE SERPL-MCNC: 93 MG/DL (ref 65–99)
GRAM STN SPEC: NORMAL
GRAM STN SPEC: NORMAL
HCT VFR BLD AUTO: 32.7 % (ref 37.5–51)
HGB BLD-MCNC: 10.7 G/DL (ref 13–17.7)
IMM GRANULOCYTES # BLD AUTO: 0.07 10*3/MM3 (ref 0–0.05)
IMM GRANULOCYTES NFR BLD AUTO: 0.6 % (ref 0–0.5)
LDH FLD-CCNC: 411 U/L
LYMPHOCYTES # BLD AUTO: 1.33 10*3/MM3 (ref 0.7–3.1)
LYMPHOCYTES NFR BLD AUTO: 10.7 % (ref 19.6–45.3)
LYMPHOCYTES NFR FLD MANUAL: 35 %
MCH RBC QN AUTO: 29.5 PG (ref 26.6–33)
MCHC RBC AUTO-ENTMCNC: 32.7 G/DL (ref 31.5–35.7)
MCV RBC AUTO: 90.1 FL (ref 79–97)
METHOD: ABNORMAL
MONOCYTES # BLD AUTO: 1.09 10*3/MM3 (ref 0.1–0.9)
MONOCYTES NFR BLD AUTO: 8.8 % (ref 5–12)
MONOS+MACROS NFR FLD: 58 %
NEUTROPHILS NFR BLD AUTO: 77.4 % (ref 42.7–76)
NEUTROPHILS NFR BLD AUTO: 9.65 10*3/MM3 (ref 1.7–7)
NEUTROPHILS NFR FLD MANUAL: 7 %
NRBC BLD AUTO-RTO: 0 /100 WBC (ref 0–0.2)
NUC CELL # FLD: 1245 /MM3
PH FLD: 7.53 [PH]
PLATELET # BLD AUTO: 177 10*3/MM3 (ref 140–450)
PMV BLD AUTO: 9.6 FL (ref 6–12)
POTASSIUM SERPL-SCNC: 4.2 MMOL/L (ref 3.5–5.2)
PROT FLD-MCNC: 3.7 G/DL
RBC # BLD AUTO: 3.63 10*6/MM3 (ref 4.14–5.8)
RBC # FLD AUTO: ABNORMAL /MM3
SODIUM SERPL-SCNC: 141 MMOL/L (ref 136–145)
WBC NRBC COR # BLD: 12.45 10*3/MM3 (ref 3.4–10.8)

## 2023-06-12 PROCEDURE — 89051 BODY FLUID CELL COUNT: CPT | Performed by: INTERNAL MEDICINE

## 2023-06-12 PROCEDURE — 94799 UNLISTED PULMONARY SVC/PX: CPT

## 2023-06-12 PROCEDURE — 85025 COMPLETE CBC W/AUTO DIFF WBC: CPT | Performed by: INTERNAL MEDICINE

## 2023-06-12 PROCEDURE — 88112 CYTOPATH CELL ENHANCE TECH: CPT | Performed by: INTERNAL MEDICINE

## 2023-06-12 PROCEDURE — 83615 LACTATE (LD) (LDH) ENZYME: CPT | Performed by: INTERNAL MEDICINE

## 2023-06-12 PROCEDURE — 88305 TISSUE EXAM BY PATHOLOGIST: CPT | Performed by: INTERNAL MEDICINE

## 2023-06-12 PROCEDURE — 84157 ASSAY OF PROTEIN OTHER: CPT | Performed by: INTERNAL MEDICINE

## 2023-06-12 PROCEDURE — 87070 CULTURE OTHR SPECIMN AEROBIC: CPT | Performed by: INTERNAL MEDICINE

## 2023-06-12 PROCEDURE — 76942 ECHO GUIDE FOR BIOPSY: CPT

## 2023-06-12 PROCEDURE — 87116 MYCOBACTERIA CULTURE: CPT | Performed by: INTERNAL MEDICINE

## 2023-06-12 PROCEDURE — 94664 DEMO&/EVAL PT USE INHALER: CPT

## 2023-06-12 PROCEDURE — 80048 BASIC METABOLIC PNL TOTAL CA: CPT | Performed by: INTERNAL MEDICINE

## 2023-06-12 PROCEDURE — 87205 SMEAR GRAM STAIN: CPT | Performed by: INTERNAL MEDICINE

## 2023-06-12 PROCEDURE — 0 LIDOCAINE 1 % SOLUTION: Performed by: RADIOLOGY

## 2023-06-12 PROCEDURE — 82042 OTHER SOURCE ALBUMIN QUAN EA: CPT | Performed by: INTERNAL MEDICINE

## 2023-06-12 PROCEDURE — 87206 SMEAR FLUORESCENT/ACID STAI: CPT | Performed by: INTERNAL MEDICINE

## 2023-06-12 PROCEDURE — 82945 GLUCOSE OTHER FLUID: CPT | Performed by: INTERNAL MEDICINE

## 2023-06-12 PROCEDURE — 83986 ASSAY PH BODY FLUID NOS: CPT | Performed by: INTERNAL MEDICINE

## 2023-06-12 PROCEDURE — 87015 SPECIMEN INFECT AGNT CONCNTJ: CPT | Performed by: INTERNAL MEDICINE

## 2023-06-12 RX ORDER — LIDOCAINE HYDROCHLORIDE 10 MG/ML
20 INJECTION, SOLUTION INFILTRATION; PERINEURAL ONCE
Status: COMPLETED | OUTPATIENT
Start: 2023-06-12 | End: 2023-06-12

## 2023-06-12 RX ADMIN — IPRATROPIUM BROMIDE AND ALBUTEROL SULFATE 3 ML: 2.5; .5 SOLUTION RESPIRATORY (INHALATION) at 20:44

## 2023-06-12 RX ADMIN — OLANZAPINE 2.5 MG: 2.5 TABLET ORAL at 17:58

## 2023-06-12 RX ADMIN — ACETAMINOPHEN 650 MG: 325 TABLET, FILM COATED ORAL at 12:20

## 2023-06-12 RX ADMIN — FUROSEMIDE 20 MG: 20 TABLET ORAL at 08:07

## 2023-06-12 RX ADMIN — IPRATROPIUM BROMIDE AND ALBUTEROL SULFATE 3 ML: 2.5; .5 SOLUTION RESPIRATORY (INHALATION) at 07:21

## 2023-06-12 RX ADMIN — GUAIFENESIN 600 MG: 600 TABLET, EXTENDED RELEASE ORAL at 08:07

## 2023-06-12 RX ADMIN — Medication 4 ML: at 07:24

## 2023-06-12 RX ADMIN — METOPROLOL SUCCINATE 50 MG: 25 TABLET, EXTENDED RELEASE ORAL at 21:06

## 2023-06-12 RX ADMIN — PANTOPRAZOLE SODIUM 40 MG: 40 TABLET, DELAYED RELEASE ORAL at 08:08

## 2023-06-12 RX ADMIN — IPRATROPIUM BROMIDE AND ALBUTEROL SULFATE 3 ML: 2.5; .5 SOLUTION RESPIRATORY (INHALATION) at 10:51

## 2023-06-12 RX ADMIN — GUAIFENESIN 600 MG: 600 TABLET, EXTENDED RELEASE ORAL at 21:06

## 2023-06-12 RX ADMIN — ROPINIROLE HYDROCHLORIDE 0.5 MG: 0.5 TABLET, FILM COATED ORAL at 21:06

## 2023-06-12 RX ADMIN — CLONAZEPAM 1 MG: 1 TABLET ORAL at 21:06

## 2023-06-12 RX ADMIN — TERAZOSIN HYDROCHLORIDE 10 MG: 5 CAPSULE ORAL at 21:07

## 2023-06-12 RX ADMIN — Medication 10 ML: at 08:09

## 2023-06-12 RX ADMIN — Medication 4 ML: at 20:49

## 2023-06-12 RX ADMIN — Medication 10 ML: at 21:07

## 2023-06-12 RX ADMIN — APIXABAN 2.5 MG: 2.5 TABLET, FILM COATED ORAL at 21:06

## 2023-06-12 RX ADMIN — IPRATROPIUM BROMIDE AND ALBUTEROL SULFATE 3 ML: 2.5; .5 SOLUTION RESPIRATORY (INHALATION) at 14:55

## 2023-06-12 RX ADMIN — Medication 1000 UNITS: at 08:08

## 2023-06-12 RX ADMIN — LIDOCAINE HYDROCHLORIDE 17 ML: 10 INJECTION, SOLUTION INFILTRATION; PERINEURAL at 11:48

## 2023-06-12 RX ADMIN — LIDOCAINE 1 PATCH: 50 PATCH CUTANEOUS at 08:00

## 2023-06-12 RX ADMIN — ROSUVASTATIN CALCIUM 40 MG: 40 TABLET, FILM COATED ORAL at 21:06

## 2023-06-12 RX ADMIN — METOPROLOL SUCCINATE 50 MG: 25 TABLET, EXTENDED RELEASE ORAL at 08:07

## 2023-06-12 NOTE — TELEPHONE ENCOUNTER
Spoke with patients wife and rescheduled hospital follow up on 8/1/23 to 9/14/23. Sent reminder letter.

## 2023-06-12 NOTE — PLAN OF CARE
Problem: Adult Inpatient Plan of Care  Goal: Plan of Care Review  Outcome: Ongoing, Progressing  Flowsheets (Taken 6/12/2023 1022)  Progress: no change  Plan of Care Reviewed With: patient  Outcome Evaluation: VSS. On 3-4L O2 per NC. Left thoracentesis completed today-700ml removed. Confusion on/off throughout shift, forgetful.  Q2turn. Sat up in chair. Family visited. Pt stable and needs met at this time.

## 2023-06-12 NOTE — PLAN OF CARE
Goal Outcome Evaluation:         VSS.Pt continues on 3L humidified 02.Sats mostly sustaining btw 88 & 90%.Possible thoracentesis today per pulm.A&O*2,forgetful,Q2H turns,WCTM.

## 2023-06-12 NOTE — PROGRESS NOTES
"                                              LOS: 12 days   Patient Care Team:  Santos Simmons MD as PCP - General (Family Medicine)  Aashish Thapa MD as Consulting Physician (Cardiology)    Chief Complaint:  F/up pneumonia and pleural effusion    Subjective   Interval History  I reviewed the admission note, progress notes, PMH, PSH, Family hx, social history, imagings and prior records on this admission, summarized the finding in my note and formulated a transition of care plan.    On oxygen 3 L/min.  No cough or dyspnea.  Underwent Thora today.    REVIEW OF SYSTEMS:   CARDIOVASCULAR: No chest pain, chest pressure or chest discomfort. No palpitations or edema.     GASTROINTESTINAL: No anorexia, nausea, vomiting or diarrhea. No abdominal pain.  CONSTITUTIONAL: No fever or chills.     Ventilator/Non-Invasive Ventilation Settings (From admission, onward)      None                  Physical Exam:     Vital Signs  Temp:  [97.3 °F (36.3 °C)-98.7 °F (37.1 °C)] 97.3 °F (36.3 °C)  Heart Rate:  [] 83  Resp:  [16-20] 18  BP: (101-132)/(60-76) 116/65    Intake/Output Summary (Last 24 hours) at 6/12/2023 1536  Last data filed at 6/12/2023 1425  Gross per 24 hour   Intake 220 ml   Output 700 ml   Net -480 ml     Flowsheet Rows      Flowsheet Row First Filed Value   Admission Height 175.3 cm (69\") Documented at 05/31/2023 0818   Admission Weight 83.9 kg (185 lb) Documented at 05/31/2023 0818            PPE used per hospital policy    General Appearance:   Alert, cooperative, in no acute distress   ENMT:  Mallampati score 3, moist mucous membrane   Eyes:  Pupils equal and reactive to light. EOMI   Neck:   Large. Trachea midline. No thyromegaly.   Lungs:   Decreased air entry on the left base on anterior auscultation.  No wheezing.  No labored breathing    Heart:   Regular rhythm and normal rate, normal S1 and S2, no         murmur   Skin:   No rash or ecchymosis   Abdomen:    Obese. Soft. No tenderness. No " HSM.   Neuro/psych:  Conscious, alert, oriented x3. Strength 5/5 in upper and lower  ext.  Appropriate mood and affect   Extremities:  No cyanosis, clubbing or edema.  Warm extremities and well-perfused          Results Review:        Results from last 7 days   Lab Units 06/12/23 0452 06/11/23 1754 06/11/23 0344 06/09/23  0407   SODIUM mmol/L 141  --  138 139   POTASSIUM mmol/L 4.2 4.0 3.6 4.3   CHLORIDE mmol/L 107  --  105 108*   CO2 mmol/L 25.0  --  25.0 22.5   BUN mg/dL 20  --  21 22   CREATININE mg/dL 1.33*  --  1.30* 1.36*   GLUCOSE mg/dL 93  --  96 99   CALCIUM mg/dL 8.7  --  8.8 8.7         Results from last 7 days   Lab Units 06/12/23 0452 06/11/23 0344 06/09/23  0407   WBC 10*3/mm3 12.45* 13.05* 12.18*   HEMOGLOBIN g/dL 10.7* 10.7* 9.5*   HEMATOCRIT % 32.7* 33.1* 28.5*   PLATELETS 10*3/mm3 177 194 159                                   I reviewed the patient's new clinical results.        Medication Review:   apixaban, 2.5 mg, Oral, Q12H  cholecalciferol, 1,000 Units, Oral, Daily  clonazePAM, 1 mg, Oral, Nightly  furosemide, 20 mg, Oral, Daily  guaiFENesin, 600 mg, Oral, Q12H  ipratropium-albuterol, 3 mL, Nebulization, 4x Daily - RT  lidocaine, 1 patch, Transdermal, Q24H  metoprolol succinate XL, 50 mg, Oral, Q12H  OLANZapine, 2.5 mg, Oral, Daily With Dinner  pantoprazole, 40 mg, Oral, Daily  rOPINIRole, 0.5 mg, Oral, Nightly  rosuvastatin, 40 mg, Oral, Nightly  senna-docusate sodium, 2 tablet, Oral, BID  sodium chloride, 10 mL, Intravenous, Q12H  sodium chloride, 4 mL, Nebulization, BID - RT  terazosin, 10 mg, Oral, Nightly        hold, 1 each  hold, 1 each        Diagnostic imaging:  I personally and independently reviewed the following images:  US chest 6/12/23: Left pleural effusion, no septation.  CT chest 6/10/23: Left lower lobe consolidation.  Left pleural effusion, moderate.    Assessment     Left lower lobe pneumonia  Left pleural effusion s/p Thora 6/7 and 6/12 with removal of 607 100 mL  respectively, exudative by LDH and not protein.  Likely parapneumonic.  Cultures negative and pH normal.  Cytology normal.  Severe pulmonary hypertension on echo 5/18/2023: RVSP 70.  Mild to moderate MR and AR.  LA mildly dilated      All problems new to me    Plan     DuoNeb 4 times a day to improve mucus clearance  Oxygen by NC and titrate to keep SPO2 >90%  Incentive spirometry  Repeat CXR prior to DC and may be as outpatient.  If recurrence of fluid then consider thoracentesis again and perhaps additional interventions such as small bore chest tube.  At this point, the fluid is parapneumonic and the studies are not consistent with empyema (normal pH and negative culture).  In addition, patient does not appear to be septic.  No need to extend antibiotic therapy further than the recommended 5-7 days.        Kala Howell MD  06/12/23  15:36 EDT          This note was dictated utilizing [employer] dictation

## 2023-06-12 NOTE — CASE MANAGEMENT/SOCIAL WORK
Continued Stay Note  [facility]     Patient Name: Art Mullins  MRN: [id number]  Today's Date: 6/12/2023    Admit Date: 5/31/2023    Plan: Return to [facility]   Discharge Plan     Row Name 06/12/23 1716       Plan    Plan Return to [facility]    Patient/Family in Agreement with Plan yes    Plan Comments CCP met with patient and spouse at bedside to discuss discharge planning. They confirm plans to return to [facility]. Patient will need transport. Casie BERGMAN RN/CCP               Discharge Codes    No documentation.               Expected Discharge Date and Time     Expected Discharge Date Expected Discharge Time    Jun 14, 2023             Georgia Noriega

## 2023-06-12 NOTE — PROGRESS NOTES
" LOS: 12 days     Name: Art Mullins  Age: 87 y.o.  Sex: male  :  1935  MRN: [id number]         Primary Care Physician: Santos Simmons MD    Subjective   Subjective    Patient is lying on the bed and appears weak and lethargic but does not appear to be any distress.  Denies nausea, vomiting abdominal pain, chest pain.    Objective   Vital Signs  Temp:  [98.1 °F (36.7 °C)-98.7 °F (37.1 °C)] 98.7 °F (37.1 °C)  Heart Rate:  [] 87  Resp:  [16-20] 16  BP: (101-132)/(51-76) 112/76  Body mass index is 24.68 kg/m².    Objective:  General Appearance:  Comfortable and in no acute distress.    Vital signs: (most recent): Blood pressure 112/76, pulse 87, temperature 98.7 °F (37.1 °C), temperature source Oral, resp. rate 16, height 175.3 cm (69\"), weight 75.8 kg (167 lb 1.7 oz), SpO2 96 %.    Lungs:  Normal effort and normal respiratory rate.  He is not in respiratory distress.  There are decreased breath sounds.    Heart: Normal rate.  Regular rhythm.    Abdomen: Abdomen is soft.  Bowel sounds are normal.   There is no abdominal tenderness.     Extremities: There is no dependent edema or local swelling.    Neurological: Patient is alert and oriented to person, place and time.    Skin:  Warm and dry.              Results Review:       I reviewed the patient's new clinical results.    Results from last 7 days   Lab Units 23  0452 23  0344 23  0407 23  0829 23  0339   WBC 10*3/mm3 12.45* 13.05* 12.18* 13.99* 13.07*   HEMOGLOBIN g/dL 10.7* 10.7* 9.5* 10.8* 10.2*   PLATELETS 10*3/mm3 177 194 159 180 195       Results from last 7 days   Lab Units 23  0452 23  1754 23  0344 23  0407 23  1837 23  0829 23  0339   SODIUM mmol/L 141  --  138 139  --  138 141   POTASSIUM mmol/L 4.2 4.0 3.6 4.3 4.7 3.5 3.8   CHLORIDE mmol/L 107  --  105 108*  --  106 106   CO2 mmol/L 25.0  --  25.0 22.5  --  23.0 25.1   BUN mg/dL 20  --  21 22  --  25* 28* "   CREATININE mg/dL 1.33*  --  1.30* 1.36*  --  1.47* 1.35*   CALCIUM mg/dL 8.7  --  8.8 8.7  --  8.5* 9.2   GLUCOSE mg/dL 93  --  96 99  --  120* 95                   Scheduled Meds:   apixaban, 2.5 mg, Oral, Q12H  cholecalciferol, 1,000 Units, Oral, Daily  clonazePAM, 1 mg, Oral, Nightly  furosemide, 20 mg, Oral, Daily  guaiFENesin, 600 mg, Oral, Q12H  ipratropium-albuterol, 3 mL, Nebulization, 4x Daily - RT  lidocaine, 1 patch, Transdermal, Q24H  metoprolol succinate XL, 50 mg, Oral, Q12H  OLANZapine, 2.5 mg, Oral, Daily With Dinner  pantoprazole, 40 mg, Oral, Daily  rOPINIRole, 0.5 mg, Oral, Nightly  rosuvastatin, 40 mg, Oral, Nightly  senna-docusate sodium, 2 tablet, Oral, BID  sodium chloride, 10 mL, Intravenous, Q12H  sodium chloride, 4 mL, Nebulization, BID - RT  terazosin, 10 mg, Oral, Nightly      PRN Meds:     acetaminophen **OR** acetaminophen **OR** acetaminophen    senna-docusate sodium **AND** polyethylene glycol **AND** bisacodyl **AND** bisacodyl    hold    hold    nitroglycerin    ondansetron    Potassium Replacement - Follow Nurse / BPA Driven Protocol    sodium chloride    sodium chloride    sodium chloride  Continuous Infusions:  hold, 1 each  hold, 1 each        Assessment & Plan   Active Hospital Problems    Diagnosis  POA    **Pneumonia due to infectious organism, unspecified laterality, unspecified part of lung [J18.9]  Yes    History of CVA (cerebrovascular accident) [Z86.73]  Not Applicable    Acute respiratory failure with hypoxia [J96.01]  Yes    Coronary artery disease without angina pectoris [I25.10]  Yes    Altered mental status, unspecified altered mental status type [R41.82]  Yes    Chronic diastolic CHF (congestive heart failure) [I50.32]  Yes    Stage 3a chronic kidney disease [N18.31]  Yes    Elevated troponin [R77.8]  Yes    Essential hypertension [I10]  Yes      Resolved Hospital Problems   No resolved problems to display.       Assessment & Plan    88yo gentleman who presented  to the hospital from SNF for chest pain as well as cough. He was noted to be 88-89% on RA. He has been admitted at this facility twice in the last month related to recent CVA (felt to be due to cardioembolic source) as well as CHF/type II NSTEMI. In ER he was found to have left sided PNA.     Left pleural effusion, pneumonia, atelectasis: Status post thoracentesis and fluid analysis was consistent with exudate and so far cultures did not reveal any growth.  Completed a course of Zosyn for 7 days and repeat CT no significant change.  Pulmonary did evaluate and patient is due to undergo repeat thoracentesis today.  Acute respiratory failure: Secondary to above and currently requiring 2 to 3 days of oxygen.  Dysphagia: Mild and on a regular diet.  Speech therapy input appreciated.  Altered mental status: Neurology feels this is metabolic encephalopathy in setting of what is probably some mild vascular dementia present on admission.  Continue present regimen.  Suspect he is presently at his baseline.  Recent CVA with left hemiparesis: Continue with Eliquis and statins.  CAD: Chest pain-free and is on Eliquis, metoprolol and statins.  Chronic diastolic HF: Presently appears euvolemic.  Lasix was held secondary to increasing creatinine.  Renal function appears stable.  Diuretics resumed at a lower dose and continue to monitor ins and outs closely.  CKD3a: Renal function at baseline.  HTN: Blood pressure overall improved and stable.  Neck pain: X-ray unremarkable.  Continue conservative measures  Hypokalemia: Monitor and replace as needed     Estimated discharge date, in next 2 to 3 days    Copied text on this note has been reviewed by me on 6/12/2023    David Montoya MD  [facility]  06/12/23  10:45 EDT

## 2023-06-13 LAB
ANION GAP SERPL CALCULATED.3IONS-SCNC: 10.7 MMOL/L (ref 5–15)
BASOPHILS # BLD AUTO: 0.08 10*3/MM3 (ref 0–0.2)
BASOPHILS NFR BLD AUTO: 0.6 % (ref 0–1.5)
BUN SERPL-MCNC: 25 MG/DL (ref 8–23)
BUN/CREAT SERPL: 16.2 (ref 7–25)
CALCIUM SPEC-SCNC: 9.3 MG/DL (ref 8.6–10.5)
CHLORIDE SERPL-SCNC: 106 MMOL/L (ref 98–107)
CO2 SERPL-SCNC: 23.3 MMOL/L (ref 22–29)
CREAT SERPL-MCNC: 1.54 MG/DL (ref 0.76–1.27)
DEPRECATED RDW RBC AUTO: 41.8 FL (ref 37–54)
EGFRCR SERPLBLD CKD-EPI 2021: 43.7 ML/MIN/1.73
EOSINOPHIL # BLD AUTO: 0.23 10*3/MM3 (ref 0–0.4)
EOSINOPHIL NFR BLD AUTO: 1.7 % (ref 0.3–6.2)
ERYTHROCYTE [DISTWIDTH] IN BLOOD BY AUTOMATED COUNT: 12.9 % (ref 12.3–15.4)
GLUCOSE SERPL-MCNC: 96 MG/DL (ref 65–99)
HCT VFR BLD AUTO: 30.6 % (ref 37.5–51)
HGB BLD-MCNC: 10.3 G/DL (ref 13–17.7)
IMM GRANULOCYTES # BLD AUTO: 0.08 10*3/MM3 (ref 0–0.05)
IMM GRANULOCYTES NFR BLD AUTO: 0.6 % (ref 0–0.5)
LYMPHOCYTES # BLD AUTO: 1.29 10*3/MM3 (ref 0.7–3.1)
LYMPHOCYTES NFR BLD AUTO: 9.3 % (ref 19.6–45.3)
MCH RBC QN AUTO: 29.5 PG (ref 26.6–33)
MCHC RBC AUTO-ENTMCNC: 33.7 G/DL (ref 31.5–35.7)
MCV RBC AUTO: 87.7 FL (ref 79–97)
MONOCYTES # BLD AUTO: 1.16 10*3/MM3 (ref 0.1–0.9)
MONOCYTES NFR BLD AUTO: 8.3 % (ref 5–12)
NEUTROPHILS NFR BLD AUTO: 11.06 10*3/MM3 (ref 1.7–7)
NEUTROPHILS NFR BLD AUTO: 79.5 % (ref 42.7–76)
NRBC BLD AUTO-RTO: 0 /100 WBC (ref 0–0.2)
PLATELET # BLD AUTO: 160 10*3/MM3 (ref 140–450)
PMV BLD AUTO: 9.8 FL (ref 6–12)
POTASSIUM SERPL-SCNC: 4.1 MMOL/L (ref 3.5–5.2)
RBC # BLD AUTO: 3.49 10*6/MM3 (ref 4.14–5.8)
SODIUM SERPL-SCNC: 140 MMOL/L (ref 136–145)
WBC NRBC COR # BLD: 13.9 10*3/MM3 (ref 3.4–10.8)

## 2023-06-13 PROCEDURE — 80048 BASIC METABOLIC PNL TOTAL CA: CPT | Performed by: INTERNAL MEDICINE

## 2023-06-13 PROCEDURE — 94761 N-INVAS EAR/PLS OXIMETRY MLT: CPT

## 2023-06-13 PROCEDURE — 94799 UNLISTED PULMONARY SVC/PX: CPT

## 2023-06-13 PROCEDURE — 94664 DEMO&/EVAL PT USE INHALER: CPT

## 2023-06-13 PROCEDURE — 85025 COMPLETE CBC W/AUTO DIFF WBC: CPT | Performed by: INTERNAL MEDICINE

## 2023-06-13 PROCEDURE — 94760 N-INVAS EAR/PLS OXIMETRY 1: CPT

## 2023-06-13 RX ADMIN — TERAZOSIN HYDROCHLORIDE 10 MG: 5 CAPSULE ORAL at 20:37

## 2023-06-13 RX ADMIN — IPRATROPIUM BROMIDE AND ALBUTEROL SULFATE 3 ML: 2.5; .5 SOLUTION RESPIRATORY (INHALATION) at 21:36

## 2023-06-13 RX ADMIN — Medication 4 ML: at 21:42

## 2023-06-13 RX ADMIN — ROSUVASTATIN CALCIUM 40 MG: 40 TABLET, FILM COATED ORAL at 20:37

## 2023-06-13 RX ADMIN — Medication 10 ML: at 20:37

## 2023-06-13 RX ADMIN — APIXABAN 2.5 MG: 2.5 TABLET, FILM COATED ORAL at 09:03

## 2023-06-13 RX ADMIN — ROPINIROLE HYDROCHLORIDE 0.5 MG: 0.5 TABLET, FILM COATED ORAL at 20:36

## 2023-06-13 RX ADMIN — Medication 10 ML: at 09:02

## 2023-06-13 RX ADMIN — GUAIFENESIN 600 MG: 600 TABLET, EXTENDED RELEASE ORAL at 20:36

## 2023-06-13 RX ADMIN — GUAIFENESIN 600 MG: 600 TABLET, EXTENDED RELEASE ORAL at 09:03

## 2023-06-13 RX ADMIN — METOPROLOL SUCCINATE 50 MG: 25 TABLET, EXTENDED RELEASE ORAL at 20:37

## 2023-06-13 RX ADMIN — IPRATROPIUM BROMIDE AND ALBUTEROL SULFATE 3 ML: 2.5; .5 SOLUTION RESPIRATORY (INHALATION) at 15:50

## 2023-06-13 RX ADMIN — METOPROLOL SUCCINATE 50 MG: 25 TABLET, EXTENDED RELEASE ORAL at 09:03

## 2023-06-13 RX ADMIN — CLONAZEPAM 1 MG: 1 TABLET ORAL at 20:37

## 2023-06-13 RX ADMIN — ACETAMINOPHEN 650 MG: 325 TABLET, FILM COATED ORAL at 09:35

## 2023-06-13 RX ADMIN — IPRATROPIUM BROMIDE AND ALBUTEROL SULFATE 3 ML: 2.5; .5 SOLUTION RESPIRATORY (INHALATION) at 07:48

## 2023-06-13 RX ADMIN — OLANZAPINE 2.5 MG: 2.5 TABLET ORAL at 18:18

## 2023-06-13 RX ADMIN — APIXABAN 2.5 MG: 2.5 TABLET, FILM COATED ORAL at 20:36

## 2023-06-13 RX ADMIN — LIDOCAINE 1 PATCH: 50 PATCH CUTANEOUS at 09:03

## 2023-06-13 RX ADMIN — DOCUSATE SODIUM 50MG AND SENNOSIDES 8.6MG 2 TABLET: 8.6; 5 TABLET, FILM COATED ORAL at 20:36

## 2023-06-13 RX ADMIN — Medication 1000 UNITS: at 09:03

## 2023-06-13 RX ADMIN — FUROSEMIDE 20 MG: 20 TABLET ORAL at 09:03

## 2023-06-13 RX ADMIN — Medication 4 ML: at 07:55

## 2023-06-13 RX ADMIN — IPRATROPIUM BROMIDE AND ALBUTEROL SULFATE 3 ML: 2.5; .5 SOLUTION RESPIRATORY (INHALATION) at 12:18

## 2023-06-13 RX ADMIN — PANTOPRAZOLE SODIUM 40 MG: 40 TABLET, DELAYED RELEASE ORAL at 09:03

## 2023-06-13 NOTE — PROGRESS NOTES
"                                              LOS: 13 days   Patient Care Team:  Santos Simmons MD as PCP - General (Family Medicine)  Aashish Thapa MD as Consulting Physician (Cardiology)    Chief Complaint:  F/up pneumonia and pleural effusion    Subjective   Interval History  On 3 L oxygen.  Reported no dyspnea at rest.  He has minimal nonproductive cough.  No chest pain.    REVIEW OF SYSTEMS:       GASTROINTESTINAL: No anorexia, nausea, vomiting or diarrhea. No abdominal pain.  CONSTITUTIONAL: No fever or chills.     Ventilator/Non-Invasive Ventilation Settings (From admission, onward)      None                  Physical Exam:     Vital Signs  Temp:  [97.2 °F (36.2 °C)-98.7 °F (37.1 °C)] 98.7 °F (37.1 °C)  Heart Rate:  [] 91  Resp:  [18-20] 18  BP: (105-127)/(50-70) 112/55    Intake/Output Summary (Last 24 hours) at 6/13/2023 1501  Last data filed at 6/13/2023 1300  Gross per 24 hour   Intake 300 ml   Output --   Net 300 ml     Flowsheet Rows      Flowsheet Row First Filed Value   Admission Height 175.3 cm (69\") Documented at 05/31/2023 0818   Admission Weight 83.9 kg (185 lb) Documented at 05/31/2023 0818            PPE used per hospital policy    General Appearance:   Alert, cooperative, in no acute distress   ENMT:  Mallampati score 3, moist mucous membrane   Eyes:  Pupils equal and reactive to light. EOMI   Neck:   Trachea midline. No thyromegaly.   Lungs:    Equal but slightly diminished air entry at the bases.  No wheezing.  Nonlabored breathing    Heart:   Regular rhythm and normal rate, normal S1 and S2, no         murmur   Skin:   No rash or ecchymosis   Abdomen:    Obese. Soft. No tenderness. No HSM.   Neuro/psych:  Conscious, alert, oriented x3. Strength 5/5 in upper and lower  ext.  Appropriate mood and affect   Extremities:  No cyanosis, clubbing or edema.  Warm extremities and well-perfused          Results Review:        Results from last 7 days   Lab Units 06/13/23  0421 " 06/12/23  0452 06/11/23  1754 06/11/23  0344   SODIUM mmol/L 140 141  --  138   POTASSIUM mmol/L 4.1 4.2 4.0 3.6   CHLORIDE mmol/L 106 107  --  105   CO2 mmol/L 23.3 25.0  --  25.0   BUN mg/dL 25* 20  --  21   CREATININE mg/dL 1.54* 1.33*  --  1.30*   GLUCOSE mg/dL 96 93  --  96   CALCIUM mg/dL 9.3 8.7  --  8.8         Results from last 7 days   Lab Units 06/13/23  0421 06/12/23  0452 06/11/23  0344   WBC 10*3/mm3 13.90* 12.45* 13.05*   HEMOGLOBIN g/dL 10.3* 10.7* 10.7*   HEMATOCRIT % 30.6* 32.7* 33.1*   PLATELETS 10*3/mm3 160 177 194                                   I reviewed the patient's new clinical results.        Medication Review:   apixaban, 2.5 mg, Oral, Q12H  cholecalciferol, 1,000 Units, Oral, Daily  clonazePAM, 1 mg, Oral, Nightly  furosemide, 20 mg, Oral, Daily  guaiFENesin, 600 mg, Oral, Q12H  ipratropium-albuterol, 3 mL, Nebulization, 4x Daily - RT  lidocaine, 1 patch, Transdermal, Q24H  metoprolol succinate XL, 50 mg, Oral, Q12H  OLANZapine, 2.5 mg, Oral, Daily With Dinner  pantoprazole, 40 mg, Oral, Daily  rOPINIRole, 0.5 mg, Oral, Nightly  rosuvastatin, 40 mg, Oral, Nightly  senna-docusate sodium, 2 tablet, Oral, BID  sodium chloride, 10 mL, Intravenous, Q12H  sodium chloride, 4 mL, Nebulization, BID - RT  terazosin, 10 mg, Oral, Nightly        hold, 1 each  hold, 1 each        Diagnostic imaging:  I personally and independently reviewed the following images:  US chest 6/12/23: Left pleural effusion, no septation.  CT chest 6/10/23: Left lower lobe consolidation.  Left pleural effusion, moderate.    Assessment     1. Left lower lobe pneumonia  2. Left pleural effusion s/p Thora 6/7 and 6/12 with removal of 607 100 mL respectively, exudative by LDH and not protein.  Likely parapneumonic.  Cultures negative and pH normal.  Cytology normal.  3. Severe pulmonary hypertension on echo 5/18/2023: RVSP 70.  Mild to moderate MR and AR.  LA mildly dilated        Plan     • DuoNeb 4 times a day to improve  mucus clearance  • Oxygen by NC and titrate to keep SPO2 >90%  • Incentive spirometry  • Check CXR in AM.  This will serve as a baseline and then we will see him in the office in 1 month with repeat CXR.  • Finished antibiotic therapy    Okay to DC to rehab in ..    Kala Howell MD  06/13/23  15:01 EDT          This note was dictated utilizing Dragon dictation

## 2023-06-13 NOTE — PROGRESS NOTES
" LOS: 13 days     Name: Art Mullins  Age: 87 y.o.  Sex: male  :  1935  MRN: [id number]         Primary Care Physician: Santos Simmons MD    Subjective   Subjective    Patient is lying on the bed and appears weak and lethargic but does not appear to be any distress.  Denies nausea, vomiting abdominal pain, chest pain.  Currently on 2 L of oxygen which will be continued.    Objective   Vital Signs  Temp:  [97.2 °F (36.2 °C)-98 °F (36.7 °C)] 97.2 °F (36.2 °C)  Heart Rate:  [] 91  Resp:  [16-20] 18  BP: (105-127)/(50-70) 115/50  Body mass index is 24.74 kg/m².    Objective:  General Appearance:  Comfortable and in no acute distress.    Vital signs: (most recent): Blood pressure 115/50, pulse 91, temperature 97.2 °F (36.2 °C), temperature source Oral, resp. rate 18, height 175.3 cm (69\"), weight 76 kg (167 lb 8.8 oz), SpO2 96 %.    Lungs:  Normal effort and normal respiratory rate.  He is not in respiratory distress.  There are decreased breath sounds.    Heart: Normal rate.  Regular rhythm.    Abdomen: Abdomen is soft.  Bowel sounds are normal.   There is no abdominal tenderness.     Extremities: There is no dependent edema or local swelling.    Neurological: Patient is alert and oriented to person, place and time.    Skin:  Warm and dry.              Results Review:       I reviewed the patient's new clinical results.    Results from last 7 days   Lab Units 23  0421 23  0452 23  0344 23  0407 23  0829 23  0339   WBC 10*3/mm3 13.90* 12.45* 13.05* 12.18* 13.99* 13.07*   HEMOGLOBIN g/dL 10.3* 10.7* 10.7* 9.5* 10.8* 10.2*   PLATELETS 10*3/mm3 160 177 194 159 180 195       Results from last 7 days   Lab Units 23  0421 23  0452 23  1754 23  0344 23  0407 23  1837 23  0829 23  0339   SODIUM mmol/L 140 141  --  138 139  --  138 141   POTASSIUM mmol/L 4.1 4.2 4.0 3.6 4.3 4.7 3.5 3.8   CHLORIDE mmol/L 106 107  --  105 " 108*  --  106 106   CO2 mmol/L 23.3 25.0  --  25.0 22.5  --  23.0 25.1   BUN mg/dL 25* 20  --  21 22  --  25* 28*   CREATININE mg/dL 1.54* 1.33*  --  1.30* 1.36*  --  1.47* 1.35*   CALCIUM mg/dL 9.3 8.7  --  8.8 8.7  --  8.5* 9.2   GLUCOSE mg/dL 96 93  --  96 99  --  120* 95                   Scheduled Meds:   apixaban, 2.5 mg, Oral, Q12H  cholecalciferol, 1,000 Units, Oral, Daily  clonazePAM, 1 mg, Oral, Nightly  furosemide, 20 mg, Oral, Daily  guaiFENesin, 600 mg, Oral, Q12H  ipratropium-albuterol, 3 mL, Nebulization, 4x Daily - RT  lidocaine, 1 patch, Transdermal, Q24H  metoprolol succinate XL, 50 mg, Oral, Q12H  OLANZapine, 2.5 mg, Oral, Daily With Dinner  pantoprazole, 40 mg, Oral, Daily  rOPINIRole, 0.5 mg, Oral, Nightly  rosuvastatin, 40 mg, Oral, Nightly  senna-docusate sodium, 2 tablet, Oral, BID  sodium chloride, 10 mL, Intravenous, Q12H  sodium chloride, 4 mL, Nebulization, BID - RT  terazosin, 10 mg, Oral, Nightly      PRN Meds:     acetaminophen **OR** acetaminophen **OR** acetaminophen    senna-docusate sodium **AND** polyethylene glycol **AND** bisacodyl **AND** bisacodyl    hold    hold    nitroglycerin    ondansetron    Potassium Replacement - Follow Nurse / BPA Driven Protocol    sodium chloride    sodium chloride    sodium chloride  Continuous Infusions:  hold, 1 each  hold, 1 each        Assessment & Plan   Active Hospital Problems    Diagnosis  POA    **Pneumonia due to infectious organism, unspecified laterality, unspecified part of lung [J18.9]  Yes    History of CVA (cerebrovascular accident) [Z86.73]  Not Applicable    Acute respiratory failure with hypoxia [J96.01]  Yes    Coronary artery disease without angina pectoris [I25.10]  Yes    Altered mental status, unspecified altered mental status type [R41.82]  Yes    Chronic diastolic CHF (congestive heart failure) [I50.32]  Yes    Stage 3a chronic kidney disease [N18.31]  Yes    Elevated troponin [R77.8]  Yes    Essential hypertension [I10]   Yes      Resolved Hospital Problems   No resolved problems to display.       Assessment & Plan    86yo gentleman who presented to the hospital from SNF for chest pain as well as cough. He was noted to be 88-89% on RA. He has been admitted at this facility twice in the last month related to recent CVA (felt to be due to cardioembolic source) as well as CHF/type II NSTEMI. In ER he was found to have left sided PNA.     Left pleural effusion, pneumonia, atelectasis: Status post thoracentesis and had repeat thoracentesis on 6/12/2023.  Fluid analysis consistent with parapneumonic pneumonia.  Completed Zosyn for total of 5 to 7 days and repeat chest x-ray in a.m.  Hopefully to rehab tomorrow and is currently on 2 L which will continued and appreciate pulmonary input.  I encourage patient to use incentive spirometry.    Acute respiratory failure: Secondary to above and currently requiring 2 to 3 L of oxygen.    Dysphagia: Mild and on a regular diet.  Speech therapy input appreciated.    Altered mental status: Neurology feels this is metabolic encephalopathy in setting of what is probably some mild vascular dementia present on admission.  Continue present regimen.  Suspect he is presently at his baseline.    Recent CVA with left hemiparesis: Continue with Eliquis and statins.    CAD: Chest pain-free and is on Eliquis, metoprolol and statins.    Chronic diastolic HF: Presently appears euvolemic.  Lasix was held secondary to increasing creatinine.  Diuretics resumed at a lower dose and continue to monitor ins and outs closely.  Creatinine is close to baseline now.    CKD3a: Renal function at baseline.    HTN: Blood pressure overall improved and stable.    Neck pain: X-ray unremarkable.  Continue conservative measures    Hypokalemia: Monitor and replace as needed     Estimated discharge date, to rehab in a.m.    Copied text on this note has been reviewed by me on 6/13/2023    David Montoya MD  [facility]  06/13/23  10:45 EDT

## 2023-06-13 NOTE — SIGNIFICANT NOTE
06/13/23 0748   Oxygen Therapy   Pulse Oximetry Type Continuous   Device (Oxygen Therapy) nasal cannula   Flow (L/min) 2  (weaned down from 4lpm per PT no home O2)     Sats 92%

## 2023-06-13 NOTE — PLAN OF CARE
Goal Outcome Evaluation:  Plan of Care Reviewed With: patient        Progress: no change  Outcome Evaluation: Confused throughout the night, has some episodes of clanging speech. 4L for sleep. VSS, weight shifted, will continue to monitor.

## 2023-06-13 NOTE — SIGNIFICANT NOTE
06/13/23 1546   OTHER   Discipline physical therapist   Rehab Time/Intention   Session Not Performed other (see comments)  (attempt x 2 this date, pt soundly sleeping and unable to arouse. will continue to follow per POC)   Therapy Assessment/Plan (PT)   Criteria for Skilled Interventions Met (PT) skilled treatment is necessary   Recommendation   PT - Next Appointment 06/14/23

## 2023-06-13 NOTE — PLAN OF CARE
Problem: Adult Inpatient Plan of Care  Goal: Plan of Care Review  Outcome: Ongoing, Progressing  Flowsheets (Taken 6/13/2023 160)  Progress: improving  Plan of Care Reviewed With: patient  Outcome Evaluation: VSS. On 2L O2 per NC. Minimal confusion this morning, patient currently alert and oriented x4. Q2turn. Sat up in chair. Family visited. CXR in AM. Plan to return back to [facility] with wheelchair van at 1300. Pt stable and needs met at this time.

## 2023-06-13 NOTE — CASE MANAGEMENT/SOCIAL WORK
Continued Stay Note  [facility]     Patient Name: Art Mullins  MRN: [id number]  Today's Date: 6/13/2023    Admit Date: 5/31/2023    Plan: Return to [facility] SNF. [employer] [facility] transport setup at 1300 tomorrow 6/14   Discharge Plan       Row Name 06/13/23 1522       Plan    Plan Return to [facility] SNF. [employer] [facility] transport setup at 1300 tomorrow 6/14    Patient/Family in Agreement with Plan yes    Plan Comments Plan for D/C to SNF tomorrow per MD notes. Per Aure/Sig, [facility] can accept tomorrow. Called and setup [email or web address] [facility] to transport tomorrow 6/14 at 1300. Malik Kaur RN-BC                   Discharge Codes    No documentation.                 Expected Discharge Date and Time       Expected Discharge Date Expected Discharge Time    Jun 14, 2023               Malik Kaur RN

## 2023-06-14 ENCOUNTER — APPOINTMENT (OUTPATIENT)
Dept: GENERAL RADIOLOGY | Facility: HOSPITAL | Age: 88
DRG: 193 | End: 2023-06-14
Payer: MEDICARE

## 2023-06-14 VITALS
HEIGHT: 69 IN | SYSTOLIC BLOOD PRESSURE: 132 MMHG | BODY MASS INDEX: 24.82 KG/M2 | HEART RATE: 99 BPM | OXYGEN SATURATION: 92 % | WEIGHT: 167.55 LBS | TEMPERATURE: 98.6 F | DIASTOLIC BLOOD PRESSURE: 63 MMHG | RESPIRATION RATE: 18 BRPM

## 2023-06-14 LAB
ANION GAP SERPL CALCULATED.3IONS-SCNC: 8 MMOL/L (ref 5–15)
BASOPHILS # BLD AUTO: 0.08 10*3/MM3 (ref 0–0.2)
BASOPHILS NFR BLD AUTO: 0.5 % (ref 0–1.5)
BUN SERPL-MCNC: 23 MG/DL (ref 8–23)
BUN/CREAT SERPL: 17.6 (ref 7–25)
CALCIUM SPEC-SCNC: 8.5 MG/DL (ref 8.6–10.5)
CHLORIDE SERPL-SCNC: 109 MMOL/L (ref 98–107)
CO2 SERPL-SCNC: 23 MMOL/L (ref 22–29)
CREAT SERPL-MCNC: 1.31 MG/DL (ref 0.76–1.27)
DEPRECATED RDW RBC AUTO: 42.9 FL (ref 37–54)
EGFRCR SERPLBLD CKD-EPI 2021: 53 ML/MIN/1.73
EOSINOPHIL # BLD AUTO: 0.34 10*3/MM3 (ref 0–0.4)
EOSINOPHIL NFR BLD AUTO: 2.2 % (ref 0.3–6.2)
ERYTHROCYTE [DISTWIDTH] IN BLOOD BY AUTOMATED COUNT: 13.3 % (ref 12.3–15.4)
FUNGUS WND CULT: NORMAL
GLUCOSE SERPL-MCNC: 92 MG/DL (ref 65–99)
HCT VFR BLD AUTO: 31 % (ref 37.5–51)
HGB BLD-MCNC: 10.3 G/DL (ref 13–17.7)
IMM GRANULOCYTES # BLD AUTO: 0.08 10*3/MM3 (ref 0–0.05)
IMM GRANULOCYTES NFR BLD AUTO: 0.5 % (ref 0–0.5)
LYMPHOCYTES # BLD AUTO: 1.39 10*3/MM3 (ref 0.7–3.1)
LYMPHOCYTES NFR BLD AUTO: 9.1 % (ref 19.6–45.3)
MCH RBC QN AUTO: 29.5 PG (ref 26.6–33)
MCHC RBC AUTO-ENTMCNC: 33.2 G/DL (ref 31.5–35.7)
MCV RBC AUTO: 88.8 FL (ref 79–97)
MONOCYTES # BLD AUTO: 1.22 10*3/MM3 (ref 0.1–0.9)
MONOCYTES NFR BLD AUTO: 8 % (ref 5–12)
MYCOBACTERIUM SPEC CULT: NORMAL
NEUTROPHILS NFR BLD AUTO: 12.09 10*3/MM3 (ref 1.7–7)
NEUTROPHILS NFR BLD AUTO: 79.7 % (ref 42.7–76)
NIGHT BLUE STAIN TISS: NORMAL
NRBC BLD AUTO-RTO: 0 /100 WBC (ref 0–0.2)
PLATELET # BLD AUTO: 141 10*3/MM3 (ref 140–450)
PMV BLD AUTO: 9.4 FL (ref 6–12)
POTASSIUM SERPL-SCNC: 4.1 MMOL/L (ref 3.5–5.2)
RBC # BLD AUTO: 3.49 10*6/MM3 (ref 4.14–5.8)
SODIUM SERPL-SCNC: 140 MMOL/L (ref 136–145)
WBC NRBC COR # BLD: 15.2 10*3/MM3 (ref 3.4–10.8)

## 2023-06-14 PROCEDURE — 94799 UNLISTED PULMONARY SVC/PX: CPT

## 2023-06-14 PROCEDURE — 80048 BASIC METABOLIC PNL TOTAL CA: CPT | Performed by: INTERNAL MEDICINE

## 2023-06-14 PROCEDURE — 85025 COMPLETE CBC W/AUTO DIFF WBC: CPT | Performed by: INTERNAL MEDICINE

## 2023-06-14 PROCEDURE — 94761 N-INVAS EAR/PLS OXIMETRY MLT: CPT

## 2023-06-14 PROCEDURE — 94664 DEMO&/EVAL PT USE INHALER: CPT

## 2023-06-14 PROCEDURE — 71046 X-RAY EXAM CHEST 2 VIEWS: CPT

## 2023-06-14 RX ORDER — FUROSEMIDE 20 MG/1
20 TABLET ORAL DAILY
Start: 2023-06-15

## 2023-06-14 RX ORDER — CLONAZEPAM 1 MG/1
1 TABLET ORAL NIGHTLY
Qty: 3 TABLET | Refills: 0 | Status: SHIPPED | OUTPATIENT
Start: 2023-06-14 | End: 2023-06-17

## 2023-06-14 RX ADMIN — Medication 4 ML: at 07:28

## 2023-06-14 RX ADMIN — DOCUSATE SODIUM 50MG AND SENNOSIDES 8.6MG 2 TABLET: 8.6; 5 TABLET, FILM COATED ORAL at 09:53

## 2023-06-14 RX ADMIN — PANTOPRAZOLE SODIUM 40 MG: 40 TABLET, DELAYED RELEASE ORAL at 09:53

## 2023-06-14 RX ADMIN — GUAIFENESIN 600 MG: 600 TABLET, EXTENDED RELEASE ORAL at 09:53

## 2023-06-14 RX ADMIN — Medication 10 ML: at 09:53

## 2023-06-14 RX ADMIN — FUROSEMIDE 20 MG: 20 TABLET ORAL at 09:53

## 2023-06-14 RX ADMIN — LIDOCAINE 1 PATCH: 50 PATCH CUTANEOUS at 09:53

## 2023-06-14 RX ADMIN — METOPROLOL SUCCINATE 50 MG: 25 TABLET, EXTENDED RELEASE ORAL at 09:53

## 2023-06-14 RX ADMIN — Medication 1000 UNITS: at 09:53

## 2023-06-14 RX ADMIN — IPRATROPIUM BROMIDE AND ALBUTEROL SULFATE 3 ML: 2.5; .5 SOLUTION RESPIRATORY (INHALATION) at 07:27

## 2023-06-14 RX ADMIN — APIXABAN 2.5 MG: 2.5 TABLET, FILM COATED ORAL at 09:53

## 2023-06-14 NOTE — DISCHARGE SUMMARY
Patient Name: Art Mullins  : 1935  MRN: [id number]    Date of Admission: 2023  Date of Discharge:  2023  Primary Care Physician: Santos Simmons MD      Chief Complaint:   Chest Pain and Cough      Discharge Diagnoses     Active Hospital Problems    Diagnosis  POA    **Pneumonia due to infectious organism, unspecified laterality, unspecified part of lung [J18.9]  Yes    History of CVA (cerebrovascular accident) [Z86.73]  Not Applicable    Acute respiratory failure with hypoxia [J96.01]  Yes    Coronary artery disease without angina pectoris [I25.10]  Yes    Altered mental status, unspecified altered mental status type [R41.82]  Yes    Chronic diastolic CHF (congestive heart failure) [I50.32]  Yes    Stage 3a chronic kidney disease [N18.31]  Yes    Elevated troponin [R77.8]  Yes    Essential hypertension [I10]  Yes      Resolved Hospital Problems   No resolved problems to display.        Hospital Course   Mr. Mullins is a 87 y.o. non-smoker with a history of recent CVA, CKD3, CAD, chronic diastolic CHF and HTN that presents to [facility] for reported chest pain and cough. The patient is currently a very poor historian as he believes he is at [facility]. Per ED notes, he was sent over from rehab due to chest discomfort with coughing since yesterday as well as some mild hypoxia of oxygen saturations 88-89% on RA.               The patient was recently admitted to this facility  to 23 for increased confusion as well as dyspnea. He was admitted here  to 23 as well when he presented with double vision and left arm weakness. It was at that time that he was found to have bilateral hemispheric infarcts of presumed cardioembolic source and placed on Eliquis therapy per Cardiology/neurology recommendations. He was discharge to rehab but presented here most recently with CHF exacerbation and elevated troponin. He was diuresed and taken for left  heart cath that revealed mild nonobstructive CAD. He was felt to have a NSTEMI secondary to demand for decompensated CHF. He was on reduced dosing of Eliquis at that time secondary to some black tarry stools that resolved with the lower dosing of medication.               In the ED, he was afebrile and hemodynamically stable. Lab work revealed a HsTN of 165, WBC 19.37, hemoglobin 12.2, procal 0.25, BNP 2938 and negative RVP. Creatinine was 1.4 with stable electrolytes. His lactic was 1.2 and blood cultures pending. He was noted to be 87-88% on RA when I entered and improved to 91% with 1 L supplemental oxygen. CXR was concerning for possible new vague opacity at the left lung base. He was started on antibiotics and is being admitted for further concerns of pneumonia.      Left pleural effusion, pneumonia, atelectasis: Status post thoracentesis and had repeat thoracentesis on 6/12/2023.  Fluid analysis consistent with parapneumonic pneumonia.  Completed Zosyn for total of 5 to 7 days and currently requiring 2 L of oxygen which will be continued and will follow-up with pulmonary as an outpatient basis.  Does have a rehab bed available today.     Acute respiratory failure: Secondary to above and currently requiring 2 to 3 L of oxygen.     Dysphagia: Mild and on a regular diet.  Speech therapy input appreciated.     Altered mental status: Neurology feels this is metabolic encephalopathy in setting of what is probably some mild vascular dementia present on admission.  Continue present regimen.  Suspect he is presently at his baseline.  His mental status is currently at baseline.     Recent CVA with left hemiparesis: Continue with Eliquis and statins.     CAD: Chest pain-free and is on Eliquis, metoprolol and statins.     Chronic diastolic HF: Presently appears euvolemic.  Lasix was held secondary to increasing creatinine.  Diuretics resumed at a lower dose and continue to monitor ins and outs closely.  Creatinine is  close to baseline now.     CKD3a: Renal function at baseline.     HTN: Blood pressure overall improved and stable.     Neck pain: X-ray unremarkable.  Continue conservative measures     Hypokalemia: Replace per protocol.    Copied text on this note has been reviewed by me on 6/14/2023      Day of Discharge         Physical Exam:  Temp:  [97.9 °F (36.6 °C)-99 °F (37.2 °C)] 98.6 °F (37 °C)  Heart Rate:  [91-99] 99  Resp:  [18] 18  BP: (107-132)/(48-67) 132/63  Body mass index is 24.74 kg/m².  Physical Exam  HEENT: PERRLA, extraocular movements intact, Scleras no icterus  Lungs:  Normal effort and normal respiratory rate.  He is not in respiratory distress.  There are decreased breath sounds.    Heart: Normal rate.  Regular rhythm.    Abdomen: Abdomen is soft.  Bowel sounds are normal.   There is no abdominal tenderness.     Extremities: There is no dependent edema or local swelling.    Neurological: Patient is alert and oriented to person, place and time.    Skin:  Warm and dry.         Consultants     Consult Orders (all) (From admission, onward)       Start     Ordered    06/05/23 1807  Inpatient Pulmonology Consult  Once        Specialty:  Pulmonary Disease  Provider:  Grant Azevedo MD    06/05/23 1806    06/05/23 1805  Inpatient Pulmonology Consult  Once        Specialty:  Pulmonary Disease  Provider:  Grant Azevedo MD    06/05/23 1804    06/01/23 1032  Inpatient Neurology Consult General  Once        Specialty:  Neurology  Provider:  Tmoy Giles MD    06/01/23 1031    05/31/23 1511  Inpatient Case Management  Consult  Once        Provider:  (Not yet assigned)    05/31/23 1511    05/31/23 1151  [facility] (on-call MD unless specified) Details  Once        Specialty:  Hospitalist  Provider:  (Not yet assigned)    05/31/23 1150                  Procedures     * Surgery not found *      Imaging Results (All)       Procedure Component Value Units Date/Time    XR Chest PA & Lateral [[id number]] Resulted:  06/14/23 0923     Updated: 06/14/23 0938    US Thoracentesis [[id number]] Collected: 06/12/23 1227    Specimen: Body Fluid Updated: 06/12/23 1230    Narrative:      ULTRASOUND-GUIDED LEFT THORACENTESIS     HISTORY: Pleural effusion     After signed informed consent was obtained the patient was prepped and  draped in the usual sterile fashion with 2% chlorhexidine. Lidocaine was  used for local anesthesia.     Ultrasound guidance was used to place a 5 Slovak catheter into the left  pleural fluid collection. 700 mL of ovidio color fluid was removed.  Sample was sent to the lab.     Confirmatory images were obtained.     Patient tolerated the procedure well with no immediate complications.        Impression:      Ultrasound-guided left thoracentesis as described        This report was finalized on 6/12/2023 12:27 PM by Dr. Robert Stokes M.D.       CT Chest Without Contrast Diagnostic [[id number]] Collected: 06/10/23 2003     Updated: 06/10/23 2019    Narrative:      CT CHEST WO CONTRAST DIAGNOSTIC-     Radiation dose reduction techniques were utilized, including automated  exposure control and exposure modulation based on body size.     Clinical: Pleural effusion, chest pain, cough     COMPARISON 06/06/2023     FINDINGS: Left-sided free-flowing pleural effusion is similar in size  compared to the previous examination. Small amount of pleural fluid on  the right tracking into the major fissure. There is  consolidation/atelectasis again involving the left lower lobe.     Cardiac enlargement, no pericardial effusion. Moderate size hiatal  hernia. There is trace amount of fluid along the right side, the amount  has diminished within the interim. No acute airspace disease has  developed. Small lymph nodes within the mediastinum again demonstrated.  Pericardial recess demonstrated adjacent to the ascending aorta as  before. There is coronary artery calcification.     Diminished infiltrate/atelectasis at the base of the right  lower lobe.  Old right-sided rib fracture seen.     Limited images through the upper abdomen demonstrate gallstones. In  addition there are hepatic cysts and geographic fatty infiltration.  There is a small wedge-shaped area of diminished attenuation along the  periphery of the spleen, similar to the previous examination, likely  infarction.     CONCLUSION:  1. Left-sided pleural effusion not changed within the interim, there is  atelectasis/consolidation involving the left lower lobe as before.  2. Diminished atelectasis/infiltrate at the right lung base. Small  amount of pleural fluid seen tracking into the major fissure.  3. No acute airspace disease as developed within the interim.  4. Hiatal hernia. Small amount of fluid demonstrated along its right  lateral border has diminished within the interim.        This report was finalized on 6/10/2023 8:16 PM by Dr. Hugo Felton M.D.       XR Chest PA & Lateral [[id number]] Collected: 06/09/23 1158     Updated: 06/09/23 1202    Narrative:      XR CHEST PA AND LATERAL-     HISTORY: Male who is 87 years-old,  pleural effusion     TECHNIQUE: Frontal and lateral views of the chest     COMPARISON: 06/07/2023     FINDINGS: The heart size is borderline. Pulmonary vasculature is  unremarkable. Mildly increased left pleural effusion and left lower lung  atelectasis/infiltrate. Minimal right pleural effusion. No pneumothorax.  No acute osseous process.          Impression:      As described.     This report was finalized on 6/9/2023 11:59 AM by Dr. Nehemiah Melvin M.D.       XR Chest PA & Lateral [[id number]] Collected: 06/07/23 1223     Updated: 06/07/23 1228    Narrative:      XR CHEST PA AND LATERAL-     HISTORY: Male who is 87 years-old,  status post left thoracentesis,  check for pneumothorax     TECHNIQUE: Frontal and lateral views of the chest     COMPARISON: 06/03/2023     FINDINGS: The heart size is borderline. Pulmonary vasculature is  unremarkable. Small left  basilar likely atelectasis or infiltrate. Small  residual left pleural effusion is apparent. No pneumothorax. No acute  osseous process.       Impression:      As described.     This report was finalized on 6/7/2023 12:25 PM by Dr. Nehemiah Melvin M.D.       US Thoracentesis [[id number]] Collected: 06/07/23 1030    Specimen: Body Fluid Updated: 06/07/23 1033    Narrative:      ULTRASOUND-GUIDED LEFT THORACENTESIS     HISTORY: Pleural effusion     After signed informed consent was obtained the patient was prepped and  draped in the usual sterile fashion with 2% chlorhexidine. Lidocaine was  used for local anesthesia.     Ultrasound guidance was used to place a 5 Estonian catheter into the left  pleural fluid collection. 600 mL of ovidio color fluid was removed.  Sample was sent to the lab.     Confirmatory images were obtained.     Patient tolerated the procedure well with no immediate complications.        Impression:      Ultrasound-guided left thoracentesis as described        This report was finalized on 6/7/2023 10:30 AM by Dr. Robert Stokes M.D.       CT Chest Without Contrast Diagnostic [[id number]] Collected: 06/06/23 1446     Updated: 06/06/23 1455    Narrative:      CT CHEST WITHOUT CONTRAST     HISTORY: Suspected left pleural effusion, pneumonia     TECHNIQUE: Radiation dose reduction techniques were utilized, including  automated exposure control and exposure modulation based on body size.  Axial images were obtained through the chest without the administration  of IV contrast. Coronal and sagittal reformatted images obtained.     COMPARISON: Chest x-ray 06/03/2023, no prior chest CTs are available     FINDINGS: Small-moderate left pleural effusion and trace right-sided  pleural effusion. Partial atelectasis of the left lower lobe.  Interstitial, groundglass and a few nodular opacities are seen in the  base of the left lower lobe. These may be infectious or inflammatory. No  appreciable lymphadenopathy.  Cardiomegaly. Trace pericardial effusion.  Small hiatal hernia. Small amount of fluid adjacent to the hiatal  hernia. Limited imaging of the upper abdomen demonstrates hepatic cysts.  Cholelithiasis. Hypodense lesion in the spleen favored to reflect a  cyst. Partially imaged right renal cyst. No acute bony abnormality       Impression:      1. Small-moderate left-sided pleural effusion with associated partial  left lower lobe atelectasis  2. Interstitial, groundglass and a few nodular opacities in the base of  the left lower lobe may be infectious or inflammatory. Follow-up chest  CT suggested to ensure clearing  3. Cholelithiasis  4. Additional findings as described           Radiation dose reduction techniques were utilized, including automated  exposure control and exposure modulation based on body size.     This report was finalized on 6/6/2023 2:51 PM by Dr. Robert Stokes M.D.       FL Video Swallow Single Contrast [[id number]] Collected: 06/05/23 1412     Updated: 06/05/23 1658    Narrative:      VIDEO SWALLOW STUDY     HISTORY: Dysphagia.     1 minute 50 seconds fluoroscopy was provided for the speech pathologist  during a video swallow study. 5.57 mGy     Penetration was seen.       Impression:      Fluoroscopy was provided for the speech pathologist during a  video swallow study. For full details please see the speech pathology  report     This report was finalized on 6/5/2023 4:55 PM by Dr. Issac Pierce M.D.       XR Chest PA & Lateral [[id number]] Collected: 06/03/23 1340     Updated: 06/03/23 1345    Narrative:      XR CHEST PA AND LATERAL-     HISTORY: Male who is 87 years-old,  pneumonia, hypoxia     TECHNIQUE: Frontal and lateral views of the chest     COMPARISON: 05/31/2023     FINDINGS: The heart is enlarged. Pulmonary vasculature is unremarkable.  Mildly moderate left pleural effusion is apparent with left basilar  atelectasis or infiltrate. No pneumothorax. No acute osseous process.        Impression:      Left basilar atelectasis/infiltrate/effusion, continued  follow-up advised. Cardiomegaly.     This report was finalized on 6/3/2023 1:42 PM by Dr. Nehemiah Melvin M.D.       XR Spine Cervical 3 View [[id number]] Collected: 06/02/23 1204     Updated: 06/02/23 1406    Narrative:      X-RAY SPINE CERVICAL THREE-VIEW     CLINICAL INFORMATION: Sudden onset of neck pain, no known injury.     FINDINGS: There is straightening of the lordotic curvature which could  be due to positioning or muscular spasm. The odontoid is preserved with  satisfactory C1-2 alignment and the prevertebral soft tissues are  normal. There is moderate multilevel disc degeneration which appears  most pronounced at C5-6. There is multilevel facet hypertrophy also  seen. C7 vertebra is obscured on the lateral projection due to the  superimposed shoulders. The appearance on the frontal projection is  within normal limits.     CONCLUSION: Degenerative change as described above. C7 vertebra obscured  on the lateral projection.     This report was finalized on 6/2/2023 2:03 PM by Dr. Hugo Felton M.D.       XR Chest 1 View [[id number]] Collected: 05/31/23 0804     Updated: 05/31/23 0809    Narrative:      XR CHEST 1 VW-     Clinical: Chest pain, shortness of breath, cough     COMPARISON examination 05/18/2023     FINDINGS: Mild cardiac enlargement as before. Atherosclerotic  calcification of the aorta. Some prominence of the central vasculature  seen. The right lung is clear. Asymmetric right first rib and  calcification compared to the left, as before.     Vague opacity demonstrated at the left lung base, minimal airspace  disease, atelectasis or effusion may be present. PA and lateral view of  the chest would be helpful for further evaluation. The remainder of  examination is unremarkable.     This report was finalized on 5/31/2023 8:06 AM by Dr. Hugo Felton M.D.               Results for orders placed during the hospital  encounter of 05/18/23    Adult Transthoracic Echo Limited W/ Cont if Necessary Per Protocol    Interpretation Summary    There is severe hypokinesis of the basal to mid inferolateral wall. The remaining wall segments are hyperdynamic and the ejection fraction is preserved    Left ventricular systolic function is normal. Left ventricular ejection fraction appears to be 56 - 60%.    Left ventricular wall thickness is consistent with mild concentric hypertrophy.    Normal right ventricular cavity size and systolic function noted.    Mild to moderate aortic valve regurgitation is present.    Mild to moderate mitral valve regurgitation is present.    Mild tricuspid valve regurgitation is present    Calculated right ventricular systolic pressure from tricuspid regurgitation is 70 mmHg.    There is no evidence of pericardial effusion.    Pertinent Labs     Results from last 7 days   Lab Units 06/14/23 0447 06/13/23 0421 06/12/23 0452 06/11/23  0344   WBC 10*3/mm3 15.20* 13.90* 12.45* 13.05*   HEMOGLOBIN g/dL 10.3* 10.3* 10.7* 10.7*   PLATELETS 10*3/mm3 141 160 177 194     Results from last 7 days   Lab Units 06/14/23 0447 06/13/23 0421 06/12/23 0452 06/11/23  1754 06/11/23  0344   SODIUM mmol/L 140 140 141  --  138   POTASSIUM mmol/L 4.1 4.1 4.2 4.0 3.6   CHLORIDE mmol/L 109* 106 107  --  105   CO2 mmol/L 23.0 23.3 25.0  --  25.0   BUN mg/dL 23 25* 20  --  21   CREATININE mg/dL 1.31* 1.54* 1.33*  --  1.30*   GLUCOSE mg/dL 92 96 93  --  96   EGFR mL/min/1.73 53.0* 43.7* 52.1*  --  53.5*       Results from last 7 days   Lab Units 06/14/23 0447 06/13/23 0421 06/12/23 0452 06/11/23  0344   CALCIUM mg/dL 8.5* 9.3 8.7 8.8               Invalid input(s): LDLCALC  Results from last 7 days   Lab Units 06/12/23  1150   BODYFLDCX  No growth at 2 days         Test Results Pending at Discharge     Pending Labs       Order Current Status    Non-gynecologic Cytology In process    AFB Culture - Body Fluid, Pleural Cavity  Preliminary result    AFB Culture - Body Fluid, Pleural Cavity Preliminary result    Body Fluid Culture - Body Fluid, Pleural Cavity Preliminary result    Fungus Culture - Body Fluid, Pleural Cavity Preliminary result            Discharge Details        Discharge Medications        Changes to Medications        Instructions Start Date   furosemide 20 MG tablet  Commonly known as: LASIX  What changed:   medication strength  how much to take   20 mg, Oral, Daily   Start Date: Hailee 15, 2023            Continue These Medications        Instructions Start Date   apixaban 2.5 MG tablet tablet  Commonly known as: ELIQUIS   2.5 mg, Oral, Every 12 Hours Scheduled      cholecalciferol 25 MCG (1000 UT) tablet  Commonly known as: VITAMIN D3   1,000 Units, Oral, Daily      clonazePAM 1 MG tablet  Commonly known as: KlonoPIN   1 mg, Oral, Nightly      doxazosin 8 MG tablet  Commonly known as: CARDURA   8 mg, Oral, Nightly      hydrocortisone 2.5 % lotion   1 application, Topical, 2 Times Daily      metoprolol succinate XL 50 MG 24 hr tablet  Commonly known as: TOPROL-XL   50 mg, Oral, 2 times daily      pantoprazole 40 MG EC tablet  Commonly known as: PROTONIX   40 mg, Oral, Daily      rOPINIRole 2 MG tablet  Commonly known as: REQUIP   2 mg, Oral, Nightly      rosuvastatin 40 MG tablet  Commonly known as: CRESTOR   40 mg, Oral, Daily      VITAMIN B 12 PO   2,500 mcg, Oral, Daily               Allergies   Allergen Reactions    Sulfa Antibiotics Itching       Discharge Disposition:  Skilled Nursing Facility (DC - External)      Discharge Diet:  Diet Order   Procedures    Diet: Regular/House Diet; Texture: Regular Texture (IDDSI 7); Fluid Consistency: Thin (IDDSI 0)       Discharge Activity:   Activity Instructions       Activity as Tolerated              CODE STATUS:    Code Status and Medical Interventions:   Ordered at: 06/02/23 1001     Medical Intervention Limits:    NO intubation (DNI)    NO cardioversion     Level Of Support  Discussed With:    Patient    Health Care Surrogate     Code Status (Patient has no pulse and is not breathing):    No CPR (Do Not Attempt to Resuscitate)     Medical Interventions (Patient has pulse or is breathing):    Limited Support     Comments:    D/w pt and wife       Future Appointments   Date Time Provider Department Center   9/14/2023  4:00 PM Renea Bradofrd APRN [facility]   11/21/2023 10:30 AM Aashish Thapa MD [facility]     Additional Instructions for the Follow-ups that You Need to Schedule       Discharge Follow-up with Specified Provider: ; 2 Weeks   As directed      To:     Follow Up: 2 Weeks                Contact information for follow-up providers       Santos Simmons MD .    Specialty: Family Medicine  Contact information:  [address] [facility] [address]  [phone]                       Contact information for after-discharge care       Destination       [facility] .    Service: Skilled Nursing  Contact information:  [address] [place] [id number] [phone]                                   Additional Instructions for the Follow-ups that You Need to Schedule       Discharge Follow-up with Specified Provider: ; 2 Weeks   As directed      To:     Follow Up: 2 Weeks             Time Spent on Discharge:  Greater than 30 minutes      David Montoya MD  [facility]  06/14/23  11:48 EDT

## 2023-06-14 NOTE — DISCHARGE PLACEMENT REQUEST
"Ron Mullins (87 y.o. Male)       Date of Birth   1935    Social Security Number       Address   [address] [place] [address]    Home Phone   [phone]    MRN   [id number]       Hindu   Zoroastrian    Marital Status                               Admission Date   5/31/23    Admission Type   Emergency    Admitting Provider   Aashish Hatfield MD    Attending Provider   David Montoya MD    Department, Room/Bed   [address], E466/1       Discharge Date       Discharge Disposition   Skilled Nursing Facility (DC - External)    Discharge Destination                                 Attending Provider: David Montoya MD    Allergies: Sulfa Antibiotics    Isolation: None   Infection: None   Code Status: No CPR    Ht: 175.3 cm (69\")   Wt: 76 kg (167 lb 8.8 oz)    Admission Cmt: None   Principal Problem: Pneumonia due to infectious organism, unspecified laterality, unspecified part of lung [J18.9]                   Active Insurance as of 5/31/2023       Primary Coverage       Payor Plan Insurance Group Employer/Plan Group    MEDICARE MEDICARE A & B        Payor Plan Address Payor Plan Phone Number Payor Plan Fax Number Effective Dates    [address] [phone]  6/1/2001 - None Entered    [facility] [id number]         Subscriber Name Subscriber Birth Date Member ID       RON MULLINS 1935 [id number]               Secondary Coverage       Payor Plan Insurance Group Employer/Plan Group    [facility] SUPP [id number]       Payor Plan Address Payor Plan Phone Number Payor Plan Fax Number Effective Dates    [address]   12/1/2016 - None Entered    [facility] [id number]         Subscriber Name Subscriber Birth Date Member ID       RON MULLINS 1935 [id number]                     Emergency Contacts        (Rel.) Home Phone Work Phone Mobile Phone    Charito Mullins (Spouse) [phone] -- --    DORA MULLINS " (Son) [phone] -- --                   Discharge Summary        David Montoya MD at 23 1148              Patient Name: Art Mullins  : 1935  MRN: [id number]    Date of Admission: 2023  Date of Discharge:  2023  Primary Care Physician: Santos Simmons MD      Chief Complaint:   Chest Pain and Cough      Discharge Diagnoses     Active Hospital Problems    Diagnosis  POA    **Pneumonia due to infectious organism, unspecified laterality, unspecified part of lung [J18.9]  Yes    History of CVA (cerebrovascular accident) [Z86.73]  Not Applicable    Acute respiratory failure with hypoxia [J96.01]  Yes    Coronary artery disease without angina pectoris [I25.10]  Yes    Altered mental status, unspecified altered mental status type [R41.82]  Yes    Chronic diastolic CHF (congestive heart failure) [I50.32]  Yes    Stage 3a chronic kidney disease [N18.31]  Yes    Elevated troponin [R77.8]  Yes    Essential hypertension [I10]  Yes      Resolved Hospital Problems   No resolved problems to display.        Hospital Course   Mr. Mullins is a 87 y.o. non-smoker with a history of recent CVA, CKD3, CAD, chronic diastolic CHF and HTN that presents to [facility] for reported chest pain and cough. The patient is currently a very poor historian as he believes he is at [facility]. Per ED notes, he was sent over from rehab due to chest discomfort with coughing since yesterday as well as some mild hypoxia of oxygen saturations 88-89% on RA.               The patient was recently admitted to this facility  to 23 for increased confusion as well as dyspnea. He was admitted here  to 23 as well when he presented with double vision and left arm weakness. It was at that time that he was found to have bilateral hemispheric infarcts of presumed cardioembolic source and placed on Eliquis therapy per Cardiology/neurology recommendations. He was discharge to  rehab but presented here most recently with CHF exacerbation and elevated troponin. He was diuresed and taken for left heart cath that revealed mild nonobstructive CAD. He was felt to have a NSTEMI secondary to demand for decompensated CHF. He was on reduced dosing of Eliquis at that time secondary to some black tarry stools that resolved with the lower dosing of medication.               In the ED, he was afebrile and hemodynamically stable. Lab work revealed a HsTN of 165, WBC 19.37, hemoglobin 12.2, procal 0.25, BNP 2938 and negative RVP. Creatinine was 1.4 with stable electrolytes. His lactic was 1.2 and blood cultures pending. He was noted to be 87-88% on RA when I entered and improved to 91% with 1 L supplemental oxygen. CXR was concerning for possible new vague opacity at the left lung base. He was started on antibiotics and is being admitted for further concerns of pneumonia.      Left pleural effusion, pneumonia, atelectasis: Status post thoracentesis and had repeat thoracentesis on 6/12/2023.  Fluid analysis consistent with parapneumonic pneumonia.  Completed Zosyn for total of 5 to 7 days and currently requiring 2 L of oxygen which will be continued and will follow-up with pulmonary as an outpatient basis.  Does have a rehab bed available today.     Acute respiratory failure: Secondary to above and currently requiring 2 to 3 L of oxygen.     Dysphagia: Mild and on a regular diet.  Speech therapy input appreciated.     Altered mental status: Neurology feels this is metabolic encephalopathy in setting of what is probably some mild vascular dementia present on admission.  Continue present regimen.  Suspect he is presently at his baseline.  His mental status is currently at baseline.     Recent CVA with left hemiparesis: Continue with Eliquis and statins.     CAD: Chest pain-free and is on Eliquis, metoprolol and statins.     Chronic diastolic HF: Presently appears euvolemic.  Lasix was held secondary to  increasing creatinine.  Diuretics resumed at a lower dose and continue to monitor ins and outs closely.  Creatinine is close to baseline now.     CKD3a: Renal function at baseline.     HTN: Blood pressure overall improved and stable.     Neck pain: X-ray unremarkable.  Continue conservative measures     Hypokalemia: Replace per protocol.    Copied text on this note has been reviewed by me on 6/14/2023      Day of Discharge         Physical Exam:  Temp:  [97.9 °F (36.6 °C)-99 °F (37.2 °C)] 98.6 °F (37 °C)  Heart Rate:  [91-99] 99  Resp:  [18] 18  BP: (107-132)/(48-67) 132/63  Body mass index is 24.74 kg/m².  Physical Exam  HEENT: PERRLA, extraocular movements intact, Scleras no icterus  Lungs:  Normal effort and normal respiratory rate.  He is not in respiratory distress.  There are decreased breath sounds.    Heart: Normal rate.  Regular rhythm.    Abdomen: Abdomen is soft.  Bowel sounds are normal.   There is no abdominal tenderness.     Extremities: There is no dependent edema or local swelling.    Neurological: Patient is alert and oriented to person, place and time.    Skin:  Warm and dry.         Consultants     Consult Orders (all) (From admission, onward)       Start     Ordered    06/05/23 1807  Inpatient Pulmonology Consult  Once        Specialty:  Pulmonary Disease  Provider:  Grant Azevedo MD    06/05/23 1806    06/05/23 1805  Inpatient Pulmonology Consult  Once        Specialty:  Pulmonary Disease  Provider:  Grant Azevedo MD    06/05/23 1804    06/01/23 1032  Inpatient Neurology Consult General  Once        Specialty:  Neurology  Provider:  Tomy Giles MD    06/01/23 1031    05/31/23 1511  Inpatient Case Management  Consult  Once        Provider:  (Not yet assigned)    05/31/23 1511    05/31/23 1151  [facility] (on-call MD unless specified) Details  Once        Specialty:  Hospitalist  Provider:  (Not yet assigned)    05/31/23 1150                  Procedures     * Surgery not found  *      Imaging Results (All)       Procedure Component Value Units Date/Time    XR Chest PA & Lateral [[id number]] Resulted: 06/14/23 0923     Updated: 06/14/23 0938    US Thoracentesis [[id number]] Collected: 06/12/23 1227    Specimen: Body Fluid Updated: 06/12/23 1230    Narrative:      ULTRASOUND-GUIDED LEFT THORACENTESIS     HISTORY: Pleural effusion     After signed informed consent was obtained the patient was prepped and  draped in the usual sterile fashion with 2% chlorhexidine. Lidocaine was  used for local anesthesia.     Ultrasound guidance was used to place a 5 Somali catheter into the left  pleural fluid collection. 700 mL of ovidio color fluid was removed.  Sample was sent to the lab.     Confirmatory images were obtained.     Patient tolerated the procedure well with no immediate complications.        Impression:      Ultrasound-guided left thoracentesis as described        This report was finalized on 6/12/2023 12:27 PM by Dr. Robert Stokes M.D.       CT Chest Without Contrast Diagnostic [[id number]] Collected: 06/10/23 2003     Updated: 06/10/23 2019    Narrative:      CT CHEST WO CONTRAST DIAGNOSTIC-     Radiation dose reduction techniques were utilized, including automated  exposure control and exposure modulation based on body size.     Clinical: Pleural effusion, chest pain, cough     COMPARISON 06/06/2023     FINDINGS: Left-sided free-flowing pleural effusion is similar in size  compared to the previous examination. Small amount of pleural fluid on  the right tracking into the major fissure. There is  consolidation/atelectasis again involving the left lower lobe.     Cardiac enlargement, no pericardial effusion. Moderate size hiatal  hernia. There is trace amount of fluid along the right side, the amount  has diminished within the interim. No acute airspace disease has  developed. Small lymph nodes within the mediastinum again demonstrated.  Pericardial recess demonstrated adjacent to the  ascending aorta as  before. There is coronary artery calcification.     Diminished infiltrate/atelectasis at the base of the right lower lobe.  Old right-sided rib fracture seen.     Limited images through the upper abdomen demonstrate gallstones. In  addition there are hepatic cysts and geographic fatty infiltration.  There is a small wedge-shaped area of diminished attenuation along the  periphery of the spleen, similar to the previous examination, likely  infarction.     CONCLUSION:  1. Left-sided pleural effusion not changed within the interim, there is  atelectasis/consolidation involving the left lower lobe as before.  2. Diminished atelectasis/infiltrate at the right lung base. Small  amount of pleural fluid seen tracking into the major fissure.  3. No acute airspace disease as developed within the interim.  4. Hiatal hernia. Small amount of fluid demonstrated along its right  lateral border has diminished within the interim.        This report was finalized on 6/10/2023 8:16 PM by Dr. Hugo Felton M.D.       XR Chest PA & Lateral [[id number]] Collected: 06/09/23 1158     Updated: 06/09/23 1202    Narrative:      XR CHEST PA AND LATERAL-     HISTORY: Male who is 87 years-old,  pleural effusion     TECHNIQUE: Frontal and lateral views of the chest     COMPARISON: 06/07/2023     FINDINGS: The heart size is borderline. Pulmonary vasculature is  unremarkable. Mildly increased left pleural effusion and left lower lung  atelectasis/infiltrate. Minimal right pleural effusion. No pneumothorax.  No acute osseous process.          Impression:      As described.     This report was finalized on 6/9/2023 11:59 AM by Dr. Nehemiah Melvin M.D.       XR Chest PA & Lateral [[id number]] Collected: 06/07/23 1223     Updated: 06/07/23 1228    Narrative:      XR CHEST PA AND LATERAL-     HISTORY: Male who is 87 years-old,  status post left thoracentesis,  check for pneumothorax     TECHNIQUE: Frontal and lateral views of the  chest     COMPARISON: 06/03/2023     FINDINGS: The heart size is borderline. Pulmonary vasculature is  unremarkable. Small left basilar likely atelectasis or infiltrate. Small  residual left pleural effusion is apparent. No pneumothorax. No acute  osseous process.       Impression:      As described.     This report was finalized on 6/7/2023 12:25 PM by Dr. Nehemiah Melvin M.D.       US Thoracentesis [[id number]] Collected: 06/07/23 1030    Specimen: Body Fluid Updated: 06/07/23 1033    Narrative:      ULTRASOUND-GUIDED LEFT THORACENTESIS     HISTORY: Pleural effusion     After signed informed consent was obtained the patient was prepped and  draped in the usual sterile fashion with 2% chlorhexidine. Lidocaine was  used for local anesthesia.     Ultrasound guidance was used to place a 5 Khmer catheter into the left  pleural fluid collection. 600 mL of ovidio color fluid was removed.  Sample was sent to the lab.     Confirmatory images were obtained.     Patient tolerated the procedure well with no immediate complications.        Impression:      Ultrasound-guided left thoracentesis as described        This report was finalized on 6/7/2023 10:30 AM by Dr. Robert Stokes M.D.       CT Chest Without Contrast Diagnostic [[id number]] Collected: 06/06/23 1446     Updated: 06/06/23 1455    Narrative:      CT CHEST WITHOUT CONTRAST     HISTORY: Suspected left pleural effusion, pneumonia     TECHNIQUE: Radiation dose reduction techniques were utilized, including  automated exposure control and exposure modulation based on body size.  Axial images were obtained through the chest without the administration  of IV contrast. Coronal and sagittal reformatted images obtained.     COMPARISON: Chest x-ray 06/03/2023, no prior chest CTs are available     FINDINGS: Small-moderate left pleural effusion and trace right-sided  pleural effusion. Partial atelectasis of the left lower lobe.  Interstitial, groundglass and a few nodular  opacities are seen in the  base of the left lower lobe. These may be infectious or inflammatory. No  appreciable lymphadenopathy. Cardiomegaly. Trace pericardial effusion.  Small hiatal hernia. Small amount of fluid adjacent to the hiatal  hernia. Limited imaging of the upper abdomen demonstrates hepatic cysts.  Cholelithiasis. Hypodense lesion in the spleen favored to reflect a  cyst. Partially imaged right renal cyst. No acute bony abnormality       Impression:      1. Small-moderate left-sided pleural effusion with associated partial  left lower lobe atelectasis  2. Interstitial, groundglass and a few nodular opacities in the base of  the left lower lobe may be infectious or inflammatory. Follow-up chest  CT suggested to ensure clearing  3. Cholelithiasis  4. Additional findings as described           Radiation dose reduction techniques were utilized, including automated  exposure control and exposure modulation based on body size.     This report was finalized on 6/6/2023 2:51 PM by Dr. Robert Stokes M.D.       FL Video Swallow Single Contrast [[id number]] Collected: 06/05/23 1412     Updated: 06/05/23 1658    Narrative:      VIDEO SWALLOW STUDY     HISTORY: Dysphagia.     1 minute 50 seconds fluoroscopy was provided for the speech pathologist  during a video swallow study. 5.57 mGy     Penetration was seen.       Impression:      Fluoroscopy was provided for the speech pathologist during a  video swallow study. For full details please see the speech pathology  report     This report was finalized on 6/5/2023 4:55 PM by Dr. Issac Pierce M.D.       XR Chest PA & Lateral [[id number]] Collected: 06/03/23 1340     Updated: 06/03/23 1345    Narrative:      XR CHEST PA AND LATERAL-     HISTORY: Male who is 87 years-old,  pneumonia, hypoxia     TECHNIQUE: Frontal and lateral views of the chest     COMPARISON: 05/31/2023     FINDINGS: The heart is enlarged. Pulmonary vasculature is unremarkable.  Mildly moderate left  pleural effusion is apparent with left basilar  atelectasis or infiltrate. No pneumothorax. No acute osseous process.       Impression:      Left basilar atelectasis/infiltrate/effusion, continued  follow-up advised. Cardiomegaly.     This report was finalized on 6/3/2023 1:42 PM by Dr. Nehemiah Melvin M.D.       XR Spine Cervical 3 View [[id number]] Collected: 06/02/23 1204     Updated: 06/02/23 1406    Narrative:      X-RAY SPINE CERVICAL THREE-VIEW     CLINICAL INFORMATION: Sudden onset of neck pain, no known injury.     FINDINGS: There is straightening of the lordotic curvature which could  be due to positioning or muscular spasm. The odontoid is preserved with  satisfactory C1-2 alignment and the prevertebral soft tissues are  normal. There is moderate multilevel disc degeneration which appears  most pronounced at C5-6. There is multilevel facet hypertrophy also  seen. C7 vertebra is obscured on the lateral projection due to the  superimposed shoulders. The appearance on the frontal projection is  within normal limits.     CONCLUSION: Degenerative change as described above. C7 vertebra obscured  on the lateral projection.     This report was finalized on 6/2/2023 2:03 PM by Dr. Hugo Felton M.D.       XR Chest 1 View [[id number]] Collected: 05/31/23 0804     Updated: 05/31/23 0809    Narrative:      XR CHEST 1 VW-     Clinical: Chest pain, shortness of breath, cough     COMPARISON examination 05/18/2023     FINDINGS: Mild cardiac enlargement as before. Atherosclerotic  calcification of the aorta. Some prominence of the central vasculature  seen. The right lung is clear. Asymmetric right first rib and  calcification compared to the left, as before.     Vague opacity demonstrated at the left lung base, minimal airspace  disease, atelectasis or effusion may be present. PA and lateral view of  the chest would be helpful for further evaluation. The remainder of  examination is unremarkable.     This report was  finalized on 5/31/2023 8:06 AM by Dr. Hugo Felton M.D.               Results for orders placed during the hospital encounter of 05/18/23    Adult Transthoracic Echo Limited W/ Cont if Necessary Per Protocol    Interpretation Summary    There is severe hypokinesis of the basal to mid inferolateral wall. The remaining wall segments are hyperdynamic and the ejection fraction is preserved    Left ventricular systolic function is normal. Left ventricular ejection fraction appears to be 56 - 60%.    Left ventricular wall thickness is consistent with mild concentric hypertrophy.    Normal right ventricular cavity size and systolic function noted.    Mild to moderate aortic valve regurgitation is present.    Mild to moderate mitral valve regurgitation is present.    Mild tricuspid valve regurgitation is present    Calculated right ventricular systolic pressure from tricuspid regurgitation is 70 mmHg.    There is no evidence of pericardial effusion.    Pertinent Labs     Results from last 7 days   Lab Units 06/14/23 0447 06/13/23 0421 06/12/23 0452 06/11/23  0344   WBC 10*3/mm3 15.20* 13.90* 12.45* 13.05*   HEMOGLOBIN g/dL 10.3* 10.3* 10.7* 10.7*   PLATELETS 10*3/mm3 141 160 177 194     Results from last 7 days   Lab Units 06/14/23 0447 06/13/23 0421 06/12/23 0452 06/11/23  1754 06/11/23  0344   SODIUM mmol/L 140 140 141  --  138   POTASSIUM mmol/L 4.1 4.1 4.2 4.0 3.6   CHLORIDE mmol/L 109* 106 107  --  105   CO2 mmol/L 23.0 23.3 25.0  --  25.0   BUN mg/dL 23 25* 20  --  21   CREATININE mg/dL 1.31* 1.54* 1.33*  --  1.30*   GLUCOSE mg/dL 92 96 93  --  96   EGFR mL/min/1.73 53.0* 43.7* 52.1*  --  53.5*       Results from last 7 days   Lab Units 06/14/23 0447 06/13/23 0421 06/12/23 0452 06/11/23  0344   CALCIUM mg/dL 8.5* 9.3 8.7 8.8               Invalid input(s): LDLCALC  Results from last 7 days   Lab Units 06/12/23  1150   BODYFLDCX  No growth at 2 days         Test Results Pending at Discharge     Pending Labs        Order Current Status    Non-gynecologic Cytology In process    AFB Culture - Body Fluid, Pleural Cavity Preliminary result    AFB Culture - Body Fluid, Pleural Cavity Preliminary result    Body Fluid Culture - Body Fluid, Pleural Cavity Preliminary result    Fungus Culture - Body Fluid, Pleural Cavity Preliminary result            Discharge Details        Discharge Medications        Changes to Medications        Instructions Start Date   furosemide 20 MG tablet  Commonly known as: LASIX  What changed:   medication strength  how much to take   20 mg, Oral, Daily   Start Date: Hailee 15, 2023            Continue These Medications        Instructions Start Date   apixaban 2.5 MG tablet tablet  Commonly known as: ELIQUIS   2.5 mg, Oral, Every 12 Hours Scheduled      cholecalciferol 25 MCG (1000 UT) tablet  Commonly known as: VITAMIN D3   1,000 Units, Oral, Daily      clonazePAM 1 MG tablet  Commonly known as: KlonoPIN   1 mg, Oral, Nightly      doxazosin 8 MG tablet  Commonly known as: CARDURA   8 mg, Oral, Nightly      hydrocortisone 2.5 % lotion   1 application, Topical, 2 Times Daily      metoprolol succinate XL 50 MG 24 hr tablet  Commonly known as: TOPROL-XL   50 mg, Oral, 2 times daily      pantoprazole 40 MG EC tablet  Commonly known as: PROTONIX   40 mg, Oral, Daily      rOPINIRole 2 MG tablet  Commonly known as: REQUIP   2 mg, Oral, Nightly      rosuvastatin 40 MG tablet  Commonly known as: CRESTOR   40 mg, Oral, Daily      VITAMIN B 12 PO   2,500 mcg, Oral, Daily               Allergies   Allergen Reactions    Sulfa Antibiotics Itching       Discharge Disposition:  Skilled Nursing Facility (DC - External)      Discharge Diet:  Diet Order   Procedures    Diet: Regular/House Diet; Texture: Regular Texture (IDDSI 7); Fluid Consistency: Thin (IDDSI 0)       Discharge Activity:   Activity Instructions       Activity as Tolerated              CODE STATUS:    Code Status and Medical Interventions:   Ordered at:  06/02/23 1001     Medical Intervention Limits:    NO intubation (DNI)    NO cardioversion     Level Of Support Discussed With:    Patient    Health Care Surrogate     Code Status (Patient has no pulse and is not breathing):    No CPR (Do Not Attempt to Resuscitate)     Medical Interventions (Patient has pulse or is breathing):    Limited Support     Comments:    D/w pt and wife       Future Appointments   Date Time Provider Department Center   9/14/2023  4:00 PM Renea Bradford APRN [facility]   11/21/2023 10:30 AM Aashish Thapa MD [facility]     Additional Instructions for the Follow-ups that You Need to Schedule       Discharge Follow-up with Specified Provider: ; 2 Weeks   As directed      To:     Follow Up: 2 Weeks                Contact information for follow-up providers       Santos Simmons MD .    Specialty: Family Medicine  Contact information:  [address] [facility] [address]  [phone]                       Contact information for after-discharge care       Destination       [facility] .    Service: Skilled Nursing  Contact information:  [address] [facility] [address] [phone]                                   Additional Instructions for the Follow-ups that You Need to Schedule       Discharge Follow-up with Specified Provider: ; 2 Weeks   As directed      To:     Follow Up: 2 Weeks             Time Spent on Discharge:  Greater than 30 minutes      David Montoya MD  [facility]  06/14/23  11:48 EDT                Electronically signed by David Montoya MD at 06/14/23 1150       Discharge Order (From admission, onward)       Start     Ordered    06/14/23 0856  Discharge patient  Once        Expected Discharge Date: 06/14/23    Discharge Disposition: Skilled Nursing Facility (DC - External)    Physician of Record for Attribution -  Please select from Treatment Team: DREW GARCIA [[id number]]    Review needed by CMO to determine Physician of Record: No    Please choose which facility the patient is currently admitted if they are being discharged to another facility or unit.: [facility]       Question Answer Comment   Physician of Record for Attribution - Please select from Treatment Team DREW GARCIA    Review needed by CMO to determine Physician of Record No    Please choose which facility the patient is currently admitted if they are being discharged to another facility or unit. [facility]        06/14/23 0833

## 2023-06-14 NOTE — CASE MANAGEMENT/SOCIAL WORK
Case Management Discharge Note      Final Note: Return to [facility] via [facility] . Wife informed of D/C plan.         Selected Continued Care - Admitted Since 5/31/2023       Destination Coordination complete.      Service Provider Selected Services Address Phone Fax Patient Preferred    [facility] [address] [address] [phone]               Durable Medical Equipment    No services have been selected for the patient.                Dialysis/Infusion    No services have been selected for the patient.                Home Medical Care    No services have been selected for the patient.                Therapy    No services have been selected for the patient.                Community Resources    No services have been selected for the patient.                Community & DME    No services have been selected for the patient.                    Selected Continued Care - Prior Encounters Includes continued care and service providers with selected services from prior encounters from 3/2/2023 to 6/14/2023      Discharged on 5/23/2023 Admission date: 5/18/2023 - Discharge disposition: Skilled Nursing Facility (DC - External)      Destination       Service Provider Selected Services Address Phone Fax Patient Preferred    [facility] [address] [address] [phone] --                          Transportation Services  W/C Van: [facility]    Final Discharge Disposition Code: 03 - skilled nursing facility (SNF)

## 2023-06-14 NOTE — PLAN OF CARE
Goal Outcome Evaluation:  Plan of Care Reviewed With: patient        Progress: improving  Outcome Evaluation: VSS, still confused but less agitated than the night before. Turned and weight shifted. Will continue to monitor. Will d/c to [facility] today, wheelchair van scheduled at 1300.

## 2023-06-14 NOTE — NURSING NOTE
Report given to JENNIFER Cardenas at [facility] at 1248. W/c van scheduled for  at 1300.

## 2023-06-15 LAB
BACTERIA FLD CULT: NORMAL
CYTO UR: NORMAL
GRAM STN SPEC: NORMAL
GRAM STN SPEC: NORMAL
LAB AP CASE REPORT: NORMAL
PATH REPORT.ADDENDUM SPEC: NORMAL
PATH REPORT.FINAL DX SPEC: NORMAL
PATH REPORT.GROSS SPEC: NORMAL

## 2023-06-20 PROBLEM — J18.9 PNEUMONIA OF LEFT LOWER LOBE DUE TO INFECTIOUS ORGANISM: Status: ACTIVE | Noted: 2023-01-01

## 2023-06-21 PROBLEM — J69.0 ASPIRATION PNEUMONITIS: Status: ACTIVE | Noted: 2023-01-01

## 2023-06-21 PROBLEM — K44.9 HIATAL HERNIA: Status: ACTIVE | Noted: 2023-01-01

## 2023-06-21 PROBLEM — I27.20 PULMONARY HYPERTENSION: Status: ACTIVE | Noted: 2023-01-01

## 2023-06-21 PROBLEM — K74.60 CIRRHOSIS OF LIVER: Status: ACTIVE | Noted: 2023-01-01

## 2023-06-21 PROBLEM — D69.6 THROMBOCYTOPENIA: Status: ACTIVE | Noted: 2023-01-01

## 2023-06-21 PROBLEM — G93.41 METABOLIC ENCEPHALOPATHY: Status: ACTIVE | Noted: 2023-01-01

## 2023-06-21 PROBLEM — F03.94 DEMENTIA WITH ANXIETY: Status: ACTIVE | Noted: 2023-01-01

## 2023-06-21 LAB
FUNGUS WND CULT: NORMAL
MYCOBACTERIUM SPEC CULT: NORMAL
NIGHT BLUE STAIN TISS: NORMAL

## 2023-06-22 PROBLEM — R31.0 GROSS HEMATURIA: Status: ACTIVE | Noted: 2023-01-01

## 2023-06-22 PROBLEM — R13.13 PHARYNGEAL DYSPHAGIA: Status: ACTIVE | Noted: 2023-01-01

## 2023-06-28 LAB
FUNGUS WND CULT: NORMAL
MYCOBACTERIUM SPEC CULT: NORMAL
NIGHT BLUE STAIN TISS: NORMAL

## 2023-07-05 LAB
FUNGUS WND CULT: NORMAL
MYCOBACTERIUM SPEC CULT: NORMAL
NIGHT BLUE STAIN TISS: NORMAL

## 2023-07-12 LAB
MYCOBACTERIUM SPEC CULT: NORMAL
NIGHT BLUE STAIN TISS: NORMAL

## 2023-07-17 PROBLEM — J90 PLEURAL EFFUSION: Status: ACTIVE | Noted: 2023-01-01

## 2023-07-17 NOTE — ED NOTES
"Nursing report ED to floor  Art Mullins  88 y.o.  male    HPI :   Chief Complaint   Patient presents with    Fall       Admitting doctor:   Venessa Ivory MD    Admitting diagnosis:   The primary encounter diagnosis was Pleural effusion on left. Diagnoses of Contusion of scalp, initial encounter and Congestive heart failure, unspecified HF chronicity, unspecified heart failure type were also pertinent to this visit.    Code status:   Current Code Status       Date Active Code Status Order ID Comments User Context       Prior            Allergies:   Sulfa antibiotics    Isolation:   No active isolations    Intake and Output  No intake or output data in the 24 hours ending 07/17/23 1912    Weight:       07/17/23  1614   Weight: 74.8 kg (165 lb)       Most recent vitals:   Vitals:    07/17/23 1614 07/17/23 1701 07/17/23 1731 07/17/23 1801   BP:  122/73 125/71 127/69   Patient Position:       Pulse:  109 111 112   Resp:       Temp: 97.5 °F (36.4 °C)      TempSrc: Tympanic      SpO2:  96% 94% 93%   Weight: 74.8 kg (165 lb)      Height: 175.3 cm (69\")          Active LDAs/IV Access:   Lines, Drains & Airways       Active LDAs       Name Placement date Placement time Site Days    Peripheral IV 07/17/23 1529 Right Antecubital 07/17/23  1529  Antecubital  less than 1                    Labs (abnormal labs have a star):   Labs Reviewed   COMPREHENSIVE METABOLIC PANEL - Abnormal; Notable for the following components:       Result Value    Glucose 107 (*)     Creatinine 1.53 (*)     Total Protein 5.7 (*)     Albumin 2.8 (*)     AST (SGOT) 78 (*)     Alkaline Phosphatase 181 (*)     eGFR 43.7 (*)     All other components within normal limits    Narrative:     GFR Normal >60  Chronic Kidney Disease <60  Kidney Failure <15    The GFR formula is only valid for adults with stable renal function between ages 18 and 70.   CBC WITH AUTO DIFFERENTIAL - Abnormal; Notable for the following components:    WBC 13.97 (*)     RBC " 3.35 (*)     Hemoglobin 9.6 (*)     Hematocrit 29.5 (*)     RDW 16.0 (*)     MPV 12.9 (*)     Platelets 51 (*)     Neutrophil % 77.3 (*)     Lymphocyte % 12.5 (*)     Immature Grans % 0.7 (*)     Neutrophils, Absolute 10.80 (*)     Monocytes, Absolute 1.15 (*)     Immature Grans, Absolute 0.10 (*)     All other components within normal limits   BNP (IN-HOUSE)   SINGLE HSTROPONIN T   CBC AND DIFFERENTIAL    Narrative:     The following orders were created for panel order CBC & Differential.  Procedure                               Abnormality         Status                     ---------                               -----------         ------                     CBC Auto Differential[[id number]]        Abnormal            Final result                 Please view results for these tests on the individual orders.       EKG:   ECG 12 Lead Dyspnea   Preliminary Result   HEART RATE= 112  bpm   RR Interval= 536  ms   NH Interval= 145  ms   P Horizontal Axis= -30  deg   P Front Axis= 40  deg   QRSD Interval= 93  ms   QT Interval= 423  ms   QRS Axis= -45  deg   T Wave Axis= 157  deg   - ABNORMAL ECG -   Sinus tachycardia   Paired ventricular premature complexes   Probable left atrial enlargement   Left anterior fascicular block   Consider anterior infarct   Borderline repolarization abnormality   Prolonged QT interval   Electronically Signed By:    Date and Time of Study: 2023-07-17 15:40:38          Meds given in ED:   Medications - No data to display    Imaging results:  XR Chest 1 View    Result Date: 7/17/2023  As described.  This report was finalized on 7/17/2023 3:46 PM by Dr. Nehemiah Melvin M.D.       Ambulatory status:   -     Social issues:   Social History     Socioeconomic History    Marital status:    Tobacco Use    Smoking status: Never   Vaping Use    Vaping Use: Never used   Substance and Sexual Activity    Alcohol use: No    Drug use: No    Sexual activity: Defer       NIH Stroke Scale:       Shakeel  JENNIFER Ambrocio  07/17/23 19:12 EDT

## 2023-07-17 NOTE — ED PROVIDER NOTES
EMERGENCY DEPARTMENT ENCOUNTER    Room Number:  21/21  PCP: Santos Simmons MD  Historian: EMS      HPI:  Chief Complaint: Fall  A complete HPI/ROS/PMH/PSH/SH/FH are unobtainable due to: Dementia  Context: Art Mullins is a 88 y.o. male who presents to the ED c/o fall.  Patient has history of pneumonia has been admitted here recently.  Patient was at nursing home and was found on his face.  Unwitnessed fall.  Patient apparently had baseline mental status.  Patient wears 3 L oxygen all the time patient has had no new cough or congestion            PAST MEDICAL HISTORY  Active Ambulatory Problems     Diagnosis Date Noted    Essential hypertension 08/31/2021    Acute CVA (cerebrovascular accident) 04/30/2023    Stage 3a chronic kidney disease     Elevated troponin     Vision changes     Altered mental status, unspecified altered mental status type 05/18/2023    Chronic diastolic CHF (congestive heart failure) 05/18/2023    Pneumonia due to infectious organism, unspecified laterality, unspecified part of lung 05/31/2023    History of CVA (cerebrovascular accident) 05/31/2023    Acute respiratory failure with hypoxia 05/31/2023    Coronary artery disease without angina pectoris 05/31/2023    Metabolic encephalopathy 06/21/2023    Dementia with anxiety 06/21/2023    Possible cirrhosis of liver per CT imaging 06/21/2023    Hiatal hernia, large 06/21/2023    Aspiration pneumonitis 06/21/2023    Thrombocytopenia 06/21/2023    Pulmonary hypertension 06/21/2023    Pharyngeal dysphagia 06/22/2023    Gross hematuria 06/22/2023     Resolved Ambulatory Problems     Diagnosis Date Noted    BELLA (acute kidney injury)      Past Medical History:   Diagnosis Date    Hyperlipidemia     Hypertension     PVCs (premature ventricular contractions)          PAST SURGICAL HISTORY  Past Surgical History:   Procedure Laterality Date    CARDIAC CATHETERIZATION N/A 5/19/2023    Procedure: Left Heart Cath;  Surgeon: Ricardo Arrieta MD;   Location:  [facility] CATH INVASIVE LOCATION;  Service: Cardiology;  Laterality: N/A;    CARDIAC CATHETERIZATION N/A 5/19/2023    Procedure: Coronary angiography;  Surgeon: Ricardo Arrieta MD;  Location: [facility] CATH INVASIVE LOCATION;  Service: Cardiology;  Laterality: N/A;         FAMILY HISTORY  Family History   Problem Relation Age of Onset    No Known Problems Mother     No Known Problems Father     No Known Problems Sister     No Known Problems Brother          SOCIAL HISTORY  Social History     Socioeconomic History    Marital status:    Tobacco Use    Smoking status: Never   Vaping Use    Vaping Use: Never used   Substance and Sexual Activity    Alcohol use: No    Drug use: No    Sexual activity: Defer         ALLERGIES  Sulfa antibiotics        REVIEW OF SYSTEMS  Review of Systems   Unable to obtain      PHYSICAL EXAM  ED Triage Vitals [07/17/23 1459]   Temp Heart Rate Resp BP SpO2   -- 114 20 127/71 90 %      Temp src Heart Rate Source Patient Position BP Location FiO2 (%)   -- Monitor Lying -- --       Physical Exam      GENERAL: Patient is awake.  No distress.  Is tachypneic.  Does not answer questions or follow commands  HENT: nares patent  EYES: no scleral icterus  CV: regular rhythm, normal rate  RESPIRATORY: normal effort  ABDOMEN: soft  MUSCULOSKELETAL: no deformity  NEURO: alert, moves all extremities, follows commands  PSYCH:  calm, cooperative  SKIN: warm, dry    Vital signs and nursing notes reviewed.          LAB RESULTS  Recent Results (from the past 24 hour(s))   Comprehensive Metabolic Panel    Collection Time: 07/17/23  3:34 PM    Specimen: Blood   Result Value Ref Range    Glucose 107 (H) 65 - 99 mg/dL    BUN 17 8 - 23 mg/dL    Creatinine 1.53 (H) 0.76 - 1.27 mg/dL    Sodium 141 136 - 145 mmol/L    Potassium 4.8 3.5 - 5.2 mmol/L    Chloride 106 98 - 107 mmol/L    CO2 22.6 22.0 - 29.0 mmol/L    Calcium 8.7 8.6 - 10.5 mg/dL    Total Protein 5.7 (L) 6.0 - 8.5 g/dL    Albumin 2.8 (L) 3.5 - 5.2  g/dL    ALT (SGPT) 17 1 - 41 U/L    AST (SGOT) 78 (H) 1 - 40 U/L    Alkaline Phosphatase 181 (H) 39 - 117 U/L    Total Bilirubin 0.6 0.0 - 1.2 mg/dL    Globulin 2.9 gm/dL    A/G Ratio 1.0 g/dL    BUN/Creatinine Ratio 11.1 7.0 - 25.0    Anion Gap 12.4 5.0 - 15.0 mmol/L    eGFR 43.7 (L) >60.0 mL/min/1.73   CBC Auto Differential    Collection Time: 07/17/23  3:34 PM    Specimen: Blood   Result Value Ref Range    WBC 13.97 (H) 3.40 - 10.80 10*3/mm3    RBC 3.35 (L) 4.14 - 5.80 10*6/mm3    Hemoglobin 9.6 (L) 13.0 - 17.7 g/dL    Hematocrit 29.5 (L) 37.5 - 51.0 %    MCV 88.1 79.0 - 97.0 fL    MCH 28.7 26.6 - 33.0 pg    MCHC 32.5 31.5 - 35.7 g/dL    RDW 16.0 (H) 12.3 - 15.4 %    RDW-SD 50.9 37.0 - 54.0 fl    MPV 12.9 (H) 6.0 - 12.0 fL    Platelets 51 (L) 140 - 450 10*3/mm3    Neutrophil % 77.3 (H) 42.7 - 76.0 %    Lymphocyte % 12.5 (L) 19.6 - 45.3 %    Monocyte % 8.2 5.0 - 12.0 %    Eosinophil % 0.9 0.3 - 6.2 %    Basophil % 0.4 0.0 - 1.5 %    Immature Grans % 0.7 (H) 0.0 - 0.5 %    Neutrophils, Absolute 10.80 (H) 1.70 - 7.00 10*3/mm3    Lymphocytes, Absolute 1.74 0.70 - 3.10 10*3/mm3    Monocytes, Absolute 1.15 (H) 0.10 - 0.90 10*3/mm3    Eosinophils, Absolute 0.12 0.00 - 0.40 10*3/mm3    Basophils, Absolute 0.06 0.00 - 0.20 10*3/mm3    Immature Grans, Absolute 0.10 (H) 0.00 - 0.05 10*3/mm3    nRBC 0.0 0.0 - 0.2 /100 WBC   ECG 12 Lead Dyspnea    Collection Time: 07/17/23  3:40 PM   Result Value Ref Range    QT Interval 423 ms       Ordered the above labs and reviewed the results.        RADIOLOGY  XR Chest 1 View    Result Date: 7/17/2023  XR CHEST 1 VW-  HISTORY: Male who is 88 years-old,  short of breath  TECHNIQUE: Frontal view of the chest  COMPARISON: 06/20/2023  FINDINGS: The heart size is borderline. Pulmonary vasculature is congested. Mild perihilar opacities may represent edema and/or pneumonia, follow-up suggested. No large pleural effusion. No pneumothorax. No acute osseous process.      As described.  This  report was finalized on 7/17/2023 3:46 PM by Dr. Nehemiah Melvin M.D.       Ordered the above noted radiological studies. Reviewed by me in PACS.            PROCEDURES  Procedures      EKG          EKG time: 1540  Rhythm/Rate: Sinus tachycardia 112 with PVCs  P waves and AK: Normal P waves  QRS, axis: Normal QRS  ST and T waves: Normal ST-T wave    Interpreted Contemporaneously by me, independently viewed  Changed compared to prior 6/20/23      MEDICATIONS GIVEN IN ER  Medications - No data to display                MEDICAL DECISION MAKING, PROGRESS, and CONSULTS     Discussion below represents my analysis of pertinent findings related to patient's condition, differential diagnosis, treatment plan and final disposition.      Additional sources:  - Discussed/ obtained information from independent historians: History obtained from wife    - External (non-ED) record review: Epic reviewed and patient admitted here in June 2023 for pneumonia    - Chronic or social conditions impacting care: None    - Shared decision making: None      Orders placed during this visit:  Orders Placed This Encounter   Procedures    CT Head Without Contrast    CT Cervical Spine Without Contrast    XR Chest 1 View    CT Chest Without Contrast Diagnostic    Comprehensive Metabolic Panel    CBC Auto Differential    BNP    Single High Sensitivity Troponin T    LHA (on-call MD unless specified) Details    ECG 12 Lead Dyspnea    Initiate Observation Status    CBC & Differential         Additional orders considered but not ordered:  None        Differential diagnosis includes but is not limited to:    Pneumonia versus pleural effusion versus CHF      Independent interpretation of labs, radiology studies, and discussions with consultants:  ED Course as of 07/17/23 1909   Mon Jul 17, 2023   6364 18:54 EDT  Patient had fall.  Did hit his head.  Patient has CT scan of the head and C-spine that were negative.  Patient does have large left sided  effusion visible on CT of his C-spine.  Discussed with wife who states patient's breathing is worse and she would like him admitted. [SL]   1907 19:07 EDT  Discussed with Dr. Ivory who will admit. [SL]      ED Course User Index  [SL] Dom Miller MD                 DIAGNOSIS  Final diagnoses:   Pleural effusion on left   Contusion of scalp, initial encounter   Congestive heart failure, unspecified HF chronicity, unspecified heart failure type         DISPOSITION  admit            Latest Documented Vital Signs:  As of 19:09 EDT  BP- 127/69 HR- 112 Temp- 97.5 °F (36.4 °C) (Tympanic) O2 sat- 93%              --    Please note that portions of this were completed with a voice recognition program.       Note Disclaimer: At [facility], we believe that sharing information builds trust and better relationships. You are receiving this note because you are receiving care at [facility] or recently visited. It is possible you will see health information before a provider has talked with you about it. This kind of information can be easy to misunderstand. To help you fully understand what it means for your health, we urge you to discuss this note with your provider.            Dom Miller MD  07/17/23 1910

## 2023-07-17 NOTE — ED NOTES
Nursing report ED to floor  Art Mullins  88 y.o.  male    HPI :   Chief Complaint   Patient presents with    Fall       Admitting doctor:   Venessa Ivory MD    Admitting diagnosis:   The primary encounter diagnosis was Pleural effusion on left. Diagnoses of Contusion of scalp, initial encounter and Congestive heart failure, unspecified HF chronicity, unspecified heart failure type were also pertinent to this visit.    Code status:   Current Code Status       Date Active Code Status Order ID Comments User Context       7/17/2023 1928 No CPR (Do Not Attempt to Resuscitate) [id number]  Venessa Ivory MD ED        Question Answer    Code Status (Patient has no pulse and is not breathing) No CPR (Do Not Attempt to Resuscitate)    Medical Interventions (Patient has pulse or is breathing) Limited Support    Medical Intervention Limits: NO intubation (DNI)                    Allergies:   Sulfa antibiotics    Isolation:   No active isolations    Intake and Output  No intake or output data in the 24 hours ending 07/17/23 1941    Weight:       07/17/23  1614   Weight: 74.8 kg (165 lb)       Most recent vitals:   Vitals:    07/17/23 1701 07/17/23 1731 07/17/23 1801 07/17/23 1931   BP: 122/73 125/71 127/69 127/62   Patient Position:       Pulse: 109 111 112 109   Resp:    18   Temp:       TempSrc:       SpO2: 96% 94% 93% 94%   Weight:       Height:           Active LDAs/IV Access:   Lines, Drains & Airways       Active LDAs       Name Placement date Placement time Site Days    Peripheral IV 07/17/23 1529 Right Antecubital 07/17/23  1529  Antecubital  less than 1                    Labs (abnormal labs have a star):   Labs Reviewed   COMPREHENSIVE METABOLIC PANEL - Abnormal; Notable for the following components:       Result Value    Glucose 107 (*)     Creatinine 1.53 (*)     Total Protein 5.7 (*)     Albumin 2.8 (*)     AST (SGOT) 78 (*)     Alkaline Phosphatase 181 (*)     eGFR 43.7 (*)     All other  components within normal limits    Narrative:     GFR Normal >60  Chronic Kidney Disease <60  Kidney Failure <15    The GFR formula is only valid for adults with stable renal function between ages 18 and 70.   CBC WITH AUTO DIFFERENTIAL - Abnormal; Notable for the following components:    WBC 13.97 (*)     RBC 3.35 (*)     Hemoglobin 9.6 (*)     Hematocrit 29.5 (*)     RDW 16.0 (*)     MPV 12.9 (*)     Platelets 51 (*)     Neutrophil % 77.3 (*)     Lymphocyte % 12.5 (*)     Immature Grans % 0.7 (*)     Neutrophils, Absolute 10.80 (*)     Monocytes, Absolute 1.15 (*)     Immature Grans, Absolute 0.10 (*)     All other components within normal limits   BNP (IN-HOUSE)   SINGLE HSTROPONIN T   CBC AND DIFFERENTIAL    Narrative:     The following orders were created for panel order CBC & Differential.  Procedure                               Abnormality         Status                     ---------                               -----------         ------                     CBC Auto Differential[[id number]]        Abnormal            Final result                 Please view results for these tests on the individual orders.       EKG:   ECG 12 Lead Dyspnea   Preliminary Result   HEART RATE= 112  bpm   RR Interval= 536  ms   VA Interval= 145  ms   P Horizontal Axis= -30  deg   P Front Axis= 40  deg   QRSD Interval= 93  ms   QT Interval= 423  ms   QRS Axis= -45  deg   T Wave Axis= 157  deg   - ABNORMAL ECG -   Sinus tachycardia   Paired ventricular premature complexes   Probable left atrial enlargement   Left anterior fascicular block   Consider anterior infarct   Borderline repolarization abnormality   Prolonged QT interval   Electronically Signed By:    Date and Time of Study: 2023-07-17 15:40:38          Meds given in ED:   Medications   acetaminophen (TYLENOL) tablet 650 mg (has no administration in time range)   sennosides-docusate (PERICOLACE) 8.6-50 MG per tablet 2 tablet (has no administration in time range)     And    polyethylene glycol (MIRALAX) packet 17 g (has no administration in time range)     And   bisacodyl (DULCOLAX) EC tablet 5 mg (has no administration in time range)     And   bisacodyl (DULCOLAX) suppository 10 mg (has no administration in time range)   ondansetron (ZOFRAN) tablet 4 mg (has no administration in time range)     Or   ondansetron (ZOFRAN) injection 4 mg (has no administration in time range)   melatonin tablet 3 mg (has no administration in time range)       Imaging results:  CT Head Without Contrast    Result Date: 7/17/2023   There is no evidence for acute traumatic intracranial pathology.  There is no significant interval change when compared to the prior head CT dated 06/20/2023. Again noted is a chronic infarct within the lateral aspect of the right frontal lobe within the right MCA distribution and the posterior aspect of the left occipital lobe within the left PCA distribution. Additionally, there are subcentimeter chronic infarcts within the cerebellar hemispheres. Mild-to-moderate changes of chronic small vessel ischemic phenomena are noted and there is old lacunar disease.  TECHNIQUE: CT scan of the cervical spine was obtained with 1 mm axial bone algorithm and 2 mm axial soft tissue algorithm images. Sagittal and coronal reconstructed images were obtained.  FINDINGS:  There is no evidence for acute fracture or bony malalignment involving the cervical spine.  Disc osteophyte complexes are noted at C4-5, C5-6, and C6-7 resulting in up to mild-to-moderate degrees of canal narrowing at the C4-5 and C5-6 levels. Foraminal stenotic changes are most prominently noted from C4-5 down to C6-7.  Incidental note is made of a large left pleural effusion. Please correlate with dedicated chest imaging.  IMPRESSION:  There is no evidence for acute fracture or bony malalignment involving the cervical spine.  Incidental note is made of degenerative phenomena within the cervical spine as discussed above.  On  the lower cuts of the cervical spine CT, a large left pleural effusion is partially visualized. Please correlate with dedicated chest imaging.  These findings and recommendations were discussed with Dr. Miller on 07/17/2023 at approximately 6:43 PM.      Radiation dose reduction techniques were utilized, including automated exposure control and exposure modulation based on body size.       CT Cervical Spine Without Contrast    Result Date: 7/17/2023   There is no evidence for acute traumatic intracranial pathology.  There is no significant interval change when compared to the prior head CT dated 06/20/2023. Again noted is a chronic infarct within the lateral aspect of the right frontal lobe within the right MCA distribution and the posterior aspect of the left occipital lobe within the left PCA distribution. Additionally, there are subcentimeter chronic infarcts within the cerebellar hemispheres. Mild-to-moderate changes of chronic small vessel ischemic phenomena are noted and there is old lacunar disease.  TECHNIQUE: CT scan of the cervical spine was obtained with 1 mm axial bone algorithm and 2 mm axial soft tissue algorithm images. Sagittal and coronal reconstructed images were obtained.  FINDINGS:  There is no evidence for acute fracture or bony malalignment involving the cervical spine.  Disc osteophyte complexes are noted at C4-5, C5-6, and C6-7 resulting in up to mild-to-moderate degrees of canal narrowing at the C4-5 and C5-6 levels. Foraminal stenotic changes are most prominently noted from C4-5 down to C6-7.  Incidental note is made of a large left pleural effusion. Please correlate with dedicated chest imaging.  IMPRESSION:  There is no evidence for acute fracture or bony malalignment involving the cervical spine.  Incidental note is made of degenerative phenomena within the cervical spine as discussed above.  On the lower cuts of the cervical spine CT, a large left pleural effusion is partially  visualized. Please correlate with dedicated chest imaging.  These findings and recommendations were discussed with Dr. Miller on 07/17/2023 at approximately 6:43 PM.      Radiation dose reduction techniques were utilized, including automated exposure control and exposure modulation based on body size.       XR Chest 1 View    Result Date: 7/17/2023  As described.  This report was finalized on 7/17/2023 3:46 PM by Dr. Nehemiah Melvin M.D.       Ambulatory status:   - with assistance     Social issues:   Social History     Socioeconomic History    Marital status:    Tobacco Use    Smoking status: Never   Vaping Use    Vaping Use: Never used   Substance and Sexual Activity    Alcohol use: No    Drug use: No    Sexual activity: Defer       NIH Stroke Scale:       Eloise Nova RN  07/17/23 19:41 EDT

## 2023-07-17 NOTE — PROGRESS NOTES
Clinical Pharmacy Services: Medication History    Art Mullins is a 88 y.o. male presenting to [facility] for   Chief Complaint   Patient presents with    Fall       He  has a past medical history of Hyperlipidemia, Hypertension, and PVCs (premature ventricular contractions).    Allergies as of 07/17/2023 - Reviewed 07/17/2023   Allergen Reaction Noted    Sulfa antibiotics Itching 09/01/2016       Medication information was obtained from: senior care Paperwork   Pharmacy and Phone Number:     Prior to Admission Medications       Prescriptions Last Dose Informant Patient Reported? Taking?    apixaban (ELIQUIS) 2.5 MG tablet tablet  Nursing Home No Yes    Take 1 tablet by mouth Every 12 (Twelve) Hours. Indications: Other - full anticoagulation    bisacodyl (Dulcolax) 10 MG suppository  Nursing Home Yes Yes    Insert 1 suppository into the rectum Daily As Needed for Constipation.    Cholecalciferol (Vitamin D3) 50 MCG (2000 UT) tablet  Nursing Home Yes Yes    Take 1 tablet by mouth Daily.    clonazePAM (KlonoPIN) 0.5 MG tablet  Nursing Home Yes Yes    Take 1 tablet by mouth Every Night.    doxazosin (CARDURA) 4 MG tablet  Nursing Home Yes Yes    Take 1 tablet by mouth Every Night.    magnesium hydroxide (MILK OF MAGNESIA) 400 MG/5ML suspension  Nursing Home Yes Yes    Take 30 mL by mouth Daily As Needed for Constipation.    metoprolol succinate XL (TOPROL-XL) 50 MG 24 hr tablet  Nursing Home No Yes    Take 1 tablet by mouth 2 (two) times a day.    mirtazapine (REMERON) 7.5 MG tablet  Nursing Home Yes Yes    Take 1 tablet by mouth Every Night.    oxymetazoline (AFRIN) 0.05 % nasal spray  Nursing Home Yes Yes    2 sprays into the nostril(s) as directed by provider Every 1 (One) Hour As Needed for Congestion.    pantoprazole (PROTONIX) 40 MG EC tablet  Nursing Home Yes Yes    Take 1 tablet by mouth Daily.    rosuvastatin (CRESTOR) 5 MG tablet  Nursing Home Yes Yes    Take 1 tablet by mouth Every Night.     sennosides-docusate (Senna Plus) 8.6-50 MG per tablet  Nursing Home Yes Yes    Take 1 tablet by mouth Every Evening.    sodium chloride 0.65 % nasal spray  Nursing Home Yes Yes    1 spray into the nostril(s) as directed by provider 2 (Two) Times a Day.    Sodium Phosphates (fleet enema) 7-19 GM/118ML enema  Nursing Home Yes Yes    Insert 1 enema into the rectum 1 (One) Time As Needed.    traMADol (ULTRAM) 50 MG tablet  Nursing Home Yes Yes    Take 25 mg by mouth Every 4 (Four) Hours As Needed for Moderate Pain.              Medication notes:     This medication list is complete to the best of my knowledge as of 7/17/2023    Please call if questions.    Keri Sim  Medication History Technician   [phone]    7/17/2023 18:01 EDT

## 2023-07-17 NOTE — ED NOTES
Pt arrives via EMS from [facility]. Pt was found on the flood by NH staff. Unwitnessed fall. EMS reports that pt has pinpoint pupils. Pt has a rash to his forehead. Pt has a hx of dementia and is at his baseline per NH staff.

## 2023-07-18 PROBLEM — R29.6 UNWITNESSED FALL: Status: ACTIVE | Noted: 2023-01-01

## 2023-07-18 PROBLEM — J18.9 PNEUMONIA DUE TO INFECTIOUS ORGANISM, UNSPECIFIED LATERALITY, UNSPECIFIED PART OF LUNG: Status: RESOLVED | Noted: 2023-01-01 | Resolved: 2023-01-01

## 2023-07-18 PROBLEM — J96.01 ACUTE RESPIRATORY FAILURE WITH HYPOXIA: Status: RESOLVED | Noted: 2023-01-01 | Resolved: 2023-01-01

## 2023-07-18 PROBLEM — E43 SEVERE MALNUTRITION: Status: ACTIVE | Noted: 2023-01-01

## 2023-07-18 PROBLEM — I50.33 ACUTE ON CHRONIC HEART FAILURE WITH PRESERVED EJECTION FRACTION (HFPEF): Status: ACTIVE | Noted: 2023-01-01

## 2023-07-18 PROBLEM — J69.0 ASPIRATION PNEUMONITIS: Status: RESOLVED | Noted: 2023-01-01 | Resolved: 2023-01-01

## 2023-07-18 PROBLEM — R31.0 GROSS HEMATURIA: Status: RESOLVED | Noted: 2023-01-01 | Resolved: 2023-01-01

## 2023-07-18 PROBLEM — J96.11 CHRONIC RESPIRATORY FAILURE WITH HYPOXIA: Status: ACTIVE | Noted: 2023-01-01

## 2023-07-18 PROBLEM — J90 PLEURAL EFFUSION: Status: ACTIVE | Noted: 2023-01-01

## 2023-07-18 PROBLEM — R41.82 ALTERED MENTAL STATUS, UNSPECIFIED ALTERED MENTAL STATUS TYPE: Status: RESOLVED | Noted: 2023-01-01 | Resolved: 2023-01-01

## 2023-07-18 PROBLEM — G93.41 METABOLIC ENCEPHALOPATHY: Status: ACTIVE | Noted: 2023-01-01

## 2023-07-18 PROBLEM — J96.12 CHRONIC RESPIRATORY FAILURE WITH HYPERCAPNIA: Status: ACTIVE | Noted: 2023-01-01

## 2023-07-18 PROBLEM — I50.32 CHRONIC DIASTOLIC CHF (CONGESTIVE HEART FAILURE): Status: RESOLVED | Noted: 2023-01-01 | Resolved: 2023-01-01

## 2023-07-18 PROBLEM — I63.9 ACUTE CVA (CEREBROVASCULAR ACCIDENT): Status: RESOLVED | Noted: 2023-01-01 | Resolved: 2023-01-01

## 2023-07-18 NOTE — PLAN OF CARE
Goal Outcome Evaluation:  Plan of Care Reviewed With: patient           Outcome Evaluation: Clinical swallow eval completed. Recommend puree and honey thick liquids. Meds crushed with puree. Recommend upright, slow rate, supervision/assist with PO, alternate liquid/solids. SLP to follow for re-eval and instrumental as warranted. Wife reports recent upgrade to thin at SNF following FEES.

## 2023-07-18 NOTE — CONSULTS
Date of Hospital Visit: 23  Encounter Provider: Reynold Schulz MD  Place of Service: [facility] CARDIOLOGY  Patient Name: Art Mullins  :1935  Referral Provider: Venessa Ivory, *    Chief complaint: fall     History of Present Illness     Mr Art Mullins is a 88 year old gentleman who has been evaluated here and at [facility]. He has a known history frequent monomorphic PVCs and HFpEF. He had acute HF in May and required thoracentesis. He has an unexplained inferior wall motion abnormality and had a cath in May 2023 that was essentially normal. He has a history of stroke that was presumed to be cardioembolic; he was placed on apixaban.     He has had progressive  decline and is in SNF at [facility]. He was found on the ground yesterday and it's unclear if he fell or passed out. He is unable to give any history; his wife last saw him on  night and says he was interactive and his speech is intelligible.  Today, he's sleeping, will not open his eyes, I can get him to barely rouse but he mumbles and won't follow commands.     We were consulted for elevated Tn of [id number]. His Cr was 1.5, which is ~ baseline. WBC is 14, Hgb 9.6, plt 50. CT showed a large left pleural effusion. He is requiring 3-4L NC oxygen for sats 93-96%.     I have been asked to see for elevated troponin.     ECHO 23    There is severe hypokinesis of the basal to mid inferolateral wall. The remaining wall segments are hyperdynamic and the ejection fraction is preserved    Left ventricular systolic function is normal. Left ventricular ejection fraction appears to be 56 - 60%.    Left ventricular wall thickness is consistent with mild concentric hypertrophy.    Normal right ventricular cavity size and systolic function noted.    Mild to moderate aortic valve regurgitation is present.    Mild to moderate mitral valve regurgitation is present.    Mild tricuspid valve regurgitation is present     Calculated right ventricular systolic pressure from tricuspid regurgitation is 70 mmHg.    There is no evidence of pericardial effusion.    Past Medical History:   Diagnosis Date    Acute CVA (cerebrovascular accident) 04/30/2023    Aspiration pneumonitis 06/21/2023    Chronic heart failure with preserved ejection fraction (HFpEF)     Hiatal hernia, large 06/21/2023    Hyperlipidemia     Hypertension     Pneumonia due to infectious organism, unspecified laterality, unspecified part of lung 05/31/2023    PVCs (premature ventricular contractions)        Past Surgical History:   Procedure Laterality Date    CARDIAC CATHETERIZATION N/A 5/19/2023    Procedure: Left Heart Cath;  Surgeon: Ricardo Arrieta MD;  Location:  [facility] CATH INVASIVE LOCATION;  Service: Cardiology;  Laterality: N/A;    CARDIAC CATHETERIZATION N/A 5/19/2023    Procedure: Coronary angiography;  Surgeon: Ricardo Arrieta MD;  Location:  [facility] CATH INVASIVE LOCATION;  Service: Cardiology;  Laterality: N/A;       Prior to Admission medications    Medication Sig Start Date End Date Taking? Authorizing Provider   apixaban (ELIQUIS) 2.5 MG tablet tablet Take 1 tablet by mouth Every 12 (Twelve) Hours. Indications: Other - full anticoagulation 5/23/23  Yes Aashish Hatfield MD   bisacodyl (Dulcolax) 10 MG suppository Insert 1 suppository into the rectum Daily As Needed for Constipation.   Yes Maribeth Ngo MD   Cholecalciferol (Vitamin D3) 50 MCG (2000 UT) tablet Take 1 tablet by mouth Daily.   Yes Maribeth Ngo MD   clonazePAM (KlonoPIN) 0.5 MG tablet Take 1 tablet by mouth Every Night.   Yes Maribeth Ngo MD   doxazosin (CARDURA) 4 MG tablet Take 1 tablet by mouth Every Night.   Yes Maribeth Ngo MD   magnesium hydroxide (MILK OF MAGNESIA) 400 MG/5ML suspension Take 30 mL by mouth Daily As Needed for Constipation.   Yes Maribeth Ngo MD   metoprolol succinate XL (TOPROL-XL) 50 MG 24 hr tablet Take 1 tablet by mouth 2 (two)  times a day. 8/24/21  Yes Aashish Thapa MD   mirtazapine (REMERON) 7.5 MG tablet Take 1 tablet by mouth Every Night.   Yes Maribeth Ngo MD   oxymetazoline (AFRIN) 0.05 % nasal spray 2 sprays into the nostril(s) as directed by provider Every 1 (One) Hour As Needed for Congestion.   Yes Maribeth Ngo MD   pantoprazole (PROTONIX) 40 MG EC tablet Take 1 tablet by mouth Daily.   Yes Maribeth Ngo MD   rosuvastatin (CRESTOR) 5 MG tablet Take 1 tablet by mouth Every Night.   Yes Maribeth Ngo MD   sennosides-docusate (Senna Plus) 8.6-50 MG per tablet Take 1 tablet by mouth Every Evening.   Yes Maribeth Ngo MD   sodium chloride 0.65 % nasal spray 1 spray into the nostril(s) as directed by provider 2 (Two) Times a Day.   Yes Maribeth Ngo MD   Sodium Phosphates (fleet enema) 7-19 GM/118ML enema Insert 1 enema into the rectum 1 (One) Time As Needed.   Yes Maribeth Ngo MD   traMADol (ULTRAM) 50 MG tablet Take 25 mg by mouth Every 4 (Four) Hours As Needed for Moderate Pain.   Yes Maribeth Ngo MD       Social History     Socioeconomic History    Marital status:    Tobacco Use    Smoking status: Never   Vaping Use    Vaping Use: Never used   Substance and Sexual Activity    Alcohol use: No    Drug use: No    Sexual activity: Defer       Family History   Problem Relation Age of Onset    No Known Problems Mother     No Known Problems Father     No Known Problems Sister     No Known Problems Brother        Review of Systems     Objective:     Vitals:    07/18/23 0235 07/18/23 0520 07/18/23 0700 07/18/23 1100   BP: 135/90  125/72 120/69   BP Location:   Left arm Right arm   Patient Position:   Lying Lying   Pulse: 117  105    Resp:   20 20   Temp:   98.1 °F (36.7 °C) 98 °F (36.7 °C)   TempSrc:   Oral Oral   SpO2:   93%    Weight:  73 kg (161 lb)     Height:         Body mass index is 23.78 kg/m².  Flowsheet Rows      Flowsheet Row First Filed Value  "  Admission Height 175.3 cm (69\") Documented at 07/17/2023 1614   Admission Weight 74.8 kg (165 lb) Documented at 07/17/2023 1614            Physical Exam  Constitutional:       Comments: Sleeping, minimally rouses to loud voice but doesn't open eyes or follow commands, speech is unintelligible   Cardiovascular:      Rate and Rhythm: Normal rate. Occasional are present.     Pulses: Normal pulses.   Pulmonary:      Comments: Poor effort, no BS left base, decreased BS right base  Abdominal:      Palpations: Abdomen is soft.   Musculoskeletal:      Right lower leg: No edema.      Left lower leg: No edema.   Neurological:      Comments: Right facial droop, bilateral great toes initially go down but then both go up; can't do further neuro testing               Lab Review:                Results from last 7 days   Lab Units 07/18/23  0515   SODIUM mmol/L 143   POTASSIUM mmol/L 3.5   CHLORIDE mmol/L 107   CO2 mmol/L 24.0   BUN mg/dL 15   CREATININE mg/dL 1.35*   GLUCOSE mg/dL 90   CALCIUM mg/dL 8.7     Results from last 7 days   Lab Units 07/17/23  2252 07/17/23 2025   HSTROP T ng/L 1,646* 1,453*     Results from last 7 days   Lab Units 07/18/23  0515   WBC 10*3/mm3 12.21*   HEMOGLOBIN g/dL 9.5*   HEMATOCRIT % 29.4*   PLATELETS 10*3/mm3 49*                                             I personally viewed and interpreted the patient's EKG/Telemetry data    Assessment/Plan:     Acute on chronic HFpEF  Left pleural effusion  PVCs  Elevated Tn/type II demand ischemia; very recent cath with no significant CAD  CKD3  Thrombocytopenia  History of stroke, presumed cardioembolic  Metabolic encephalopathy on top of dementia    *There is no need to check further troponins given the very recent cath.    *He has been started on IV furosemide, and I would continue this for now.  Apixaban has been held in case he needs a thoracentesis, but I suspect that his platelet count would need to improve before this can be performed.  I do not " feel he needs a repeat echocardiogram.    *This is presumed to be an encephalopathy versus worsening dementia, but his neurologic exam does not appear to be normal to me, and I do not know his baseline.  He appears to have a right facial droop on my exam and both toes are initially downgoing but then immediately upgoing.  I will defer further neurologic testing to the primary team.    *A potential infection could explain a lot of his symptoms as well, but his procal is normal.  Does this fully exclude infection? Will also defer to primary team.

## 2023-07-18 NOTE — NURSING NOTE
Wound/Ostomy:  We see the patient by the request of the nurse on the floor about multiples  injuries to the body. Patient with hx of Dementia, and having fallen in the nursing home. Upon assessment is observed multiples partial skin loss area and some sites with scab in the body, due to the fall, Bactroban is ordered.  In addition we could see redness blanchable are on Coccyx/Buttocks, possibly relater to moisture, no pressure injury.   Patient is at risk of further skin problems, is incontinent, bedridden, with little mobility, low air loss mattress for adequate pressure redistribution and pressure relief is requested from Agility and frame from EVS.  Wound care order and pressure ulcer preventive measures placed into Epic.  Rn at bedside.  Please re-consult for any additional needs.

## 2023-07-18 NOTE — PLAN OF CARE
Goal Outcome Evaluation:  Plan of Care Reviewed With: patient        Progress: no change  Outcome Evaluation: Pt admitted with pleural effusion. Pt sinus tach on monitor. Pills crushed in applesauce. Cardiology consulted for elevated troponin. Pt diuresing well. IV lasix continued this AM. 4L O2 nc. Pt on 3L O2 at baseline. Disoriented x3-4. Sometimes oriented to self. Multiple skin abrasions. WCON consulted. Will continue to provide supportive care.

## 2023-07-18 NOTE — PROGRESS NOTES
Name: Art Mullins ADMIT: 2023   : 1935  PCP: Santos Simmons MD    MRN: [id number] LOS: 0 days   AGE/SEX: 88 y.o. male  ROOM: [facility]     Subjective   Subjective   Resting in bed. Wife at bedside. Patient not available for ROS d/t his dementia/AMS. Wife reports unwitnessed fall at his facility yesterday. He has been declining since stroke in April. This is his 5th hospitalization since that time. He did have some confusion prior to his stroke, but his mental status as well as mobility has vastly declined since stroke. Wife reports baseline left sided weakness, but feels he is currently more confused than normal. Initial goals were to get him stronger to return home, but she know reports some understanding that the chances of this are slim. She states he was on a modified diet until this past Tuesday/Wednesday when the facility said he was cleared for thin liquids.     Objective   Objective   Vital Signs  Temp:  [97.5 °F (36.4 °C)-98.1 °F (36.7 °C)] 98 °F (36.7 °C)  Heart Rate:  [105-117] 105  Resp:  [18-20] 20  BP: (113-136)/(51-90) 120/69  SpO2:  [89 %-96 %] 93 %  on  Flow (L/min):  [3-4] 4;   Device (Oxygen Therapy): nasal cannula  Body mass index is 23.78 kg/m².    Physical Exam  Vitals and nursing note reviewed.   Constitutional:       Appearance: He is ill-appearing. He is not toxic-appearing.   HENT:      Head: Normocephalic and atraumatic.   Eyes:      General:         Right eye: No discharge.         Left eye: No discharge.      Conjunctiva/sclera: Conjunctivae normal.   Cardiovascular:      Rate and Rhythm: Normal rate and regular rhythm.      Pulses: Normal pulses.   Pulmonary:      Effort: Pulmonary effort is normal. No respiratory distress.      Comments: Diminished d/t poor patient effort/cooperation. On 4 L   Abdominal:      General: Bowel sounds are normal. There is no distension.      Palpations: Abdomen is soft.      Tenderness: There is no abdominal tenderness.    Musculoskeletal:         General: No swelling. Normal range of motion.   Skin:     General: Skin is warm and dry.      Findings: No bruising.   Neurological:      Mental Status: He is alert. He is disoriented.      Motor: Weakness present.      Comments: Not following commands well for full neurological exam   Psychiatric:      Comments: Confused, restless at times with moaning.     Results Review:       I reviewed the patient's new clinical results.  Results from last 7 days   Lab Units 07/18/23  0515 07/17/23  1534   WBC 10*3/mm3 12.21* 13.97*   HEMOGLOBIN g/dL 9.5* 9.6*   PLATELETS 10*3/mm3 49* 51*     Results from last 7 days   Lab Units 07/18/23  0515 07/17/23  1534   SODIUM mmol/L 143 141   POTASSIUM mmol/L 3.5 4.8   CHLORIDE mmol/L 107 106   CO2 mmol/L 24.0 22.6   BUN mg/dL 15 17   CREATININE mg/dL 1.35* 1.53*   GLUCOSE mg/dL 90 107*   Estimated Creatinine Clearance: 39.8 mL/min (A) (by C-G formula based on SCr of 1.35 mg/dL (H)).  Results from last 7 days   Lab Units 07/17/23  1534   ALBUMIN g/dL 2.8*   BILIRUBIN mg/dL 0.6   ALK PHOS U/L 181*   AST (SGOT) U/L 78*   ALT (SGPT) U/L 17     Results from last 7 days   Lab Units 07/18/23  0515 07/17/23  1534   CALCIUM mg/dL 8.7 8.7   ALBUMIN g/dL  --  2.8*       No results found for: HGBA1C, POCGLU    cholecalciferol, 2,000 Units, Oral, Daily  clonazePAM, 0.5 mg, Oral, Nightly  furosemide, 40 mg, Intravenous, Q12H  Menthol-Zinc Oxide, 1 application , Topical, Q12H  metoprolol succinate XL, 50 mg, Oral, Q12H  mirtazapine, 7.5 mg, Oral, Nightly  pantoprazole, 40 mg, Oral, Daily  rosuvastatin, 5 mg, Oral, Nightly  sennosides-docusate, 1 tablet, Oral, Q PM  senna-docusate sodium, 2 tablet, Oral, BID  terazosin, 5 mg, Oral, Nightly       Diet: Cardiac Diets; Healthy Heart (2-3 Na+); No Straw; Texture: Pureed (NDD 1); Fluid Consistency: Honey Thick       Assessment/Plan     Active Hospital Problems    Diagnosis  POA    **Acute on chronic heart failure with preserved  ejection fraction (HFpEF) [I50.33]  Yes    Unwitnessed fall [R29.6]  Yes    Chronic respiratory failure with hypoxia [J96.11]  Yes    Metabolic encephalopathy [G93.41]  Yes    Recurrent pleural effusion on left [J90]  Yes    Pharyngeal dysphagia [R13.13]  Yes    Dementia with anxiety [F03.94]  Yes    Pulmonary hypertension [I27.20]  Yes    Thrombocytopenia [D69.6]  Yes    Possible cirrhosis of liver per CT imaging [K74.60]  Yes    History of CVA (cerebrovascular accident) [Z86.73]  Not Applicable    Stage 3a chronic kidney disease [N18.31]  Yes    Elevated troponin [R77.8]  Yes    Essential hypertension [I10]  Yes      Resolved Hospital Problems   No resolved problems to display.     Mr. Mullins is a 88 year old male who presented to the hospital from his NH for an unwitnessed fall. He has known dementia with recent cardio-embolic stroke back in April. He has been in a facility or in the hospital since that time. He has had recurrent left pleural effusion as well as CHF and aspiration pneumonia complicating his overall prognosis. He was brought to this facility where he was noted to have recurrence of his large left pleural effusion as well as markedly elevated troponin/BNP with concerns for CHF exacerbation.     Acute on chronic heart failure with preserved EF  Recurrent large left pleural effusion  Chronic respiratory failure with hypoxia  -With recurrent large effusion, will hold Eliquis for now and ask pulmonology to see.   -IV diuretics ordered and renal function improving with this.   -Cardiology has been consulted. Will await their recommendations.   -Chronically on 3 L and currently at 4 L- wean as able.  -Suspect majority of his lung issues are fluid related, but will obtain baseline procal and monitor for any fevers to suggest secondary pneumonia given his high risk for aspiration.     Elevated troponin  -Unable to assess for cardiac complaints, but does have history of NSTEMI secondary to CHF.  -EKG  stable. Will await cardiology input. He is a poor candidate for any aggressive work up.    Metabolic encephalopathy  Unwitnessed fall  Dementia  History of stroke  dysphagia  -Wife reports increased confusion on top of his underlying dementia.  -Difficult neuro exam d/t his cooperation.  -CTH and c-spine without new findings.  -Monitor neuro checks. Delirium precautions.   -PT/OT/ST to see.  -With unwitnessed fall, mental status changes and history of stroke, ask neurology to weigh in.    CKD3  -Near baseline around 1.3-1.7.  -Monitor with diuretics.     Thrombocytopenia  Anemia  Possible cirrhosis  -Recent CT imaging with nodular contour of liver with concerning for cirrhosis versus hepatic tumor?  -LFTs stable but platelets lower than his baseline. Was 70-80s at prior admission and now around 50s. ? Etiology. Could be related to a consumptive process versus underlying liver issue. Check PT/INR in AM as well as repeat platelets. Holding Eliquis for now.  -Monitor for any bleeding and trend labs.    HTN  -BP stable at present. BB resumed.    Discussed with patient, wife at bedside, nurse and Dr. Almanza.    Unfortunately, this is the patient's fifth hospital visit since his stroke in April. Palliative did meet with patient's family last admission where code status was a DNR but the plan was to continue to focus on rehabilitation for a return home. I explained to the patient's wife that this is likely not a feasible goal at this time d/t his continued complications from his illness. She confirmed DNR this admission and is agreeable to discussing a possible transition to comfort with the palliative care team. I am concerned that this will continue to be a vicious cycle for him with a low quality of life at this point. Wife in agreement. Will await further palliative discussions. For now, will focus on optimizing patient's medical status.    VTE Prophylaxis - Eliquis (home med) (temporarily on hold)  Code Status -  DNR  Disposition - Anticipate discharge TBD.      CRISTÓBAL Amaya  [facility]  07/18/23  12:15 EDT    Addendum: Reviewed procalcitonin- 0.25. He continues to be afebrile but does have some leukocytosis as well as tachycardia. Certainly high risk for pneumonia given his history of aspiration as well as altered mental status. Await neurology input on mentation but will go ahead and obtain blood cultures as well as start pharmacy to dose Zosyn for coverage of possible aspiration pneumonia as well. Await pulmonology input. I reviewed the discharge summary from prior hospital stay and sedating medications were recommended to be held. Klonopin was stopped at that time. I will hold Klonopin and Remeron until he is more alert/neuro status improves.

## 2023-07-18 NOTE — PROGRESS NOTES
[facility] Clinical Pharmacy Services: Piperacillin-Tazobactam Consult    Pt Name: Art Mullins   : 1935    Indication: Pneumonia    Relevant clinical data and objective history reviewed:    Past Medical History:   Diagnosis Date    Acute CVA (cerebrovascular accident) 2023    Aspiration pneumonitis 2023    Chronic heart failure with preserved ejection fraction (HFpEF)     Hiatal hernia, large 2023    Hyperlipidemia     Hypertension     Pneumonia due to infectious organism, unspecified laterality, unspecified part of lung 2023    PVCs (premature ventricular contractions)      Creatinine   Date Value Ref Range Status   2023 1.35 (H) 0.76 - 1.27 mg/dL Final   2023 1.53 (H) 0.76 - 1.27 mg/dL Final   2023 1.71 (H) 0.76 - 1.27 mg/dL Final   2023 1.90 (H) 0.60 - 1.30 mg/dL Final     Comment:     Serial Number: [id number]:  [id number]     BUN   Date Value Ref Range Status   2023 15 8 - 23 mg/dL Final     Estimated Creatinine Clearance: 39.8 mL/min (A) (by C-G formula based on SCr of 1.35 mg/dL (H)).    Lab Results   Component Value Date    WBC 12.21 (H) 2023     Temp Readings from Last 3 Encounters:   23 98.5 °F (36.9 °C) (Oral)   23 97.5 °F (36.4 °C) (Oral)   23 98.6 °F (37 °C) (Oral)      Assessment/Plan  Estimated CrCl >20 mL/min at this time; BMI 23.78 kg/m2  Will start piperacillin-tazobactam 3.375 g IV every 8 hours for 5 days    Pharmacy will continue to follow daily while on piperacillin-tazobactam and adjust as needed. Thank you for this consult.    Lu Viera, [facility]  Clinical Pharmacist

## 2023-07-18 NOTE — PLAN OF CARE
Problem: Skin Injury Risk Increased  Goal: Skin Health and Integrity  Intervention: Promote and Optimize Oral Intake  Recent Flowsheet Documentation  Taken 7/18/2023 1355 by Arlene Dao RD  Oral Nutrition Promotion:   calorie-dense foods provided   calorie-dense liquids provided   nutritional therapy counseling provided     Problem: Malnutrition  Goal: Improved Nutritional Intake  Outcome: Ongoing, Progressing  Intervention: Promote and Optimize Oral Intake  Flowsheets (Taken 7/18/2023 1355)  Oral Nutrition Promotion:   calorie-dense foods provided   calorie-dense liquids provided   nutritional therapy counseling provided   Goal Outcome Evaluation: Nutrition assessment and MSA complete. Magic cups added with meals. RD to follow.

## 2023-07-18 NOTE — CASE MANAGEMENT/SOCIAL WORK
Discharge Planning Assessment  [facility]     Patient Name: Art Mullins  MRN: [id number]  Today's Date: 7/18/2023    Admit Date: 7/17/2023    Plan: Undetermined   Discharge Needs Assessment       Row Name 07/18/23 1143       Living Environment    People in Home facility resident    Current Living Arrangements extended care facility    Potentially Unsafe Housing Conditions none    Primary Care Provided by self    Provides Primary Care For no one    Quality of Family Relationships supportive       Resource/Environmental Concerns    Resource/Environmental Concerns none       Transition Planning    Patient/Family Anticipates Transition to inpatient rehabilitation facility    Patient/Family Anticipated Services at Transition        Discharge Needs Assessment    Equipment Currently Used at Home oxygen;wheelchair    Equipment Needed After Discharge none                   Discharge Plan       Row Name 07/18/23 1143       Plan    Plan Undetermined    Patient/Family in Agreement with Plan yes    Plan Comments Met with pt and wife at bedside. Pt is from [facility] for STR.  Paid bedhold at this time.  Discussed with wife who states she is unsure if pt will return at this time.  Consult for palliative care pending. Wife states they may pursue palliative.  CCP will follow.  RAFAEL Alberts RN                  Continued Care and Services - Admitted Since 7/17/2023    Coordination has not been started for this encounter.       Selected Continued Care - Prior Encounters Includes continued care and service providers with selected services from prior encounters from 4/18/2023 to 7/18/2023      Discharged on 6/29/2023 Admission date: 6/20/2023 - Discharge disposition: Skilled Nursing Facility (DC - External)      Destination       Service Provider Selected Services Address Phone Fax Patient Preferred    [facility] [address] [place] [address] [phone] --                       Discharged on 6/14/2023 Admission date: 5/31/2023 - Discharge disposition: Skilled Nursing Facility (DC - External)      Destination       Service Provider Selected Services Address Phone Fax Patient Preferred    [facility] [address] [facility] [address] [phone]                       Discharged on 5/23/2023 Admission date: 5/18/2023 - Discharge disposition: Skilled Nursing Facility (DC - External)      Destination       Service Provider Selected Services Address Phone Fax Patient Preferred    [facility] [address] [facility] [address] [phone] --                             Demographic Summary       Row Name 07/18/23 1142       General Information    Admission Type observation    Arrived From home    Referral Source admission list    Reason for Consult discharge planning    Preferred Language English       Contact Information    Permission Granted to Share Info With family/designee  Charito Mullins (spouse) [phone]                   Functional Status    No documentation.                  Psychosocial    No documentation.                  Abuse/Neglect    No documentation.                  Legal    No documentation.                  Substance Abuse    No documentation.                  Patient Forms    No documentation.                     Eloise Alberts, JENNIFER

## 2023-07-18 NOTE — H&P
HISTORY AND PHYSICAL   [facility]        Date of Admission: 2023  Patient Identification:  Name: Art Mullins  Age: 88 y.o.  Sex: male  :  1935  MRN: [id number]                     Primary Care Physician: Santos Simmons MD    Chief Complaint:  88 year old gentleman who presented to the emergency room after an unwitnessed fall at a nursing home; he was recently admitted for pneumonia and sent there; he is on home oxygen at 3 liters; he denies chest pain but has been short of air; no fever or chills; his wife is present in the room and gives the history ; the patient has a history of dementia    History of Present Illness:   As above    Past Medical History:  Past Medical History:   Diagnosis Date    Hyperlipidemia     Hypertension     PVCs (premature ventricular contractions)      Past Surgical History:  Past Surgical History:   Procedure Laterality Date    CARDIAC CATHETERIZATION N/A 2023    Procedure: Left Heart Cath;  Surgeon: Ricardo Arrieta MD;  Location: [facility] INVASIVE LOCATION;  Service: Cardiology;  Laterality: N/A;    CARDIAC CATHETERIZATION N/A 2023    Procedure: Coronary angiography;  Surgeon: Ricardo Arrieta MD;  Location: [facility] INVASIVE LOCATION;  Service: Cardiology;  Laterality: N/A;      Home Meds:  Medications Prior to Admission   Medication Sig Dispense Refill Last Dose    apixaban (ELIQUIS) 2.5 MG tablet tablet Take 1 tablet by mouth Every 12 (Twelve) Hours. Indications: Other - full anticoagulation 60 tablet 0     bisacodyl (Dulcolax) 10 MG suppository Insert 1 suppository into the rectum Daily As Needed for Constipation.       Cholecalciferol (Vitamin D3) 50 MCG (2000 UT) tablet Take 1 tablet by mouth Daily.       clonazePAM (KlonoPIN) 0.5 MG tablet Take 1 tablet by mouth Every Night.       doxazosin (CARDURA) 4 MG tablet Take 1 tablet by mouth Every Night.       magnesium hydroxide (MILK OF MAGNESIA) 400 MG/5ML suspension Take 30 mL by mouth  Daily As Needed for Constipation.       metoprolol succinate XL (TOPROL-XL) 50 MG 24 hr tablet Take 1 tablet by mouth 2 (two) times a day. 60 tablet 5     mirtazapine (REMERON) 7.5 MG tablet Take 1 tablet by mouth Every Night.       oxymetazoline (AFRIN) 0.05 % nasal spray 2 sprays into the nostril(s) as directed by provider Every 1 (One) Hour As Needed for Congestion.       pantoprazole (PROTONIX) 40 MG EC tablet Take 1 tablet by mouth Daily.       rosuvastatin (CRESTOR) 5 MG tablet Take 1 tablet by mouth Every Night.       sennosides-docusate (Senna Plus) 8.6-50 MG per tablet Take 1 tablet by mouth Every Evening.       sodium chloride 0.65 % nasal spray 1 spray into the nostril(s) as directed by provider 2 (Two) Times a Day.       Sodium Phosphates (fleet enema) 7-19 GM/118ML enema Insert 1 enema into the rectum 1 (One) Time As Needed.       traMADol (ULTRAM) 50 MG tablet Take 25 mg by mouth Every 4 (Four) Hours As Needed for Moderate Pain.          Allergies:  Allergies   Allergen Reactions    Sulfa Antibiotics Itching     Immunizations:  Immunization History   Administered Date(s) Administered    COVID-19 (PFIZER) Purple Cap Monovalent 2021, 2021, 10/12/2021    Covid-19 (Pfizer) Gray Cap Monovalent 2022    Tdap 2016     Social History:   Social History     Social History Narrative    Not on file     Social History     Socioeconomic History    Marital status:    Tobacco Use    Smoking status: Never   Vaping Use    Vaping Use: Never used   Substance and Sexual Activity    Alcohol use: No    Drug use: No    Sexual activity: Defer       Family History:  Family History   Problem Relation Age of Onset    No Known Problems Mother     No Known Problems Father     No Known Problems Sister     No Known Problems Brother         Review of Systems  Not obtainable    Objective:  T Max 24 hrs: Temp (24hrs), Av.7 °F (36.5 °C), Min:97.5 °F (36.4 °C), Max:98 °F (36.7 °C)    Vitals Ranges:  "  Temp:  [97.5 °F (36.4 °C)-98 °F (36.7 °C)] 97.7 °F (36.5 °C)  Heart Rate:  [107-116] 111  Resp:  [18-20] 20  BP: (113-136)/(51-78) 113/68      Exam:  /68 (BP Location: Left arm, Patient Position: Lying)   Pulse 111   Temp 97.7 °F (36.5 °C) (Oral)   Resp 20   Ht 175.3 cm (69\")   Wt 73.7 kg (162 lb 6.4 oz)   SpO2 93%   BMI 23.98 kg/m²     General Appearance:    Alert, cooperative, no distress, appears stated age; chronically ill appearing   Head:    Normocephalic, without obvious abnormality, atraumatic   Eyes:    PERRL, conjunctivae/corneas clear, EOM's intact, both eyes   Ears:    Normal external ear canals, both ears   Nose:   Nares normal, septum midline, mucosa normal, no drainage    or sinus tenderness   Throat:   Lips, mucosa, and tongue normal   Neck:   Supple, symmetrical, trachea midline, no adenopathy;     thyroid:  no enlargement/tenderness/nodules; no carotid    bruit or JVD   Back:     Symmetric, no curvature, ROM normal, no CVA tenderness   Lungs:     Decreased breath sounds bilaterally, respirations unlabored   Chest Wall:    No tenderness or deformity    Heart:    Regular rate and rhythm, S1 and S2 normal, no murmur, rub   or gallop   Abdomen:     Soft, nontender, bowel sounds active all four quadrants,     no masses, no hepatomegaly, no splenomegaly   Extremities:   Extremities normal, atraumatic, no cyanosis or edema                       .    Data Review:  Labs in chart were reviewed.  WBC   Date Value Ref Range Status   07/17/2023 13.97 (H) 3.40 - 10.80 10*3/mm3 Final     Hemoglobin   Date Value Ref Range Status   07/17/2023 9.6 (L) 13.0 - 17.7 g/dL Final     Hematocrit   Date Value Ref Range Status   07/17/2023 29.5 (L) 37.5 - 51.0 % Final     Platelets   Date Value Ref Range Status   07/17/2023 51 (L) 140 - 450 10*3/mm3 Final     Sodium   Date Value Ref Range Status   07/17/2023 141 136 - 145 mmol/L Final     Potassium   Date Value Ref Range Status   07/17/2023 4.8 3.5 - 5.2 " mmol/L Final     Comment:     Specimen hemolyzed.  Results may be affected.     Chloride   Date Value Ref Range Status   07/17/2023 106 98 - 107 mmol/L Final     CO2   Date Value Ref Range Status   07/17/2023 22.6 22.0 - 29.0 mmol/L Final     BUN   Date Value Ref Range Status   07/17/2023 17 8 - 23 mg/dL Final     Creatinine   Date Value Ref Range Status   07/17/2023 1.53 (H) 0.76 - 1.27 mg/dL Final     Glucose   Date Value Ref Range Status   07/17/2023 107 (H) 65 - 99 mg/dL Final     Calcium   Date Value Ref Range Status   07/17/2023 8.7 8.6 - 10.5 mg/dL Final     AST (SGOT)   Date Value Ref Range Status   07/17/2023 78 (H) 1 - 40 U/L Final     Comment:     Specimen hemolyzed.  Results may be affected.     ALT (SGPT)   Date Value Ref Range Status   07/17/2023 17 1 - 41 U/L Final     Comment:     Specimen hemolyzed.  Results may be affected.     Alkaline Phosphatase   Date Value Ref Range Status   07/17/2023 181 (H) 39 - 117 U/L Final                Imaging Results (All)       Procedure Component Value Units Date/Time    CT Head Without Contrast [[id number]] Collected: 07/17/23 1915     Updated: 07/17/23 2051    Narrative:      CT HEAD AND CERVICAL SPINE WITHOUT CONTRAST     CLINICAL HISTORY: Status post fall. Contusion to right frontal area of  the head.     TECHNIQUE: CT scan of the head was obtained with 3 mm axial soft tissue  and 2 mm bone algorithm images. No intravenous contrast was  administered. Sagittal and coronal reconstructions were obtained.     COMPARISON: CT head dated 06/20/2023.     FINDINGS:       There is no evidence for a calvarial fracture. There is no evidence for  an acute extra-axial hemorrhage.     No significant interval change is seen since the previous head CT dated  06/20/2023. The ventricles, sulci, and cisterns are age-appropriate.  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 Chronic infarcts are noted within the left occipital  lobe within the left PCA distribution. The largest of these measures up  to 1 cm in diameter. Old lacunar disease is noted within the caudates  and the left lentiform nucleus. There is mild-to-moderate chronic small  vessel ischemic phenomena. Subcentimeter chronic infarcts are noted  within the cerebellar hemispheres. The largest of these is seen within  the right cerebellar hemisphere measuring up to approximately 7 mm in  diameter.       Impression:         There is no evidence for acute traumatic intracranial pathology.     There is no significant interval change when compared to the prior head  CT dated 06/20/2023. Again noted is a chronic infarct within the lateral  aspect of the right frontal lobe within the right MCA distribution and  the posterior aspect of the left occipital lobe within the left PCA  distribution. Additionally, there are subcentimeter chronic infarcts  within the cerebellar hemispheres. Mild-to-moderate changes of chronic  small vessel ischemic phenomena are noted and there is old lacunar  disease.        TECHNIQUE: CT scan of the cervical spine was obtained with 1 mm axial  bone algorithm and 2 mm axial soft tissue algorithm images. Sagittal and  coronal reconstructed images were obtained.     FINDINGS:     There is no evidence for acute fracture or bony malalignment involving  the cervical spine.     Disc osteophyte complexes are noted at C4-5, C5-6, and C6-7 resulting in  up to mild-to-moderate degrees of canal narrowing at the C4-5 and C5-6  levels. Foraminal stenotic changes are most prominently noted from C4-5  down to C6-7.     Incidental note is made of a large left pleural effusion. Please  correlate with dedicated chest imaging.     IMPRESSION:     There is no evidence for acute fracture or bony malalignment involving  the cervical spine.     Incidental note  is made of degenerative phenomena within the cervical  spine as discussed above.     On the lower cuts of the cervical spine CT, a large left pleural  effusion is partially visualized. Please correlate with dedicated chest  imaging.     These findings and recommendations were discussed with Dr. Miller on  07/17/2023 at approximately 6:43 PM.                 Radiation dose reduction techniques were utilized, including automated  exposure control and exposure modulation based on body size.     This report was finalized on 7/17/2023 8:48 PM by Dr. Rigo Vasquez M.D.       CT Cervical Spine Without Contrast [[id number]] Collected: 07/17/23 1915     Updated: 07/17/23 2051    Narrative:      CT HEAD AND CERVICAL SPINE WITHOUT CONTRAST     CLINICAL HISTORY: Status post fall. Contusion to right frontal area of  the head.     TECHNIQUE: CT scan of the head was obtained with 3 mm axial soft tissue  and 2 mm bone algorithm images. No intravenous contrast was  administered. Sagittal and coronal reconstructions were obtained.     COMPARISON: CT head dated 06/20/2023.     FINDINGS:       There is no evidence for a calvarial fracture. There is no evidence for  an acute extra-axial hemorrhage.     No significant interval change is seen since the previous head CT dated  06/20/2023. The ventricles, sulci, and cisterns are age-appropriate.  There is a focus of encephalomalacia within the lateral aspect of the  right frontal lobe measuring up to 13 x 11 mm in greatest axial  dimensions that is compatible with a chronic infarct within the right  MCA distribution. Chronic infarcts are noted within the left occipital  lobe within the left PCA distribution. The largest of these measures up  to 1 cm in diameter. Old lacunar disease is noted within the caudates  and the left lentiform nucleus. There is mild-to-moderate chronic small  vessel ischemic phenomena. Subcentimeter chronic infarcts are noted  within the cerebellar hemispheres. The  largest of these is seen within  the right cerebellar hemisphere measuring up to approximately 7 mm in  diameter.       Impression:         There is no evidence for acute traumatic intracranial pathology.     There is no significant interval change when compared to the prior head  CT dated 06/20/2023. Again noted is a chronic infarct within the lateral  aspect of the right frontal lobe within the right MCA distribution and  the posterior aspect of the left occipital lobe within the left PCA  distribution. Additionally, there are subcentimeter chronic infarcts  within the cerebellar hemispheres. Mild-to-moderate changes of chronic  small vessel ischemic phenomena are noted and there is old lacunar  disease.        TECHNIQUE: CT scan of the cervical spine was obtained with 1 mm axial  bone algorithm and 2 mm axial soft tissue algorithm images. Sagittal and  coronal reconstructed images were obtained.     FINDINGS:     There is no evidence for acute fracture or bony malalignment involving  the cervical spine.     Disc osteophyte complexes are noted at C4-5, C5-6, and C6-7 resulting in  up to mild-to-moderate degrees of canal narrowing at the C4-5 and C5-6  levels. Foraminal stenotic changes are most prominently noted from C4-5  down to C6-7.     Incidental note is made of a large left pleural effusion. Please  correlate with dedicated chest imaging.     IMPRESSION:     There is no evidence for acute fracture or bony malalignment involving  the cervical spine.     Incidental note is made of degenerative phenomena within the cervical  spine as discussed above.     On the lower cuts of the cervical spine CT, a large left pleural  effusion is partially visualized. Please correlate with dedicated chest  imaging.     These findings and recommendations were discussed with Dr. Miller on  07/17/2023 at approximately 6:43 PM.                 Radiation dose reduction techniques were utilized, including automated  exposure  control and exposure modulation based on body size.     This report was finalized on 7/17/2023 8:48 PM by Dr. Rigo Vasquez M.D.       CT Chest Without Contrast Diagnostic [[id number]] Collected: 07/17/23 2031     Updated: 07/17/23 2042    Narrative:      CT CHEST WO CONTRAST DIAGNOSTIC-     Radiation dose reduction techniques were utilized, including automated  exposure control and exposure modulation based on body size.     Clinical: Shortness of breath, fell     COMPARISON 06/20/2023     FINDINGS: No acute osseous abnormality, old rib fractures. There is  cardiac enlargement. Small to moderate size hiatal hernia.  Atherosclerotic calcification of a normal diameter aorta. There is  coronary artery calcification also seen. There is a large left-sided  pleural effusion. Consolidation/compressive atelectasis involving the  majority of the left lower lobe. There is some atelectasis involving the  left upper lobe. Subpleural atelectasis demonstrated along the posterior  inferior right lower lobe. No suspicious pulmonary parenchymal lesion  identified. There is considerable respiratory motion artifact seen.     Limited images through the upper abdomen demonstrate what appear to be  likely hepatic cysts and renal cysts and fatty infiltration of the  liver, there are multiple gallstones.     CONCLUSION: No acute osseous abnormality seen. There is cardiac  enlargement. There is a large left-sided pleural effusion with  compressive atelectasis/consolidation involving the left lower lobe,  there is a minimal amount of atelectasis involving the left upper lobe  and right lower lobe. There is a hiatal hernia again seen.        This report was finalized on 7/17/2023 8:39 PM by Dr. Hugo Felton M.D.       XR Chest 1 View [[id number]] Collected: 07/17/23 1545     Updated: 07/17/23 1550    Narrative:      XR CHEST 1 VW-     HISTORY: Male who is 88 years-old,  short of breath     TECHNIQUE: Frontal view of the chest      COMPARISON: 06/20/2023     FINDINGS: The heart size is borderline. Pulmonary vasculature is  congested. Mild perihilar opacities may represent edema and/or  pneumonia, follow-up suggested. No large pleural effusion. No  pneumothorax. No acute osseous process.       Impression:      As described.     This report was finalized on 7/17/2023 3:46 PM by Dr. Nehemiah Melvin M.D.                 Assessment:  Active Hospital Problems    Diagnosis  POA    **Pleural effusion [J90]  Yes      Resolved Hospital Problems   No resolved problems to display.   Acute on chronic diastolic chf  Chronic hypoxic respiratory failure  Fall  Hypertension  Ckd3  Cad  Dementia  Anemia  thrombocytopenia    Plan:  Will continue diuresis  May need thoracentesis  Monitor on telemetry  He has an ems dnr  D/w patient, wife and ed provider    Venessa Ivory MD  7/18/2023  00:48 EDT

## 2023-07-18 NOTE — THERAPY EVALUATION
Acute Care - Speech Language Pathology   Swallow Initial Evaluation [facility]     Patient Name: Art Mullins  : 1935  MRN: [id number]  Today's Date: 2023               Admit Date: 2023    Visit Dx:     ICD-10-CM ICD-9-CM   1. Pleural effusion on left  J90 511.9   2. Contusion of scalp, initial encounter  S00.03XA 920   3. Congestive heart failure, unspecified HF chronicity, unspecified heart failure type  I50.9 428.0     Patient Active Problem List   Diagnosis    Essential hypertension    Acute CVA (cerebrovascular accident)    Stage 3a chronic kidney disease    Elevated troponin    Vision changes    Altered mental status, unspecified altered mental status type    Chronic diastolic CHF (congestive heart failure)    Pneumonia due to infectious organism, unspecified laterality, unspecified part of lung    History of CVA (cerebrovascular accident)    Acute respiratory failure with hypoxia    Coronary artery disease without angina pectoris    Metabolic encephalopathy    Dementia with anxiety    Possible cirrhosis of liver per CT imaging    Hiatal hernia, large    Aspiration pneumonitis    Thrombocytopenia    Pulmonary hypertension    Pharyngeal dysphagia    Gross hematuria    Pleural effusion     Past Medical History:   Diagnosis Date    Hyperlipidemia     Hypertension     PVCs (premature ventricular contractions)      Past Surgical History:   Procedure Laterality Date    CARDIAC CATHETERIZATION N/A 2023    Procedure: Left Heart Cath;  Surgeon: Ricardo Arrieta MD;  Location:  [facility] CATH INVASIVE LOC[facility];  Service: Cardiology;  Laterality: N/A;    CARDIAC CATHETERIZATION N/A 2023    Procedure: Coronary angiography;  Surgeon: Ricardo Arrieta MD;  Location:  [facility] CATH [facility]VAS[facility] LOC[facility];  Service: Cardiology;  Laterality: N/A;       SLP Recommendation and Plan  SLP Swallowing Diagnosis: oral dysphagia, R/O pharyngeal dysphagia (23 0900)  SLP Diet Recommendation: puree, honey thick liquids  (07/18/23 0900)  Recommended Precautions and Strategies: upright posture during/after eating, small bites of food and sips of liquid (07/18/23 0900)  SLP Rec. for Method of Medication Administration: meds crushed, with puree, as tolerated (07/18/23 0900)     Monitor for Signs of Aspiration: yes, notify SLP if any concerns (07/18/23 0900)  Recommended Diagnostics: reassess via clinical swallow evaluation, reassess via VFSS (MBS) (VFSS pending mental status (not carlos FEES 7/18- pulling at SLP hand during palpation)) (07/18/23 0900)  Swallow Criteria for Skilled Therapeutic Interventions Met: demonstrates skilled criteria (07/18/23 0900)  Anticipated Discharge Disposition (SLP): unknown (07/18/23 0900)  Rehab Potential/Prognosis, Swallowing: good, to achieve stated therapy goals (07/18/23 0900)  Therapy Frequency (Swallow): PRN (07/18/23 0900)  Predicted Duration Therapy Intervention (Days): until discharge (07/18/23 0900)  Oral Care Recommendations: Oral Care BID/PRN (07/18/23 0900)                                      Oral Care Recommendations: Oral Care BID/PRN (07/18/23 0900)    Plan of Care Reviewed With: patient  Outcome Evaluation: Clinical swallow eval completed. Recommend puree and honey thick liquids. Meds crushed with puree. Recommend upright, slow rate, supervision/assist with PO, alternate liquid/solids. SLP to follow for re-eval and instrumental as warranted. Wife reports recent upgrade to thin at SNF following FEES.      SWALLOW EVALUATION (last 72 hours)       SLP Adult Swallow Evaluation       Row Name 07/18/23 0900                   Rehab Evaluation    Document Type evaluation  -OC        Subjective Information no complaints  -OC        Patient Observations alert;cooperative;agree to therapy  -OC        Patient Effort good  -OC        Symptoms Noted During/After Treatment none  -OC           General Information    Patient Profile Reviewed yes  -OC        Pertinent History Of Current Problem Patient  admitted with pleural effusion. Possible PNA. Recent hospitilization with PNA and modified diet mech soft no mixed and nectar thick. Wife reports recent upgrade to thin liquids from FEES as SNF.  -OC        Current Method of Nutrition NPO  -OC        Precautions/Limitations, Vision difficult to assess  -OC        Precautions/Limitations, Hearing difficult to assess  -OC        Prior Level of Function-Communication unknown  -OC        Prior Level of Function-Swallowing mechanical ground textures;nectar thick liquids;other (see comments)  recent upgrade to thin  -OC        Plans/Goals Discussed with patient and family;agreed upon  -OC        Barriers to Rehab medically complex  -OC        Patient's Goals for Discharge patient did not state  -OC        Family Goals for Discharge patient able to return to PO diet;other (see comments)  concern for continued need of thickened liquids  -OC           Pain    Additional Documentation --  none reported  -OC           Oral Motor Structure and Function    Dentition Assessment natural, present and adequate  -OC        Secretion Management WNL/WFL  -OC           Oral Musculature and Cranial Nerve Assessment    Oral Motor General Assessment unable to assess  -OC        Oral Motor, Comment unable to follow oral motor commands  -OC           Clinical Swallow Eval    Clinical Swallow Evaluation Summary Patient demonstrated poor oral awareness of ice chip in oral cavity. Patient with multple swallows per ice chip. Patient demonstrated oral holding/swishing with all liquids. Change in vocal quality s/p thin liquids s/p oral hold. Patient demonstrated audible mistiming and attempts to verbalize, suspected prior to full bolus clearance nectar thick liquids. No overt s/s aspiration s/p oral hold honey thick liquids and puree. Small 1/2 tsp trials puree, poor acceptance. Adequate clearance and clear vocal quality.  -OC           SLP Evaluation Clinical Impression    SLP Swallowing  Diagnosis oral dysphagia;R/O pharyngeal dysphagia  -OC        Functional Impact risk of aspiration/pneumonia  -OC        Rehab Potential/Prognosis, Swallowing good, to achieve stated therapy goals  -OC        Swallow Criteria for Skilled Therapeutic Interventions Met demonstrates skilled criteria  -OC           Recommendations    Therapy Frequency (Swallow) PRN  -OC        Predicted Duration Therapy Intervention (Days) until discharge  -OC        SLP Diet Recommendation puree;honey thick liquids  -OC        Recommended Diagnostics reassess via clinical swallow evaluation;reassess via VFSS (MBS)  VFSS pending mental status (not carlos FEES 7/18- pulling at SLP hand during palpation)  -OC        Recommended Precautions and Strategies upright posture during/after eating;small bites of food and sips of liquid  -OC        Oral Care Recommendations Oral Care BID/PRN  -OC        SLP Rec. for Method of Medication Administration meds crushed;with puree;as tolerated  -OC        Monitor for Signs of Aspiration yes;notify SLP if any concerns  -OC        Anticipated Discharge Disposition (SLP) unknown  -OC           Swallow Goals (SLP)    Swallow LTGs Swallow Long Term Goal (free text)  -OC           (LTG) Swallow    (LTG) Swallow Tolerate least restrictive diet with no overt s/s aspiration.  -OC        [place] (Swallow Long Term Goal) with 1:1 assist/ supervision  -OC        Time Frame (Swallow Long Term Goal) by discharge  -OC                  User Key  (r) = Recorded By, (t) = Taken By, (c) = Cosigned By      Initials Name Effective Dates    OC Heather Gastelum MA,CCC-SLP 06/16/21 -                     EDUCATION  The patient has been educated in the following areas:   Dysphagia (Swallowing Impairment).        SLP GOALS       Row Name 07/18/23 0900             (LTG) Swallow    (LTG) Swallow Tolerate least restrictive diet with no overt s/s aspiration.  -OC      [place] (Swallow Long Term Goal) with 1:1 assist/ supervision   -OC      Time Frame (Swallow Long Term Goal) by discharge  -OC                User Key  (r) = Recorded By, (t) = Taken By, (c) = Cosigned By      Initials Name Provider Type    Heather Han MA,CCC-SLP Speech and Language Pathologist                       Time Calculation:    Time Calculation- SLP       Row Name 07/18/23 1003             Time Calculation- SLP    SLP Start Time 0900  -OC      SLP Received On 07/18/23  -OC         Untimed Charges    47102-GO Eval Oral Pharyng Swallow Minutes 60  -OC         Total Minutes    Untimed Charges Total Minutes 60  -OC       Total Minutes 60  -OC                User Key  (r) = Recorded By, (t) = Taken By, (c) = Cosigned By      Initials Name Provider Type    Heather Han MA,CCC-SLP Speech and Language Pathologist                    Therapy Charges for Today       Code Description Service Date Service Provider Modifiers Qty    [id number]  ST EVAL ORAL PHARYNG SWALLOW 4 7/18/2023 Heather Gastelum MA,IFEANYI-SLP GN 1                 Heather Gastelum MA, CCC-EMBER  7/18/2023

## 2023-07-18 NOTE — PROGRESS NOTES
Nutrition Services    Patient Name:  Art Mullins  YOB: 1935  MRN: [id number]  Admit Date:  7/17/2023    Assessment Date:  07/18/23    Comment: Nutrition assessment initiated due to MST score. Pt here with CHF, chronic resp failure, metabolic encephalopathy, dementia.  Swallow eval noted and diet now puree with honey thick liquids. Wife reports his diet was upgraded recently at NH to [facility] soft/thick liquids. Food preferences obtained.  He only took  a few bites of his lunch today. Labs, meds, skin reviewed. On remeron - may help with appetite.    Patient meets ASPEN/AND criteria for a nutrition diagnosis of severe malnutrition of chronic illness based on: 11% wt loss in 2-3 months and inadequate po intake during this time.     Will add magic cups with meals and cont to follow clinical course, nutrition needs.      CLINICAL NUTRITION ASSESSMENT      Reason for Assessment MST score 2+     Diagnosis/Problem   CHF, chronic resp failure, metabolic encephalopathy, dementia,    Medical/Surgical History Past Medical History:   Diagnosis Date    Acute CVA (cerebrovascular accident) 04/30/2023    Aspiration pneumonitis 06/21/2023    Chronic heart failure with preserved ejection fraction (HFpEF)     Hiatal hernia, large 06/21/2023    Hyperlipidemia     Hypertension     Pneumonia due to infectious organism, unspecified laterality, unspecified part of lung 05/31/2023    PVCs (premature ventricular contractions)        Past Surgical History:   Procedure Laterality Date    CARDIAC CATHETERIZATION N/A 5/19/2023    Procedure: Left Heart Cath;  Surgeon: Ricardo Arrieta MD;  Location: [facility] CATH INVASIVE LOCATION;  Service: Cardiology;  Laterality: N/A;    CARDIAC CATHETERIZATION N/A 5/19/2023    Procedure: Coronary angiography;  Surgeon: Ricardo Arrieta MD;  Location: [facility] CATH INVASIVE LOCATION;  Service: Cardiology;  Laterality: N/A;        Encounter Information        Nutrition History:     Food Preferences: Likes  "thickened coffee, scr eggs, fruit.   Supplements:    Factors Affecting Intake: chewing difficulty, decreased appetite, swallow impairment     Anthropometrics        Current Height  Current Weight  BMI kg/m2 Height: 175.3 cm (69\")  Weight: 73 kg (161 lb) (07/18/23 0520)  Body mass index is 23.78 kg/m².   Adjusted BMI (if applicable)    BMI Category Normal/Healthy (18.4 - 24.9)       Admission Weight 73kg       Ideal Body Weight (IBW) 70.7kg   Adjusted IBW (if applicable)        Usual Body Weight (UBW) 182lb   Weight Change/Trend Loss 2-3 months       Weight History Wt Readings from Last 30 Encounters:   07/18/23 0520 73 kg (161 lb)   07/17/23 2122 73.7 kg (162 lb 6.4 oz)   07/17/23 1614 74.8 kg (165 lb)   06/29/23 0556 74.2 kg (163 lb 8 oz)   06/28/23 0602 73.3 kg (161 lb 8 oz)   06/27/23 0532 70.3 kg (154 lb 15.7 oz)   06/26/23 0519 78.7 kg (173 lb 6.4 oz)   06/25/23 0512 77.2 kg (170 lb 3.2 oz)   06/24/23 0527 75.1 kg (165 lb 9.6 oz)   06/23/23 0344 73.7 kg (162 lb 8 oz)   06/22/23 0520 74.8 kg (164 lb 12.8 oz)   06/21/23 0539 74.8 kg (165 lb)   06/20/23 2002 79.1 kg (174 lb 4.8 oz)   06/20/23 1611 75.8 kg (167 lb)   06/13/23 0500 76 kg (167 lb 8.8 oz)   06/12/23 0515 75.8 kg (167 lb 1.7 oz)   06/11/23 0511 75.8 kg (167 lb 1.7 oz)   06/10/23 0517 76.1 kg (167 lb 12.3 oz)   06/09/23 0500 70.5 kg (155 lb 6.8 oz)   06/08/23 0535 80.9 kg (178 lb 5.6 oz)   06/07/23 0500 80 kg (176 lb 5.9 oz)   06/06/23 0509 76.9 kg (169 lb 8.5 oz)   06/05/23 0517 74.5 kg (164 lb 3.9 oz)   06/04/23 0500 72.4 kg (159 lb 9.8 oz)   06/03/23 0600 79.4 kg (175 lb 0.7 oz)   06/02/23 0530 75.1 kg (165 lb 9.1 oz)   06/01/23 0600 83 kg (182 lb 15.7 oz)   05/31/23 0818 83.9 kg (185 lb)   05/23/23 0505 84 kg (185 lb 3 oz)   05/18/23 1718 82.6 kg (182 lb)   05/18/23 0957 82.6 kg (182 lb)   05/01/23 1653 85.7 kg (189 lb)   04/30/23 1402 86 kg (189 lb 8 oz)   11/22/22 1111 81.6 kg (180 lb)   11/18/21 1417 84.8 kg (187 lb)   08/31/21 1055 83 kg (183 " lb)   08/24/21 0950 83.9 kg (185 lb)   07/27/21 1145 84.1 kg (185 lb 8 oz)   09/01/16 1941 79.4 kg (175 lb)             Estimated/Assessed Needs        Current Weight  Weight: 73 kg (161 lb) (07/18/23 0520)       Energy Requirements    Weight for Calculation 73 kg   Method for Estimation  25 kcal/kg   EST Needs (kcal/day) 1825       Protein Requirements    Weight for Calculation 73 kg   EST Protein Needs (g/kg) 1.2 gm/kg   EST Daily Needs (g/day) 88       Fluid Requirements     Method for Estimation 1 mL/kcal    EST Needs (mL/day) 1800     Tests/Procedures        Tests/Procedures Clinical Swallow Evaluation     Labs       Pertinent Labs    Results from last 7 days   Lab Units 07/18/23  0515 07/17/23  1534   SODIUM mmol/L 143 141   POTASSIUM mmol/L 3.5 4.8   CHLORIDE mmol/L 107 106   CO2 mmol/L 24.0 22.6   BUN mg/dL 15 17   CREATININE mg/dL 1.35* 1.53*   CALCIUM mg/dL 8.7 8.7   BILIRUBIN mg/dL  --  0.6   ALK PHOS U/L  --  181*   ALT (SGPT) U/L  --  17   AST (SGOT) U/L  --  78*   GLUCOSE mg/dL 90 107*     Results from last 7 days   Lab Units 07/18/23  0515 07/17/23  1534   HEMOGLOBIN g/dL 9.5* 9.6*   HEMATOCRIT % 29.4* 29.5*   WBC 10*3/mm3 12.21* 13.97*   ALBUMIN g/dL  --  2.8*     Results from last 7 days   Lab Units 07/18/23  0515 07/17/23  1534   PLATELETS 10*3/mm3 49* 51*     COVID19   Date Value Ref Range Status   06/28/2023 Not Detected Not Detected - Ref. Range Final     Lab Results   Component Value Date    HGBA1C 5.30 05/01/2023          Medications           Scheduled Medications cholecalciferol, 2,000 Units, Oral, Daily  clonazePAM, 0.5 mg, Oral, Nightly  furosemide, 40 mg, Intravenous, Q12H  Menthol-Zinc Oxide, 1 application , Topical, Q12H  metoprolol succinate XL, 50 mg, Oral, Q12H  mirtazapine, 7.5 mg, Oral, Nightly  mupirocin, 1 application , Topical, Q12H  pantoprazole, 40 mg, Oral, Daily  rosuvastatin, 5 mg, Oral, Nightly  sennosides-docusate, 1 tablet, Oral, Q PM  senna-docusate sodium, 2 tablet,  Oral, BID  terazosin, 5 mg, Oral, Nightly       Infusions     PRN Medications   acetaminophen    senna-docusate sodium **AND** polyethylene glycol **AND** bisacodyl **AND** bisacodyl    melatonin    ondansetron **OR** ondansetron     Physical Findings          Physical Appearance disoriented, on oxygen therapy, other: sleeping   Oral/Mouth Cavity tooth or teeth missing   Edema  1+ (trace)   Gastrointestinal last bowel movement: 7/17   Skin  skin tear, abrasions   Tubes/Drains/Lines none   NFPE Patient/family declined exam since pt sleeping     Malnutrition Severity Assessment      Patient meets criteria for : Severe Malnutrition  Malnutrition Type (last 8 hours)       Malnutrition Severity Assessment       Row Name 07/18/23 1348       Malnutrition Severity Assessment    Malnutrition Type Chronic Disease - Related Malnutrition      Row Name 07/18/23 1348       Insufficient Energy Intake     Insufficient Energy Intake Findings Severe    Insufficient Energy Intake  <75% of est. energy requirement for > or equal to 1 month      Row Name 07/18/23 1348       Unintentional Weight Loss     Unintentional Weight Loss Findings Severe    Unintentional Weight Loss  Weight loss greater than 7.5% in three months  11%      Row Name 07/18/23 1348       Muscle Loss    Tem[place] Region Moderate - slight depression      Row Name 07/18/23 1348       Criteria Met (Must meet criteria for severity in at least 2 of these categories: M Wasting, Fat Loss, Fluid, Secondary Signs, Wt. Status, Intake)    Patient meets criteria for  Severe Malnutrition                     Current Nutrition Orders & Evaluation of Intake       Oral Nutrition     Food Allergies NKFA   Current PO Diet Diet: Cardiac Diets; Healthy Heart (2-3 Na+); No Straw; Texture: Pureed (NDD 1); Fluid Consistency: Honey Thick   Supplement n/a   PO Evaluation     % PO Intake Few bites    # of Days Evaluated 1   --  PES STATEMENT / NUTRITION DIAGNOSIS      Nutrition Dx Problem  Problem:  Malnutrition  Etiology: Medical Diagnosis dementia, dysphagia  Signs/Symptoms: Report of Minimal PO Intake and Unintended Weight Change    Comment:    --  NUTRITION INTERVENTION / PLAN OF CARE      Intervention Goal(s) Maintain nutrition status, Reduce/improve symptoms, Meet estimated needs, Disease management/therapy, Tolerate PO , Increase intake, and Maintain weight         RD Intervention/Action Interview for preferences, Follow Tx Progress, Care plan reviewed, and Recommend/ordered:          Prescription/Orders:       PO Diet       Supplements Magic cups with meals      Snacks       Enteral Nutrition       Parenteral Nutrition    New Prescription Ordered? Yes   --      Monitor/Evaluation Per protocol, I&O, PO intake, Supplement intake, Pertinent labs, Weight, Skin status, GI status, Symptoms, POC/GOC, Swallow function, Hemodynamic stability   Discharge Plan/Needs Pending clinical course   Education Will instruct as appropriate   --    RD to follow per protocol.      Electronically signed by:  Arlene Dao RD  07/18/23 13:37 EDT

## 2023-07-18 NOTE — CONSULTS
Referring Provider: Dr. Almanza  Reason for Consultation: Pleural effusion    Patient Care Team:  Santos Simmons MD as PCP - General (Family Medicine)  Aashish Thapa MD as Consulting Physician (Cardiology)    Chief complaint:   Fall    History of present illness:    Subjective   This is an 88-year-old male patient with history of diastolic CHF.    He was recently hospitalized here for pneumonia and pleural effusion and underwent Thora on 6/7 and 6/12 with removal of 600 and 100 mm respectively.  Fluid was exudative, likely parapneumonic.  Cytology was normal.    He was active prior to his recent illnesses and he has been hospitalized multiple times this year.  Lately he has been residing at the [place] and his performance status has deteriorated significantly.  His family described him sleeping in bed most of the time and not wanting to get out of the bed or cooperate much with physical therapy.    He was brought to the hospital yesterday for a fall.  Apparently he slipped out of the bed according to his family.  There has been no reports of worsening shortness of breath or cough.  His work-up revealed worsening left pleural effusion on CT as described below, which prompted consultation for us.    Patient is somnolent.  His eyes are closed.  He did not cooperate with the history/exam and most of the information was obtained from his family.  Family stated that he has not been talking since he fell.    Data: 5/18/2023: RVSP 70. Mild to moderate MR and AR. LA mildly dilated     Review of Systems  Cannot obtain due to altered mental status.    History  Past Medical History:   Diagnosis Date    Acute CVA (cerebrovascular accident) 04/30/2023    Aspiration pneumonitis 06/21/2023    Chronic heart failure with preserved ejection fraction (HFpEF)     Hiatal hernia, large 06/21/2023    Hyperlipidemia     Hypertension     Pneumonia due to infectious organism, unspecified laterality,  unspecified part of lung 05/31/2023    PVCs (premature ventricular contractions)    ,   Past Surgical History:   Procedure Laterality Date    CARDIAC CATHETERIZATION N/A 5/19/2023    Procedure: Left Heart Cath;  Surgeon: Ricardo Arrieta MD;  Location: [facility] CATH INVASIVE LOCATION;  Service: Cardiology;  Laterality: N/A;    CARDIAC CATHETERIZATION N/A 5/19/2023    Procedure: Coronary angiography;  Surgeon: Ricardo Arrieta MD;  Location:  [facility] CATH INVASIVE LOCATION;  Service: Cardiology;  Laterality: N/A;   ,   Family History   Problem Relation Age of Onset    No Known Problems Mother     No Known Problems Father     No Known Problems Sister     No Known Problems Brother    ,   Social History     Socioeconomic History    Marital status:    Tobacco Use    Smoking status: Never   Vaping Use    Vaping Use: Never used   Substance and Sexual Activity    Alcohol use: No    Drug use: No    Sexual activity: Defer     E-cigarette/Vaping    E-cigarette/Vaping Use Never User     Passive Exposure No     Counseling Given No      E-cigarette/Vaping Substances    Nicotine No     THC No     Flavoring No      E-cigarette/Vaping Devices    Disposable No     Pre-filled or Refillable Cartridge No     Refillable Tank No     Pre-filled Pod No          ,   Medications Prior to Admission   Medication Sig Dispense Refill Last Dose    apixaban (ELIQUIS) 2.5 MG tablet tablet Take 1 tablet by mouth Every 12 (Twelve) Hours. Indications: Other - full anticoagulation 60 tablet 0     bisacodyl (Dulcolax) 10 MG suppository Insert 1 suppository into the rectum Daily As Needed for Constipation.       Cholecalciferol (Vitamin D3) 50 MCG (2000 UT) tablet Take 1 tablet by mouth Daily.       clonazePAM (KlonoPIN) 0.5 MG tablet Take 1 tablet by mouth Every Night.       doxazosin (CARDURA) 4 MG tablet Take 1 tablet by mouth Every Night.       magnesium hydroxide (MILK OF MAGNESIA) 400 MG/5ML suspension Take 30 mL by mouth Daily As Needed for Constipation.        metoprolol succinate XL (TOPROL-XL) 50 MG 24 hr tablet Take 1 tablet by mouth 2 (two) times a day. 60 tablet 5     mirtazapine (REMERON) 7.5 MG tablet Take 1 tablet by mouth Every Night.       oxymetazoline (AFRIN) 0.05 % nasal spray 2 sprays into the nostril(s) as directed by provider Every 1 (One) Hour As Needed for Congestion.       pantoprazole (PROTONIX) 40 MG EC tablet Take 1 tablet by mouth Daily.       rosuvastatin (CRESTOR) 5 MG tablet Take 1 tablet by mouth Every Night.       sennosides-docusate (Senna Plus) 8.6-50 MG per tablet Take 1 tablet by mouth Every Evening.       sodium chloride 0.65 % nasal spray 1 spray into the nostril(s) as directed by provider 2 (Two) Times a Day.       Sodium Phosphates (fleet enema) 7-19 GM/118ML enema Insert 1 enema into the rectum 1 (One) Time As Needed.       traMADol (ULTRAM) 50 MG tablet Take 25 mg by mouth Every 4 (Four) Hours As Needed for Moderate Pain.      , Scheduled Meds:  cholecalciferol, 2,000 Units, Oral, Daily  clonazePAM, 0.5 mg, Oral, Nightly  furosemide, 40 mg, Intravenous, Q12H  Menthol-Zinc Oxide, 1 application , Topical, Q12H  metoprolol succinate XL, 50 mg, Oral, Q12H  mirtazapine, 7.5 mg, Oral, Nightly  mupirocin, 1 application , Topical, Q12H  pantoprazole, 40 mg, Oral, Daily  rosuvastatin, 5 mg, Oral, Nightly  sennosides-docusate, 1 tablet, Oral, Q PM  senna-docusate sodium, 2 tablet, Oral, BID  terazosin, 5 mg, Oral, Nightly   , Continuous Infusions:   , and Allergies:  Sulfa antibiotics    Objective     Vital Signs   Temp:  [97.5 °F (36.4 °C)-98.5 °F (36.9 °C)] 98.5 °F (36.9 °C)  Heart Rate:  [105-117] 105  Resp:  [18-20] 20  BP: (101-136)/(57-90) 101/57    PPE used per hospital policy    Physical Exam:  Constitutional: Not in acute distress.  Eyes closed.  Eyes: Injected conjunctivae, EOMI. pupils equal reactive to light.  ENMT: Genao 3. No oral thrush.  Moist tongue.  Neck: Trachea midline. No thyromegaly  Heart: RRR, no murmur  Lungs:  Diminished air entry on the left base on anterior auscultation.  No wheezing.  Nonlabored breathing.  Abdomen: Soft. No tenderness or dullness. No HSM.  Extremities: No cyanosis, clubbing or pitting edema.  Warm extremities and well-perfused.  Neuro: Somnolent.  Confused.  Does not follow commands.  Moves extremities to stimulus.  Psych: Cannot assess due to AMS.  Integumentary: No rash.  Normal skin turgor  Lymphatic: No palpable cervical or supraclavicular lymph nodes.          Laboratory workup:    Results from last 7 days   Lab Units 07/18/23  0515 07/17/23  1534   SODIUM mmol/L 143 141   POTASSIUM mmol/L 3.5 4.8   CHLORIDE mmol/L 107 106   CO2 mmol/L 24.0 22.6   BUN mg/dL 15 17   CREATININE mg/dL 1.35* 1.53*   GLUCOSE mg/dL 90 107*   CALCIUM mg/dL 8.7 8.7     Results from last 7 days   Lab Units 07/17/23  2252 07/17/23 2025   HSTROP T ng/L 1,646* 1,453*     Results from last 7 days   Lab Units 07/18/23  0515 07/17/23  1534   WBC 10*3/mm3 12.21* 13.97*   HEMOGLOBIN g/dL 9.5* 9.6*   HEMATOCRIT % 29.4* 29.5*   PLATELETS 10*3/mm3 49* 51*         Results from last 7 days   Lab Units 07/17/23 2025   PROBNP pg/mL 32,490.0*       Diagnostic imaging:  I personally and independently reviewed the following images:   CT chest 7/17/2023 and 6/20/2023: Worsening left pleural effusion.  Left lower lobe consolidation likely atelectasis      Assessment     Left pleural effusion s/p Thora 6/7 and 6/12 with removal of 600 & 700 mL respectively, exudative by LDH and not protein.  Recurrent and currently worse.  Left lower lobe consolidation, likely atelectasis.  Pneumonia is felt to be less likely due to normal procalcitonin and lack of fever or leukocytosis.  However it cannot be ruled out completely.  He could also have aspiration.  Severe pulmonary hypertension on echo 5/18/2023: RVSP 70.  Mild to moderate MR and AR.  LA mildly dilate  Chronic diastolic CHF    Recommendations:      Discussed with the family at length.   Offered thoracentesis.  However, they feel that his condition is deteriorating and he would not like to go through invasive procedures.  He has been hospitalized multiple times this year and they wish to make him comfortable.  They would like more information about palliative care/hospice.  I will go ahead and place a consult for palliative care for now.  We will hold off on thoracentesis.  Continue oxygen therapy and when patient is more alert, he can use incentive spirometry.      Kala Howell MD  07/18/23  15:28 EDT

## 2023-07-19 NOTE — CONSULTS
"Purpose of the visit was to evaluate for: goals of care/advanced care planning, support for patient/family, hospice referral/discussion, pain/symptom management, transfer to comfort care bed/unit, and comfort care. Spoke with MD and RN as well as patient and family and discussed palliative care, goals of care, and [facility].      Assessment:  Patient is palliative care appropriate given CHF, fall, respiratory failure, pleural effusion, dementia, h/o CVA. Patient is minimally responsive, trying to say \"water\" but keeps eyes closed, labored breathing at times. PPS: 20%.     Recommendations/Plan: Family likely transitioning care to comfort measures on  palliative unit after they discuss with his two other children. They plan to update bedside RN when they speak to the rest of the family. CRISTÓBAL Mims aware that family is leaning towards comfort and awaiting their final decision prior to transferring to .     Other Comments:   Spoke to wife, son (Jah) and DIL outside of room. They had a good understanding of patient's current condition and shared about his rapid decline this admission. Family stated that if patient is not able to drive or play golf he would not have the quality of life he would want. They have been hopeful for recovery but are now seeing this has been a progressive decline and unable to rebound to a meaningful quality of life given his chronic conditions. They expressed goal of comfort and transition to palliative care unit for anticipated end of life care. Provided education about symptom management, comfort care, and hospice involvement. They agree but wish to discuss with his other two children prior to changing goal of care. They will notify bedside RN when they are ready to transfer to . Provided support to family, advised of palliative team availability. Updated CRISTÓBAL Mims and RN Hope.   "

## 2023-07-19 NOTE — PLAN OF CARE
"Goal Outcome Evaluation:  Plan of Care Reviewed With: patient, spouse        Progress: no change  Outcome Evaluation: vss. disoriented x4. pt lethargic this shift, unable to give oral medications. pt did say \"water\", rn spoon fed pt thickened liquid. turned q2. plan for transfer to [facility] when bed becomes available         "

## 2023-07-19 NOTE — CONSULTS
NEUROLOGY CONSULT    DOS: 2023  NAME: Art Mullins   : 1935  PCP: Santos Simmons MD  CC: Stroke  Referring MD: Venessa Ivory, *      Neurological Problem and Interval History:    88 y.o. M with a history of previous stroke and residual left-sided weakness in 2023, congestive heart failure patient is admitted with recurrent pleural effusions status postthoracentesis.  Patient has had multiple recent hospital admissions and overall declining health and a Dls Who presents with Sx of altered mental status.  Patient had a stroke and says since had multiple hospital admissions patient continues to have decline in overall function.  Patient was admitted found to have a recurrent pulmonary Hamzah effusion.  Patient is status post thoracentesis.  Patient also has advancing dementia.  Neurology is consulted as patient has stopped talking and is minimally interactive.  When seen in consultation patient was nearly obtunded though somewhat arousable to sternal rub.  He is otherwise apparently nonfocal.  Family at this time is discussing possible hospice versus CMO.      Past Medical/Surgical Hx:  Past Medical History:   Diagnosis Date    Acute CVA (cerebrovascular accident) 2023    Aspiration pneumonitis 2023    Chronic heart failure with preserved ejection fraction (HFpEF)     Hiatal hernia, large 2023    Hyperlipidemia     Hypertension     Pneumonia due to infectious organism, unspecified laterality, unspecified part of lung 2023    PVCs (premature ventricular contractions)      Past Surgical History:   Procedure Laterality Date    CARDIAC CATHETERIZATION N/A 2023    Procedure: Left Heart Cath;  Surgeon: Ricardo Arrieta MD;  Location:  [facility] CATH INVASIVE LOCATION;  Service: Cardiology;  Laterality: N/A;    CARDIAC CATHETERIZATION N/A 2023    Procedure: Coronary angiography;  Surgeon: Ricardo Arrieta MD;  Location:  [facility] CATH INVASIVE LOCATION;  Service: Cardiology;   Laterality: N/A;         Medications On Admission  Medications Prior to Admission   Medication Sig Dispense Refill Last Dose    apixaban (ELIQUIS) 2.5 MG tablet tablet Take 1 tablet by mouth Every 12 (Twelve) Hours. Indications: Other - full anticoagulation 60 tablet 0     bisacodyl (Dulcolax) 10 MG suppository Insert 1 suppository into the rectum Daily As Needed for Constipation.       Cholecalciferol (Vitamin D3) 50 MCG (2000 UT) tablet Take 1 tablet by mouth Daily.       clonazePAM (KlonoPIN) 0.5 MG tablet Take 1 tablet by mouth Every Night.       doxazosin (CARDURA) 4 MG tablet Take 1 tablet by mouth Every Night.       magnesium hydroxide (MILK OF MAGNESIA) 400 MG/5ML suspension Take 30 mL by mouth Daily As Needed for Constipation.       metoprolol succinate XL (TOPROL-XL) 50 MG 24 hr tablet Take 1 tablet by mouth 2 (two) times a day. 60 tablet 5     mirtazapine (REMERON) 7.5 MG tablet Take 1 tablet by mouth Every Night.       oxymetazoline (AFRIN) 0.05 % nasal spray 2 sprays into the nostril(s) as directed by provider Every 1 (One) Hour As Needed for Congestion.       pantoprazole (PROTONIX) 40 MG EC tablet Take 1 tablet by mouth Daily.       rosuvastatin (CRESTOR) 5 MG tablet Take 1 tablet by mouth Every Night.       sennosides-docusate (Senna Plus) 8.6-50 MG per tablet Take 1 tablet by mouth Every Evening.       sodium chloride 0.65 % nasal spray 1 spray into the nostril(s) as directed by provider 2 (Two) Times a Day.       Sodium Phosphates (fleet enema) 7-19 GM/118ML enema Insert 1 enema into the rectum 1 (One) Time As Needed.       traMADol (ULTRAM) 50 MG tablet Take 25 mg by mouth Every 4 (Four) Hours As Needed for Moderate Pain.          Allergies:  Allergies   Allergen Reactions    Sulfa Antibiotics Itching       Social Hx:  Social History     Socioeconomic History    Marital status:    Tobacco Use    Smoking status: Never   Vaping Use    Vaping Use: Never used   Substance and Sexual Activity     Alcohol use: No    Drug use: No    Sexual activity: Defer       Family Hx:  Family History   Problem Relation Age of Onset    No Known Problems Mother     No Known Problems Father     No Known Problems Sister     No Known Problems Brother        Review of Imaging (Interpretation of images not reports):  Head CT:  88 y.o. M with a history of previous stroke and residual left-sided weakness in 4/2023, congestive heart failure patient is admitted with recurrent pleural effusions status postthoracentesis.  Patient has had multiple recent hospital admissions and overall declining health and a Dls Who presents with Sx of altered mental status.  Patient had a stroke and says since had multiple hospital admissions patient continues to have decline in overall function.  Patient was admitted found to have a recurrent pulmonary Hamzah effusion.  Patient is status post thoracentesis.  Patient also has advancing dementia.  Neurology is consulted as patient has stopped talking and is minimally interactive.  When seen in consultation patient was nearly obtunded though somewhat arousable to sternal rub.  He is otherwise apparently nonfocal.  Family at this time is discussing possible hospice versus CMO.    Laboratory Results:   Lab Results   Component Value Date    GLUCOSE 101 (H) 07/19/2023    CALCIUM 8.8 07/19/2023     (H) 07/19/2023    K 3.2 (L) 07/19/2023    CO2 27.2 07/19/2023     07/19/2023    BUN 19 07/19/2023    CREATININE 1.63 (H) 07/19/2023    BCR 11.7 07/19/2023    ANIONGAP 15.8 (H) 07/19/2023     Lab Results   Component Value Date    WBC 11.89 (H) 07/19/2023    HGB 10.1 (L) 07/19/2023    HCT 31.7 (L) 07/19/2023    MCV 88.1 07/19/2023    PLT 55 (L) 07/19/2023     Lab Results   Component Value Date    CHOL 120 05/01/2023     Lab Results   Component Value Date    HDL 27 (L) 05/01/2023     Lab Results   Component Value Date    LDL 67 05/01/2023     Lab Results   Component Value Date    TRIG 149 05/01/2023     Lab  "Results   Component Value Date    HGBA1C 5.30 05/01/2023     Lab Results   Component Value Date    INR 1.61 (H) 07/19/2023    INR 1.73 (H) 06/20/2023    INR 1.42 (H) 05/31/2023    PROTIME 19.4 (H) 07/19/2023    PROTIME 20.5 (H) 06/20/2023    PROTIME 17.6 (H) 05/31/2023         Physical Examination:   /70 (BP Location: Left arm, Patient Position: Lying)   Pulse 102   Temp 97.8 °F (36.6 °C) (Oral)   Resp 16   Ht 175.3 cm (69\")   Wt 73 kg (161 lb)   SpO2 91%   BMI 23.78 kg/m²   Patient is minimally responsive, sternal rub with facial grimacing and bilateral upper extremity movements are spontaneous but minimal.    Diagnoses / Discussion:  88 y.o. M with a history of previous stroke and residual left-sided weakness in 4/2023, congestive heart failure patient is admitted with recurrent pleural effusions and overall decline in ADLs.  Neurology asked to evaluate for altered mental status and decreased state of responsiveness.  Will await outcome of goals of care meeting.    Plan:  Neurology was consulted for the altered mental status.  There is an obvious work-up that can be done here but family is currently in discussion for possible hospice and comfort measures only.  Pending the outcome of this discussion we will then formulate either neurological plan of evaluation versus signing off.     I have discussed the above with the patient and family.  Time spent with patient: 60min    MDM  Reviewed: previous chart, nursing note and vitals  Reviewed previous: labs  Interpretation: labs  Total time providing critical care: 30-74 minutes. This excludes time spent performing separately reportable procedures and services.  Consults: neurology       Brielle Cochran MD  Neurology             "

## 2023-07-19 NOTE — PLAN OF CARE
Goal Outcome Evaluation: Pt VSS on 4L NC, responds to voice, does not answer questions or follow commands, Neurology consulted. Pt family declined thoracentesis. Family updated at bedside and Palliative consult placed.      Problem: Adult Inpatient Plan of Care  Goal: Plan of Care Review  Outcome: Ongoing, Not Progressing     Problem: Malnutrition  Goal: Improved Nutritional Intake  Outcome: Ongoing, Not Progressing     Problem: Adult Inpatient Plan of Care  Goal: Absence of Hospital-Acquired Illness or Injury  Intervention: Prevent and Manage VTE (Venous Thromboembolism) Risk  Recent Flowsheet Documentation  Taken 7/18/2023 0915 by Abby Cross RN  Activity Management: bedrest  VTE Prevention/Management:   bilateral   sequential compression devices on     Problem: Adult Inpatient Plan of Care  Goal: Absence of Hospital-Acquired Illness or Injury  Intervention: Prevent Infection  Recent Flowsheet Documentation  Taken 7/18/2023 1800 by Abby Cross RN  Infection Prevention: environmental surveillance performed  Taken 7/18/2023 1600 by Abby Cross RN  Infection Prevention: environmental surveillance performed  Taken 7/18/2023 1400 by Abby Cross RN  Infection Prevention: environmental surveillance performed  Taken 7/18/2023 1200 by Abby Cross RN  Infection Prevention: environmental surveillance performed  Taken 7/18/2023 1000 by Abby Cross RN  Infection Prevention: environmental surveillance performed  Taken 7/18/2023 0915 by Abby Cross RN  Infection Prevention:   environmental surveillance performed   equipment surfaces disinfected   hand hygiene promoted   personal protective equipment utilized   rest/sleep promoted   single patient room provided   visitors restricted/screened  Taken 7/18/2023 0800 by Abby Cross RN  Infection Prevention: environmental surveillance performed     Problem: Fall Injury Risk  Goal: Absence of Fall and Fall-Related Injury  Outcome:  Ongoing, Not Progressing  Intervention: Identify and Manage Contributors  Recent Flowsheet Documentation  Taken 7/18/2023 0915 by Abby Cross, RN  Medication Review/Management:   medications reviewed   high-risk medications identified  Intervention: Promote Injury-Free Environment  Recent Flowsheet Documentation  Taken 7/18/2023 1800 by Abby Cross RN  Safety Promotion/Fall Prevention: safety round/check completed  Taken 7/18/2023 1600 by Abby Crsos RN  Safety Promotion/Fall Prevention: safety round/check completed  Taken 7/18/2023 1400 by Abby Cross RN  Safety Promotion/Fall Prevention: safety round/check completed  Taken 7/18/2023 1200 by Abby Cross RN  Safety Promotion/Fall Prevention: safety round/check completed  Taken 7/18/2023 1000 by Abby Cross RN  Safety Promotion/Fall Prevention: safety round/check completed  Taken 7/18/2023 0915 by Abby Cross RN  Safety Promotion/Fall Prevention:   activity supervised   assistive device/personal items within reach   clutter free environment maintained   fall prevention program maintained   lighting adjusted   room organization consistent   safety round/check completed  Taken 7/18/2023 0800 by Abby Cross, RN  Safety Promotion/Fall Prevention: safety round/check completed     Problem: Skin Injury Risk Increased  Goal: Skin Health and Integrity  Outcome: Ongoing, Not Progressing

## 2023-07-19 NOTE — PLAN OF CARE
"Goal Outcome Evaluation:  Plan of Care Reviewed With: patient        Progress: no change  Outcome Evaluation: Disoriented x4. Pt less responsive and interactive this shift. Pt will verbally respond to commands with \"yes ma'am\" but not follow commands. IV abx started per order. Q2h turn. External catheter in use. Incontinence care provided as needed. Will continue to provide supportive care.         "

## 2023-07-19 NOTE — PROGRESS NOTES
Name: Art Mullins ADMIT: 2023   : 1935  PCP: Santos Simmons MD    MRN: [id number] LOS: 1 days   AGE/SEX: 88 y.o. male  ROOM: [facility]     Subjective   Subjective   Lying in bed. Son at bedside. He states patient has been sleeping since he arrived. He has been more lethargic with garbled speech since his fall. He still does not follow commands. Son and his step mother are meeting with palliative care today. ROS not available d/t his mental status.     Objective   Objective   Vital Signs  Temp:  [97.7 °F (36.5 °C)-98.5 °F (36.9 °C)] 97.7 °F (36.5 °C)  Heart Rate:  [] 91  Resp:  [16-20] 16  BP: ()/(52-69) 106/52  SpO2:  [91 %-92 %] 91 %  on  Flow (L/min):  [4] 4;   Device (Oxygen Therapy): nasal cannula  Body mass index is 23.78 kg/m².    Physical Exam  Vitals and nursing note reviewed.   Constitutional:       Appearance: He is ill-appearing. He is not toxic-appearing.   Cardiovascular:      Rate and Rhythm: Normal rate and regular rhythm.      Pulses: Normal pulses.   Pulmonary:      Effort: Pulmonary effort is normal. No respiratory distress.      Comments: Diminished d/t poor patient effort/cooperation. On 4 L at 90%  Abdominal:      General: Bowel sounds are normal. There is no distension.      Palpations: Abdomen is soft.      Tenderness: There is no abdominal tenderness.   Musculoskeletal:         General: No swelling. Normal range of motion.   Skin:     General: Skin is warm and dry.      Findings: No bruising.   Neurological:      Mental Status: He is alert. He is disoriented.      Motor: Weakness present.      Comments: Not following commands well for full neurological exam. + garbled speech  Psychiatric:      Comments: Confused, withdrawn.     Results Review:       I reviewed the patient's new clinical results.  Results from last 7 days   Lab Units 23  0538 23  0515 23  1534   WBC 10*3/mm3 11.89* 12.21* 13.97*   HEMOGLOBIN g/dL 10.1* 9.5* 9.6*    PLATELETS 10*3/mm3 55* 49* 51*     Results from last 7 days   Lab Units 07/19/23  0538 07/18/23  0515 07/17/23  1534   SODIUM mmol/L 146* 143 141   POTASSIUM mmol/L 3.2* 3.5 4.8   CHLORIDE mmol/L 103 107 106   CO2 mmol/L 27.2 24.0 22.6   BUN mg/dL 19 15 17   CREATININE mg/dL 1.63* 1.35* 1.53*   GLUCOSE mg/dL 101* 90 107*   Estimated Creatinine Clearance: 33 mL/min (A) (by C-G formula based on SCr of 1.63 mg/dL (H)).  Results from last 7 days   Lab Units 07/19/23  0538 07/17/23  1534   ALBUMIN g/dL 2.6* 2.8*   BILIRUBIN mg/dL 1.0 0.6   ALK PHOS U/L 177* 181*   AST (SGOT) U/L 51* 78*   ALT (SGPT) U/L 14 17     Results from last 7 days   Lab Units 07/19/23  0538 07/18/23  0515 07/17/23  1534   CALCIUM mg/dL 8.8 8.7 8.7   ALBUMIN g/dL 2.6*  --  2.8*     Results from last 7 days   Lab Units 07/19/23  0538 07/18/23 0515   PROCALCITONIN ng/mL 0.26* 0.25     No results found for: HGBA1C, POCGLU    cholecalciferol, 2,000 Units, Oral, Daily  furosemide, 40 mg, Intravenous, Q12H  Menthol-Zinc Oxide, 1 application , Topical, Q12H  metoprolol succinate XL, 50 mg, Oral, Q12H  mupirocin, 1 application , Topical, Q12H  pantoprazole, 40 mg, Oral, Daily  piperacillin-tazobactam, 3.375 g, Intravenous, Q8H  rosuvastatin, 5 mg, Oral, Nightly  sennosides-docusate, 1 tablet, Oral, Q PM  senna-docusate sodium, 2 tablet, Oral, BID  terazosin, 5 mg, Oral, Nightly       Diet: Cardiac Diets; Healthy Heart (2-3 Na+); No Straw; Texture: Pureed (NDD 1); Fluid Consistency: Honey Thick       Assessment/Plan     Active Hospital Problems    Diagnosis  POA    **Acute on chronic heart failure with preserved ejection fraction (HFpEF) [I50.33]  Yes    Unwitnessed fall [R29.6]  Yes    Chronic respiratory failure with hypoxia [J96.11]  Yes    Metabolic encephalopathy [G93.41]  Yes    Pleural effusion [J90]  Yes    Severe malnutrition [E43]  Yes    Recurrent pleural effusion on left [J90]  Yes    Pharyngeal dysphagia [R13.13]  Yes    Dementia with  anxiety [F03.94]  Yes    Pulmonary hypertension [I27.20]  Yes    Thrombocytopenia [D69.6]  Yes    Possible cirrhosis of liver per CT imaging [K74.60]  Yes    History of CVA (cerebrovascular accident) [Z86.73]  Not Applicable    Stage 3a chronic kidney disease [N18.31]  Yes    Elevated troponin [R77.8]  Yes    Essential hypertension [I10]  Yes      Resolved Hospital Problems   No resolved problems to display.     Mr. Mullins is a 88 year old male who presented to the hospital from his NH for an unwitnessed fall. He has known dementia with recent cardio-embolic stroke back in April. He has been in a facility or in the hospital since that time. He has had recurrent left pleural effusion as well as CHF and aspiration pneumonia complicating his overall prognosis. He was brought to this facility where he was noted to have recurrence of his large left pleural effusion as well as markedly elevated troponin/BNP with concerns for CHF exacerbation.      Acute on chronic heart failure with preserved EF  Recurrent large left pleural effusion  Chronic respiratory failure with hypoxia  -Cardiology following. On IV Lasix BID but creatinine up some today as well as sodium. Will hold further dosing until repeat labs in the AM.  -Chronically on 3 L and currently at 4 L- wean as able.  -Pulmonology consulted and offered thoracentesis but family may pursue palliative. Will re-evaluate based on family's decision. Eliquis on hold.   -Suspect majority of his lung issues are fluid related/effusion related, but possible component of infection could be at play given recent thin liquids at facility and confusion. I started Zosyn yesterday for possible aspiration pneumonia. Blood cultures pending.      Elevated troponin  -Unable to assess for cardiac complaints, but does have history of NSTEMI secondary to CHF.  -EKG stable. Type II NSTEMI from CHF.     Metabolic encephalopathy  Unwitnessed fall  Dementia  History of stroke  dysphagia  -Wife  reports increased confusion on top of his underlying dementia.  -Difficult neuro exam d/t his cooperation.  -CTH and c-spine without new findings.  -Monitor neuro checks. Delirium precautions.   -PT/OT/ST to see.  -There is the chance he could have had another stroke with his fall/worsening neuro status. Initial CTH was stable. Neurology has been consulted for further recommendations.     CKD3  -Near baseline around 1.3-1.7.  -Monitor with diuretics. Creatinine 1.3-1.6 today with rising AG and mild hypernatremia. Hold on further diuretics for now.     Thrombocytopenia  Anemia  Possible cirrhosis  -Recent CT imaging with nodular contour of liver with concerning for cirrhosis versus hepatic tumor?  -LFTs stable but platelets lower than his baseline. Was 70-80s at prior admission and now around 50s. ? Etiology. Could be related to a consumptive process versus underlying liver issue.   -Monitor for any bleeding and trend labs. Platelets stable. INR 1.6. Continue to hold Eliquis for now.     HTN  -BP stable at present. BB resumed.     Discussed with patient, nurse and son at bedside as well as palliative team.     Patient has multiple issues at this time- recurrent large pleural effusion, aspiration, altered mental status with underlying dementia, history of stroke and cannot rule out acute CVA, CKD, possible cirrhosis with low platelets, etc. This is his fifth admission since April and he was a very active man prior to this per his son. I have asked if his father would want to continue living this way and he does not feel he would. Patient made comments stating he was ready to see [facility] last admission. I will continue the current treatment plan for now. Faith with palliative is meeting with wife and son at 2 PM to discuss possible transition to comfort. Will await final decisions.      VTE Prophylaxis - Eliquis (home med) (temporarily on hold)  Code Status - DNR  Disposition - Anticipate discharge TBD.    Felicita Bull,  CRISTÓBAL  [facility]  07/19/23  10:50 EDT    Addendum 1608: Notified by nursing that patient's family is ready to transfer patient to palliative floor for comfort care. Will place order set and eliminate non-comfort medications at this time.

## 2023-07-20 NOTE — PROGRESS NOTES
Name: Art Mullins ADMIT: 2023   : 1935  PCP: Santos Simmons MD    MRN: [id number] LOS: 2 days   AGE/SEX: 88 y.o. male  ROOM: [facility]/     Subjective   Subjective   Resting in bed. About to transfer to [facility] as a bed available. No family present at this moment. He is nonverbal/non-interactive today. Has been medicated for pain/anxiety.     Objective   Objective   Vital Signs  Temp:  [97.6 °F (36.4 °C)-98.6 °F (37 °C)] 98.6 °F (37 °C)  Heart Rate:  [103-116] 116  Resp:  [18-26] 26  BP: (104-125)/(49-87) 117/87  SpO2:  [90 %-95 %] 90 %  on  Flow (L/min):  [4-5] 4;   Device (Oxygen Therapy): nasal cannula  Body mass index is 23.78 kg/m².    Physical Exam  Vitals and nursing note reviewed.   Constitutional:       Appearance: He is ill-appearing. He is not toxic-appearing.   Cardiovascular:      Rate and Rhythm: Normal rate and regular rhythm.      Pulses: Normal pulses.   Pulmonary:      Effort: Pulmonary effort is normal. No respiratory distress.      Comments: Diminished d/t poor patient effort/cooperation. On 5 L.  Abdominal:      General: Bowel sounds are normal. There is no distension.      Palpations: Abdomen is soft.      Tenderness: There is no abdominal tenderness.   Musculoskeletal:         General: No swelling.   Skin:     General: Skin is warm and dry.      Findings: No bruising.   Neurological:      Mental Status: He is disoriented and nonverbal today.     Motor: Weakness present.      Comments: Medically sedated.  Psychiatric:      Comments: Withdrawn.     Results Review:       I reviewed the patient's new clinical results.  Results from last 7 days   Lab Units 23  0538 23  0515 23  1534   WBC 10*3/mm3 11.89* 12.21* 13.97*   HEMOGLOBIN g/dL 10.1* 9.5* 9.6*   PLATELETS 10*3/mm3 55* 49* 51*     Results from last 7 days   Lab Units 23  0538 23  0515 23  1534   SODIUM mmol/L 146* 143 141   POTASSIUM mmol/L 3.2* 3.5 4.8   CHLORIDE mmol/L 103 107 106    CO2 mmol/L 27.2 24.0 22.6   BUN mg/dL 19 15 17   CREATININE mg/dL 1.63* 1.35* 1.53*   GLUCOSE mg/dL 101* 90 107*   Estimated Creatinine Clearance: 33 mL/min (A) (by C-G formula based on SCr of 1.63 mg/dL (H)).  Results from last 7 days   Lab Units 07/19/23  0538 07/17/23  1534   ALBUMIN g/dL 2.6* 2.8*   BILIRUBIN mg/dL 1.0 0.6   ALK PHOS U/L 177* 181*   AST (SGOT) U/L 51* 78*   ALT (SGPT) U/L 14 17     Results from last 7 days   Lab Units 07/19/23  0538 07/18/23  0515 07/17/23  1534   CALCIUM mg/dL 8.8 8.7 8.7   ALBUMIN g/dL 2.6*  --  2.8*     Results from last 7 days   Lab Units 07/19/23  0538 07/18/23  0515   PROCALCITONIN ng/mL 0.26* 0.25     No results found for: HGBA1C, POCGLU        Diet: Cardiac Diets; Healthy Heart (2-3 Na+); No Straw; Texture: Pureed (NDD 1); Fluid Consistency: Honey Thick       Assessment/Plan     Active Hospital Problems    Diagnosis  POA    **Acute on chronic heart failure with preserved ejection fraction (HFpEF) [I50.33]  Yes    Unwitnessed fall [R29.6]  Yes    Chronic respiratory failure with hypoxia [J96.11]  Yes    Metabolic encephalopathy [G93.41]  Yes    Pleural effusion [J90]  Yes    Severe malnutrition [E43]  Yes    Recurrent pleural effusion on left [J90]  Yes    Pharyngeal dysphagia [R13.13]  Yes    Dementia with anxiety [F03.94]  Yes    Pulmonary hypertension [I27.20]  Yes    Thrombocytopenia [D69.6]  Yes    Possible cirrhosis of liver per CT imaging [K74.60]  Yes    History of CVA (cerebrovascular accident) [Z86.73]  Not Applicable    Stage 3a chronic kidney disease [N18.31]  Yes    Elevated troponin [R77.8]  Yes    Essential hypertension [I10]  Yes      Resolved Hospital Problems   No resolved problems to display.     Mr. Mullins is a 88 year old male who presented to the hospital from his NH for an unwitnessed fall. He has known dementia with recent cardio-embolic stroke back in April. He has been in a facility or in the hospital since that time. He has had recurrent left  pleural effusion as well as CHF and aspiration pneumonia complicating his overall prognosis. He was brought to this facility where he was noted to have recurrence of his large left pleural effusion as well as markedly elevated troponin/BNP with concerns for CHF exacerbation.      Acute on chronic heart failure with preserved EF  Recurrent large left pleural effusion  Chronic respiratory failure with hypoxia  -Cardiology followed. Given some IV Lasix, but this was held d/t bump in creatinine.  -Chronically on 3 L and currently at 5L.  -Pulmonology consulted and offered thoracentesis but family is pursing palliative.  -Suspect majority of his lung issues are fluid related/effusion related, but possible component of infection could be at play given recent thin liquids at facility and confusion. Zosyn started, but now stopped given focus on comfort.     Elevated troponin  -Unable to assess for cardiac complaints, but does have history of NSTEMI secondary to CHF.  -EKG stable. Type II NSTEMI from CHF.     Metabolic encephalopathy  Unwitnessed fall  Dementia  History of stroke  dysphagia  -Wife reports increased confusion on top of his underlying dementia.  -Difficult neuro exam d/t his cooperation.  -CTH and c-spine without new findings.  -Monitor neuro checks. Delirium precautions.   -PT/OT/ST consulted.  -There is the chance he could have had another stroke with his fall/worsening neuro status. Initial CTH was stable. Neurology has been consulted for further recommendations but no plans for work up given his decline.     CKD3  -Near baseline around 1.3-1.7.     Thrombocytopenia  Anemia  Possible cirrhosis  -Recent CT imaging with nodular contour of liver with concerning for cirrhosis versus hepatic tumor?  -LFTs stable but platelets lower than his baseline. Was 70-80s at prior admission and now around 50s. ? Etiology. Could be related to a consumptive process versus underlying liver issue.      HTN  -BP stable at  present. BB resumed.     Discussed with patient and nurse.    Palliative met with family 7/19. They decided to pursue comfort at that time. Plans to move to [facility] today. I placed palliative care order set and hospice consult yesterday.     Code Status - DNR with comfort.    CRISTÓBAL Amaya  [facility]  07/20/23  13:02 EDT

## 2023-07-20 NOTE — PLAN OF CARE
Goal Outcome Evaluation:           Progress: no change  Outcome Evaluation: PPS 10%. Pt transferred to [facility] for palliative care. Pt medicated with 1mg of ativan and 4mg of morphine for restlessness. Will start to premedicate pt. Spouse at bedside. No needs at this time.

## 2023-07-20 NOTE — PROGRESS NOTES
Palliative Social Work Note    Purpose of visit: Follow up  Assessment: Patient unresponsive, comfortable initially but began moaning and breathing more heavily during visit. PPS: 10%.   Support System: Present at bedside, Involved in care, and Family  Psychosocial Needs Identified: Major Change/Loss/Stressor  Plan/Other Comments:   Spoke to spouse at bedside. She noted that patient has been sleeping more peacefully and soundly since receiving symptom control. She appears to be grieving appropriately. Validated and normalized her feelings. Offered support. She stated they are meeting with hospice tomorrow.       Social Work Assessment Tool (SWAT) for Palliative Care Patients and Caregivers          How well are patient and/or primary caregiver doing?    1              2          3               4                 5  Not well  Not too Neutral  Reasonably  Extremely     at all         well                      well              well                          Issue:               Patient                  Primary               Caregiver    1.End of life decisions consistent with their religous and cultural norms.   6 N/A   5 Extremely well   2. Patient thoughts of suicide or wanting to hasten death.  6 N/A 5 Extremely well   3. Anxiety about death    6 N/A 5 Extremely well   4. Preferences about environment (e.g., pets, own bed etc.) 6 N/A 5 Extremely well   5. Social support    6 N/A 5 Extremely well   6. Financial resources 6 N/A 5 Extremely well   7. Safety issues 6 N/A 5 Extremely well   8. Comfort issues 6 N/A 5 Extremely well   9. Complicated anticipatory grief (e.g., guilt, depression, etc.) 6 N/A 4 Reasonably well   10. Awareness of prognosis 6 N/A 5 Extremely well   11. Spirituality  (e.g., higher purpose in life, sense of connection with all)  6 N/A 5 Extremely well    Total Score  54

## 2023-07-20 NOTE — PLAN OF CARE
Goal Outcome Evaluation:  VSS. Patient has orders to move to palliative care when a bed becomes available. He is lethargic and agitiated at times, ativan given and patient appears to be resting and is calm. Turning every 2 hours and doing frequent oral care. Purwick in place. Will continue to monitor and keep comfortable.

## 2023-07-20 NOTE — PROGRESS NOTES
"Nutrition Services    Patient Name:  Art Mullins  YOB: 1935  MRN: [id number]  Admit Date:  7/17/2023    Assessment Date:  07/20/23    Comment: Nutrition follow up.  Noted transition to palliative care. Has not eaten in the past 24 hrs. Will cancel supplements and follow if needed.     CLINICAL NUTRITION ASSESSMENT      Reason for Assessment Follow-up Protocol     Diagnosis/Problem   CHF, chronic resp failure, metabolic encephalopathy, dementia,      Encounter Information        Nutrition History:     Food Preferences: Likes thickened coffee, scr eggs, fruit.   Supplements:    Factors Affecting Intake: chewing difficulty, decreased appetite, swallow impairment     Anthropometrics        Current Height  Current Weight  BMI kg/m2 Height: 175.3 cm (69\")  Weight: 73 kg (161 lb) (07/18/23 0520)  Body mass index is 23.78 kg/m².   Adjusted BMI (if applicable)    BMI Category Normal/Healthy (18.4 - 24.9)       Admission Weight 73kg       Ideal Body Weight (IBW) 70.7kg   Adjusted IBW (if applicable)        Usual Body Weight (UBW) 182lb   Weight Change/Trend Loss 2-3 months              Estimated/Assessed Needs        Current Weight  Weight: 73 kg (161 lb) (07/18/23 0520)       Energy Requirements    Weight for Calculation 73 kg   Method for Estimation  25 kcal/kg   EST Needs (kcal/day) 1825       Protein Requirements    Weight for Calculation 73 kg   EST Protein Needs (g/kg) 1.2 gm/kg   EST Daily Needs (g/day) 88       Fluid Requirements     Method for Estimation 1 mL/kcal    EST Needs (mL/day) 1800     Tests/Procedures        Tests/Procedures No new tests/procedures     Labs       Pertinent Labs    Results from last 7 days   Lab Units 07/19/23  0538 07/18/23  0515 07/17/23  1534   SODIUM mmol/L 146* 143 141   POTASSIUM mmol/L 3.2* 3.5 4.8   CHLORIDE mmol/L 103 107 106   CO2 mmol/L 27.2 24.0 22.6   BUN mg/dL 19 15 17   CREATININE mg/dL 1.63* 1.35* 1.53*   CALCIUM mg/dL 8.8 8.7 8.7   BILIRUBIN mg/dL 1.0  -- "  0.6   ALK PHOS U/L 177*  --  181*   ALT (SGPT) U/L 14  --  17   AST (SGOT) U/L 51*  --  78*   GLUCOSE mg/dL 101* 90 107*       Results from last 7 days   Lab Units 07/19/23  0538   HEMOGLOBIN g/dL 10.1*   HEMATOCRIT % 31.7*   WBC 10*3/mm3 11.89*   ALBUMIN g/dL 2.6*       Results from last 7 days   Lab Units 07/19/23  0538 07/18/23  0515 07/17/23  1534   INR  1.61*  --   --    PLATELETS 10*3/mm3 55* 49* 51*       COVID19   Date Value Ref Range Status   06/28/2023 Not Detected Not Detected - Ref. Range Final     Lab Results   Component Value Date    HGBA1C 5.30 05/01/2023          Medications           Scheduled Medications       Infusions     PRN Medications   acetaminophen    diphenoxylate-atropine    Glycerin-Hypromellose-    glycopyrrolate **OR** glycopyrrolate **OR** glycopyrrolate **OR** glycopyrrolate    HYDROmorphone **OR** Morphine **OR** morphine **OR** ketorolac    HYDROmorphone **OR** Morphine **OR** morphine    HYDROmorphone **OR** Morphine **OR** morphine    LORazepam **OR** LORazepam **OR** LORazepam    LORazepam **OR** LORazepam **OR** LORazepam    LORazepam **OR** LORazepam **OR** LORazepam    melatonin    ondansetron **OR** ondansetron     Physical Findings          Physical Appearance disoriented, on oxygen therapy, other: sleeping   Oral/Mouth Cavity tooth or teeth missing   Edema  1+ (trace), 2+ (mild)   Gastrointestinal last bowel movement: 7/19   Skin  skin tear, abrasions   Tubes/Drains/Lines none   NFPE Patient/family declined exam since pt sleeping     Malnutrition Severity Assessment      Patient meets criteria for : Severe Malnutrition         Current Nutrition Orders & Evaluation of Intake       Oral Nutrition     Food Allergies NKFA   Current PO Diet Diet: Cardiac Diets; Healthy Heart (2-3 Na+); No Straw; Texture: Pureed (NDD 1); Fluid Consistency: Honey Thick   Supplement Magic Cup, TID   PO Evaluation     % PO Intake 0    # of Days Evaluated 1   --  PES STATEMENT / NUTRITION  DIAGNOSIS      Nutrition Dx Problem  Problem: Malnutrition  Etiology: Medical Diagnosis dementia, dysphagia  Signs/Symptoms: Report of Minimal PO Intake and Unintended Weight Change    Comment:    --  NUTRITION INTERVENTION / PLAN OF CARE      Intervention Goal(s) Palliative Care and Hospice Care         RD Intervention/Action Care plan reviewed         Prescription/Orders:       PO Diet       Supplements DC magic cups      Snacks       Enteral Nutrition       Parenteral Nutrition    New Prescription Ordered? Yes   --      Monitor/Evaluation Per protocol, I&O, PO intake, Supplement intake, Pertinent labs, Weight, Skin status, GI status, Symptoms, POC/GOC, Swallow function, Hemodynamic stability   Discharge Plan/Needs Pending clinical course   Education Will instruct as appropriate   --    RD to follow per protocol.      Electronically signed by:  Arlene Dao RD  07/20/23 14:25 EDT

## 2023-07-20 NOTE — PROGRESS NOTES
Reviewed the chart.  There is nothing to add other than what is already recommended.  We will see the patient only as needed.  Please call back if required

## 2023-07-20 NOTE — SIGNIFICANT NOTE
07/20/23 0855   OTHER   Discipline speech language pathologist   Rehab Time/Intention   Session Not Performed other (see comments)  (Discussed with RN. Plan to transfer to palliative care. SLP to s/o, please re-consult as warranted.)

## 2023-07-21 PROBLEM — J96.21 ACUTE ON CHRONIC RESPIRATORY FAILURE WITH HYPOXIA: Status: ACTIVE | Noted: 2023-01-01

## 2023-07-21 NOTE — PLAN OF CARE
Goal Outcome Evaluation:  PPS 10%. Premedicated prior to q4 turns with 4mg morphine and 1mg ativan for symptom management. 4L's o2. Huffman inserted for comfort. No family at bedside overnight. Will continue to monitor per palliative goals of care.

## 2023-07-21 NOTE — PROGRESS NOTES
Case Management Discharge Note      Final Note: The patient  on 23 @ 10:45. B. Bernice RN, CCP.         Selected Continued Care - Admitted Since 2023       Destination    No services have been selected for the patient.                Durable Medical Equipment    No services have been selected for the patient.                Dialysis/Infusion    No services have been selected for the patient.                Home Medical Care    No services have been selected for the patient.                Therapy    No services have been selected for the patient.                Community Resources    No services have been selected for the patient.                Community & DME    No services have been selected for the patient.                    Selected Continued Care - Prior Encounters Includes continued care and service providers with selected services from prior encounters from 2023 to 2023      Discharged on 2023 Admission date: 2023 - Discharge disposition: Skilled Nursing Facility (DC - External)      Destination       Service Provider Selected Services Address Phone Fax Patient Preferred    [facility] [address] [id number] [phone] --                      Discharged on 2023 Admission date: 2023 - Discharge disposition: Skilled Nursing Facility (DC - External)      Destination       Service Provider Selected Services Address Phone Fax Patient Preferred    [facility] [address] [address] [phone]                       Discharged on 2023 Admission date: 2023 - Discharge disposition: Skilled Nursing Facility (DC - External)      Destination       Service Provider Selected Services Address Phone Fax Patient Preferred    [facility] [address]  [address] [phone] --                               Final Discharge Disposition Code: 20 -

## 2023-07-21 NOTE — DISCHARGE SUMMARY
Name: Art Mullins  :  1935  MRN: [id number]         Primary Care Physician: Santos Simmons MD      Date of Admission:  2023  Date and Time of Death:  2023 at 10:45 AM    Principle Cause of Death:   Acute on chronic respiratory failure with hypoxia    Secondary Diagnoses:     Acute on chronic heart failure with preserved ejection fraction (HFpEF)    Essential hypertension    Stage 3a chronic kidney disease    Elevated troponin    History of CVA (cerebrovascular accident)    Dementia with anxiety    Possible cirrhosis of liver per CT imaging    Thrombocytopenia    Pulmonary hypertension    Pharyngeal dysphagia    Recurrent pleural effusion on left    Unwitnessed fall    Acute on chronic respiratory failure with hypoxia    Metabolic encephalopathy    Pleural effusion    Severe malnutrition      Hospital Course  Patient was a 88 y.o. male who presented to [facility] with who presented to the hospital from his NH for an unwitnessed fall. He had known dementia with recent cardio-embolic stroke back in April. He had a progressive decline since then and was in and out of a nursing home or the hospital. He was having issues with recurrent left pleural effusion as well as CHF and aspiration pneumonia complicating his overall prognosis. He was brought to this facility where he was again noted to have recurrence of his large left pleural effusion as well as markedly elevated troponin/BNP with concerns for CHF exacerbation. Please see the admitting history and physical for further details.   He was placed on IV diuretics and Cardiology as well as pulmonology consulted. There were plans to offer repeat thoracentesis and cover him for potential aspiration pneumonia this stay. However, given his gradual decline and lack of functional improvement, family decided to pursue comfort measures. He had possible metabolic encephalopathy on top of underlying dementia without any improvement  complicating her current matters. He was transferred to [facility] for palliative care and passed away comfortably with medications for pain and increased work of breathing.      Felicita Bull, CRISTÓBAL  07/21/23  11:08 EDT

## 2023-07-21 NOTE — PROGRESS NOTES
Discharge Planning Assessment  [facility]     Patient Name: Art Mullins  MRN: [id number]  Today's Date: 2023    Admit Date: 2023    Plan: Undetermined   Discharge Needs Assessment    No documentation.                  Discharge Plan       Row Name 23 1348       Plan    Plan Comments The patient transferred to [facility] from [facility] on 23. The patient was palliative. [facility] to evaluate. JANUSZ Queen RN, CCP    Final Discharge Disposition Code 20 -     Final Note The patient  on 23 @ 10:45. JANUSZ Queen RN, CCP.                  Continued Care and Services - Admitted Since 2023    Coordination has not been started for this encounter.       Selected Continued Care - Prior Encounters Includes continued care and service providers with selected services from prior encounters from 2023 to 2023      Discharged on 2023 Admission date: 2023 - Discharge disposition: Skilled Nursing Facility (DC - External)      Destination       Service Provider Selected Services Address Phone Fax Patient Preferred    [facility] [address] [place] [id number] [phone] --                      Discharged on 2023 Admission date: 2023 - Discharge disposition: Skilled Nursing Facility (DC - External)      Destination       Service Provider Selected Services Address Phone Fax Patient Preferred    [facility] [address] [facility] [address] [phone]                       Discharged on 2023 Admission date: 2023 - Discharge disposition: Skilled Nursing Facility (DC - External)      Destination       Service Provider Selected Services Address Phone Fax Patient Preferred    [facility] [address] [facility] [address] [phone] --                           Expected Discharge Date and Time       Expected Discharge Date Expected Discharge Time    Jul 21, 2023            Demographic Summary    No documentation.                  Functional Status    No documentation.                  Psychosocial    No documentation.                  Abuse/Neglect    No documentation.                  Legal    No documentation.                  Substance Abuse    No documentation.                  Patient Forms    No documentation.                     Pooja Queen RN

## 2023-07-23 LAB
BACTERIA SPEC AEROBE CULT: NORMAL
BACTERIA SPEC AEROBE CULT: NORMAL

## 2023-07-24 LAB
MYCOBACTERIUM SPEC CULT: NORMAL
NIGHT BLUE STAIN TISS: NORMAL

## 2024-06-10 NOTE — THERAPY EVALUATION
Patient Name: Art Mullins  : 1935    MRN: [id number]                              Today's Date: 2023       Admit Date: 2023    Visit Dx:     ICD-10-CM ICD-9-CM   1. Acute CVA (cerebrovascular accident)  I63.9 434.91   2. Acute kidney injury  N17.9 584.9   3. Troponin level elevated  R77.8 790.6     Patient Active Problem List   Diagnosis   • Palpitations   • Essential hypertension   • Abnormal EKG   • Acute CVA (cerebrovascular accident)   • CKD (chronic kidney disease)   • BELLA (acute kidney injury)   • Elevated troponin   • Vision changes     Past Medical History:   Diagnosis Date   • Hyperlipidemia    • Hypertension      History reviewed. No pertinent surgical history.   General Information     Row Name 23 1202          Physical Therapy Time and Intention    Document Type discharge evaluation/summary  -MS     Mode of Treatment physical therapy  -MS     Row Name 23 1202          General Information    Patient Profile Reviewed yes  -MS     Prior Level of Function independent:;all household mobility;ADL's  + fall history- last fall in October  -MS     Existing Precautions/Restrictions no known precautions/restrictions  -MS     Barriers to Rehab none identified  -MS     Row Name 23 1202          Living Environment    People in Home spouse  -MS     Row Name 23 1202          Home Main Entrance    Number of Stairs, Main Entrance --  patio home per patient report.  -MS     Row Name 23 1202          Cognition    Orientation Status (Cognition) oriented x 4  -MS     Row Name 23 1202          Safety Issues, Functional Mobility    Comment, Safety Issues/Impairments (Mobility) Gait belt and non skid socks donned.  -MS           User Key  (r) = Recorded By, (t) = Taken By, (c) = Cosigned By    Initials Name Provider Type    MS Kristin Alvarado PT Physical Therapist               Mobility     Row Name 23 1204          Bed Mobility    Bed Mobility sit-supine  -MS      Sit-Supine [place] (Bed Mobility) independent  -MS     Row Name 05/01/23 1204          Transfers    Comment, (Transfers) Standing ind in room upon PT arrival- no exit alarm on.  -MS     Row Name 05/01/23 1204          Sit-Stand Transfer    Sit-Stand [place] (Transfers) independent  -MS     Row Name 05/01/23 1204          Gait/Stairs (Locomotion)    [place] Level (Gait) supervision  -MS     Distance in Feet (Gait) 120'  -MS     Comment, (Gait/Stairs) No overt LOB or veering noted though patient did report feeling 'wobbly'  -MS           User Key  (r) = Recorded By, (t) = Taken By, (c) = Cosigned By    Initials Name Provider Type    Kristin Giron, PT Physical Therapist               Obj/Interventions     Row Name 05/01/23 1205          Range of Motion Comprehensive    General Range of Motion no range of motion deficits identified  -MS     Row Name 05/01/23 1205          Strength Comprehensive (MMT)    Comment, General Manual Muscle Testing (MMT) Assessment R LE 5/5, L LE 5/5 except knee ex 4/5  -MS     Row Name 05/01/23 1205          Balance    Balance Assessment sitting static balance;standing static balance  -MS     Static Sitting Balance independent  -MS     Position, Sitting Balance sitting edge of bed  -MS     Static Standing Balance independent  -MS     Position/Device Used, Standing Balance unsupported  -MS     Row Name 05/01/23 1205          Sensory Assessment (Somatosensory)    Sensory Assessment (Somatosensory) sensation intact  -MS           User Key  (r) = Recorded By, (t) = Taken By, (c) = Cosigned By    Initials Name Provider Type    Kristin Giron, PT Physical Therapist               Goals/Plan    No documentation.                Clinical Impression     Row Name 05/01/23 1206          Pain    Pretreatment Pain Rating 0/10 - no pain  -MS     Posttreatment Pain Rating 0/10 - no pain  -MS     Row Name 05/01/23 1206          Therapy Assessment/Plan (PT)    Criteria for  Skilled Interventions Met (PT) no;does not meet criteria for skilled intervention  -MS     Therapy Frequency (PT) evaluation only  -MS     Row Name 05/01/23 1206          Vital Signs    O2 Delivery Pre Treatment room air  -MS     Row Name 05/01/23 1206          Positioning and Restraints    Pre-Treatment Position in bed  -MS     Post Treatment Position bed  -MS     In Bed fowlers;call light within reach;encouraged to call for assist;exit alarm on  -MS           User Key  (r) = Recorded By, (t) = Taken By, (c) = Cosigned By    Initials Name Provider Type    Kristin Giron, PT Physical Therapist               Outcome Measures     Row Name 05/01/23 1206          How much help from another person do you currently need...    Turning from your back to your side while in flat bed without using bedrails? 4  -MS     Moving from lying on back to sitting on the side of a flat bed without bedrails? 4  -MS     Moving to and from a bed to a chair (including a wheelchair)? 4  -MS     Standing up from a chair using your arms (e.g., wheelchair, bedside chair)? 4  -MS     Climbing 3-5 steps with a railing? 3  -MS     To walk in hospital room? 3  -MS     AM-PAC 6 Clicks Score (PT) 22  -MS     Highest level of mobility 7 --> Walked 25 feet or more  -MS     Row Name 05/01/23 1206          Modified [place] Scale    Modified [place] Scale 1 - No significant disability despite symptoms.  Able to carry out all usual duties and activities.  -MS     Row Name 05/01/23 1206          Functional Assessment    Outcome Measure Options AM-PAC 6 Clicks Basic Mobility (PT);Modified [place]  -MS           User Key  (r) = Recorded By, (t) = Taken By, (c) = Cosigned By    Initials Name Provider Type    Kristin Giron, PT Physical Therapist                               PT Recommendation and Plan           Time Calculation:    PT Charges     Row Name 05/01/23 1202             Time Calculation    Start Time 1023  -MS      Stop Time 1040  -MS       Time Calculation (min) 17 min  -MS      PT Received On 05/01/23  -MS            User Key  (r) = Recorded By, (t) = Taken By, (c) = Cosigned By    Initials Name Provider Type    Kristin Giron, PT Physical Therapist              Therapy Charges for Today     Code Description Service Date Service Provider Modifiers Qty    [id number]  PT EVAL LOW COMPLEXITY 2 5/1/2023 Kristin Alvarado, PT GP 1    [id number]  PT THER PROC EA 15 MIN 5/1/2023 Kristin Alvarado, PT GP 1          PT G-Codes  Outcome Measure Options: AM-PAC 6 Clicks Basic Mobility (PT), Modified [place]  AM-PAC 6 Clicks Score (PT): 22  Modified [place] Scale: 1 - No significant disability despite symptoms.  Able to carry out all usual duties and activities.  PT Discharge Summary  Anticipated Discharge Disposition (PT): home    Kristin Alvarado PT  5/1/2023     0

## (undated) DEVICE — GW EMR FIX EXCHG J STD .035 3MM 260CM

## (undated) DEVICE — GLIDESHEATH SLENDER STAINLESS STEEL KIT: Brand: GLIDESHEATH SLENDER

## (undated) DEVICE — CATH DIAG IMPULSE FL3.5 5F 100CM

## (undated) DEVICE — PK CATH CARD 40

## (undated) DEVICE — CATH DIAG DXTERITY ULTRA TRANSRADIAL 4.0 5F 100CM 0/SH

## (undated) DEVICE — KT MANIFLD CARDIAC